# Patient Record
Sex: MALE | Race: WHITE | NOT HISPANIC OR LATINO | Employment: OTHER | URBAN - METROPOLITAN AREA
[De-identification: names, ages, dates, MRNs, and addresses within clinical notes are randomized per-mention and may not be internally consistent; named-entity substitution may affect disease eponyms.]

---

## 2017-12-04 ENCOUNTER — OFFICE VISIT (OUTPATIENT)
Dept: SLEEP MEDICINE | Facility: HOSPITAL | Age: 67
End: 2017-12-04

## 2017-12-04 VITALS
HEIGHT: 66 IN | WEIGHT: 179 LBS | HEART RATE: 68 BPM | SYSTOLIC BLOOD PRESSURE: 150 MMHG | OXYGEN SATURATION: 90 % | DIASTOLIC BLOOD PRESSURE: 102 MMHG | BODY MASS INDEX: 28.77 KG/M2

## 2017-12-04 DIAGNOSIS — G47.33 MODERATE OBSTRUCTIVE SLEEP APNEA: Primary | ICD-10-CM

## 2017-12-04 DIAGNOSIS — G47.10 HYPERSOMNIA: ICD-10-CM

## 2017-12-04 PROCEDURE — 99213 OFFICE O/P EST LOW 20 MIN: CPT | Performed by: INTERNAL MEDICINE

## 2017-12-04 NOTE — PROGRESS NOTES
Subjective:     Chief Complaint:   Chief Complaint   Patient presents with   • Follow-up       HPI:    Nic Brooks Sr. is a 67 y.o. male here for follow-up of obstructive sleep apnea.    He is here in yearly follow-up of his obstructive sleep apnea.  He remains on auto CPAP therapy at a range of 6-18.  He is utilizing a nasal mask.  He notes no significant problems.    Further details are as follows:    Since last visit sleep problem has: remained the same  Currently using PAP: yes Hours of usage during the night: 6    Amount of sleep per night : 6 hours  Average length of time it takes to fall asleep : 5 minutes  Number of awakenings per night : 2     He feels fatigue (tiredness, exhaustion, lethargy) in the daytime even when not sleepy? Occasionally   He feels sleepy (or struggles to stay awake) in the daytime? Occasionally     Boone Scale scored as 3/24.    Type of mask: nasal mask    He (since starting PAP or since last visit) has problems with the following:   Pressure from the mask 0 - No Problem  Skin irritation from the mask 0 - No Problem  Mask coming off face 0 - No Problem  Air leaks from the mask 0 - No Problem  Dry mouth or throat 0 - No Problem  Nasal congestion 0 - No Problem    I reviewed his PAP download:  Average pressure: 7  Average AHI:  1  Average minutes in large leak per night: 0      Current medications are:   Current Outpatient Prescriptions:   •  diclofenac (VOLTAREN) 75 MG EC tablet, , Disp: , Rfl:   •  famotidine (PEPCID) 20 MG tablet, , Disp: , Rfl:   •  levocetirizine (XYZAL) 5 MG tablet, , Disp: , Rfl: .      The patient's relevant past medical, surgical, family and social history were reviewed and updated in Epic as appropriate.     ROS:    Review of Systems   Constitutional: Negative for fatigue and unexpected weight change.   Respiratory: Positive for apnea.    Psychiatric/Behavioral: Negative for sleep disturbance.         Objective:    Physical Exam   Constitutional: He is  oriented to person, place, and time. He appears well-developed and well-nourished.   HENT:   Head: Normocephalic and atraumatic.   Mouth/Throat: Oropharynx is clear and moist.   Class 2 airway   Neck: Neck supple. No thyromegaly present.   Cardiovascular: Normal rate and regular rhythm.  Exam reveals no gallop and no friction rub.    No murmur heard.  Pulmonary/Chest: Effort normal. No respiratory distress. He has no wheezes. He has no rales.   Musculoskeletal: He exhibits no edema.   Neurological: He is alert and oriented to person, place, and time.   Skin: Skin is warm and dry.   Psychiatric: He has a normal mood and affect. His behavior is normal.   Vitals reviewed.      Data:    Patient's PAP download was personally reviewed including raw data and results.    Assessment:    Problem List Items Addressed This Visit        Pulmonary Problems    Moderate obstructive sleep apnea - Primary    Relevant Orders    PAP Therapy       Other    Hypersomnia    Overview     Resolved               Acceptable treatment of his obstructive sleep apnea with auto CPAP 6-18 utilizing a nasal pillow mask.    Plan:     1. No change in CPAP settings needed  2. I renewed supplies for the next year  3. Routine follow-up      Discussed in detail with the patient.  He will call prior to his follow up visit for any new problems.    Signed by  Juanjo Adam MD

## 2018-01-17 DIAGNOSIS — R06.09 DOE (DYSPNEA ON EXERTION): Primary | ICD-10-CM

## 2018-01-18 ENCOUNTER — APPOINTMENT (OUTPATIENT)
Dept: PREADMISSION TESTING | Facility: HOSPITAL | Age: 68
End: 2018-01-18

## 2018-01-18 ENCOUNTER — HOSPITAL ENCOUNTER (OUTPATIENT)
Dept: GENERAL RADIOLOGY | Facility: HOSPITAL | Age: 68
Discharge: HOME | End: 2018-01-18
Attending: INTERNAL MEDICINE | Admitting: INTERNAL MEDICINE

## 2018-01-18 ENCOUNTER — OFFICE VISIT (OUTPATIENT)
Dept: CARDIOLOGY | Facility: CLINIC | Age: 68
End: 2018-01-18

## 2018-01-18 VITALS
DIASTOLIC BLOOD PRESSURE: 89 MMHG | SYSTOLIC BLOOD PRESSURE: 141 MMHG | HEART RATE: 55 BPM | WEIGHT: 180 LBS | HEIGHT: 68 IN | BODY MASS INDEX: 27.28 KG/M2

## 2018-01-18 DIAGNOSIS — R03.0 ELEVATED BLOOD PRESSURE READING: ICD-10-CM

## 2018-01-18 DIAGNOSIS — E78.5 DYSLIPIDEMIA: ICD-10-CM

## 2018-01-18 DIAGNOSIS — R94.31 ABNORMAL EKG: ICD-10-CM

## 2018-01-18 DIAGNOSIS — R07.89 OTHER CHEST PAIN: ICD-10-CM

## 2018-01-18 DIAGNOSIS — R07.89 OTHER CHEST PAIN: Primary | ICD-10-CM

## 2018-01-18 DIAGNOSIS — R06.09 DOE (DYSPNEA ON EXERTION): ICD-10-CM

## 2018-01-18 LAB
ALBUMIN SERPL-MCNC: 4.2 G/DL (ref 3.2–4.8)
ALBUMIN/GLOB SERPL: 1.6 G/DL (ref 1.5–2.5)
ALP SERPL-CCNC: 75 U/L (ref 25–100)
ALT SERPL W P-5'-P-CCNC: 25 U/L (ref 7–40)
ANION GAP SERPL CALCULATED.3IONS-SCNC: 2 MMOL/L (ref 3–11)
ARTICHOKE IGE QN: 141 MG/DL (ref 0–130)
AST SERPL-CCNC: 24 U/L (ref 0–33)
BILIRUB SERPL-MCNC: 0.6 MG/DL (ref 0.3–1.2)
BUN BLD-MCNC: 17 MG/DL (ref 9–23)
BUN/CREAT SERPL: 15.5 (ref 7–25)
CALCIUM SPEC-SCNC: 9.7 MG/DL (ref 8.7–10.4)
CHLORIDE SERPL-SCNC: 107 MMOL/L (ref 99–109)
CHOLEST SERPL-MCNC: 200 MG/DL (ref 0–200)
CO2 SERPL-SCNC: 30 MMOL/L (ref 20–31)
CREAT BLD-MCNC: 1.1 MG/DL (ref 0.6–1.3)
DEPRECATED RDW RBC AUTO: 50.5 FL (ref 37–54)
ERYTHROCYTE [DISTWIDTH] IN BLOOD BY AUTOMATED COUNT: 14.4 % (ref 11.3–14.5)
GFR SERPL CREATININE-BSD FRML MDRD: 67 ML/MIN/1.73
GLOBULIN UR ELPH-MCNC: 2.6 GM/DL
GLUCOSE BLD-MCNC: 100 MG/DL (ref 70–100)
HBA1C MFR BLD: 5.9 % (ref 4.8–5.6)
HCT VFR BLD AUTO: 46 % (ref 38.9–50.9)
HDLC SERPL-MCNC: 48 MG/DL (ref 40–60)
HGB BLD-MCNC: 15.4 G/DL (ref 13.1–17.5)
MCH RBC QN AUTO: 31.8 PG (ref 27–31)
MCHC RBC AUTO-ENTMCNC: 33.5 G/DL (ref 32–36)
MCV RBC AUTO: 94.8 FL (ref 80–99)
PLATELET # BLD AUTO: 252 10*3/MM3 (ref 150–450)
PMV BLD AUTO: 10.2 FL (ref 6–12)
POTASSIUM BLD-SCNC: 4.9 MMOL/L (ref 3.5–5.5)
PROT SERPL-MCNC: 6.8 G/DL (ref 5.7–8.2)
RBC # BLD AUTO: 4.85 10*6/MM3 (ref 4.2–5.76)
SODIUM BLD-SCNC: 139 MMOL/L (ref 132–146)
TRIGL SERPL-MCNC: 109 MG/DL (ref 0–150)
WBC NRBC COR # BLD: 6.79 10*3/MM3 (ref 3.5–10.8)

## 2018-01-18 PROCEDURE — 80053 COMPREHEN METABOLIC PANEL: CPT | Performed by: PHYSICIAN ASSISTANT

## 2018-01-18 PROCEDURE — 85027 COMPLETE CBC AUTOMATED: CPT | Performed by: PHYSICIAN ASSISTANT

## 2018-01-18 PROCEDURE — 83036 HEMOGLOBIN GLYCOSYLATED A1C: CPT | Performed by: PHYSICIAN ASSISTANT

## 2018-01-18 PROCEDURE — 80061 LIPID PANEL: CPT | Performed by: PHYSICIAN ASSISTANT

## 2018-01-18 PROCEDURE — 36415 COLL VENOUS BLD VENIPUNCTURE: CPT

## 2018-01-18 PROCEDURE — 71046 X-RAY EXAM CHEST 2 VIEWS: CPT

## 2018-01-18 PROCEDURE — 93000 ELECTROCARDIOGRAM COMPLETE: CPT | Performed by: INTERNAL MEDICINE

## 2018-01-18 PROCEDURE — 99203 OFFICE O/P NEW LOW 30 MIN: CPT | Performed by: INTERNAL MEDICINE

## 2018-01-18 RX ORDER — ACETAMINOPHEN 325 MG/1
650 TABLET ORAL EVERY 4 HOURS PRN
Status: CANCELLED | OUTPATIENT
Start: 2018-01-18

## 2018-01-18 RX ORDER — NITROGLYCERIN 0.4 MG/1
0.4 TABLET SUBLINGUAL
Status: CANCELLED | OUTPATIENT
Start: 2018-01-18

## 2018-01-18 RX ORDER — ASPIRIN 325 MG
325 TABLET ORAL DAILY
Qty: 100 TABLET | Refills: 4
Start: 2018-01-18 | End: 2018-01-19 | Stop reason: HOSPADM

## 2018-01-18 RX ORDER — CLOPIDOGREL BISULFATE 75 MG/1
TABLET ORAL
Qty: 38 TABLET | Refills: 11 | Status: ON HOLD | OUTPATIENT
Start: 2018-01-18 | End: 2018-01-19

## 2018-01-18 NOTE — PAT
Patient states doesn't have hx of mrsa but mrsa added to infection section so pat to call ID and figure out why on chart and possibly removed before surgery tomorrow.         Pt does have open sore on right wrist- minor cut from snow blowing and skin teared- pt states dr martínez aware- minor redness and no drainage-  Pt instructed to put antibiotic ointment on tonight and keep open to air so can fully scab over.

## 2018-01-18 NOTE — PROGRESS NOTES
"[facility]  INITIAL OFFICE CONSULT    Nic Brooks Sr.  : 1950  MRN:[id number]  Home Phone:[phone]  Patient Address: [address]  [facility] [address]    Date of Encounter: 2018    PCP: Madison Hodge, SHIVA  [address] [facility] [address]  Referring MD: self-refer    IDENTIFICATION: A 67 y.o.  white male, resident of [place], [place]     Chief Complaint   Patient presents with   • Chest Pain     PROBLEM LIST:   1. Chest pain  A. Right and LHC 2005: Normal LV function, EF 75%, normal right heart pressures, minor nonobstructive coronary artery disease  B. Recurrent chest pain with abnormal EKG showing anterior YESI, \"new\" since 2005  2. History of anemia with B12 deficiency  3. Obstructive sleep apnea, on CPAP therapy  4. 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 Arthritis  7. Surgical history:   A. Tonsillectomy  B. Splenectomy  C. Lower lobectomy of right lung  D. Left knee meniscus repair  E. Lumbar discectomy    ALLERGIES: No Known Allergies    CURRENT MEDICATIONS:   •  Cyanocobalamin (B-12 COMPLIANCE INJECTION IJ), Inject as directed Every 30 Days  •  diclofenac 75 MG EC tablet, 2 Times a Day.  •  levocetirizine 5 MG tablet, As Needed    HPI: Mr. Brooks is a pleasant 66 y/o WM with history as noted above who is seen in consultation today for chest pain. He notes 2 specific incidents which are new and bothersome to him. Both episodes happened with exertion when he was outside working, first in October and again in mid December. He has mid-sternal chest pressure and associated intra-scapular pain, with sharp shooting pains that will radiate to his shoulder. He has associated shortness of breath and feels \"exhausted\" the following day after these episodes. His pain lasts a while, and notes that the first episode lasted the entire day, and the second time the pain " "lasted throughout the night as well. He took an 81mg aspirin the second time. He has not had any more episodes of chest pain since December. His EKG today is abnormal and shows some ST elevation in V2-V5, which is new compared to EKG from 2005. He denies history of hypertension, hyperlipidemia, diabetes or claudication. No history of MI, CVA, DVT/PE or rheumatic fever. No tobacco, alcohol or drug use. He is very active at home.     Cardiac Risk Factors include: advanced age (older than 55 for men, 65 for women) and male gender    ROS: All systems have been reviewed and are negative with the exception of those mentioned in the HPI and problem list above.    Surgical History:   Past Surgical History:   Procedure Laterality Date   • APPENDECTOMY     • KIDNEY STONE SURGERY     • KNEE ARTHROSCOPY W/ MENISCECTOMY     • LUMBAR DISCECTOMY     • LUNG REMOVAL, PARTIAL     • TONSILLECTOMY       Social History:   Social History     Social History   • Marital status:      Spouse name: N/A   • Number of children: N/A   • Years of education: N/A     Occupational History   • Retired      Social History Main Topics   • Smoking status: Never Smoker   • Smokeless tobacco: Never Used   • Alcohol use No   • Drug use: No   • Sexual activity: Not on file     Family History:   Family History   Problem Relation Age of Onset   • Hypertension Mother    • Cancer Mother    • Cancer Father    • Emphysema Father    • Diabetes Father    • COPD Father        Objective     Vitals:    01/18/18 1016 01/18/18 1024 01/18/18 1025 01/18/18 1026   BP: 126/82 129/87 147/92 141/89   BP Location: Left arm Left arm Right arm Right arm   Patient Position: Sitting Standing Standing Sitting   Pulse: 60 60 61 55   Weight: 81.6 kg (180 lb)      Height: 172.7 cm (68\")        Body mass index is 27.37 kg/(m^2).    PHYSICAL EXAM:  Constitutional:  Well-nourished, cooperative, in no acute distress.   Head:  Normocephalic, without obvious " "abnormality, atraumatic.   Neck: No adenopathy, supple, trachea midline, no thyromegaly, no    carotid bruit, no JVD.   Respiratory:   Clear to auscultation bilaterally; respirations regular, even and unlabored. No wheezes, rales or ronchi.    Cardiovascular:  Regular rhythm and normal rate, normal S1 and S2, no            murmur, no gallop, no rub, no click.   Pulses: Peripheral pulses are present and equal bilaterally.   GI:   Soft, non-distended. Bowel sounds heard throughout. Non-tender to palpation, no guarding.   Extremities: No edema, clubbing or cyanosis.   Skin: Skin is warm and dry. No bleeding, bruising or rash.   Neurological: Alert, oriented to time, person and place. No focal deficits.     Labs/Diagnostic Data  No recent labs     Labs 10/2016:   HgbA1C: 5.7%  TSH 1.24  CBC: WBC 6.2, RBC 4.47, Hgb 14, Hct 42, Plt 298  Lipid Panel: , HDL 38, , Trig 68  CMP: Na 143, K 5.3, Cl 107, CO2 28, Glucose 84, BUN 22, Cr 0.9, AST 23, ALT 25, Alk Phos 70      ECG 12 Lead  Date/Time: 1/18/2018 11:34 AM  Performed by: MELANI SULLIVAN  Authorized by: MELANI SULLIVAN   Comparison: compared with previous ECG from 1/20/2005  Comparison to previous ECG: Anterior YESI, \"new\" since 1/20/2005  Rhythm: sinus rhythm  Rate: normal  BPM: 62  Conduction: incomplete RBBB  QRS axis: normal  Clinical impression: abnormal ECG  Comments: Anterior YESI, \"new\" since 1/20/2005        Radiology Data:   CXR 1/18/2018:  FINDINGS:   1. There is blunting of the right costophrenic angle with elevation  right hemidiaphragm and there is considerable pleural reaction at the  right lung base. This suggests chronic pleural scarring at the right  base perhaps with a small amount of effusion, possibly organized  effusion. This appearance has increased slightly when compared to  04/06/2016 but was present to some degree previously.  2. The lungs are otherwise clear. Active parenchymal lung disease is not  identified.    " "      IMPRESSION:  1. Pleural scarring pleural reaction and increased pleural density is  seen right lung base, very slightly progressed from 2016.  2. No acute findings are otherwise noted in the chest. There is no new  active disease. Heart size is normal and the heart is compensated.      Assessment and Plan:     1. His EKG today shows 1-2 mm ST elevation in V2-V5 which is \"new\" since EKG 01/2005. His chest pain has mixed features, however when taken together with the EKG this is bothersome, and he needs a cardiac catheterization to rule out ischemic heart disease.  2. He will begin a daily aspirin 325mg as well as take 600mg of Plavix today with plan for left heart catheterization tomorrow. He understands and agrees.  3. Labs will be obtained to include CBC, CMP, A1C and lipid panel  4. Final disposition to follow pending above.     Thank you for allowing me to participate in the care of Nic Brooks SrReyna. Feel free to contact me directly with any further questions or concerns.    Scribed for Negrito Wilkes MD by Letha Scott PA-C. 1/18/2018  10:28 AM   I, Negrito Wilkes MD, [facility], [facility], personally performed the services described in this documentation as scribed by the above named individual in my presence, and it is both accurate and complete. At 6:31 PM on 01/18/2018    "

## 2018-01-18 NOTE — DISCHARGE INSTRUCTIONS
The following instructions given during Pre Admission Testing visit:    Do not eat or drink anything after MN except for sips of water with your a.m. Prescription meds unless otherwise instructed by your physician.    Glasses and jewelry may be worn, but dentures must be removed prior to cath/procedure.    Leave any items you consider valuable at home.    Family members may wait in [facility] waiting area during procedure.    Bring all medications in their original containers the day of procedure.    Bring photo ID and insurance cards on the day of procedure.    Need to make arrangements for transportation prior to discharge.    The following handouts were given:     Heart Cath pathway (if applicable)   Cardiac Cath booklet published by [facility]    OR appropriate [facility] procedure booklet    If applicable, pt instructed to bring CPAP mask and tubing the day of procedure.

## 2018-01-19 ENCOUNTER — HOSPITAL ENCOUNTER (OUTPATIENT)
Facility: HOSPITAL | Age: 68
Discharge: HOME | End: 2018-01-19
Attending: INTERNAL MEDICINE | Admitting: INTERNAL MEDICINE

## 2018-01-19 VITALS
SYSTOLIC BLOOD PRESSURE: 145 MMHG | OXYGEN SATURATION: 96 % | RESPIRATION RATE: 18 BRPM | WEIGHT: 181 LBS | DIASTOLIC BLOOD PRESSURE: 101 MMHG | BODY MASS INDEX: 28.41 KG/M2 | TEMPERATURE: 97.3 F | HEIGHT: 67 IN | HEART RATE: 63 BPM

## 2018-01-19 DIAGNOSIS — R03.0 ELEVATED BLOOD PRESSURE READING: ICD-10-CM

## 2018-01-19 DIAGNOSIS — R94.31 ABNORMAL EKG: ICD-10-CM

## 2018-01-19 DIAGNOSIS — E78.5 DYSLIPIDEMIA: ICD-10-CM

## 2018-01-19 DIAGNOSIS — R07.89 OTHER CHEST PAIN: ICD-10-CM

## 2018-01-19 LAB
ACT BLD: 241 SECONDS (ref 82–152)
ACT BLD: 279 SECONDS (ref 82–152)

## 2018-01-19 PROCEDURE — 25010000002 FENTANYL CITRATE (PF) 100 MCG/2ML SOLUTION: Performed by: INTERNAL MEDICINE

## 2018-01-19 PROCEDURE — C1769 GUIDE WIRE: HCPCS | Performed by: INTERNAL MEDICINE

## 2018-01-19 PROCEDURE — C9600 PERC DRUG-EL COR STENT SING: HCPCS | Performed by: INTERNAL MEDICINE

## 2018-01-19 PROCEDURE — C1874 STENT, COATED/COV W/DEL SYS: HCPCS | Performed by: INTERNAL MEDICINE

## 2018-01-19 PROCEDURE — C1725 CATH, TRANSLUMIN NON-LASER: HCPCS | Performed by: INTERNAL MEDICINE

## 2018-01-19 PROCEDURE — 93571 IV DOP VEL&/PRESS C FLO 1ST: CPT | Performed by: INTERNAL MEDICINE

## 2018-01-19 PROCEDURE — 85347 COAGULATION TIME ACTIVATED: CPT

## 2018-01-19 PROCEDURE — 93458 L HRT ARTERY/VENTRICLE ANGIO: CPT | Performed by: INTERNAL MEDICINE

## 2018-01-19 PROCEDURE — 92978 ENDOLUMINL IVUS OCT C 1ST: CPT | Performed by: INTERNAL MEDICINE

## 2018-01-19 PROCEDURE — 92928 PRQ TCAT PLMT NTRAC ST 1 LES: CPT | Performed by: INTERNAL MEDICINE

## 2018-01-19 PROCEDURE — 25010000002 MIDAZOLAM PER 1 MG: Performed by: INTERNAL MEDICINE

## 2018-01-19 PROCEDURE — 25010000002 HEPARIN (PORCINE) PER 1000 UNITS: Performed by: INTERNAL MEDICINE

## 2018-01-19 PROCEDURE — C1894 INTRO/SHEATH, NON-LASER: HCPCS | Performed by: INTERNAL MEDICINE

## 2018-01-19 PROCEDURE — C1887 CATHETER, GUIDING: HCPCS | Performed by: INTERNAL MEDICINE

## 2018-01-19 PROCEDURE — G0378 HOSPITAL OBSERVATION PER HR: HCPCS

## 2018-01-19 PROCEDURE — C1753 CATH, INTRAVAS ULTRASOUND: HCPCS | Performed by: INTERNAL MEDICINE

## 2018-01-19 PROCEDURE — 0 IOPAMIDOL PER 1 ML: Performed by: INTERNAL MEDICINE

## 2018-01-19 PROCEDURE — C1760 CLOSURE DEV, VASC: HCPCS | Performed by: INTERNAL MEDICINE

## 2018-01-19 DEVICE — XIENCE ALPINE EVEROLIMUS ELUTING CORONARY STENT SYSTEM 3.00 MM X 28 MM / RAPID-EXCHANGE
Type: IMPLANTABLE DEVICE | Status: FUNCTIONAL
Brand: XIENCE ALPINE

## 2018-01-19 RX ORDER — HEPARIN SODIUM 1000 [USP'U]/ML
INJECTION, SOLUTION INTRAVENOUS; SUBCUTANEOUS AS NEEDED
Status: DISCONTINUED | OUTPATIENT
Start: 2018-01-19 | End: 2018-01-19 | Stop reason: HOSPADM

## 2018-01-19 RX ORDER — NITROGLYCERIN 5 MG/ML
INJECTION, SOLUTION INTRAVENOUS AS NEEDED
Status: DISCONTINUED | OUTPATIENT
Start: 2018-01-19 | End: 2018-01-19 | Stop reason: HOSPADM

## 2018-01-19 RX ORDER — ASPIRIN 81 MG/1
81 TABLET ORAL DAILY
Status: DISCONTINUED | OUTPATIENT
Start: 2018-01-19 | End: 2018-01-19 | Stop reason: HOSPADM

## 2018-01-19 RX ORDER — MIDAZOLAM HYDROCHLORIDE 1 MG/ML
INJECTION INTRAMUSCULAR; INTRAVENOUS AS NEEDED
Status: DISCONTINUED | OUTPATIENT
Start: 2018-01-19 | End: 2018-01-19 | Stop reason: HOSPADM

## 2018-01-19 RX ORDER — CLOPIDOGREL BISULFATE 75 MG/1
75 TABLET ORAL DAILY
Status: DISCONTINUED | OUTPATIENT
Start: 2018-01-19 | End: 2018-01-19 | Stop reason: HOSPADM

## 2018-01-19 RX ORDER — ASPIRIN 81 MG/1
81 TABLET ORAL DAILY
Qty: 100 TABLET | Refills: 4 | Status: SHIPPED | OUTPATIENT
Start: 2018-01-20 | End: 2019-01-20

## 2018-01-19 RX ORDER — NITROGLYCERIN 0.4 MG/1
0.4 TABLET SUBLINGUAL
Status: DISCONTINUED | OUTPATIENT
Start: 2018-01-19 | End: 2018-01-19

## 2018-01-19 RX ORDER — FENTANYL CITRATE 50 UG/ML
INJECTION, SOLUTION INTRAMUSCULAR; INTRAVENOUS AS NEEDED
Status: DISCONTINUED | OUTPATIENT
Start: 2018-01-19 | End: 2018-01-19 | Stop reason: HOSPADM

## 2018-01-19 RX ORDER — CLOPIDOGREL BISULFATE 75 MG/1
75 TABLET ORAL DAILY
Qty: 90 TABLET | Refills: 3 | Status: SHIPPED | OUTPATIENT
Start: 2018-01-19 | End: 2018-11-08 | Stop reason: SDUPTHER

## 2018-01-19 RX ORDER — LISINOPRIL 5 MG/1
5 TABLET ORAL DAILY
Qty: 90 TABLET | Refills: 3 | Status: SHIPPED | OUTPATIENT
Start: 2018-01-19 | End: 2018-05-01 | Stop reason: SINTOL

## 2018-01-19 RX ORDER — ACETAMINOPHEN 325 MG/1
650 TABLET ORAL EVERY 4 HOURS PRN
Status: DISCONTINUED | OUTPATIENT
Start: 2018-01-19 | End: 2018-01-19 | Stop reason: HOSPADM

## 2018-01-19 RX ORDER — ATORVASTATIN CALCIUM 80 MG/1
80 TABLET, FILM COATED ORAL DAILY
Qty: 90 TABLET | Refills: 3 | Status: SHIPPED | OUTPATIENT
Start: 2018-01-19 | End: 2018-05-01 | Stop reason: SINTOL

## 2018-01-19 RX ORDER — ACETAMINOPHEN 325 MG/1
650 TABLET ORAL EVERY 4 HOURS PRN
Status: DISCONTINUED | OUTPATIENT
Start: 2018-01-19 | End: 2018-01-19

## 2018-01-19 RX ORDER — LIDOCAINE HYDROCHLORIDE 10 MG/ML
INJECTION, SOLUTION INFILTRATION; PERINEURAL AS NEEDED
Status: DISCONTINUED | OUTPATIENT
Start: 2018-01-19 | End: 2018-01-19 | Stop reason: HOSPADM

## 2018-01-19 RX ADMIN — CLOPIDOGREL BISULFATE 75 MG: 75 TABLET ORAL at 09:17

## 2018-01-19 RX ADMIN — ASPIRIN 81 MG: 81 TABLET, COATED ORAL at 09:17

## 2018-01-19 NOTE — H&P
"    Pre-cardiac Catheterization History and Physical  [facility]      Patient:  Nic Brooks Sr.  :  1950  MRN: [id number]    PCP:  SHIVA Amaya  PHONE:  [phone]    DATE: 2018  ID: Nic Brooks Sr. is a 67 y.o. male resident of [place], [place]    CC: Chest pain and abnormal EKG    PROBLEM LIST:   1. Chest pain  A. Right and LHC 2005: Normal LV function, EF 75%, normal right heart pressures, minor nonobstructive coronary artery disease  B. Recurrent chest pain with abnormal EKG showing anterior YESI, \"new\" since 2005  2. History of anemia with B12 deficiency  3. Obstructive sleep apnea, on CPAP therapy  4. 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 Arthritis  7. Surgical history:   A. Tonsillectomy  B. Splenectomy  C. Lower lobectomy of right lung  D. Left knee meniscus repair  E. Lumbar discectomy     BRIEF HPI: Patient was seen in clinic yesterday, please refer to that note. He is a pleasant 68 y/o WM with history as noted above who presented in consultation for chest pain. He had 2 specific incidents which were concerning for him happening a few months apart, with the last episode happening approximately 1 month ago. He has had midsternal chest pressure and intrascapular pain associated with exertion, but also has atypical features which are sharp shooting pains and long duration. He has not had chest pain recently, however an EKG in office yesterday demonstrated ST elevation in V2-V5 which is \"new\" compared to EKG from . He was given  and a loading dose of Plavix 600mg yesterday. No other symptoms or history of cardiovascular or cerebrovascular disease.      Cardiac Risk Factors: advanced age (older than 55 for men, 65 for women), dyslipidemia, hypertension and male gender    Allergies:      No Known Allergies    MEDICATIONS:  No current " "facility-administered medications on file prior to encounter.      Current Outpatient Prescriptions on File Prior to Encounter   Medication Sig   • aspirin 325 MG tablet Take 1 tablet by mouth Daily. (Patient taking differently: Take 81 mg by mouth Daily.)   • clopidogrel (PLAVIX) 75 MG tablet Take 8 tablets by mouth today for loading dose of 600mg, then take 75mg daily beginning tomorrow. (Patient taking differently: Take 8 tablets by mouth today for loading dose of 600mg, then take 75mg daily beginning tomorrow.  - will start tomorrow)   • Cyanocobalamin (B-12 COMPLIANCE INJECTION IJ) Inject  as directed Every 30 (Thirty) Days. LD January 5th   • diclofenac (VOLTAREN) 75 MG EC tablet Take 75 mg by mouth 2 (Two) Times a Day.   • levocetirizine (XYZAL) 5 MG tablet Take 5 mg by mouth As Needed for Allergies.     Past medical & surgical history, social and family history reviewed in the electronic medical record.    ROS: Pertinent positives listed in the HPI and problem list above. All others reviewed and negative.     Physical Exam:   BP (!) 154/107 (BP Location: Left arm, Patient Position: Lying)  Pulse 78  Temp 97.3 °F (36.3 °C) (Temporal Artery )   Resp 18  Ht 170.2 cm (67\")  Wt 82.1 kg (181 lb)  SpO2 93%  BMI 28.35 kg/m2    Constitutional:    Alert, cooperative, in no acute distress   Neck:     No Jugular venous distention, adenopathy, or thyromegaly noted.     Heart:    Regular rhythm and normal rate, normal S1 and S2, no murmurs,gallops, rubs, or clicks. No distinct PMI noted.    Lungs:     Clear to auscultation bilaterally, respirations regular, even     and unlabored    Abdomen:     Soft non-tender, non-distended, normal bowel sounds, no masses or organomegaly   Extremities:   No gross deformities, no edema, clubbing, or cyanosis.    Pulses:   Peripheral pulses palpable and equal bilaterally.     Barbaeu Test:  Left: Normal  (oxymetric Allens) Right: Not Assessed     Labs and Diagnostic " "Data:    Results from last 7 days  Lab Units 01/18/18  1150   SODIUM mmol/L 139   POTASSIUM mmol/L 4.9   CHLORIDE mmol/L 107   CO2 mmol/L 30.0   BUN mg/dL 17   CREATININE mg/dL 1.10   GLUCOSE mg/dL 100   CALCIUM mg/dL 9.7       Results from last 7 days  Lab Units 01/18/18  1150   WBC 10*3/mm3 6.79   HEMOGLOBIN g/dL 15.4   HEMATOCRIT % 46.0   PLATELETS 10*3/mm3 252     Lab Results   Component Value Date    CHOL 200 01/18/2018    TRIG 109 01/18/2018    HDL 48 01/18/2018    LDLDIRECT 141 (H) 01/18/2018    AST 24 01/18/2018    ALT 25 01/18/2018       Results from last 7 days  Lab Units 01/18/18  1150   HEMOGLOBIN A1C % 5.90*             CXR 1/18/18:  FINDINGS:   1. There is blunting of the right costophrenic angle with elevation  right hemidiaphragm and there is considerable pleural reaction at the  right lung base. This suggests chronic pleural scarring at the right  base perhaps with a small amount of effusion, possibly organized  effusion. This appearance has increased slightly when compared to  04/06/2016 but was present to some degree previously.  2. The lungs are otherwise clear. Active parenchymal lung disease is not  identified.          IMPRESSION:  1. Pleural scarring pleural reaction and increased pleural density is  seen right lung base, very slightly progressed from 2016.  2. No acute findings are otherwise noted in the chest. There is no new  active disease. Heart size is normal and the heart is compensated.      EKG 1/18/18: Anterior YESI, \"new\" since 1/20/2005    IMPRESSION:  · 68 y/o WM with recent episodes of chest pain of mixed features, and abnormal EKG in office demonstrating ST elevation in V2-V5. He presents for catheterization studies with intervention standby.     PLAN:  · Procedure to perform: LHC +/- CBI. Risks, benefits and alternatives to the procedure explained to the patient and he understands and wishes to proceed.     INegrito MD, personally performed the services described as " documented by the above named individual. I have made any necessary edits and it is both accurate and complete 1/19/2018  12:10 PM    Scribed for Negrito Wilkes MD by Letha Scott PA-C. 1/19/2018  8:42 AM

## 2018-01-23 ENCOUNTER — DOCUMENTATION (OUTPATIENT)
Dept: CARDIAC REHAB | Facility: HOSPITAL | Age: 68
End: 2018-01-23

## 2018-01-23 NOTE — PROGRESS NOTES
Pt. Referred for Phase II Cardiac Rehab. Staff attempted to contact patient regarding program information and scheduling.  Staff unable to leave message with Patient due to no voicemail.

## 2018-01-23 NOTE — PROGRESS NOTES
Pt. Referred for Phase II Cardiac Rehab. Staff discussed benefits of exercise, program protocol, and educational material provided. Teach back verified.  Permission granted from patient for staff to fax referral information to outlying program at this time.  Staff to fax referral info to Cardiac Rehab in [place].

## 2018-05-01 ENCOUNTER — OFFICE VISIT (OUTPATIENT)
Dept: CARDIOLOGY | Facility: CLINIC | Age: 68
End: 2018-05-01

## 2018-05-01 VITALS
BODY MASS INDEX: 26.36 KG/M2 | DIASTOLIC BLOOD PRESSURE: 70 MMHG | WEIGHT: 178 LBS | SYSTOLIC BLOOD PRESSURE: 101 MMHG | HEART RATE: 98 BPM | HEIGHT: 69 IN

## 2018-05-01 DIAGNOSIS — I25.10 CORONARY ARTERY DISEASE INVOLVING NATIVE CORONARY ARTERY OF NATIVE HEART WITHOUT ANGINA PECTORIS: Primary | ICD-10-CM

## 2018-05-01 DIAGNOSIS — E87.5 HYPERKALEMIA: ICD-10-CM

## 2018-05-01 DIAGNOSIS — E78.5 DYSLIPIDEMIA, GOAL LDL BELOW 70: ICD-10-CM

## 2018-05-01 PROCEDURE — 99214 OFFICE O/P EST MOD 30 MIN: CPT | Performed by: INTERNAL MEDICINE

## 2018-05-01 RX ORDER — ROSUVASTATIN CALCIUM 40 MG/1
40 TABLET, COATED ORAL DAILY
Qty: 30 TABLET | Refills: 11 | Status: SHIPPED | OUTPATIENT
Start: 2018-05-01 | End: 2018-11-08 | Stop reason: SDUPTHER

## 2018-05-01 NOTE — PROGRESS NOTES
"  OFFICE FOLLOW UP     Date of Encounter:2018     Name: Nic Brooks Sr.  : 1950  Address: [address] [address]  Phone: [phone]    PCP: SHIVA Amaya  [address] [facility] [address]    Nic Brooks Sr. is a 68 y.o. male.      Chief Complaint: Follow up of CAD    Problem List:   1. CAD  A. Right and [facility] 2005: Normal LV function, EF 75%, normal right heart pressures, minor nonobstructive coronary artery disease  B.  2018, Recurrent chest pain with abnormal EKG showing anterior YESI, \"new\" since 2005  C.  [facility], 2018:  Normal LV function, 3 vessel nonobstructive, KERRY to LAD.  2. History of anemia with B12 deficiency  3. Obstructive sleep apnea, on CPAP therapy  4. 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 Arthritis  7. Surgical history:   A. Tonsillectomy  B. Splenectomy  C. Lower lobectomy of right lung  D. Left knee meniscus repair  E. Lumbar discectomy    Allergies:  No Known Allergies    Current Medications:  •  aspirin 81 MG EC by mouth Daily.  •  atorvastatin 80 MG by mouth Daily.  •  clopidogrel 75 MG by mouth Daily  •  diclofenac 75 mg by mouth 2 (Two) Times a Day.  •  levo cetirizine 5 mg by mouth As Needed for Allergies.  •  lisinopril 5 MG by mouth Daily.    History of Present Illness:  Mr. Brooks returns for follow-up following his drug-eluting stent placed in the proximal to mid LAD in the 2018.  He has been active physically.  He has been active in his [employer] projects.   He has 2 issues today.  First, he has had leg \"cramps\" that occurred the last time that he tried to take high dose atorvastatin.  They completely abated but returned \"unchanged\" following his 2018 stent procedure.  Secondly, labs from an outside hospital reveal normal renal function but with a potassium of 4.8 in early February, 5.8 in mid April, and 5.8 in late April.  In a brief " "interview I do not think that his diet suggest a diet with high potassium content.         The following portions of the patient's history were reviewed and updated as appropriate: allergies, current medications and problem list.    ROS: Pertinent positives as listed in the HPI.  All other systems reviewed and negative.    Objective:    Vitals:    05/01/18 1454 05/01/18 1457   BP: 114/69 101/70   BP Location: Left arm Left arm   Patient Position: Sitting Standing   Pulse: 95 98   Weight: 80.7 kg (178 lb) 80.7 kg (178 lb)   Height: 175.3 cm (69\") 175.3 cm (69\")       Physical Exam:  GENERAL: Alert, cooperative, in no acute distress.   HEENT: Fundoscopic deferred, otherwise unremarkable.  NECK: No Jugular venous distention, adenopathy, or thyromegaly noted.   HEART: Regular rhythm, normal rate, and no murmurs, gallops, or rubs.   LUNGS: Clear to auscultation bilaterally. No wheezing, rales or ronchi.  ABDOMEN: Flat without evidence of organomegaly, masses, or tenderness.  NEUROLOGIC: No focal abnormalities involving strength or sensation are noted.   EXTREMITIES: No clubbing, cyanosis, or edema noted.     Diagnostic Data:    4/24/18  HGB A1c: 5.9  CMP:  GLU 94, BUN 22, CR 1.0, , K 5.8, AST 19, ALT 33  LIPID: LDL=50, HDL=48, TG=65, 4/24/18.  Procedures      Assessment and Plan:   1.  CAD: Asymptomatic post stenting (KERRY) in January. Continue ASA, plavix and statin. 0.  2.  HLP:  Myalgias noted with high dose atorvastatin, will change to rosuvastatin 40 mg daily at bedtime.  3. Hyperkalemia: I have asked the patient to avoid foods with obviously high potassium content.  I have asked him to discontinue lisinopril.  We will check potassium, blood pressure, and a lipid panel in about 1 month.  *Upon checkout patient wishes to try CoQ10 with atorvastatin. He was also taking atorvastatin in the morning. He will change this to the evening and update our office if tolerated.     I will see Nic Brooks Sr. back in 6 " months or sooner on an as needed basis.    I, Negrito Wilkes MD, personally performed the services described as documented by the above named individual. I have made any necessary edits and it is both accurate and complete 5/1/2018  6:59 PM    Scribed for Negrito Wilkes MD by Gisel Escobar RN. 05/01/2018 3:28 PM.        EMR Dragon/Transcription Disclaimer:  Much of this encounter note is an electronic transcription/translation of spoken language to printed text.  The electronic translation of spoken language may permit erroneous, or at times, nonsensical words or phrases to be inadvertently transcribed.  Although I have reviewed the note for such errors, some may still exist.

## 2018-11-08 ENCOUNTER — OFFICE VISIT (OUTPATIENT)
Dept: CARDIOLOGY | Facility: CLINIC | Age: 68
End: 2018-11-08

## 2018-11-08 VITALS
SYSTOLIC BLOOD PRESSURE: 130 MMHG | WEIGHT: 182 LBS | HEART RATE: 70 BPM | BODY MASS INDEX: 26.96 KG/M2 | HEIGHT: 69 IN | OXYGEN SATURATION: 95 % | DIASTOLIC BLOOD PRESSURE: 85 MMHG

## 2018-11-08 DIAGNOSIS — E78.5 DYSLIPIDEMIA, GOAL LDL BELOW 70: Primary | ICD-10-CM

## 2018-11-08 DIAGNOSIS — I10 ESSENTIAL HYPERTENSION: ICD-10-CM

## 2018-11-08 DIAGNOSIS — I25.10 CORONARY ARTERY DISEASE INVOLVING NATIVE CORONARY ARTERY OF NATIVE HEART WITHOUT ANGINA PECTORIS: ICD-10-CM

## 2018-11-08 DIAGNOSIS — E87.5 HYPERKALEMIA: ICD-10-CM

## 2018-11-08 PROCEDURE — 99214 OFFICE O/P EST MOD 30 MIN: CPT | Performed by: INTERNAL MEDICINE

## 2018-11-08 RX ORDER — CLOPIDOGREL BISULFATE 75 MG/1
75 TABLET ORAL DAILY
Qty: 90 TABLET | Refills: 3 | Status: SHIPPED | OUTPATIENT
Start: 2018-11-08 | End: 2019-11-08

## 2018-11-08 RX ORDER — CLOPIDOGREL BISULFATE 75 MG/1
75 TABLET ORAL DAILY
Qty: 90 TABLET | Refills: 3 | Status: SHIPPED | OUTPATIENT
Start: 2018-11-08 | End: 2018-11-08 | Stop reason: SDUPTHER

## 2018-11-08 RX ORDER — CHLORTHALIDONE 50 MG/1
50 TABLET ORAL DAILY
Qty: 30 TABLET | Refills: 11 | Status: SHIPPED | OUTPATIENT
Start: 2018-11-08 | End: 2020-01-20

## 2018-11-08 RX ORDER — ROSUVASTATIN CALCIUM 40 MG/1
40 TABLET, COATED ORAL DAILY
Qty: 30 TABLET | Refills: 11 | Status: SHIPPED | OUTPATIENT
Start: 2018-11-08 | End: 2019-10-14 | Stop reason: SDUPTHER

## 2018-11-08 RX ORDER — UBIDECARENONE 100 MG
100 CAPSULE ORAL DAILY
COMMUNITY

## 2018-11-08 NOTE — PROGRESS NOTES
"  OFFICE FOLLOW UP     Date of Encounter:2018     Name: Nic Brooks Sr.  : 1950  Address: [address] [place] [id number]  Home Phone:[phone]    PCP: Madison Hodge APRN [address] [facility] [address]    Nic Brooks Sr. is a 68 y.o. male.    Chief Complaint: Follow up of CAD, HLD    Problem List:   1. CAD  A. Right and [facility] 2005: Normal LV function, EF 75%, normal right heart pressures, minor nonobstructive coronary artery disease  B.  2018, Recurrent chest pain with abnormal EKG showing anterior YESI, \"new\" since 2005  C.  [facility], 2018:  Normal LV function, 3 vessel nonobstructive, iFR 0.89, KERRY to LAD.  2. History of anemia with B12 deficiency  3. Obstructive sleep apnea, on CPAP therapy  4. 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 Arthritis  7. Hyperlipidemia  8. Surgical history:   A. Tonsillectomy  B. Splenectomy  C. Lower lobectomy of right lung  D. Left knee meniscus repair  E. Lumbar discectomy    Allergies:  No Known Allergies    Current Medications:  •  aspirin 81 MG EC by mouth Daily.  •  clopidogrel 75 MG by mouth Daily  •  coenzyme Q10 100 mg by mouth Daily.  •  Cyanocobalamin Inject  as directed Every 30 (Thirty) Days. LD   •  diclofenac 75 MG EC by mouth Daily.  •  levocetirizine 5 mg by mouth As Needed for Allergies.  •  rosuvastatin 40 MG by mouth Daily.    History of Present Illness:           Mr. Brooks presents today for yearly follow up. Since his last visit he denies any cardiac symptoms, specifically no angina, unusual exertional dyspnea, palpitations or syncope. 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 He had labs on Monday which show LDL is at target, and K is still a little elevated at 5.4. His blood pressure log from home also shows borderline elevated BP's, and he reports his Lisinopril was discontinued last visit due to this concern. " "    The following portions of the patient's history were reviewed and updated as appropriate: allergies, current medications and problem list.    ROS: Pertinent positives as listed in the HPI.  All other systems reviewed and negative.    Objective:  Vitals:    11/08/18 1331   BP: 130/85   BP Location: Right arm   Patient Position: Sitting   Pulse: 70   SpO2: 95%   Weight: 82.6 kg (182 lb)   Height: 175.3 cm (69\")       Physical Exam:  GENERAL: Alert, cooperative, in no acute distress.   HEENT: Normocephalic, no adenopathy, no jugular venous distention  HEART: No discrete PMI is noted. Regular rhythm, normal rate, and no murmurs, gallops, or rubs.   LUNGS: Clear to auscultation bilaterally. No wheezing, rales or ronchi.  ABDOMEN: Soft, bowel sounds present, non-tender   NEUROLOGIC: No focal abnormalities involving strength or sensation are noted.   EXTREMITIES: No clubbing, cyanosis, or edema noted.     Diagnostic Data:    11/5/2018  Lipid: , HDL, 65, TRIG 85, LDL 39    K: 5.4    Procedures    Assessment and Plan:   1.  CAD: asymptomatic without angina. Continue DAPT and statin.   2.  HLD: LDL of 40 is at target and he will continue his Crestor 40mg.  3.  HTN: he has borderline elevated BP's, however with an elevated K level we will not re-challenge ACE-I or ARB. We will begin Chlorthalidone 50mg daily and see him back in 6 months with labs at that time.      Scribed for Negrito Wilkes MD by Letha Arenas PA-C. 11/08/2018 1:35 PM.  I, Negrito Wilkes MD, [facility], [facility], personally performed the services described in this documentation as scribed by the above named individual in my presence, and it is both accurate and complete. At 3:59 PM on 11/08/2018        EMR Dragon/Transcription Disclaimer:  Much of this encounter note is an electronic transcription/translation of spoken language to printed text.  The electronic translation of spoken language may permit erroneous, or at times, nonsensical words " or phrases to be inadvertently transcribed.  Although I have reviewed the note for such errors, some may still exist.

## 2018-11-26 ENCOUNTER — OFFICE VISIT (OUTPATIENT)
Dept: SLEEP MEDICINE | Facility: HOSPITAL | Age: 68
End: 2018-11-26

## 2018-11-26 VITALS
WEIGHT: 179.8 LBS | HEIGHT: 69 IN | HEART RATE: 76 BPM | OXYGEN SATURATION: 93 % | SYSTOLIC BLOOD PRESSURE: 112 MMHG | DIASTOLIC BLOOD PRESSURE: 70 MMHG | BODY MASS INDEX: 26.63 KG/M2

## 2018-11-26 DIAGNOSIS — G47.33 OSA (OBSTRUCTIVE SLEEP APNEA): Primary | ICD-10-CM

## 2018-11-26 PROCEDURE — 99213 OFFICE O/P EST LOW 20 MIN: CPT | Performed by: NURSE PRACTITIONER

## 2018-11-26 NOTE — PROGRESS NOTES
Subjective: Follow-up        Chief Complaint:   Chief Complaint   Patient presents with   • Follow-up       HPI:    Nic Brooks Sr. is a 68 y.o. male here for follow-up of pollo.  Patient was last seen 12/4/17 is here for his yearly follow-up.  He is doing very well with his CPAP therapy.  He sleeps well at night and awakens feeling very rested.  His [place] scale is 5/24.  Patient is doing well with current settings and wishes to continue CPAP therapy.        Current medications are:   Current Outpatient Medications:   •  aspirin 81 MG EC tablet, Take 1 tablet by mouth Daily., Disp: 100 tablet, Rfl: 4  •  chlorthalidone (HYGROTEN) 50 MG tablet, Take 1 tablet by mouth Daily., Disp: 30 tablet, Rfl: 11  •  clopidogrel (PLAVIX) 75 MG tablet, Take 1 tablet by mouth Daily., Disp: 90 tablet, Rfl: 3  •  coenzyme Q10 100 MG capsule, Take 100 mg by mouth Daily., Disp: , Rfl:   •  Cyanocobalamin (B-12 COMPLIANCE INJECTION IJ), Inject  as directed Every 30 (Thirty) Days. LD January 5th, Disp: , Rfl:   •  diclofenac (VOLTAREN) 75 MG EC tablet, Take 75 mg by mouth Daily., Disp: , Rfl:   •  levocetirizine (XYZAL) 5 MG tablet, Take 5 mg by mouth As Needed for Allergies., Disp: , Rfl:   •  rosuvastatin (CRESTOR) 40 MG tablet, Take 1 tablet by mouth Daily., Disp: 30 tablet, Rfl: 11.      The patient's relevant past medical, surgical, family and social history were reviewed and updated in Epic as appropriate.       Review of Systems   HENT: Positive for postnasal drip and sinus pain.    Eyes: Positive for visual disturbance.   Respiratory: Positive for apnea.    Allergic/Immunologic: Positive for environmental allergies.   Psychiatric/Behavioral: Positive for sleep disturbance.   All other systems reviewed and are negative.        Objective:    Physical Exam   Constitutional: He is oriented to person, place, and time. He appears well-developed and well-nourished.   HENT:   Head: Normocephalic and atraumatic.   Mouth/Throat:  Oropharynx is clear and moist.   Mallampati 4 anatomy   Eyes: Conjunctivae are normal.   Neck: Neck supple. No thyromegaly present.   Cardiovascular: Normal rate and regular rhythm.   Pulmonary/Chest: Effort normal and breath sounds normal.   Lymphadenopathy:     He has no cervical adenopathy.   Neurological: He is alert and oriented to person, place, and time.   Skin: Skin is warm and dry.   Psychiatric: He has a normal mood and affect. His behavior is normal. Judgment and thought content normal.   Nursing note and vitals reviewed.   download has been reviewed with patient.  Greater than 4 hour usage 77.2%.  90% pressure 8.3 with a controlled AHI 1.2.      ASSESSMENT/PLAN    Nic was seen today for follow-up.    Diagnoses and all orders for this visit:    GAUDENCIO (obstructive sleep apnea)  -     CPAP Therapy            1. Counseled patient regarding multimodal approach with healthy nutrition, healthy sleep, regular physical activity, social activities, counseling, and medications. Encouraged to practice lateral sleep position. Avoid alcohol and sedatives close to bedtime.  2. Refill supplies ×1 year.  3. Return to clinic one year or sooner as symptoms warrant.    I have reviewed the results of my evaluation and impression and discussed my recommendations in detail with the patient.      Signed by  SHIVA Edwards    November 26, 2018      CC: aMdison Hodge APRN          No ref. provider found

## 2018-11-26 NOTE — PATIENT INSTRUCTIONS

## 2019-05-16 ENCOUNTER — OFFICE VISIT (OUTPATIENT)
Dept: CARDIOLOGY | Facility: CLINIC | Age: 69
End: 2019-05-16

## 2019-05-16 VITALS
WEIGHT: 176 LBS | HEIGHT: 69 IN | SYSTOLIC BLOOD PRESSURE: 112 MMHG | HEART RATE: 81 BPM | DIASTOLIC BLOOD PRESSURE: 75 MMHG | BODY MASS INDEX: 26.07 KG/M2

## 2019-05-16 DIAGNOSIS — I10 ESSENTIAL HYPERTENSION: ICD-10-CM

## 2019-05-16 DIAGNOSIS — E78.5 DYSLIPIDEMIA, GOAL LDL BELOW 70: ICD-10-CM

## 2019-05-16 DIAGNOSIS — I25.10 CORONARY ARTERY DISEASE INVOLVING NATIVE CORONARY ARTERY OF NATIVE HEART WITHOUT ANGINA PECTORIS: Primary | ICD-10-CM

## 2019-05-16 PROCEDURE — 99214 OFFICE O/P EST MOD 30 MIN: CPT | Performed by: INTERNAL MEDICINE

## 2019-05-16 RX ORDER — ASPIRIN 81 MG/1
81 TABLET ORAL DAILY
Status: ON HOLD | COMMUNITY
End: 2019-11-16 | Stop reason: SDUPTHER

## 2019-05-16 NOTE — PROGRESS NOTES
"  OFFICE FOLLOW UP     Date of Encounter:2019     Name: Nic Brooks Sr.  : 1950  Address: [address] [address]    PCP: Madison Hodge APRN [address] [facility] [address]    Nic Brooks Sr. is a 69 y.o. male.    Chief Complaint: Follow up of CAD, HTN, HLD    Problem List:   1. Coronary artery disease   A. Right and [facility] 2005: Normal LV function, EF 75%, normal right heart pressures, minor nonobstructive coronary artery disease  B.  2018, Recurrent chest pain with abnormal EKG showing anterior YESI, \"new\" since 2005  C.  [facility], 2018:  Normal LV function, iFR 0.89, KERRY to LAD.  2. History of anemia with B12 deficiency  3. Obstructive sleep apnea, on CPAP therapy  4. 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 Arthritis  7. Hyperlipidemia  8. Surgical history:   A. Tonsillectomy  B. Splenectomy  C. Lower lobectomy of right lung  D. Left knee meniscus repair  E. Lumbar discectomy    Allergies:  No Known Allergies    Current Medications:  •  aspirin 81 MG EC tablet, Take 81 mg by mouth Daily.  •  chlorthalidone (HYGROTEN) 50 MG tablet, Take 1 tablet by mouth Daily.  •  clopidogrel (PLAVIX) 75 MG tablet, Take 1 tablet by mouth Daily  •  coenzyme Q10 100 MG capsule, Take 100 mg by mouth Daily.  •  Cyanocobalamin (B-12 COMPLIANCE INJECTION IJ), Inject as directed Every 30 (Thirty) Days.  •  diclofenac (VOLTAREN) 75 MG EC tablet, Take 75 mg by mouth Daily  •  rosuvastatin (CRESTOR) 40 MG tablet, Take 1 tablet by mouth Daily    History of Present Illness:            Mr. Brooks presents for scheduled follow up. Since his last visit he has remained active and asymptomatic. He denies angina, dyspnea on exertion, palpitations or heart failure symptoms. He reports he has had recent labwork at his PCP's office which was within normal limits. These are not available at the time of this dictation.     The " "following portions of the patient's history were reviewed and updated as appropriate: allergies, current medications and problem list.    ROS: Pertinent positives as listed in the HPI.  All other systems reviewed and negative.    Objective:  Vitals:    05/16/19 1413 05/16/19 1414   BP: 115/81 112/75   BP Location: Left arm Left arm   Patient Position: Sitting Standing   Pulse: 79 81   Weight: 79.8 kg (176 lb)    Height: 175.3 cm (69\")        Physical Exam:  GENERAL: Alert, cooperative, in no acute distress.   HEENT: Normocephalic, no adenopathy, no jugular venous distention  HEART: No discrete PMI is noted. Regular rhythm, normal rate, and no murmurs, gallops, or rubs.   LUNGS: Clear to auscultation bilaterally. No wheezing, rales or ronchi.  ABDOMEN: Soft, bowel sounds present, non-tender   NEUROLOGIC: No focal abnormalities involving strength or sensation are noted.   EXTREMITIES: No clubbing, cyanosis, or edema noted.     Diagnostic Data:    Labs 03/2019:  CBC normal  CMP: Na 142, K 4.9, Cl 103, Glucose 93, BUN 18, Cr 1.3, eGFR 58, AST 26, ALT 28  Lipid Panel: , HDL 40, LDL 48, Trig 109    Procedures    Assessment and Plan:     1. CAD: active and asymptomatic without angina. Continue DAPT and statin.  2. HTN: well controlled. Continue Chlorthalidone.   3. HLD: Continue Crestor 40mg as recent LDL of 48 is at target.   4. Elevated Creatinine on recent labs at 1.3 from previous Cr of 0.9, one year ago. This should be re-checked in a few months by his PCP and he understands. He does not take Diclofenac on a daily basis.   5. Follow up in 1 year or sooner as needed.     Scribed for Negrito Wilkes MD by Letha Arenas PA-C. 05/16/2019 2:14 PM.    I, Negrito Wilkes MD, [facility], [facility], personally performed the services described in this documentation as scribed by the above named individual in my presence, and it is both accurate and complete. At 3:58 PM on 05/16/2019      EMR Dragon/Transcription " Disclaimer:  Much of this encounter note is an electronic transcription/translation of spoken language to printed text.  The electronic translation of spoken language may permit erroneous, or at times, nonsensical words or phrases to be inadvertently transcribed.  Although I have reviewed the note for such errors, some may still exist.

## 2019-10-14 RX ORDER — ROSUVASTATIN CALCIUM 40 MG/1
TABLET, COATED ORAL
Qty: 90 TABLET | Refills: 3 | Status: SHIPPED | OUTPATIENT
Start: 2019-10-14 | End: 2020-05-19 | Stop reason: SDUPTHER

## 2019-10-16 ENCOUNTER — OFFICE VISIT (OUTPATIENT)
Dept: ORTHOPEDIC SURGERY | Facility: CLINIC | Age: 69
End: 2019-10-16

## 2019-10-16 VITALS — OXYGEN SATURATION: 98 % | BODY MASS INDEX: 25.39 KG/M2 | HEIGHT: 69 IN | HEART RATE: 65 BPM | WEIGHT: 171.4 LBS

## 2019-10-16 DIAGNOSIS — M17.12 PRIMARY OSTEOARTHRITIS OF LEFT KNEE: ICD-10-CM

## 2019-10-16 DIAGNOSIS — M17.11 PRIMARY OSTEOARTHRITIS OF RIGHT KNEE: Primary | ICD-10-CM

## 2019-10-16 PROCEDURE — 99203 OFFICE O/P NEW LOW 30 MIN: CPT | Performed by: ORTHOPAEDIC SURGERY

## 2019-10-16 RX ORDER — CETIRIZINE HYDROCHLORIDE 5 MG/1
5 TABLET ORAL DAILY
COMMUNITY
End: 2019-11-05

## 2019-10-16 RX ORDER — PREGABALIN 150 MG/1
150 CAPSULE ORAL ONCE
Status: CANCELLED | OUTPATIENT
Start: 2019-10-16 | End: 2019-10-16

## 2019-10-16 RX ORDER — ACETAMINOPHEN 325 MG/1
1000 TABLET ORAL ONCE
Status: CANCELLED | OUTPATIENT
Start: 2019-10-16 | End: 2019-10-16

## 2019-10-16 RX ORDER — MELOXICAM 7.5 MG/1
15 TABLET ORAL ONCE
Status: CANCELLED | OUTPATIENT
Start: 2019-10-16 | End: 2019-10-16

## 2019-10-16 NOTE — PROGRESS NOTES
[facility] Note    Subjective     Chief Complaint   Patient presents with   • Left Knee - Pain   • Right Knee - Pain        HPI    Nic Brooks Sr. is a 69 y.o. male.  He presents today for evaluation of bilateral knee pain, right greater than left.  He is had pain for at least 11 years, following no particular injury.  The pain has been worsening over that timeframe.  Previous injections, anti-inflammatories, and bracing without long-term benefit.  He has reached the point where he would like to proceed with knee replacement surgery, starting on the right knee.  Pain is worse with walking, standing, sitting and climbing stairs.  He has pain at night.  He has pain with work and leisure activities.  Pain is associated with swelling, grinding and stiffness.  No history of clots nor clotting disorders.  He does take Plavix, and had a stent placed in 2018, with Dr. Wilkes.      Patient Active Problem List   Diagnosis   • Moderate obstructive sleep apnea   • Fatigue   • Snoring   • GERD (gastroesophageal reflux disease)   • Hypersomnia   • Other chest pain   • Abnormal EKG   • Coronary artery disease involving native coronary artery of native heart without angina pectoris   • Dyslipidemia, goal LDL below 70   • Essential hypertension     Past Medical History:   Diagnosis Date   • Arthritis    • Aspiration into airway     april 2016- pt states was swabbed at that time but never told mrsa positive-    then started cpap august 2016 and never told anything about being positive mrsa   • Chest pain     not at this time    • Coronary artery disease    • GERD (gastroesophageal reflux disease)    • Heart disease    • History of kidney stones     just one    • Hyperlipidemia    • Osteoarthritis    • Sleep apnea with use of continuous positive airway pressure (CPAP)     complaint with machine    • Wears glasses       Past Surgical History:   Procedure Laterality Date   • APPENDECTOMY     • CARDIAC  CATHETERIZATION N/A 1/19/2018    Procedure: Left Heart Cath;  Surgeon: Negrito Wilkes MD;  Location: [facility] CATH INVASIVE LOCATION;  Service:    • COLONOSCOPY     • ENDOSCOPY     • KIDNEY STONE SURGERY     • KNEE ARTHROSCOPY W/ MENISCECTOMY      left   • LUMBAR DISCECTOMY     • LUNG REMOVAL, PARTIAL      right lower lobe    • SPLENECTOMY     • TONSILLECTOMY      removed adnoids   • WISDOM TOOTH EXTRACTION      only 2 removed       Family History   Problem Relation Age of Onset   • Hypertension Mother    • Cancer Mother    • Cancer Father    • Emphysema Father    • Diabetes Father    • COPD Father    • Osteoarthritis Father    • No Known Problems Sister    • Cancer Brother    • Diabetes Brother    • Heart disease Brother      Social History     Socioeconomic History   • Marital status:      Spouse name: Not on file   • Number of children: Not on file   • Years of education: Not on file   • Highest education level: Not on file   Tobacco Use   • Smoking status: Never Smoker   • Smokeless tobacco: Never Used   Substance and Sexual Activity   • Alcohol use: No   • Drug use: No   • Sexual activity: Defer      Current Outpatient Medications on File Prior to Visit   Medication Sig Dispense Refill   • aspirin 81 MG EC tablet Take 81 mg by mouth Daily.     • cetirizine (zyrTEC) 5 MG tablet Take 5 mg by mouth Daily.     • chlorthalidone (HYGROTEN) 50 MG tablet Take 1 tablet by mouth Daily. 30 tablet 11   • clopidogrel (PLAVIX) 75 MG tablet Take 1 tablet by mouth Daily. 90 tablet 3   • coenzyme Q10 100 MG capsule Take 100 mg by mouth Daily.     • Cyanocobalamin (B-12 COMPLIANCE INJECTION IJ) Inject  as directed Every 30 (Thirty) Days. LD January 5th     • diclofenac (VOLTAREN) 75 MG EC tablet Take 75 mg by mouth Daily.     • rosuvastatin (CRESTOR) 40 MG tablet TAKE ONE TABLET BY MOUTH DAILY 90 tablet 3     No current facility-administered medications on file prior to visit.       No Known Allergies     Review of  Systems   Constitutional: Negative for activity change, appetite change, chills, diaphoresis, fatigue, fever and unexpected weight change.   HENT: Positive for tinnitus. Negative for congestion, dental problem, drooling, ear discharge, ear pain, facial swelling, hearing loss, mouth sores, nosebleeds, postnasal drip, rhinorrhea, sinus pressure, sneezing, sore throat, trouble swallowing and voice change.    Eyes: Negative for photophobia, pain, discharge, redness, itching and visual disturbance.   Respiratory: Positive for apnea. Negative for cough, choking, chest tightness, shortness of breath, wheezing and stridor.    Cardiovascular: Negative for chest pain, palpitations and leg swelling.   Gastrointestinal: Negative for abdominal distention, abdominal pain, anal bleeding, blood in stool, constipation, diarrhea, nausea, rectal pain and vomiting.   Endocrine: Negative for cold intolerance, heat intolerance, polydipsia, polyphagia and polyuria.   Genitourinary: Negative for decreased urine volume, difficulty urinating, dysuria, enuresis, flank pain, frequency, genital sores, hematuria and urgency.   Musculoskeletal: Positive for back pain, gait problem and joint swelling. Negative for arthralgias, myalgias, neck pain and neck stiffness.   Skin: Negative for color change, pallor, rash and wound.   Allergic/Immunologic: Negative for environmental allergies, food allergies and immunocompromised state.   Neurological: Negative for dizziness, tremors, seizures, syncope, facial asymmetry, speech difficulty, weakness, light-headedness, numbness and headaches.   Hematological: Negative for adenopathy. Does not bruise/bleed easily.   Psychiatric/Behavioral: Negative for agitation, behavioral problems, confusion, decreased concentration, dysphoric mood, hallucinations, self-injury, sleep disturbance and suicidal ideas. The patient is not nervous/anxious and is not hyperactive.         Objective      Physical Exam  Pulse 65    "Ht 175.3 cm (69.02\")   Wt 77.7 kg (171 lb 6.4 oz)   SpO2 98%   BMI 25.30 kg/m²     Body mass index is 25.3 kg/m².    General:   Mental Status:  Alert   Appearance: Cooperative, in no acute distress   Build and Nutrition: Well-nourished well-developed male   Orientation: Alert and oriented to person, place and time   Posture: Normal   Gait: Antalgic on both lower extremities    Integument:   Right knee: no skin lesions, no rash, no ecchymosis   Left knee: no skin lesions, no rash, no ecchymosis    Neurologic:   Sensation:    Right foot: intact to light touch on the dorsal and plantar aspect    Left foot: intact to light touch on the dorsal and plantar aspect   Motor:  Right lower extremity: 5/5 quadriceps, hamstrings, ankle dorsiflexors, and ankle plantar flexors  Left lower extremity: 5/5 quadriceps, hamstrings, ankle dorsiflexors, and ankle plantar flexors  Vascular:   Right lower extremity: 2+ posterior tibialis pulse, prompt capillary refill   Left lower extremity: 2+ posterior tibialis pulse, prompt capillary refill    Lower Extremities:   Right Knee:    Tenderness:  Medial and lateral joint line tenderness    Effusion:  None    Swelling:  None    Crepitus:  Positive    Atrophy:  None    Range of motion:  Extension: 5°       Flexion: 110°  Instability:  No varus laxity, no valgus laxity, negative anterior drawer  Deformities:  Varus   Left Knee:    Tenderness:  None    Effusion:  None    Swelling:  None    Crepitus: Positive    Atrophy:  None    Range of motion:  Extension: 0°       Flexion: 125°  Instability:  No varus laxity, no valgus laxity, negative anterior drawer  Deformities:  Mild varus      Imaging/Studies      Imaging Results (last 24 hours)     Procedure Component Value Units Date/Time    XR Knee 4+ View Bilateral [[id number]] Resulted:  10/16/19 1210     Updated:  10/16/19 1212    Narrative:       Right Knee Radiographs  Indication: right knee pain  Views: Standing AP's and skiers of both knees, " with lateral and sunrise   views of the right knee    Comparison: no prior studies available    Findings:   Bone-on-bone contact medial compartment, tricompartmental osteophytes,   varus alignment, and advanced patellofemoral degeneration, with no acute   bony abnormalities.    Impression: End-stage right knee osteoarthritis    Left Knee Radiographs  Indication: left knee pain  Views: Standing AP's and skiers of both knees, with lateral and sunrise   views of the left knee    Comparison: no prior studies available    Findings:   Bone-on-bone contact both medially and laterally, with advanced   patellofemoral degeneration, pseudosubluxation of the knee medially, with   no acute bony abnormalities.    Impression: End-stage left knee osteoarthritis.          Assessment and Plan     Nic was seen today for pain and pain.    Diagnoses and all orders for this visit:    Primary osteoarthritis of right knee  -     XR Knee 4+ View Bilateral  -     Case Request; Standing  -     Instructions on coughing, deep breathing, and incentive spirometry.; Future  -     CBC and Differential; Future  -     Basic metabolic panel; Future  -     Protime-INR; Future  -     APTT; Future  -     Hemoglobin A1c; Future  -     Sedimentation rate; Future  -     C-reactive protein; Future  -     Urinalysis With Culture If Indicated -; Future  -     Tranexamic Acid 1,000 mg in sodium chloride 0.9 % 100 mL  -     Tranexamic Acid 1,000 mg in sodium chloride 0.9 % 100 mL  -     ceFAZolin (ANCEF) 2 g in sodium chloride 0.9 % 100 mL IVPB  -     acetaminophen (TYLENOL) tablet 975 mg  -     meloxicam (MOBIC) tablet 15 mg  -     pregabalin (LYRICA) capsule 150 mg  -     mupirocin (BACTROBAN) 2 % nasal ointment 1 application  -     Case Request    Primary osteoarthritis of left knee  -     XR Knee 4+ View Bilateral    Other orders  -     Outpatient In A Bed; Standing  -     Follow Anesthesia Guidelines / Standing Orders; Future  -     Obtain informed  consent  -     Provide instructions to patient regarding NPO status  -     Follow Anesthesia Guidelines / Standing Orders; Standing  -     Nerve Block; Standing  -     Verify NPO Status; Standing  -     SCD (sequential compression device)- to be placed on patient in Pre-op; Standing  -     POC Glucose Once; Standing  -     Clip operative site; Standing  -     Obtain informed consent (if not collected inpatient or PAT); Standing  -     Notify Physician - Standard; Standing  -     NPO After Midnight  -     mupirocin (BACTROBAN NASAL) 2 % nasal ointment; into the nostril(s) as directed by provider 2 (Two) Times a Day.  -     chlorhexidine (HIBICLENS) 4 % external liquid; Apply  topically to the appropriate area as directed Daily. Shower with hibiclens solution as directed for 5 days prior to surgery        1. Primary osteoarthritis of right knee    2. Primary osteoarthritis of left knee        I reviewed my findings with patient today.  He has advanced bilateral knee arthritis, and he would like to proceed with knee replacement surgery, starting on the right knee.  Risk, benefits, and alternatives have been discussed.  He has exhausted conservative treatment options.  Please see my counseling note for details.    Surgical Counseling     I have informed the patient of the diagnosis and the prognosis.  Exhaustive conservative treatment modalities have not resulted in long term pain relief.  The symptoms have progressed to the point of daily pain and inability to perform activities of daily living without significant pain. The patient has reached the point of desiring to proceed with total knee arthroplasty after discussing the risks, benefits and alternatives to the procedure.  The surgical procedure itself was discussed in detail. Risks of the procedure were discussed, which included but are not limited to, bleeding, infection, damage to blood vessels and nerves, incomplete pain relief, loosening of the prosthesis, deep  infection, need for further surgery, loss of limb, deep venous thrombosis, pulmonary embolus, death, heart attack, stroke, kidney failure, liver failure, and anesthetic complications.  In addition, the potential for deep infection developing in the future was discussed, which could require further surgery.  The knee would have to be re-opened, debrided, and potentially remove the prosthesis, which may or may not be replaced in the future.  Also, the possibility for loosening of the prosthesis has been mentioned.  If the prosthesis loosened, a revision arthroplasty could be performed, with results that are not as predictable compared to the original procedure.  The typical rehabilitative course has also been discussed, and full recovery may take up to a year to see the maximum benefit.  The importance of patient cooperation in the rehabilitative efforts has also been discussed.  No guarantees whatsoever were given.  The patient understands the potential risks versus the benefits and desires to proceed with total knee arthroplasty at a mutually convenient time.    Return for For surgery as planned.      Medical Decision Making  Management Options : major surgery with risk factors  Data/Risk: radiology tests and independent visualization of imaging, lab tests, or EMG/NCV      Negrito Vidal MD  10/16/19  12:26 PM

## 2019-10-17 ENCOUNTER — TELEPHONE (OUTPATIENT)
Dept: CARDIOLOGY | Facility: CLINIC | Age: 69
End: 2019-10-17

## 2019-10-17 NOTE — TELEPHONE ENCOUNTER
Wife called to request cardiac clearance for pt to undergo TKR with Dr. Negrito Vidal, scheduled on 11/15/2019. Pt has CAD with last stents in 1/2018. Pt remains asymptomatic from a cardiac standpoint. Per [facility] ok to hold Plavix 5-7 days and continue ASA 81 mg daily, uninterrupted. Wife aware, letter will be sent in Epic. KH

## 2019-11-05 ENCOUNTER — APPOINTMENT (OUTPATIENT)
Dept: PREADMISSION TESTING | Facility: HOSPITAL | Age: 69
End: 2019-11-05

## 2019-11-05 ENCOUNTER — OFFICE VISIT (OUTPATIENT)
Dept: SLEEP MEDICINE | Facility: HOSPITAL | Age: 69
End: 2019-11-05

## 2019-11-05 VITALS
HEIGHT: 69 IN | OXYGEN SATURATION: 97 % | SYSTOLIC BLOOD PRESSURE: 117 MMHG | WEIGHT: 179 LBS | BODY MASS INDEX: 26.51 KG/M2 | DIASTOLIC BLOOD PRESSURE: 79 MMHG | HEART RATE: 60 BPM

## 2019-11-05 VITALS — WEIGHT: 178 LBS | HEIGHT: 69 IN | BODY MASS INDEX: 26.36 KG/M2

## 2019-11-05 DIAGNOSIS — M17.11 PRIMARY OSTEOARTHRITIS OF RIGHT KNEE: ICD-10-CM

## 2019-11-05 DIAGNOSIS — G47.33 OSA (OBSTRUCTIVE SLEEP APNEA): Primary | ICD-10-CM

## 2019-11-05 LAB
ANION GAP SERPL CALCULATED.3IONS-SCNC: 13 MMOL/L (ref 5–15)
APTT PPP: 23.3 SECONDS (ref 24–37)
BASOPHILS # BLD AUTO: 0.03 10*3/MM3 (ref 0–0.2)
BASOPHILS NFR BLD AUTO: 0.4 % (ref 0–1.5)
BUN BLD-MCNC: 19 MG/DL (ref 8–23)
BUN/CREAT SERPL: 17.6 (ref 7–25)
CALCIUM SPEC-SCNC: 9.2 MG/DL (ref 8.6–10.5)
CHLORIDE SERPL-SCNC: 101 MMOL/L (ref 98–107)
CO2 SERPL-SCNC: 25 MMOL/L (ref 22–29)
CREAT BLD-MCNC: 1.08 MG/DL (ref 0.76–1.27)
CRP SERPL-MCNC: 0.57 MG/DL (ref 0–0.5)
DEPRECATED RDW RBC AUTO: 53.1 FL (ref 37–54)
EOSINOPHIL # BLD AUTO: 0.1 10*3/MM3 (ref 0–0.4)
EOSINOPHIL NFR BLD AUTO: 1.3 % (ref 0.3–6.2)
ERYTHROCYTE [DISTWIDTH] IN BLOOD BY AUTOMATED COUNT: 15.4 % (ref 12.3–15.4)
ERYTHROCYTE [SEDIMENTATION RATE] IN BLOOD: 12 MM/HR (ref 0–20)
GFR SERPL CREATININE-BSD FRML MDRD: 68 ML/MIN/1.73
GLUCOSE BLD-MCNC: 101 MG/DL (ref 65–99)
HBA1C MFR BLD: 5.8 % (ref 4.8–5.6)
HCT VFR BLD AUTO: 45.3 % (ref 37.5–51)
HGB BLD-MCNC: 14.7 G/DL (ref 13–17.7)
IMM GRANULOCYTES # BLD AUTO: 0.01 10*3/MM3 (ref 0–0.05)
IMM GRANULOCYTES NFR BLD AUTO: 0.1 % (ref 0–0.5)
INR PPP: 0.94 (ref 0.85–1.16)
LYMPHOCYTES # BLD AUTO: 2.78 10*3/MM3 (ref 0.7–3.1)
LYMPHOCYTES NFR BLD AUTO: 37.3 % (ref 19.6–45.3)
MCH RBC QN AUTO: 30.2 PG (ref 26.6–33)
MCHC RBC AUTO-ENTMCNC: 32.5 G/DL (ref 31.5–35.7)
MCV RBC AUTO: 93.2 FL (ref 79–97)
MONOCYTES # BLD AUTO: 0.98 10*3/MM3 (ref 0.1–0.9)
MONOCYTES NFR BLD AUTO: 13.2 % (ref 5–12)
NEUTROPHILS # BLD AUTO: 3.55 10*3/MM3 (ref 1.7–7)
NEUTROPHILS NFR BLD AUTO: 47.7 % (ref 42.7–76)
NRBC BLD AUTO-RTO: 0 /100 WBC (ref 0–0.2)
PLAT MORPH BLD: NORMAL
PLATELET # BLD AUTO: 216 10*3/MM3 (ref 140–450)
PMV BLD AUTO: 10.2 FL (ref 6–12)
POTASSIUM BLD-SCNC: 4 MMOL/L (ref 3.5–5.2)
PROTHROMBIN TIME: 12.1 SECONDS (ref 11.2–14.3)
RBC # BLD AUTO: 4.86 10*6/MM3 (ref 4.14–5.8)
RBC MORPH BLD: NORMAL
SODIUM BLD-SCNC: 139 MMOL/L (ref 136–145)
WBC MORPH BLD: NORMAL
WBC NRBC COR # BLD: 7.45 10*3/MM3 (ref 3.4–10.8)

## 2019-11-05 PROCEDURE — 36415 COLL VENOUS BLD VENIPUNCTURE: CPT

## 2019-11-05 PROCEDURE — 83036 HEMOGLOBIN GLYCOSYLATED A1C: CPT | Performed by: ORTHOPAEDIC SURGERY

## 2019-11-05 PROCEDURE — 85007 BL SMEAR W/DIFF WBC COUNT: CPT | Performed by: ORTHOPAEDIC SURGERY

## 2019-11-05 PROCEDURE — 80048 BASIC METABOLIC PNL TOTAL CA: CPT | Performed by: ORTHOPAEDIC SURGERY

## 2019-11-05 PROCEDURE — 86140 C-REACTIVE PROTEIN: CPT | Performed by: ORTHOPAEDIC SURGERY

## 2019-11-05 PROCEDURE — 85610 PROTHROMBIN TIME: CPT | Performed by: ORTHOPAEDIC SURGERY

## 2019-11-05 PROCEDURE — 93005 ELECTROCARDIOGRAM TRACING: CPT

## 2019-11-05 PROCEDURE — 85025 COMPLETE CBC W/AUTO DIFF WBC: CPT | Performed by: ORTHOPAEDIC SURGERY

## 2019-11-05 PROCEDURE — 99212 OFFICE O/P EST SF 10 MIN: CPT | Performed by: NURSE PRACTITIONER

## 2019-11-05 PROCEDURE — 85652 RBC SED RATE AUTOMATED: CPT | Performed by: ORTHOPAEDIC SURGERY

## 2019-11-05 PROCEDURE — 85730 THROMBOPLASTIN TIME PARTIAL: CPT | Performed by: ORTHOPAEDIC SURGERY

## 2019-11-05 ASSESSMENT — KOOS JR
KOOS JR SCORE: 22
KOOS JR SCORE: 31.307

## 2019-11-05 NOTE — PAT
Cardiac clearance from Dr. Wilkes on chart.    Patient to apply Chlorhexadine wipes  to surgical area (as instructed) the night before procedure and the AM of procedure. Wipes provided.    Bactroban and Chlorhexidine Prescription given during PAT visit, as well as written and verbal instructions given to patient during PAT visit.  Patient/family also instructed to complete skin prep checklist and return the checklist on the day of surgery to preoperative staff.  Patient/family verbalized understanding.    Patient instructed to drink 20 ounces (or until full) of Gatorade and it needs to be completed 1 hour before given arrival time for procedure (NO RED Gatorade)    Patient verbalized understanding.

## 2019-11-05 NOTE — PROGRESS NOTES
Chief Complaint:   Chief Complaint   Patient presents with   • Follow-up       HPI:    Nic Brooks is a 69 y.o. male here for follow-up of sleep apnea.  Patient was last seen 11/26/2018.  Patient states he is doing well with CPAP therapy.  Patient is sleeping 6 hours nightly and feels refreshed upon awakening.  Patient denies excessive daytime sleepiness.  Patient has an [place] score of 4/24.  In 1 week patient is having a total knee replacement and does understand to take his CPAP with him to the hospital.  Patient wishes to continue CPAP therapy.        Current medications are:   Current Outpatient Medications:   •  aspirin 81 MG EC tablet, Take 81 mg by mouth Daily., Disp: , Rfl:   •  cetirizine (zyrTEC) 5 MG tablet, Take 5 mg by mouth Daily., Disp: , Rfl:   •  Chlorhexidine Gluconate 4 % solution, Shower with hibiclens solution as directed for 5 days prior to surgery, Disp: 237 mL, Rfl: 0  •  chlorthalidone (HYGROTEN) 50 MG tablet, Take 1 tablet by mouth Daily., Disp: 30 tablet, Rfl: 11  •  clopidogrel (PLAVIX) 75 MG tablet, Take 1 tablet by mouth Daily., Disp: 90 tablet, Rfl: 3  •  coenzyme Q10 100 MG capsule, Take 100 mg by mouth Daily., Disp: , Rfl:   •  Cyanocobalamin (B-12 COMPLIANCE INJECTION IJ), Inject  as directed Every 30 (Thirty) Days. LD January 5th, Disp: , Rfl:   •  diclofenac (VOLTAREN) 75 MG EC tablet, Take 75 mg by mouth Daily., Disp: , Rfl:   •  mupirocin (BACTROBAN) 2 % ointment, Apply into the nostril(s) as directed by provider 2 (Two) Times a Day., Disp: 22 g, Rfl: 0  •  rosuvastatin (CRESTOR) 40 MG tablet, TAKE ONE TABLET BY MOUTH DAILY, Disp: 90 tablet, Rfl: 3.      The patient's relevant past medical, surgical, family and social history were reviewed and updated in Epic as appropriate.       Review of Systems   HENT: Positive for postnasal drip.    Eyes: Positive for visual disturbance.   Respiratory: Positive for apnea.    Musculoskeletal: Positive for arthralgias and joint  swelling.   Allergic/Immunologic: Positive for environmental allergies.   Psychiatric/Behavioral: Positive for sleep disturbance.   All other systems reviewed and are negative.        Objective:    Physical Exam   Constitutional: He is oriented to person, place, and time. He appears well-developed and well-nourished.   HENT:   Head: Normocephalic and atraumatic.   Mouth/Throat: Oropharynx is clear and moist.   Mallampati 4 anatomy   Eyes: Conjunctivae are normal.   Neck: Neck supple. No thyromegaly present.   Cardiovascular: Normal rate and regular rhythm.   Pulmonary/Chest: Effort normal and breath sounds normal.   Lymphadenopathy:     He has no cervical adenopathy.   Neurological: He is alert and oriented to person, place, and time.   Skin: Skin is warm and dry.   Psychiatric: He has a normal mood and affect. His behavior is normal. Judgment and thought content normal.   Nursing note and vitals reviewed.    154/180 days of use.  Greater than 4-hour use 82.2%.  90% pressure 8.1.  AHI of 1.2.  Download reviewed with patient.    ASSESSMENT/PLAN    Nic was seen today for follow-up.    Diagnoses and all orders for this visit:    GAUDENCIO (obstructive sleep apnea)  -     CPAP Therapy            1. Counseled patient regarding multimodal approach with healthy nutrition, healthy sleep, regular physical activity, social activities, counseling, and medications. Encouraged to practice lateral sleep position. Avoid alcohol and sedatives close to bedtime.  2. Fill supplies x1 year.  Return to clinic 1 year or sooner if symptoms warrant.    I have reviewed the results of my evaluation and impression and discussed my recommendations in detail with the patient.      Signed by  SHIVA Edwards    November 5, 2019      CC: Madison Weeks APRN          No ref. provider found

## 2019-11-06 ENCOUNTER — TELEPHONE (OUTPATIENT)
Dept: ORTHOPEDIC SURGERY | Facility: CLINIC | Age: 69
End: 2019-11-06

## 2019-11-06 NOTE — TELEPHONE ENCOUNTER
----- Message from Negrito Vidal MD sent at 11/5/2019  5:18 PM EST -----  Ok    ----- Message -----  From: Samantha Samaniego  Sent: 11/5/2019   4:31 PM  To: Negrito Vidal MD    CRP 0.57

## 2019-11-14 ENCOUNTER — ANESTHESIA EVENT (OUTPATIENT)
Dept: PERIOP | Facility: HOSPITAL | Age: 69
End: 2019-11-14

## 2019-11-14 RX ORDER — SODIUM CHLORIDE 0.9 % (FLUSH) 0.9 %
3 SYRINGE (ML) INJECTION EVERY 12 HOURS SCHEDULED
Status: CANCELLED | OUTPATIENT
Start: 2019-11-14

## 2019-11-14 RX ORDER — SODIUM CHLORIDE 0.9 % (FLUSH) 0.9 %
3-10 SYRINGE (ML) INJECTION AS NEEDED
Status: CANCELLED | OUTPATIENT
Start: 2019-11-14

## 2019-11-14 RX ORDER — FAMOTIDINE 10 MG/ML
20 INJECTION, SOLUTION INTRAVENOUS ONCE
Status: CANCELLED | OUTPATIENT
Start: 2019-11-14 | End: 2019-11-14

## 2019-11-15 ENCOUNTER — APPOINTMENT (OUTPATIENT)
Dept: GENERAL RADIOLOGY | Facility: HOSPITAL | Age: 69
End: 2019-11-15

## 2019-11-15 ENCOUNTER — HOSPITAL ENCOUNTER (OUTPATIENT)
Facility: HOSPITAL | Age: 69
Discharge: HOME | End: 2019-11-16
Attending: ORTHOPAEDIC SURGERY | Admitting: ORTHOPAEDIC SURGERY

## 2019-11-15 ENCOUNTER — ANESTHESIA (OUTPATIENT)
Dept: PERIOP | Facility: HOSPITAL | Age: 69
End: 2019-11-15

## 2019-11-15 DIAGNOSIS — Z96.651 STATUS POST TOTAL RIGHT KNEE REPLACEMENT: Primary | ICD-10-CM

## 2019-11-15 DIAGNOSIS — M17.11 PRIMARY OSTEOARTHRITIS OF RIGHT KNEE: ICD-10-CM

## 2019-11-15 PROBLEM — R73.03 PREDIABETES: Status: ACTIVE | Noted: 2019-11-15

## 2019-11-15 LAB
GLUCOSE BLDC GLUCOMTR-MCNC: 133 MG/DL (ref 70–130)
GLUCOSE BLDC GLUCOMTR-MCNC: 142 MG/DL (ref 70–130)
GLUCOSE BLDC GLUCOMTR-MCNC: 93 MG/DL (ref 70–130)
POTASSIUM BLD-SCNC: 3.7 MMOL/L (ref 3.5–5.2)

## 2019-11-15 PROCEDURE — 63710000001 ROSUVASTATIN 20 MG TABLET: Performed by: NURSE PRACTITIONER

## 2019-11-15 PROCEDURE — A9270 NON-COVERED ITEM OR SERVICE: HCPCS | Performed by: NURSE PRACTITIONER

## 2019-11-15 PROCEDURE — 25010000002 ROPIVACAINE PER 1 MG: Performed by: ORTHOPAEDIC SURGERY

## 2019-11-15 PROCEDURE — 25010000003 CEFAZOLIN IN DEXTROSE 2-4 GM/100ML-% SOLUTION: Performed by: ORTHOPAEDIC SURGERY

## 2019-11-15 PROCEDURE — 63710000001 MELOXICAM 15 MG TABLET: Performed by: ORTHOPAEDIC SURGERY

## 2019-11-15 PROCEDURE — 25010000002 PROPOFOL 10 MG/ML EMULSION: Performed by: NURSE ANESTHETIST, CERTIFIED REGISTERED

## 2019-11-15 PROCEDURE — 94660 CPAP INITIATION&MGMT: CPT

## 2019-11-15 PROCEDURE — A9270 NON-COVERED ITEM OR SERVICE: HCPCS | Performed by: ORTHOPAEDIC SURGERY

## 2019-11-15 PROCEDURE — 97162 PT EVAL MOD COMPLEX 30 MIN: CPT

## 2019-11-15 PROCEDURE — 63710000001 MUPIROCIN 2 % OINTMENT: Performed by: ORTHOPAEDIC SURGERY

## 2019-11-15 PROCEDURE — 63710000001 FAMOTIDINE 20 MG TABLET: Performed by: ANESTHESIOLOGY

## 2019-11-15 PROCEDURE — C1713 ANCHOR/SCREW BN/BN,TIS/BN: HCPCS | Performed by: ORTHOPAEDIC SURGERY

## 2019-11-15 PROCEDURE — 25010000002 ROPIVACAINE PER 1 MG: Performed by: NURSE ANESTHETIST, CERTIFIED REGISTERED

## 2019-11-15 PROCEDURE — 84132 ASSAY OF SERUM POTASSIUM: CPT | Performed by: ANESTHESIOLOGY

## 2019-11-15 PROCEDURE — 73560 X-RAY EXAM OF KNEE 1 OR 2: CPT

## 2019-11-15 PROCEDURE — 63710000001 PREGABALIN 150 MG CAPSULE: Performed by: ORTHOPAEDIC SURGERY

## 2019-11-15 PROCEDURE — 27447 TOTAL KNEE ARTHROPLASTY: CPT | Performed by: ORTHOPAEDIC SURGERY

## 2019-11-15 PROCEDURE — 94799 UNLISTED PULMONARY SVC/PX: CPT

## 2019-11-15 PROCEDURE — 82962 GLUCOSE BLOOD TEST: CPT

## 2019-11-15 PROCEDURE — 25010000002 DEXAMETHASONE PER 1 MG: Performed by: NURSE ANESTHETIST, CERTIFIED REGISTERED

## 2019-11-15 PROCEDURE — 63710000001 HYDROCODONE-ACETAMINOPHEN 5-325 MG TABLET: Performed by: ORTHOPAEDIC SURGERY

## 2019-11-15 PROCEDURE — C1776 JOINT DEVICE (IMPLANTABLE): HCPCS | Performed by: ORTHOPAEDIC SURGERY

## 2019-11-15 PROCEDURE — 63710000001 INSULIN LISPRO (HUMAN) PER 5 UNITS: Performed by: NURSE PRACTITIONER

## 2019-11-15 PROCEDURE — A9270 NON-COVERED ITEM OR SERVICE: HCPCS | Performed by: ANESTHESIOLOGY

## 2019-11-15 PROCEDURE — 25010000002 ONDANSETRON PER 1 MG: Performed by: NURSE ANESTHETIST, CERTIFIED REGISTERED

## 2019-11-15 PROCEDURE — 63710000001 ACETAMINOPHEN 500 MG TABLET: Performed by: ORTHOPAEDIC SURGERY

## 2019-11-15 DEVICE — LEGION PS HIGH FLEX XLPE SZ 5-6 10MM
Type: IMPLANTABLE DEVICE | Status: FUNCTIONAL
Brand: LEGION

## 2019-11-15 DEVICE — GENESIS II RESURFACING PATELLAR 35MM
Type: IMPLANTABLE DEVICE | Status: FUNCTIONAL
Brand: GENESIS II

## 2019-11-15 DEVICE — LEGION POSTERIOR STABILIZED                                    OXINIUM FEMORAL SIZE 7 RIGHT
Type: IMPLANTABLE DEVICE | Status: FUNCTIONAL
Brand: LEGION

## 2019-11-15 DEVICE — GENESIS II NON-POROUS TIBIAL                                    BASEPLATE SIZE 5 RIGHT
Type: IMPLANTABLE DEVICE | Status: FUNCTIONAL
Brand: GENESIS II

## 2019-11-15 DEVICE — CMT BONE SIMPLEX/P FULL DOSE 10/PK: Type: IMPLANTABLE DEVICE | Status: FUNCTIONAL

## 2019-11-15 DEVICE — IMPLANTABLE DEVICE: Type: IMPLANTABLE DEVICE | Status: FUNCTIONAL

## 2019-11-15 RX ORDER — ONDANSETRON 2 MG/ML
4 INJECTION INTRAMUSCULAR; INTRAVENOUS ONCE AS NEEDED
Status: DISCONTINUED | OUTPATIENT
Start: 2019-11-15 | End: 2019-11-15 | Stop reason: HOSPADM

## 2019-11-15 RX ORDER — NICOTINE POLACRILEX 4 MG
15 LOZENGE BUCCAL
Status: DISCONTINUED | OUTPATIENT
Start: 2019-11-15 | End: 2019-11-16 | Stop reason: HOSPADM

## 2019-11-15 RX ORDER — PROMETHAZINE HYDROCHLORIDE 25 MG/1
25 TABLET ORAL ONCE AS NEEDED
Status: DISCONTINUED | OUTPATIENT
Start: 2019-11-15 | End: 2019-11-15 | Stop reason: HOSPADM

## 2019-11-15 RX ORDER — ONDANSETRON 2 MG/ML
INJECTION INTRAMUSCULAR; INTRAVENOUS AS NEEDED
Status: DISCONTINUED | OUTPATIENT
Start: 2019-11-15 | End: 2019-11-15 | Stop reason: SURG

## 2019-11-15 RX ORDER — BISACODYL 5 MG/1
10 TABLET, DELAYED RELEASE ORAL DAILY PRN
Status: DISCONTINUED | OUTPATIENT
Start: 2019-11-15 | End: 2019-11-16 | Stop reason: HOSPADM

## 2019-11-15 RX ORDER — NALOXONE HCL 0.4 MG/ML
0.4 VIAL (ML) INJECTION
Status: DISCONTINUED | OUTPATIENT
Start: 2019-11-15 | End: 2019-11-16 | Stop reason: HOSPADM

## 2019-11-15 RX ORDER — ONDANSETRON 2 MG/ML
4 INJECTION INTRAMUSCULAR; INTRAVENOUS EVERY 6 HOURS PRN
Status: DISCONTINUED | OUTPATIENT
Start: 2019-11-15 | End: 2019-11-16 | Stop reason: HOSPADM

## 2019-11-15 RX ORDER — PROMETHAZINE HYDROCHLORIDE 25 MG/ML
6.25 INJECTION, SOLUTION INTRAMUSCULAR; INTRAVENOUS ONCE AS NEEDED
Status: DISCONTINUED | OUTPATIENT
Start: 2019-11-15 | End: 2019-11-15 | Stop reason: HOSPADM

## 2019-11-15 RX ORDER — SODIUM CHLORIDE 0.9 % (FLUSH) 0.9 %
1-10 SYRINGE (ML) INJECTION AS NEEDED
Status: DISCONTINUED | OUTPATIENT
Start: 2019-11-15 | End: 2019-11-16 | Stop reason: HOSPADM

## 2019-11-15 RX ORDER — MORPHINE SULFATE 4 MG/ML
4 INJECTION, SOLUTION INTRAMUSCULAR; INTRAVENOUS
Status: DISCONTINUED | OUTPATIENT
Start: 2019-11-15 | End: 2019-11-16 | Stop reason: HOSPADM

## 2019-11-15 RX ORDER — NALOXONE HCL 0.4 MG/ML
0.1 VIAL (ML) INJECTION
Status: DISCONTINUED | OUTPATIENT
Start: 2019-11-15 | End: 2019-11-16 | Stop reason: HOSPADM

## 2019-11-15 RX ORDER — MAGNESIUM HYDROXIDE 1200 MG/15ML
LIQUID ORAL AS NEEDED
Status: DISCONTINUED | OUTPATIENT
Start: 2019-11-15 | End: 2019-11-15 | Stop reason: HOSPADM

## 2019-11-15 RX ORDER — DOCUSATE SODIUM 100 MG/1
100 CAPSULE, LIQUID FILLED ORAL 2 TIMES DAILY PRN
Status: DISCONTINUED | OUTPATIENT
Start: 2019-11-15 | End: 2019-11-16 | Stop reason: HOSPADM

## 2019-11-15 RX ORDER — ASPIRIN 325 MG
325 TABLET, DELAYED RELEASE (ENTERIC COATED) ORAL DAILY
Status: DISCONTINUED | OUTPATIENT
Start: 2019-11-16 | End: 2019-11-16 | Stop reason: HOSPADM

## 2019-11-15 RX ORDER — DEXAMETHASONE SODIUM PHOSPHATE 4 MG/ML
INJECTION, SOLUTION INTRA-ARTICULAR; INTRALESIONAL; INTRAMUSCULAR; INTRAVENOUS; SOFT TISSUE AS NEEDED
Status: DISCONTINUED | OUTPATIENT
Start: 2019-11-15 | End: 2019-11-15 | Stop reason: SURG

## 2019-11-15 RX ORDER — ONDANSETRON 4 MG/1
4 TABLET, FILM COATED ORAL EVERY 6 HOURS PRN
Status: DISCONTINUED | OUTPATIENT
Start: 2019-11-15 | End: 2019-11-16 | Stop reason: HOSPADM

## 2019-11-15 RX ORDER — PREGABALIN 150 MG/1
150 CAPSULE ORAL ONCE
Status: COMPLETED | OUTPATIENT
Start: 2019-11-15 | End: 2019-11-15

## 2019-11-15 RX ORDER — FENTANYL CITRATE 50 UG/ML
50 INJECTION, SOLUTION INTRAMUSCULAR; INTRAVENOUS
Status: DISCONTINUED | OUTPATIENT
Start: 2019-11-15 | End: 2019-11-15 | Stop reason: HOSPADM

## 2019-11-15 RX ORDER — ROPIVACAINE HYDROCHLORIDE 5 MG/ML
INJECTION, SOLUTION EPIDURAL; INFILTRATION; PERINEURAL AS NEEDED
Status: DISCONTINUED | OUTPATIENT
Start: 2019-11-15 | End: 2019-11-15 | Stop reason: HOSPADM

## 2019-11-15 RX ORDER — PROMETHAZINE HYDROCHLORIDE 25 MG/1
25 SUPPOSITORY RECTAL ONCE AS NEEDED
Status: DISCONTINUED | OUTPATIENT
Start: 2019-11-15 | End: 2019-11-15 | Stop reason: HOSPADM

## 2019-11-15 RX ORDER — BUPIVACAINE HYDROCHLORIDE 2.5 MG/ML
INJECTION, SOLUTION EPIDURAL; INFILTRATION; INTRACAUDAL
Status: DISCONTINUED | OUTPATIENT
Start: 2019-11-15 | End: 2019-11-15 | Stop reason: SURG

## 2019-11-15 RX ORDER — MELOXICAM 15 MG/1
15 TABLET ORAL ONCE
Status: COMPLETED | OUTPATIENT
Start: 2019-11-15 | End: 2019-11-15

## 2019-11-15 RX ORDER — CEFAZOLIN SODIUM 2 G/100ML
2 INJECTION, SOLUTION INTRAVENOUS ONCE
Status: COMPLETED | OUTPATIENT
Start: 2019-11-15 | End: 2019-11-15

## 2019-11-15 RX ORDER — LABETALOL HYDROCHLORIDE 5 MG/ML
10 INJECTION, SOLUTION INTRAVENOUS EVERY 4 HOURS PRN
Status: DISCONTINUED | OUTPATIENT
Start: 2019-11-15 | End: 2019-11-16 | Stop reason: HOSPADM

## 2019-11-15 RX ORDER — FAMOTIDINE 20 MG/1
20 TABLET, FILM COATED ORAL ONCE
Status: COMPLETED | OUTPATIENT
Start: 2019-11-15 | End: 2019-11-15

## 2019-11-15 RX ORDER — ACETAMINOPHEN 325 MG/1
650 TABLET ORAL EVERY 4 HOURS PRN
Status: DISCONTINUED | OUTPATIENT
Start: 2019-11-15 | End: 2019-11-16 | Stop reason: HOSPADM

## 2019-11-15 RX ORDER — CEFAZOLIN SODIUM 2 G/100ML
2 INJECTION, SOLUTION INTRAVENOUS EVERY 8 HOURS
Status: COMPLETED | OUTPATIENT
Start: 2019-11-15 | End: 2019-11-16

## 2019-11-15 RX ORDER — BUPIVACAINE HYDROCHLORIDE 5 MG/ML
INJECTION, SOLUTION PERINEURAL
Status: COMPLETED | OUTPATIENT
Start: 2019-11-15 | End: 2019-11-15

## 2019-11-15 RX ORDER — ROSUVASTATIN CALCIUM 20 MG/1
40 TABLET, COATED ORAL DAILY
Status: DISCONTINUED | OUTPATIENT
Start: 2019-11-15 | End: 2019-11-16 | Stop reason: HOSPADM

## 2019-11-15 RX ORDER — CHLORTHALIDONE 50 MG/1
50 TABLET ORAL DAILY
COMMUNITY
End: 2020-05-19 | Stop reason: SDUPTHER

## 2019-11-15 RX ORDER — MELOXICAM 7.5 MG/1
15 TABLET ORAL DAILY
Status: DISCONTINUED | OUTPATIENT
Start: 2019-11-16 | End: 2019-11-16 | Stop reason: HOSPADM

## 2019-11-15 RX ORDER — BISACODYL 10 MG
10 SUPPOSITORY, RECTAL RECTAL DAILY PRN
Status: DISCONTINUED | OUTPATIENT
Start: 2019-11-15 | End: 2019-11-16 | Stop reason: HOSPADM

## 2019-11-15 RX ORDER — SODIUM CHLORIDE, SODIUM LACTATE, POTASSIUM CHLORIDE, CALCIUM CHLORIDE 600; 310; 30; 20 MG/100ML; MG/100ML; MG/100ML; MG/100ML
9 INJECTION, SOLUTION INTRAVENOUS CONTINUOUS
Status: DISCONTINUED | OUTPATIENT
Start: 2019-11-15 | End: 2019-11-15

## 2019-11-15 RX ORDER — SODIUM CHLORIDE 0.9 % (FLUSH) 0.9 %
3 SYRINGE (ML) INJECTION EVERY 12 HOURS SCHEDULED
Status: DISCONTINUED | OUTPATIENT
Start: 2019-11-15 | End: 2019-11-16 | Stop reason: HOSPADM

## 2019-11-15 RX ORDER — DEXTROSE MONOHYDRATE 25 G/50ML
25 INJECTION, SOLUTION INTRAVENOUS
Status: DISCONTINUED | OUTPATIENT
Start: 2019-11-15 | End: 2019-11-16 | Stop reason: HOSPADM

## 2019-11-15 RX ORDER — HYDROCODONE BITARTRATE AND ACETAMINOPHEN 5; 325 MG/1; MG/1
1 TABLET ORAL EVERY 4 HOURS PRN
Status: DISCONTINUED | OUTPATIENT
Start: 2019-11-15 | End: 2019-11-16 | Stop reason: HOSPADM

## 2019-11-15 RX ORDER — LIDOCAINE HYDROCHLORIDE 10 MG/ML
0.5 INJECTION, SOLUTION EPIDURAL; INFILTRATION; INTRACAUDAL; PERINEURAL ONCE AS NEEDED
Status: COMPLETED | OUTPATIENT
Start: 2019-11-15 | End: 2019-11-15

## 2019-11-15 RX ORDER — ACETAMINOPHEN 500 MG
1000 TABLET ORAL ONCE
Status: COMPLETED | OUTPATIENT
Start: 2019-11-15 | End: 2019-11-15

## 2019-11-15 RX ORDER — CLOPIDOGREL BISULFATE 75 MG/1
75 TABLET ORAL DAILY
COMMUNITY
End: 2019-12-23

## 2019-11-15 RX ORDER — SODIUM CHLORIDE 9 MG/ML
120 INJECTION, SOLUTION INTRAVENOUS CONTINUOUS
Status: DISCONTINUED | OUTPATIENT
Start: 2019-11-15 | End: 2019-11-16 | Stop reason: HOSPADM

## 2019-11-15 RX ADMIN — MELOXICAM 15 MG: 15 TABLET ORAL at 10:31

## 2019-11-15 RX ADMIN — ACETAMINOPHEN 1000 MG: 500 TABLET ORAL at 10:31

## 2019-11-15 RX ADMIN — SODIUM CHLORIDE, POTASSIUM CHLORIDE, SODIUM LACTATE AND CALCIUM CHLORIDE: 600; 310; 30; 20 INJECTION, SOLUTION INTRAVENOUS at 13:28

## 2019-11-15 RX ADMIN — DEXAMETHASONE SODIUM PHOSPHATE 8 MG: 4 INJECTION, SOLUTION INTRAMUSCULAR; INTRAVENOUS at 12:43

## 2019-11-15 RX ADMIN — CEFAZOLIN SODIUM 2 G: 2 INJECTION, SOLUTION INTRAVENOUS at 20:49

## 2019-11-15 RX ADMIN — SODIUM CHLORIDE 120 ML/HR: 9 INJECTION, SOLUTION INTRAVENOUS at 17:10

## 2019-11-15 RX ADMIN — TRANEXAMIC ACID 1000 MG: 100 INJECTION, SOLUTION INTRAVENOUS at 12:42

## 2019-11-15 RX ADMIN — ONDANSETRON 4 MG: 2 INJECTION INTRAMUSCULAR; INTRAVENOUS at 14:25

## 2019-11-15 RX ADMIN — MUPIROCIN 1 APPLICATION: 20 OINTMENT TOPICAL at 10:31

## 2019-11-15 RX ADMIN — PROPOFOL 120 MCG/KG/MIN: 10 INJECTION, EMULSION INTRAVENOUS at 12:43

## 2019-11-15 RX ADMIN — BUPIVACAINE HYDROCHLORIDE 20 ML: 2.5 INJECTION, SOLUTION EPIDURAL; INFILTRATION; INTRACAUDAL; PERINEURAL at 14:49

## 2019-11-15 RX ADMIN — ROSUVASTATIN CALCIUM 40 MG: 20 TABLET, COATED ORAL at 20:47

## 2019-11-15 RX ADMIN — BUPIVACAINE HYDROCHLORIDE 2 ML: 5 INJECTION, SOLUTION PERINEURAL at 12:36

## 2019-11-15 RX ADMIN — CEFAZOLIN SODIUM 2 G: 2 INJECTION, SOLUTION INTRAVENOUS at 12:22

## 2019-11-15 RX ADMIN — HYDROCODONE BITARTRATE AND ACETAMINOPHEN 1 TABLET: 5; 325 TABLET ORAL at 20:47

## 2019-11-15 RX ADMIN — FAMOTIDINE 20 MG: 20 TABLET ORAL at 10:31

## 2019-11-15 RX ADMIN — ROPIVACAINE HYDROCHLORIDE 10 ML/HR: 5 INJECTION, SOLUTION EPIDURAL; INFILTRATION; PERINEURAL at 14:49

## 2019-11-15 RX ADMIN — LIDOCAINE HYDROCHLORIDE 0.5 ML: 10 INJECTION, SOLUTION EPIDURAL; INFILTRATION; INTRACAUDAL; PERINEURAL at 10:17

## 2019-11-15 RX ADMIN — SODIUM CHLORIDE, POTASSIUM CHLORIDE, SODIUM LACTATE AND CALCIUM CHLORIDE 9 ML/HR: 600; 310; 30; 20 INJECTION, SOLUTION INTRAVENOUS at 10:17

## 2019-11-15 RX ADMIN — PREGABALIN 150 MG: 150 CAPSULE ORAL at 10:31

## 2019-11-15 NOTE — ANESTHESIA PROCEDURE NOTES
Peripheral Block    Pre-sedation assessment completed: 11/15/2019 2:38 PM    Patient reassessed immediately prior to procedure    Patient location during procedure: post-op  Stop time: 11/15/2019 2:49 PM  Reason for block: at surgeon's request and post-op pain management  Performed by  CRNA: Ruthie Connelly, CRNA  Assisted by: Sana Wyman RN  Preanesthetic Checklist  Completed: patient identified, site marked, surgical consent, pre-op evaluation, timeout performed, IV checked, risks and benefits discussed and monitors and equipment checked  Prep:  Pt Position: supine  Sterile barriers:cap, gloves, mask and sterile barriers  Prep: ChloraPrep  Patient monitoring: blood pressure monitoring, continuous pulse oximetry and EKG  Procedure  Performed under: spinal  Guidance:ultrasound guided  Images:still images obtained, printed/placed on chart    Laterality:right  Block Type:adductor canal block  Injection Technique:catheter  Needle Type:Tuohy and echogenic  Needle Gauge:18 G  Resistance on Injection: none  Catheter Size:20 G (20g)  Cath Depth at skin: 8 cm    Medications Used: bupivacaine PF (MARCAINE) 0.25 % injection, 20 mL  Med admintered at 11/15/2019 2:49 PM      Post Assessment  Injection Assessment: negative aspiration for heme, incremental injection and no paresthesia on injection  Patient Tolerance:comfortable throughout block  Complications:no  Additional Notes  Procedure:             The pt was placed in the Supine position.  The Insertion site was  prepped and Draped in sterile fashion.  The pt was anesthetized with  IV Sedation( see meds).  Skin and cutaneous tissue was infiltrated and anesthetized with 1% Lidocaine 3 mls via a 25g needle.  A BBraun 4 inch 18g echogenic needle was then  inserted approximately midline, mid-thigh and advanced In-plane with Ultrasound guidance.  Normal Saline PSF was utilized for hydrodissection of tissue.  The Vastus medialis and Sartorius muscle where visualized  and the needle tip was placed in the adductor canal,  lateral to the femoral artery.  LA injection spread was visualized, injection was incremental 1-5ml, injection pressure was normal or little, no intraneural injection, no vascular injection.  LA dose was injected thru the needle(see dose above).  A BBraun 20g wire stylet catheter was placed via the needle with ultrasound visualization and confirmation with NS fluid bolus. The labeled Catheter was then secured to skin at insertion site with skin afix and steristrips to curled catheter and CHG transparent dressing.  Thank you.

## 2019-11-15 NOTE — PLAN OF CARE
Problem: Patient Care Overview  Goal: Plan of Care Review  Outcome: Ongoing (interventions implemented as appropriate)   11/15/19 6571   Coping/Psychosocial   Plan of Care Reviewed With patient   OTHER   Outcome Summary Patient walked short distance in room with very ataxic gait. No further gait pursued for safety. Recommend using R knee immobilizer until tomorrow's PT session. PADD score 7

## 2019-11-15 NOTE — OP NOTE
DATE OF PROCEDURE: 11/15/19    PREOPERATIVE DIAGNOSIS: right knee arthritis      POSTOPERATIVE DIAGNOSIS:  right knee arthritis    PROCEDURES PERFORMED:   right total knee arthroplasty with [employer] components (#7 posterior stabilized femur, #5 tibia, 10 mm polyethylene, with 35 three peg patella).     SURGEON: Negrito Vidal MD    ASSISTANT: Galina Rojo PA-C     SPECIMENS: None    ANESTHESIA:  Spinal    STAFF:  Circulator: Nai De Santiago RN  Scrub Person: Greg Valencia; Tri Huggins  Nursing Assistant: Shirley Bansal Abigail  Assistant: Galina Rojo PA-C    TOURNIQUET TIME: 20 minutes    ESTIMATED BLOOD LOSS: 200ml     COMPLICATIONS: None    PREOPERATIVE ANTIBIOTICS: Ancef 2 g    INDICATIONS: The patient is a 69 y.o. male with debilitating right knee pain secondary to advanced osteoarthritis that failed to improve in spite of conservative treatment .  Options have been discussed at length with the patient and the patient has had an extended course of conservative treatment without long-term benefit. The patient has reached the point where the patient desires total knee arthroplasty surgery and understands the risks, benefits, and alternatives. Consent was obtained. Please see my office notes for details with regard to preoperative counseling and operative rationale.     DESCRIPTION OF PROCEDURE: The patient was positively identified in the preoperative holding area and brought to the operating suite and placed in a supine position. After adequate spinal anesthetic had been achieved, the right lower extremity was prepped and draped in the usual sterile fashion.  After application of a tourniquet to the right upper thigh, which was used during the procedure for a total 20 minutes during the cementation process only. Landmarks of the knee were identified and timeout procedure was performed to confirm the operative site, as well as other parameters. Following the  sterile prep and drape, a skin incision was made just off the medial aspect of midline for a medial parapatellar approach. Following a sharp skin incision, dissection was carried down to the level of the extensor mechanism and a medial parapatellar arthrotomy was made and the patella was tucked into the lateral gutter. Description of arthritis: Bone-on-bone contact the medial compartment, tricompartmental osteophytes, varus alignment and patellofemoral degeneration.  The knee was adequately exposed and a distal femoral cut was made with an intramedullary guide. This was subsequently sized for a #7 implant and anterior, posterior chamfer cuts made. The menisci removed both medially and laterally.  The ACL was transected and the tibia was subluxed anteriorly. Proximal tibia cut was made with the external alignment guide. The cut was noted to be perpendicular to the tibial axis, with symmetric flexion and extension gaps. Therefore, final tibial preparations to accommodate a #5 tibia were made, followed by final femoral preparations for the box region. With a trial 10 insert in place, full flexion and extension was noted with no instability. The patella was then prepared for a 35 three peg patella which had excellent tracking. All trial components were removed and final components were cemented in place with namely a #5 tibia, #7 posterior stabilized femur and a 35 three peg patella with a trial 11 insert for cement compression. All the excess cement was removed from the bone implant interface and allowed to harden. Tourniquet was deflated. Hemostasis was obtained with electrocautery. There was no brisk bleeding noted in the popliteal fossa in particular. Therefore, the knee was copiously irrigated as it was between major steps, and the final 10 insert was placed as this was deemed appropriate for the patient's anatomy with full flexion and extension and no instability and attention was then directed towards closure.  The medial parapatellar arthrotomy was closed with #1 Vicryl in an interrupted figure-of-eight fashion in 4 strategic locations followed by oversewing this from proximal to distal with a #1 StrataFix symmetric, which nicely sealed the joint, followed by closure of the deep fascial layer with #1 Vicryl in a buried interrupted fashion, followed by closure of the subcutaneous layer with 2-0 Vicryl and the skin with 3-0 Stratafix in a running subcuticular fashion. Prineo wound closure dressing was applied followed by a sterile dressing with 4 x 4's, abdominal pad, soft roll and Ace wrap. The patient tolerated the procedure well and was brought to the recovery room in good condition.     PLAN:  1.  The patient will begin early range of motion and weight-bearing per the post total knee arthroplasty protocol.   2.  I anticipate brief hospitalization for initial rehabilitation and pain control followed by continued rehabilitation in either home health or supervised setting.   3.  Postoperative medical management with Dr. Claros.  4.  Aspirin will be utilized for DVT prophylaxis unless there is a contraindication.       Negrito Vidal MD  11/15/19  2:19 PM

## 2019-11-15 NOTE — INTERVAL H&P NOTE
"Pre-Op H&P (See Recent Office Note Attached for Full H&P)    Chief complaint: Bilateral knee pain, R>L    Review of Systems:  General ROS:  no fever, chills, rashes, No change since last office visit  Cardiovascular ROS: no chest pain or dyspnea on exertion.  History of CAD s/p Prox/mid LAD KERRY 1/2018 EF NL  Respiratory ROS: no cough, shortness of breath, or wheezing    Meds:    No current facility-administered medications on file prior to encounter.      Current Outpatient Medications on File Prior to Encounter   Medication Sig Dispense Refill   • aspirin 81 MG EC tablet Take 81 mg by mouth Daily.     • Chlorhexidine Gluconate 4 % solution Shower with hibiclens solution as directed for 5 days prior to surgery 237 mL 0   • chlorthalidone (HYGROTON) 25 MG tablet Take 25 mg by mouth Daily.     • clopidogrel (PLAVIX) 75 MG tablet Take 75 mg by mouth Daily.     • mupirocin (BACTROBAN) 2 % ointment Apply into the nostril(s) as directed by provider 2 (Two) Times a Day. 22 g 0   • coenzyme Q10 100 MG capsule Take 100 mg by mouth Daily.     • Cyanocobalamin (B-12 COMPLIANCE INJECTION IJ) Inject 1 dose as directed Every 30 (Thirty) Days. LD January 5th     • rosuvastatin (CRESTOR) 40 MG tablet TAKE ONE TABLET BY MOUTH DAILY 90 tablet 3       Vital Signs:  /80 (BP Location: Right arm, Patient Position: Lying)   Pulse 66   Temp 97.5 °F (36.4 °C) (Temporal)   Resp 16   Ht 175.3 cm (69\")   Wt 80.7 kg (178 lb)   SpO2 98%   BMI 26.29 kg/m²     Physical Exam:    CV:  S1S2 regular rate and rhythm, no murmur               Resp:  Clear to auscultation; respirations regular, even and unlabored    Results Review:     Lab Results   Component Value Date    WBC 7.45 11/05/2019    HGB 14.7 11/05/2019    HCT 45.3 11/05/2019    MCV 93.2 11/05/2019     11/05/2019    NEUTROABS 3.55 11/05/2019    GLUCOSE 101 (H) 11/05/2019    BUN 19 11/05/2019    CREATININE 1.08 11/05/2019    EGFRIFNONA 68 11/05/2019     11/05/2019    K " 4.0 11/05/2019     11/05/2019    CO2 25.0 11/05/2019    CALCIUM 9.2 11/05/2019    ALBUMIN 4.20 01/18/2018    AST 24 01/18/2018    ALT 25 01/18/2018    BILITOT 0.6 01/18/2018        I reviewed the patient's new clinical results.    Cancer Staging (if applicable)  Cancer Patient: __ yes _x_no __unknown; If yes, clinical stage T:__ N:__M:__, stage group or __N/A    Assessment/Plan:    He has advanced bilateral knee arthritis, and he would like to proceed with knee replacement surgery, starting on the right knee.  Risk, benefits, and alternatives have been discussed.  He has exhausted conservative treatment options.  Please see my counseling note for details.    Surgical Counseling     I have informed the patient of the diagnosis and the prognosis.  Exhaustive conservative treatment modalities have not resulted in long term pain relief.  The symptoms have progressed to the point of daily pain and inability to perform activities of daily living without significant pain. The patient has reached the point of desiring to proceed with total knee arthroplasty after discussing the risks, benefits and alternatives to the procedure.  The surgical procedure itself was discussed in detail. Risks of the procedure were discussed, which included but are not limited to, bleeding, infection, damage to blood vessels and nerves, incomplete pain relief, loosening of the prosthesis, deep infection, need for further surgery, loss of limb, deep venous thrombosis, pulmonary embolus, death, heart attack, stroke, kidney failure, liver failure, and anesthetic complications.  In addition, the potential for deep infection developing in the future was discussed, which could require further surgery.  The knee would have to be re-opened, debrided, and potentially remove the prosthesis, which may or may not be replaced in the future.  Also, the possibility for loosening of the prosthesis has been mentioned.  If the prosthesis loosened, a  revision arthroplasty could be performed, with results that are not as predictable compared to the original procedure.  The typical rehabilitative course has also been discussed, and full recovery may take up to a year to see the maximum benefit.  The importance of patient cooperation in the rehabilitative efforts has also been discussed.  No guarantees whatsoever were given.  The patient understands the potential risks versus the benefits and desires to proceed with total knee arthroplasty    Mayela Cortés, SHIVA  11/15/2019   10:38 AM      Agree with above.  Plan for right total knee arthroplasty.    Negrito Vidal MD  11/15/19  11:29 AM

## 2019-11-15 NOTE — PLAN OF CARE
Problem: Patient Care Overview  Goal: Plan of Care Review  Outcome: Ongoing (interventions implemented as appropriate)   11/15/19 1602   Coping/Psychosocial   Plan of Care Reviewed With patient;family   Plan of Care Review   Progress improving   OTHER   Outcome Summary vss; alert and oriented; dressing cdi; arrow @ 10; tolerating PO; 2 Liters oxygen     Goal: Individualization and Mutuality  Outcome: Ongoing (interventions implemented as appropriate)    Goal: Discharge Needs Assessment  Outcome: Ongoing (interventions implemented as appropriate)    Goal: Interprofessional Rounds/Family Conf  Outcome: Ongoing (interventions implemented as appropriate)      Problem: Pain, Chronic (Adult)  Goal: Identify Related Risk Factors and Signs and Symptoms  Outcome: Ongoing (interventions implemented as appropriate)    Goal: Acceptable Pain/Comfort Level and Functional Ability  Outcome: Ongoing (interventions implemented as appropriate)      Problem: Knee Arthroplasty (Total, Partial) (Adult)  Goal: Signs and Symptoms of Listed Potential Problems Will be Absent, Minimized or Managed (Knee Arthroplasty)  Outcome: Ongoing (interventions implemented as appropriate)    Goal: Anesthesia/Sedation Recovery  Outcome: Ongoing (interventions implemented as appropriate)

## 2019-11-15 NOTE — ANESTHESIA PREPROCEDURE EVALUATION
Anesthesia Evaluation     Patient summary reviewed and Nursing notes reviewed   no history of anesthetic complications:  NPO Solid Status: > 8 hours  NPO Liquid Status: > 2 hours           Airway   Mallampati: I  TM distance: >3 FB  Neck ROM: full  No difficulty expected  Dental - normal exam     Pulmonary - normal exam    breath sounds clear to auscultation  (+) sleep apnea (compliant with CPAP) on CPAP,   (-) COPD, asthma, not a smoker  Cardiovascular - normal exam  Exercise tolerance: good (4-7 METS)    ECG reviewed  Rhythm: regular  Rate: normal    (+) hypertension well controlled, CAD (treated with KERRY to LAD in Jan of 2018. On plavix which has been stopped appropriately. Coags wnl. Has 3v nonobstructive CAD in addtion), hyperlipidemia,   (-) dysrhythmias, angina, CHF, murmur      Neuro/Psych- negative ROS  (-) seizures, CVA  GI/Hepatic/Renal/Endo    (+)  GERD well controlled,    (-) liver disease, no renal disease, diabetes    Musculoskeletal     Abdominal    Substance History      OB/GYN          Other   arthritis,                      Anesthesia Plan    ASA 3     spinal     intravenous induction     Anesthetic plan, all risks, benefits, and alternatives have been provided, discussed and informed consent has been obtained with: patient.    Plan discussed with CRNA.

## 2019-11-15 NOTE — ANESTHESIA PROCEDURE NOTES
Spinal Block    Pre-sedation assessment completed: 11/15/2019 12:30 PM    Patient reassessed immediately prior to procedure    Patient location during procedure: OR  Stop Time: 11/15/2019 12:36 PM  Indication:at surgeon's request  Performed By  Anesthesiologist: Mariano Oliver Jr., MD  Preanesthetic Checklist  Completed: patient identified, site marked, surgical consent, pre-op evaluation, timeout performed, IV checked, risks and benefits discussed and monitors and equipment checked  Spinal Block Prep:  Patient Position:sitting  Sterile Tech:cap, gloves, sterile barriers and mask  Prep:Chloraprep  Patient Monitoring:blood pressure monitoring, continuous pulse oximetry and EKG  Spinal Block Procedure  Approach:midline  Guidance:landmark technique and palpation technique  Location:L4-L5  Needle Type:Sprotte  Needle Gauge:25 G  Placement of Spinal needle event:cerebrospinal fluid aspirated  Paresthesia: no  Fluid Appearance:clear  Medications: bupivacaine (MARCAINE) 0.5 % injection, 2 mL  Med Administered at 11/15/2019 12:36 PM   Post Assessment  Patient Tolerance:patient tolerated the procedure well with no apparent complications  Complications no  Additional Notes  Procedure:  Pt assisted to sitting position, with legs in position of comfort over side of bed.  Pt. instructed in optimal spine presentation, the spine was prepped/ Draped and the skin at insertion site was anesthetized with 1% Lidocaine 2 ml.  The spinal needle was then advanced until CSF flow was obtained and LA was injected:

## 2019-11-15 NOTE — THERAPY EVALUATION
Patient Name: Nic Brooks  : 1950    MRN: [id number]                              Today's Date: 11/15/2019       Admit Date: 11/15/2019    Visit Dx:     ICD-10-CM ICD-9-CM   1. Primary osteoarthritis of right knee M17.11 715.16     Patient Active Problem List   Diagnosis   • Moderate obstructive sleep apnea   • Fatigue   • Snoring   • GERD (gastroesophageal reflux disease)   • Hypersomnia   • Other chest pain   • Abnormal EKG   • Coronary artery disease involving native coronary artery of native heart without angina pectoris   • Dyslipidemia, goal LDL below 70   • Essential hypertension   • Primary osteoarthritis of right knee   • Status post total right knee replacement   • Prediabetes     Past Medical History:   Diagnosis Date   • Arthritis    • Aspiration into airway     2016- pt states was swabbed at that time but never told mrsa positive-    then started cpap 2016 and never told anything about being positive mrsa   • Chest pain     not at this time    • Coronary artery disease    • GERD (gastroesophageal reflux disease)    • Hearing loss     kailee hearing aids   • Heart disease    • History of kidney stones     just one    • History of transfusion     [facility]   • Hyperlipidemia    • Osteoarthritis    • Sleep apnea with use of continuous positive airway pressure (CPAP)     complaint with machine    • Wears glasses      Past Surgical History:   Procedure Laterality Date   • APPENDECTOMY     • CARDIAC CATHETERIZATION N/A 2018    Procedure: Left Heart Cath;  Surgeon: Negrito Wilkes MD;  Location: [facility] INVASIVE LOCATION;  Service:    • COLONOSCOPY      every 5 years   • CORONARY STENT PLACEMENT     • ENDOSCOPY     • KIDNEY STONE SURGERY     • KNEE ARTHROSCOPY W/ MENISCECTOMY      left   • LUMBAR DISCECTOMY     • LUNG REMOVAL, PARTIAL      right lower lobe    • SPLENECTOMY     • TONSILLECTOMY      removed adnoids   • WISDOM TOOTH EXTRACTION      only 2  removed      General Information     [facility] Name 11/15/19 1724          PT Evaluation Time/Intention    Document Type  evaluation  -SC     Mode of Treatment  physical therapy  -[facility] Name 11/15/19 1724          General Information    Patient Profile Reviewed?  yes s/p R TKA- DR Vidal  -SC     Prior Level of Function  independent:;gait  -SC     Existing Precautions/Restrictions  fall;brace worn when out of bed  (Significant)   -[facility] Name 11/15/19 1724          Relationship/Environment    Lives With  alone  -[facility] Name 11/15/19 1724          Resource/Environmental Concerns    Current Living Arrangements  home/apartment/condo  -[facility] Name 11/15/19 1724          Home Main Entrance    Number of Stairs, Main Entrance  one and ramp  -[facility] Name 11/15/19 1724          Stairs Within Home, Primary    Number of Stairs, Within Home, Primary  none  -[facility] Name 11/15/19 1724          Cognitive Assessment/Intervention- PT/OT    Orientation Status (Cognition)  oriented x 4  -SC     Cognitive Assessment/Intervention Comment  alert. oriented x3 following commands  -[facility] Name 11/15/19 1724          Safety Issues, Functional Mobility    Impairments Affecting Function (Mobility)  balance;coordination;motor control  -SC       User Key  (r) = Recorded By, (t) = Taken By, (c) = Cosigned By    Initials Name Provider Type    SC Hyacinth Alva, PT Physical Therapist        Mobility     [facility] Name 11/15/19 1726          Bed Mobility Assessment/Treatment    Bed Mobility Assessment/Treatment  scooting/bridging;supine-sit  -SC     [place] Level (Bed Mobility)  conditional independence  -SC     Assistive Device (Bed Mobility)  bed rails  -SC     Comment (Bed Mobility)  up to edge of bed  -[facility] Name 11/15/19 1726          Transfer Assessment/Treatment    Comment (Transfers)  STS with VC for sequencing.  Needed more assist sitting down  -[facility] Name 11/15/19 1726          Sit-Stand Transfer    Sit-Stand  [place] (Transfers)  2 person assist;verbal cues;minimum assist (75% patient effort)  -SC     Row Name 11/15/19 1726          Gait/Stairs Assessment/Training    Gait/Stairs Assessment/Training  gait/ambulation independence  -SC     [place] Level (Gait)  verbal cues;moderate assist (50% patient effort);2 person assist  -SC     Assistive Device (Gait)  walker, front-wheeled  -SC     Pattern (Gait)  step-to  -SC     Deviations/Abnormal Patterns (Gait)  ataxic;scissoring  (Significant)   -SC     Bilateral Gait Deviations  knee buckling, right side;knee buckling, left side  -SC     Comment (Gait/Stairs)  Patient ambulated short distance with ataxic gait . At this point PT stoped further gait for safety. Rn notified to keep R knee brace on for transfers  -[facility] Name 11/15/19 1726          Mobility Assessment/Intervention    Extremity Weight-bearing Status  right lower extremity  -SC     Right Lower Extremity (Weight-bearing Status)  weight-bearing as tolerated (WBAT)  -SC       User Key  (r) = Recorded By, (t) = Taken By, (c) = Cosigned By    Initials Name Provider Type    SC Hyacinth Alva, PT Physical Therapist        Obj/Interventions     [facility] Name 11/15/19 1728          General ROM    GENERAL ROM COMMENTS  R knee limited 25%  -[facility] Name 11/15/19 1728          MMT (Manual Muscle Testing)    General MMT Comments  R tib ant 3/5, quads 3+/5., L LE : tib ant 4/5, quads 4+/5  -[facility] Name 11/15/19 1728          Therapeutic Exercise    Lower Extremity (Therapeutic Exercise)  SLR (straight leg raise), bilateral  -SC     Exercise Type (Therapeutic Exercise)  AAROM (active assistive range of motion)  -SC     Sets/Reps (Therapeutic Exercise)  10  -SC     Row Name 11/15/19 1728          Dynamic Standing Balance    Level of [place], Reaches Outside Midline (Standing, Dynamic Balance)  2 person assist;moderate assist, 50 to 74% patient effort  -SC     Time Able to Maintain Position, Reaches Outside  Midline (Standing, Dynamic Balance)  1 to 2 minutes  -SC     Assistive Device Utilized (Supported Standing, Dynamic Balance)  walker, rolling  -SC     Row Name 11/15/19 1736          Sensory Assessment/Intervention    Sensory General Assessment  -- + numbness B toes  -SC       User Key  (r) = Recorded By, (t) = Taken By, (c) = Cosigned By    Initials Name Provider Type    SC Nida, Shearon A, PT Physical Therapist        Goals/Plan     Row Name 11/15/19 1730          Bed Mobility Goal 1 (PT)    Activity/Assistive Device (Bed Mobility Goal 1, PT)  bed mobility activities, all  -SC     Pepin Level/Cues Needed (Bed Mobility Goal 1, PT)  independent  -SC     Time Frame (Bed Mobility Goal 1, PT)  long term goal (LTG);10 days  -SC     Row Name 11/15/19 1730          Transfer Goal 1 (PT)    Activity/Assistive Device (Transfer Goal 1, PT)  sit-to-stand/stand-to-sit;walker, rolling  -SC     Pepin Level/Cues Needed (Transfer Goal 1, PT)  conditional independence  -SC     Time Frame (Transfer Goal 1, PT)  long term goal (LTG);10 days  -SC     Row Name 11/15/19 1730          Gait Training Goal 1 (PT)    Activity/Assistive Device (Gait Training Goal 1, PT)  gait (walking locomotion);walker, rolling  -SC     [place] Level (Gait Training Goal 1, PT)  conditional independence  -SC     Distance (Gait Goal 1, PT)  500  -SC     Time Frame (Gait Training Goal 1, PT)  long term goal (LTG);10 days  -SC     Row Name 11/15/19 1730          ROM Goal 1 (PT)    ROM Goal 1 (PT)  0-90  -SC     Time Frame (ROM Goal 1, PT)  short term goal (STG);2 weeks;5 - 7 days  -SC     Row Name 11/15/19 1730          Stairs Goal 1 (PT)    Activity/Assistive Device (Stairs Goal 1, PT)  stairs, all skills;walker, rolling  -SC     [place]ep[place] Level/Cues Needed (Stairs Goal 1, PT)  contact guard assist  -SC     Number of Stairs (Stairs Goal 1, PT)  1  -SC     Time Frame (Stairs Goal 1, PT)  long term goal (LTG);10 days  -SC       User Key  (r)  = Recorded By, (t) = Taken By, (c) = Cosigned By    Initials Name Provider Type    SC Hyacinth Alva, PT Physical Therapist        Clinical Impression     Row Name 11/15/19 1730          Pain Assessment    Additional Documentation  Pain Scale: FACES Pre/Post-Treatment (Group)  -SC     Row Name 11/15/19 1730          Pain Scale: Numbers Pre/Post-Treatment    Pain Location - Side  Right  -SC     Pain Location  knee  -SC     Pain Intervention(s)  Repositioned;Cold applied  -[facility] 11/15/19 1730          Pain Scale: FACES Pre/Post-Treatment    Pain: FACES Scale, Pretreatment  0-->no hurt  -SC     Pain: FACES Scale, Post-Treatment  2-->hurts little bit  -SC     Row Name 11/15/19 1730          Plan of Care Review    Plan of Care Reviewed With  patient;spouse  -SC     Row Name 11/15/19 1730          Physical Therapy Clinical Impression    Criteria for Skilled Interventions Met (PT Clinical Impression)  yes;treatment indicated  -SC     Rehab Potential (PT Clinical Summary)  good, to achieve stated therapy goals  -SC     Row Name 11/15/19 1730          Positioning and Restraints    Pre-Treatment Position  in bed  -SC     Post Treatment Position  chair  -SC     In Chair  notified nsg;reclined;sitting;call light within reach;encouraged to call for assist;with family/caregiver;exit alarm on  -SC       User Key  (r) = Recorded By, (t) = Taken By, (c) = Cosigned By    Initials Name Provider Type    Hyacinth Hernandez, PT Physical Therapist        Outcome Measures     Row Name 11/15/19 1732          How much help from another person do you currently need...    Turning from your back to your side while in flat bed without using bedrails?  4  -SC     Moving from lying on back to sitting on the side of a flat bed without bedrails?  4  -SC     Moving to and from a bed to a chair (including a wheelchair)?  2  -SC     Standing up from a chair using your arms (e.g., wheelchair, bedside chair)?  3  -SC     Climbing 3-5 steps  with a railing?  1  -SC     To walk in hospital room?  2  -SC     AM-PAC 6 Clicks Score (PT)  16  -SC     Row Name 11/15/19 3573          Functional Assessment    Outcome Measure Options  AM-PAC 6 Clicks Basic Mobility (PT)  -SC       User Key  (r) = Recorded By, (t) = Taken By, (c) = Cosigned By    Initials Name Provider Type    SC Hyacnith Alva PT Physical Therapist        Physical Therapy Education     Title: PT OT SLP Therapies (Done)     Topic: Physical Therapy (Done)     Point: Mobility training (Done)     Learning Progress Summary           Patient Eager, E, VU,NR by SC at 11/15/2019  5:33 PM    Comment:  reviewed safety and brace use   Family Eager, E, VU,NR by SC at 11/15/2019  5:33 PM    Comment:  reviewed safety and brace use                   Point: Home exercise program (Done)     Learning Progress Summary           Patient Eager, E, VU,NR by SC at 11/15/2019  5:33 PM    Comment:  reviewed safety and brace use   Family Ilene E, VU,NR by SC at 11/15/2019  5:33 PM    Comment:  reviewed safety and brace use                   Point: Body mechanics (Done)     Learning Progress Summary           Patient Eager, E, VU,NR by SC at 11/15/2019  5:33 PM    Comment:  reviewed safety and brace use   Family Narcisaer, E, VU,NR by SC at 11/15/2019  5:33 PM    Comment:  reviewed safety and brace use                   Point: Precautions (Done)     Learning Progress Summary           Patient Eager, E, VU,NR by SC at 11/15/2019  5:33 PM    Comment:  reviewed safety and brace use   Family Narcisaer, E, VU,NR by SC at 11/15/2019  5:33 PM    Comment:  reviewed safety and brace use                               User Key     Initials Effective Dates Name Provider Type [facility] 06/19/15 -  Hyacinth Alva PT Physical Therapist PT              PT Recommendation and Plan     Plan of Care Reviewed With: patient  Outcome Summary: Patient walked short distance in room with very ataxic gait. No further gait perfued for safety.  Recommend using R knee immobilizer untill tomorrow's PT session. PADD score 7     Time Calculation:   PT Charges     Row Name 11/15/19 1654             Time Calculation    Start Time  1654  -SC      PT Received On  11/15/19  -SC      PT Goal Re-Cert Due Date  11/25/19  -SC        User Key  (r) = Recorded By, (t) = Taken By, (c) = Cosigned By    Initials Name Provider Type    SC Hyacinth Alva, PT Physical Therapist        Therapy Charges for Today     Code Description Service Date Service Provider Modifiers Qty    51665372002 HC PT EVAL MOD COMPLEXITY 4 11/15/2019 Hyacinth Alva, PT GP 1    11236631543 HC PT THER SUPP EA 15 MIN 11/15/2019 Hyacinth Alva, PT GP 2          PT G-Codes  Outcome Measure Options: AM-PAC 6 Clicks Basic Mobility (PT)  AM-PAC 6 Clicks Score (PT): 16    Hyacinth Alva, PT  11/15/2019

## 2019-11-15 NOTE — H&P
Patient Name: Nic Brooks  MRN: [id number]  : 1950  DOS: 11/15/2019    Attending: Negrito Vidal MD    Primary Care Provider: Madison Weeks APRN      Chief complaint:  Right knee pain    Subjective   Patient is a 69 y.o. male presented for right total knee arthroplasty by Dr. Vidal.    He underwent surgery under spinal anesthesia. He tolerated surgery well and is admitted for further medical management. His knee has been painful for many years. Conservative treatments failed to provide long term pain relief. He denies use of assistive device for ambulation or recent falls.     When seen in PACU he is doing well. He denies pain, but is still under effects of spinal block. He denies nausea, shortness of breath or chest pain. No hx of DVT or PE.    He has hx HLD and CAD. He is followed by Dr. Wilkes, cardiology, and had preop cardiac clearance.     (Above is noted, agree.  Seen in his room afterwards.  Doing very well.  No complaints of nausea, vomiting, or shortness of breath.  His postoperative pain is under control, spinal anesthesia effects are still present though starting to subside.)wy    Allergies:  No Known Allergies    Meds:  Medications Prior to Admission   Medication Sig Dispense Refill Last Dose   • aspirin 81 MG EC tablet Take 81 mg by mouth Daily.   2019 at    • Chlorhexidine Gluconate 4 % solution Shower with hibiclens solution as directed for 5 days prior to surgery 237 mL 0 2019 at 2200   • chlorthalidone (HYGROTON) 25 MG tablet Take 25 mg by mouth Daily.   2019 at 2200   • clopidogrel (PLAVIX) 75 MG tablet Take 75 mg by mouth Daily.   2019 at 1900   • mupirocin (BACTROBAN) 2 % ointment Apply into the nostril(s) as directed by provider 2 (Two) Times a Day. 22 g 0 2019 at 2200   • coenzyme Q10 100 MG capsule Take 100 mg by mouth Daily.   2019 at 2000   • Cyanocobalamin (B-12 COMPLIANCE INJECTION IJ) Inject 1 dose as directed Every 30 (Thirty)  Days. LD January 5th   10/26/2019   • rosuvastatin (CRESTOR) 40 MG tablet TAKE ONE TABLET BY MOUTH DAILY 90 tablet 3 11/13/2019       History:   Past Medical History:   Diagnosis Date   • Arthritis    • Aspiration into airway     april 2016- pt states was swabbed at that time but never told mrsa positive-    then started cpap august 2016 and never told anything about being positive mrsa   • Chest pain     not at this time    • Coronary artery disease    • GERD (gastroesophageal reflux disease)    • Hearing loss     kailee hearing aids   • Heart disease    • History of kidney stones     just one    • History of transfusion 1980    [facility]   • Hyperlipidemia    • Osteoarthritis    • Sleep apnea with use of continuous positive airway pressure (CPAP)     complaint with machine    • Wears glasses      Past Surgical History:   Procedure Laterality Date   • APPENDECTOMY     • CARDIAC CATHETERIZATION N/A 1/19/2018    Procedure: Left Heart Cath;  Surgeon: Negrito Wilkes MD;  Location: [facility] INVASIVE LOCATION;  Service:    • COLONOSCOPY      every 5 years   • CORONARY STENT PLACEMENT     • ENDOSCOPY     • KIDNEY STONE SURGERY     • KNEE ARTHROSCOPY W/ MENISCECTOMY      left   • LUMBAR DISCECTOMY     • LUNG REMOVAL, PARTIAL      right lower lobe    • SPLENECTOMY     • TONSILLECTOMY      removed adnoids   • WISDOM TOOTH EXTRACTION      only 2 removed      Family History   Problem Relation Age of Onset   • Hypertension Mother    • Cancer Mother    • Cancer Father    • Emphysema Father    • Diabetes Father    • COPD Father    • Osteoarthritis Father    • No Known Problems Sister    • Cancer Brother    • Diabetes Brother    • Heart disease Brother      Social History     Tobacco Use   • Smoking status: Never Smoker   • Smokeless tobacco: Never Used   Substance Use Topics   • Alcohol use: No   • Drug use: No   He is  with 2 children. 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 [employer]  ")wy    Review of Systems  Pertinent items are noted in HPI, all other systems reviewed and negative    Vital Signs  Visit Vitals  /85   Pulse 59   Temp 97 °F (36.1 °C)   Resp 16   Ht 175.3 cm (69\")   Wt 80.7 kg (178 lb)   SpO2 100%   BMI 26.29 kg/m²       Physical Exam:    General Appearance:    Alert, cooperative, in no acute distress   Head:    Normocephalic, without obvious abnormality, atraumatic   Eyes:            Lids and lashes normal, conjunctivae and sclerae normal, no   icterus, no pallor, corneas clear   Ears:    Ears appear intact with no abnormalities noted   Neck:   No adenopathy, supple, trachea midline, no thyromegaly   Lungs:     Clear to auscultation, respirations regular, even and                   unlabored    Heart:    Regular rhythm and normal rate, normal S1 and S2, no            murmur, no gallop   Abdomen:     Normal bowel sounds, no masses, no organomegaly, soft        non-tender, non-distended, no guarding, no rebound                 tenderness   Genitalia:    Deferred   Extremities:   Right knee ACE wrap CDI. Nerve block present   Pulses:   Pulses palpable and equal bilaterally   Skin:   No bleeding, bruising or rash   Neurologic:   Cranial nerves 2 - 12 grossly intact, lack of sensation or movement BLE d/t spinal block ( starting to regain gross movement and sensation with SA effects starting to subside)wy      I reviewed the patient's new clinical results.     Results from last 7 days   Lab Units 11/15/19  1054   POTASSIUM mmol/L 3.7     Results for JR MIRELES (MRN [id number]) as of 11/15/2019 16:56   Ref. Range 11/5/2019 10:01   Glucose Latest Ref Range: 65 - 99 mg/dL 101 (H)   Sodium Latest Ref Range: 136 - 145 mmol/L 139   Potassium Latest Ref Range: 3.5 - 5.2 mmol/L 4.0   CO2 Latest Ref Range: 22.0 - 29.0 mmol/L 25.0   Chloride Latest Ref Range: 98 - 107 mmol/L 101   Anion Gap Latest Ref Range: 5.0 - 15.0 mmol/L 13.0   Creatinine Latest Ref Range: 0.76 - 1.27 mg/dL " 1.08   BUN Latest Ref Range: 8 - 23 mg/dL 19   BUN/Creatinine Ratio Latest Ref Range: 7.0 - 25.0  17.6   Calcium Latest Ref Range: 8.6 - 10.5 mg/dL 9.2   eGFR Non  Am Latest Ref Range: >60 mL/min/1.73 68   Hemoglobin A1C Latest Ref Range: 4.80 - 5.60 % 5.80 (H)   C-Reactive Protein Latest Ref Range: 0.00 - 0.50 mg/dL 0.57 (H)   Protime Latest Ref Range: 11.2 - 14.3 Seconds 12.1   INR Latest Ref Range: 0.85 - 1.16  0.94   PTT Latest Ref Range: 24.0 - 37.0 seconds 23.3 (L)   WBC Latest Ref Range: 3.40 - 10.80 10*3/mm3 7.45   RBC Latest Ref Range: 4.14 - 5.80 10*6/mm3 4.86   Hemoglobin Latest Ref Range: 13.0 - 17.7 g/dL 14.7   Hematocrit Latest Ref Range: 37.5 - 51.0 % 45.3   RDW Latest Ref Range: 12.3 - 15.4 % 15.4   MCV Latest Ref Range: 79.0 - 97.0 fL 93.2   MCH Latest Ref Range: 26.6 - 33.0 pg 30.2   MCHC Latest Ref Range: 31.5 - 35.7 g/dL 32.5   MPV Latest Ref Range: 6.0 - 12.0 fL 10.2   Platelets Latest Ref Range: 140 - 450 10*3/mm3 216     Assessment and Plan:     Status post total right knee replacement    Primary osteoarthritis of right knee    Moderate obstructive sleep apnea    Coronary artery disease involving native coronary artery of native heart without angina pectoris    Dyslipidemia, goal LDL below 70    Prediabetes      Plan  1. PT/OT- early ambulation postop  2. Pain control-prns, AC nerve block   3. IS-encourage  4. DVT proph- mechs/ASA  5. Bowel regimen  6. Resume home medications as appropriate  7. Monitor post-op labs  8. Discharge planning for home    CAD, hyperlipidemia  - continue home statin  - resume plavix when ok with Dr. Vidal( probably POD2, if H/H stable. )wy    PreDM  - hgb A1c on 11/5/19 5.8  - Accu-Chek AC and HS with low dose SSI    GAUDENCIO  - CPAP at night      Ashely Gómez APRN  11/15/19  4:55 PM     I have personally performed the evaluation on this patient. My history is consistent  with HPI obtained. My exam findings are listed above. I have personally reviewed and  discussed the above formulated treatment plan with pt and [email or web address]

## 2019-11-16 VITALS
HEART RATE: 76 BPM | BODY MASS INDEX: 26.36 KG/M2 | HEIGHT: 69 IN | WEIGHT: 178 LBS | DIASTOLIC BLOOD PRESSURE: 76 MMHG | SYSTOLIC BLOOD PRESSURE: 104 MMHG | OXYGEN SATURATION: 95 % | RESPIRATION RATE: 16 BRPM | TEMPERATURE: 98.3 F

## 2019-11-16 LAB
ANION GAP SERPL CALCULATED.3IONS-SCNC: 12 MMOL/L (ref 5–15)
BUN BLD-MCNC: 20 MG/DL (ref 8–23)
BUN/CREAT SERPL: 16.7 (ref 7–25)
CALCIUM SPEC-SCNC: 8.4 MG/DL (ref 8.6–10.5)
CHLORIDE SERPL-SCNC: 99 MMOL/L (ref 98–107)
CO2 SERPL-SCNC: 26 MMOL/L (ref 22–29)
CREAT BLD-MCNC: 1.2 MG/DL (ref 0.76–1.27)
DEPRECATED RDW RBC AUTO: 51.1 FL (ref 37–54)
ERYTHROCYTE [DISTWIDTH] IN BLOOD BY AUTOMATED COUNT: 15.1 % (ref 12.3–15.4)
GFR SERPL CREATININE-BSD FRML MDRD: 60 ML/MIN/1.73
GLUCOSE BLD-MCNC: 116 MG/DL (ref 65–99)
GLUCOSE BLDC GLUCOMTR-MCNC: 115 MG/DL (ref 70–130)
GLUCOSE BLDC GLUCOMTR-MCNC: 125 MG/DL (ref 70–130)
HCT VFR BLD AUTO: 36.1 % (ref 37.5–51)
HGB BLD-MCNC: 12.1 G/DL (ref 13–17.7)
MCH RBC QN AUTO: 30.7 PG (ref 26.6–33)
MCHC RBC AUTO-ENTMCNC: 33.5 G/DL (ref 31.5–35.7)
MCV RBC AUTO: 91.6 FL (ref 79–97)
PLATELET # BLD AUTO: 244 10*3/MM3 (ref 140–450)
PMV BLD AUTO: 10.5 FL (ref 6–12)
POTASSIUM BLD-SCNC: 3.8 MMOL/L (ref 3.5–5.2)
RBC # BLD AUTO: 3.94 10*6/MM3 (ref 4.14–5.8)
SODIUM BLD-SCNC: 137 MMOL/L (ref 136–145)
WBC NRBC COR # BLD: 18.25 10*3/MM3 (ref 3.4–10.8)

## 2019-11-16 PROCEDURE — 99024 POSTOP FOLLOW-UP VISIT: CPT | Performed by: ORTHOPAEDIC SURGERY

## 2019-11-16 PROCEDURE — 97116 GAIT TRAINING THERAPY: CPT

## 2019-11-16 PROCEDURE — 80048 BASIC METABOLIC PNL TOTAL CA: CPT | Performed by: NURSE PRACTITIONER

## 2019-11-16 PROCEDURE — 63710000001 MELOXICAM 7.5 MG TABLET: Performed by: ORTHOPAEDIC SURGERY

## 2019-11-16 PROCEDURE — 25010000003 CEFAZOLIN IN DEXTROSE 2-4 GM/100ML-% SOLUTION: Performed by: ORTHOPAEDIC SURGERY

## 2019-11-16 PROCEDURE — 94799 UNLISTED PULMONARY SVC/PX: CPT

## 2019-11-16 PROCEDURE — 63710000001 DOCUSATE SODIUM 100 MG CAPSULE: Performed by: ORTHOPAEDIC SURGERY

## 2019-11-16 PROCEDURE — 97110 THERAPEUTIC EXERCISES: CPT

## 2019-11-16 PROCEDURE — 63710000001 ASPIRIN EC 325 MG TABLET DELAYED-RELEASE: Performed by: ORTHOPAEDIC SURGERY

## 2019-11-16 PROCEDURE — 85027 COMPLETE CBC AUTOMATED: CPT | Performed by: ORTHOPAEDIC SURGERY

## 2019-11-16 PROCEDURE — 97166 OT EVAL MOD COMPLEX 45 MIN: CPT

## 2019-11-16 PROCEDURE — A9270 NON-COVERED ITEM OR SERVICE: HCPCS | Performed by: ORTHOPAEDIC SURGERY

## 2019-11-16 PROCEDURE — 94660 CPAP INITIATION&MGMT: CPT

## 2019-11-16 PROCEDURE — 25010000002 MORPHINE PER 10 MG: Performed by: ORTHOPAEDIC SURGERY

## 2019-11-16 PROCEDURE — 82962 GLUCOSE BLOOD TEST: CPT

## 2019-11-16 PROCEDURE — 63710000001 HYDROCODONE-ACETAMINOPHEN 5-325 MG TABLET: Performed by: ORTHOPAEDIC SURGERY

## 2019-11-16 RX ORDER — DOCUSATE SODIUM 100 MG/1
100 CAPSULE, LIQUID FILLED ORAL 2 TIMES DAILY PRN
Qty: 60 CAPSULE | Refills: 0 | Status: SHIPPED | OUTPATIENT
Start: 2019-11-16

## 2019-11-16 RX ORDER — ASPIRIN 81 MG/1
81 TABLET ORAL DAILY
Status: ON HOLD
Start: 2019-11-16 | End: 2020-10-20 | Stop reason: SDUPTHER

## 2019-11-16 RX ORDER — HYDROCODONE BITARTRATE AND ACETAMINOPHEN 5; 325 MG/1; MG/1
1 TABLET ORAL EVERY 4 HOURS PRN
Qty: 40 TABLET | Refills: 0 | Status: SHIPPED | OUTPATIENT
Start: 2019-11-16 | End: 2019-11-25

## 2019-11-16 RX ADMIN — MELOXICAM 15 MG: 7.5 TABLET ORAL at 07:50

## 2019-11-16 RX ADMIN — HYDROCODONE BITARTRATE AND ACETAMINOPHEN 1 TABLET: 5; 325 TABLET ORAL at 00:39

## 2019-11-16 RX ADMIN — SODIUM CHLORIDE, PRESERVATIVE FREE 3 ML: 5 INJECTION INTRAVENOUS at 07:51

## 2019-11-16 RX ADMIN — HYDROCODONE BITARTRATE AND ACETAMINOPHEN 1 TABLET: 5; 325 TABLET ORAL at 12:05

## 2019-11-16 RX ADMIN — ASPIRIN 325 MG: 325 TABLET, COATED ORAL at 07:50

## 2019-11-16 RX ADMIN — CEFAZOLIN SODIUM 2 G: 2 INJECTION, SOLUTION INTRAVENOUS at 04:50

## 2019-11-16 RX ADMIN — MORPHINE SULFATE 4 MG: 4 INJECTION, SOLUTION INTRAMUSCULAR; INTRAVENOUS at 05:09

## 2019-11-16 RX ADMIN — DOCUSATE SODIUM 100 MG: 100 CAPSULE, LIQUID FILLED ORAL at 07:50

## 2019-11-16 RX ADMIN — HYDROCODONE BITARTRATE AND ACETAMINOPHEN 1 TABLET: 5; 325 TABLET ORAL at 07:50

## 2019-11-16 NOTE — DISCHARGE PLACEMENT REQUEST
"Nic Brooks (69 y.o. Male)     Pt is in room 358 [facility]        Date of Birth Social Security Number Address Home Phone MRN    1950  PO   [facility] [address] [phone] [id number]    Zoroastrian Marital Status          Non-Taoism        Admission Date Admission Type Admitting Provider Attending Provider Department, Room/Bed    11/15/19 Elective Negrito Vidal MD Kirk, Michael E, MD [facility] 3G, S358/1    Discharge Date Discharge Disposition Discharge Destination                       Attending Provider:  Negrito Vidal MD    Allergies:  No Known Allergies    Isolation:  None   Infection:  MRSA/History Only (01/18/18)   Code Status:  CPR    Ht:  175.3 cm (69\")   Wt:  80.7 kg (178 lb)    Admission Cmt:  None   Principal Problem:  Status post total right knee replacement [Z96.651]                 Active Insurance as of 11/15/2019     Primary Coverage     Payor Plan Insurance Group Employer/Plan Group    MEDICARE MEDICARE A & B      Payor Plan Address Payor Plan Phone Number Payor Plan Fax Number Effective Dates    [address] [phone]  2/1/2014 - None Entered    [facility] [address]       Subscriber Name Subscriber Birth Date Member ID       NIC BROOKS 1950 [id number]           Secondary Coverage     Payor Plan Insurance Group Employer/Plan Group    AARP MED SUPP AARP HEALTH CARE OPTIONS      Payor Plan Address Payor Plan Phone Number Payor Plan Fax Number Effective Dates    [facility] [phone]  1/1/2016 - None Entered    [address]       [facility] [address]       Subscriber Name Subscriber Birth Date Member ID       NIC BROOKS 1950 [id number]                 Emergency Contacts      (Rel.) Home Phone Work Phone Mobile Phone    Katie Brooks (Spouse) [phone] -- [phone]        [address]  [address] [facility] [address]  Dept. Phone:  [phone]  Dept. Fax:   " "Date Ordered: 2019         Patient:  Nic Brooks MRN:  [id number]   PO   [place] [address] :  1950  SSN:    Phone: [phone] Sex:  M     Weight: 80.7 kg (178 lb)         Ht Readings from Last 1 Encounters:   11/15/19 175.3 cm (69\")         Walker               (Order ID: [id number])    Diagnosis:  Primary osteoarthritis of right knee (M17.11 [ICD-10-CM] 715.16 [ICD-9-CM])  Status post total right knee replacement (Z96.651 [ICD-10-CM] V43.65 [ICD-9-CM])   Quantity:  1     Equipment:  Walker Folding with Wheels  Length of Need (99 Months = Lifetime): 6 Months        Authorizing Provider's Phone: [phone]   Verbal Order Mode: Telephone with readback   Authorizing Provider: Ryan Claros MD  Authorizing Provider's NPI: [id number]     Order Entered By: Valentine Davila RN 2019 10:05 AM            Insurance Information                MEDICARE/MEDICARE A & B Phone: [phone]    Subscriber: Nic Brooks Subscriber#: [id number]    Group#:  Precert#:         AARP MED SUPP/AAR HEALTH CARE OPTIONS Phone: [phone]    Subscriber: Nic Brooks Subscriber#: [id number]    Group#:  Precert#:              History & Physical      Ryan Claros MD at 11/15/19 1439          Patient Name: Nic Brooks  MRN: [id number]  : 1950  DOS: 11/15/2019    Attending: Negrito Vidal MD    Primary Care Provider: Madison Weeks APRN      Chief complaint:  Right knee pain    Subjective   Patient is a 69 y.o. male presented for right total knee arthroplasty by Dr. Vidal.    He underwent surgery under spinal anesthesia. He tolerated surgery well and is admitted for further medical management. His knee has been painful for many years. Conservative treatments failed to provide long term pain relief. He denies use of assistive device for ambulation or recent falls.     When seen in PACU he is doing well. He denies pain, but is still under effects of " spinal block. He denies nausea, shortness of breath or chest pain. No hx of DVT or PE.    He has hx HLD and CAD. He is followed by Dr. Wilkes, cardiology, and had preop cardiac clearance.     (Above is noted, agree.  Seen in his room afterwards.  Doing very well.  No complaints of nausea, vomiting, or shortness of breath.  His postoperative pain is under control, spinal anesthesia effects are still present though starting to subside.)wy    Allergies:  No Known Allergies    Meds:  Medications Prior to Admission   Medication Sig Dispense Refill Last Dose   • aspirin 81 MG EC tablet Take 81 mg by mouth Daily.   11/14/2019 at 2000   • Chlorhexidine Gluconate 4 % solution Shower with hibiclens solution as directed for 5 days prior to surgery 237 mL 0 11/14/2019 at 2200   • chlorthalidone (HYGROTON) 25 MG tablet Take 25 mg by mouth Daily.   11/14/2019 at 2200   • clopidogrel (PLAVIX) 75 MG tablet Take 75 mg by mouth Daily.   11/7/2019 at 1900   • mupirocin (BACTROBAN) 2 % ointment Apply into the nostril(s) as directed by provider 2 (Two) Times a Day. 22 g 0 11/14/2019 at 2200   • coenzyme Q10 100 MG capsule Take 100 mg by mouth Daily.   11/13/2019 at 2000   • Cyanocobalamin (B-12 COMPLIANCE INJECTION IJ) Inject 1 dose as directed Every 30 (Thirty) Days. LD January 5th   10/26/2019   • rosuvastatin (CRESTOR) 40 MG tablet TAKE ONE TABLET BY MOUTH DAILY 90 tablet 3 11/13/2019       History:   Past Medical History:   Diagnosis Date   • Arthritis    • Aspiration into airway     april 2016- pt states was swabbed at that time but never told mrsa positive-    then started cpap august 2016 and never told anything about being positive mrsa   • Chest pain     not at this time    • Coronary artery disease    • GERD (gastroesophageal reflux disease)    • Hearing loss     kailee hearing aids   • Heart disease    • History of kidney stones     just one    • History of transfusion [facility]    [facility]   • Hyperlipidemia    •  "Osteoarthritis    • Sleep apnea with use of continuous positive airway pressure (CPAP)     complaint with machine    • Wears glasses      Past Surgical History:   Procedure Laterality Date   • APPENDECTOMY     • CARDIAC CATHETERIZATION N/A 1/19/2018    Procedure: Left Heart Cath;  Surgeon: Negrito Wilkes MD;  Location: [facility] CATH INVASIVE LOCATION;  Service:    • COLONOSCOPY      every 5 years   • CORONARY STENT PLACEMENT     • ENDOSCOPY     • KIDNEY STONE SURGERY     • KNEE ARTHROSCOPY W/ MENISCECTOMY      left   • LUMBAR DISCECTOMY     • LUNG REMOVAL, PARTIAL      right lower lobe    • SPLENECTOMY     • TONSILLECTOMY      removed adnoids   • WISDOM TOOTH EXTRACTION      only 2 removed      Family History   Problem Relation Age of Onset   • Hypertension Mother    • Cancer Mother    • Cancer Father    • Emphysema Father    • Diabetes Father    • COPD Father    • Osteoarthritis Father    • No Known Problems Sister    • Cancer Brother    • Diabetes Brother    • Heart disease Brother      Social History     Tobacco Use   • Smoking status: Never Smoker   • Smokeless tobacco: Never Used   Substance Use Topics   • Alcohol use: No   • Drug use: No   He is  with 2 children. 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 [employer] )wy    Review of Systems  Pertinent items are noted in HPI, all other systems reviewed and negative    Vital Signs  Visit Vitals  /85   Pulse 59   Temp 97 °F (36.1 °C)   Resp 16   Ht 175.3 cm (69\")   Wt 80.7 kg (178 lb)   SpO2 100%   BMI 26.29 kg/m²       Physical Exam:    General Appearance:    Alert, cooperative, in no acute distress   Head:    Normocephalic, without obvious abnormality, atraumatic   Eyes:            Lids and lashes normal, conjunctivae and sclerae normal, no   icterus, no pallor, corneas clear   Ears:    Ears appear intact with no abnormalities noted   Neck:   No adenopathy, supple, trachea midline, no thyromegaly   Lungs:     Clear to auscultation, " respirations regular, even and                   unlabored    Heart:    Regular rhythm and normal rate, normal S1 and S2, no            murmur, no gallop   Abdomen:     Normal bowel sounds, no masses, no organomegaly, soft        non-tender, non-distended, no guarding, no rebound                 tenderness   Genitalia:    Deferred   Extremities:   Right knee ACE wrap CDI. Nerve block present   Pulses:   Pulses palpable and equal bilaterally   Skin:   No bleeding, bruising or rash   Neurologic:   Cranial nerves 2 - 12 grossly intact, lack of sensation or movement BLE d/t spinal block ( starting to regain gross movement and sensation with SA effects starting to subside)wy      I reviewed the patient's new clinical results.     Results from last 7 days   Lab Units 11/15/19  1054   POTASSIUM mmol/L 3.7     Results for JR MIRELES (MRN [id number]) as of 11/15/2019 16:56   Ref. Range 11/5/2019 10:01   Glucose Latest Ref Range: 65 - 99 mg/dL 101 (H)   Sodium Latest Ref Range: 136 - 145 mmol/L 139   Potassium Latest Ref Range: 3.5 - 5.2 mmol/L 4.0   CO2 Latest Ref Range: 22.0 - 29.0 mmol/L 25.0   Chloride Latest Ref Range: 98 - 107 mmol/L 101   Anion Gap Latest Ref Range: 5.0 - 15.0 mmol/L 13.0   Creatinine Latest Ref Range: 0.76 - 1.27 mg/dL 1.08   BUN Latest Ref Range: 8 - 23 mg/dL 19   BUN/Creatinine Ratio Latest Ref Range: 7.0 - 25.0  17.6   Calcium Latest Ref Range: 8.6 - 10.5 mg/dL 9.2   eGFR Non  Am Latest Ref Range: >60 mL/min/1.73 68   Hemoglobin A1C Latest Ref Range: 4.80 - 5.60 % 5.80 (H)   C-Reactive Protein Latest Ref Range: 0.00 - 0.50 mg/dL 0.57 (H)   Protime Latest Ref Range: 11.2 - 14.3 Seconds 12.1   INR Latest Ref Range: 0.85 - 1.16  0.94   PTT Latest Ref Range: 24.0 - 37.0 seconds 23.3 (L)   WBC Latest Ref Range: 3.40 - 10.80 10*3/mm3 7.45   RBC Latest Ref Range: 4.14 - 5.80 10*6/mm3 4.86   Hemoglobin Latest Ref Range: 13.0 - 17.7 g/dL 14.7   Hematocrit Latest Ref Range: 37.5 - 51.0 % 45.3    RDW Latest Ref Range: 12.3 - 15.4 % 15.4   MCV Latest Ref Range: 79.0 - 97.0 fL 93.2   MCH Latest Ref Range: 26.6 - 33.0 pg 30.2   MCHC Latest Ref Range: 31.5 - 35.7 g/dL 32.5   MPV Latest Ref Range: 6.0 - 12.0 fL 10.2   Platelets Latest Ref Range: 140 - 450 10*3/mm3 216     Assessment and Plan:     Status post total right knee replacement    Primary osteoarthritis of right knee    Moderate obstructive sleep apnea    Coronary artery disease involving native coronary artery of native heart without angina pectoris    Dyslipidemia, goal LDL below 70    Prediabetes      Plan  1. PT/OT- early ambulation postop  2. Pain control-prns, AC nerve block   3. IS-encourage  4. DVT proph- mechs/ASA  5. Bowel regimen  6. Resume home medications as appropriate  7. Monitor post-op labs  8. Discharge planning for home    CAD, hyperlipidemia  - continue home statin  - resume plavix when ok with Dr. Vidal( probably POD2, if H/H stable. )wy    PreDM  - hgb A1c on 11/5/19 5.8  - Accu-Chek AC and HS with low dose SSI    GAUDENCIO  - CPAP at night      SHIVA Lombardo  11/15/19  4:55 PM     I have personally performed the evaluation on this patient. My history is consistent  with HPI obtained. My exam findings are listed above. I have personally reviewed and discussed the above formulated treatment plan with pt and [email or web address]      Electronically signed by Ryan Claros MD at 11/15/19 1801     Negrito Vidal MD at 11/15/19 1038          Pre-Op H&P (See Recent Office Note Attached for Full H&P)    Chief complaint: Bilateral knee pain, R>L    Review of Systems:  General ROS:  no fever, chills, rashes, No change since last office visit  Cardiovascular ROS: no chest pain or dyspnea on exertion.  History of CAD s/p Prox/mid LAD KERRY 1/2018 EF NL  Respiratory ROS: no cough, shortness of breath, or wheezing    Meds:    No current facility-administered medications on file prior to encounter.      Current Outpatient Medications on File  "Prior to Encounter   Medication Sig Dispense Refill   • aspirin 81 MG EC tablet Take 81 mg by mouth Daily.     • Chlorhexidine Gluconate 4 % solution Shower with hibiclens solution as directed for 5 days prior to surgery 237 mL 0   • chlorthalidone (HYGROTON) 25 MG tablet Take 25 mg by mouth Daily.     • clopidogrel (PLAVIX) 75 MG tablet Take 75 mg by mouth Daily.     • mupirocin (BACTROBAN) 2 % ointment Apply into the nostril(s) as directed by provider 2 (Two) Times a Day. 22 g 0   • coenzyme Q10 100 MG capsule Take 100 mg by mouth Daily.     • Cyanocobalamin (B-12 COMPLIANCE INJECTION IJ) Inject 1 dose as directed Every 30 (Thirty) Days. LD January 5th     • rosuvastatin (CRESTOR) 40 MG tablet TAKE ONE TABLET BY MOUTH DAILY 90 tablet 3       Vital Signs:  /80 (BP Location: Right arm, Patient Position: Lying)   Pulse 66   Temp 97.5 °F (36.4 °C) (Temporal)   Resp 16   Ht 175.3 cm (69\")   Wt 80.7 kg (178 lb)   SpO2 98%   BMI 26.29 kg/m²      Physical Exam:    CV:  S1S2 regular rate and rhythm, no murmur               Resp:  Clear to auscultation; respirations regular, even and unlabored    Results Review:     Lab Results   Component Value Date    WBC 7.45 11/05/2019    HGB 14.7 11/05/2019    HCT 45.3 11/05/2019    MCV 93.2 11/05/2019     11/05/2019    NEUTROABS 3.55 11/05/2019    GLUCOSE 101 (H) 11/05/2019    BUN 19 11/05/2019    CREATININE 1.08 11/05/2019    EGFRIFNONA 68 11/05/2019     11/05/2019    K 4.0 11/05/2019     11/05/2019    CO2 25.0 11/05/2019    CALCIUM 9.2 11/05/2019    ALBUMIN 4.20 01/18/2018    AST 24 01/18/2018    ALT 25 01/18/2018    BILITOT 0.6 01/18/2018        I reviewed the patient's new clinical results.    Cancer Staging (if applicable)  Cancer Patient: __ yes _x_no __unknown; If yes, clinical stage T:__ N:__M:__, stage group or __N/A    Assessment/Plan:    He has advanced bilateral knee arthritis, and he would like to proceed with knee replacement surgery, " starting on the right knee.  Risk, benefits, and alternatives have been discussed.  He has exhausted conservative treatment options.  Please see my counseling note for details.    Surgical Counseling     I have informed the patient of the diagnosis and the prognosis.  Exhaustive conservative treatment modalities have not resulted in long term pain relief.  The symptoms have progressed to the point of daily pain and inability to perform activities of daily living without significant pain. The patient has reached the point of desiring to proceed with total knee arthroplasty after discussing the risks, benefits and alternatives to the procedure.  The surgical procedure itself was discussed in detail. Risks of the procedure were discussed, which included but are not limited to, bleeding, infection, damage to blood vessels and nerves, incomplete pain relief, loosening of the prosthesis, deep infection, need for further surgery, loss of limb, deep venous thrombosis, pulmonary embolus, death, heart attack, stroke, kidney failure, liver failure, and anesthetic complications.  In addition, the potential for deep infection developing in the future was discussed, which could require further surgery.  The knee would have to be re-opened, debrided, and potentially remove the prosthesis, which may or may not be replaced in the future.  Also, the possibility for loosening of the prosthesis has been mentioned.  If the prosthesis loosened, a revision arthroplasty could be performed, with results that are not as predictable compared to the original procedure.  The typical rehabilitative course has also been discussed, and full recovery may take up to a year to see the maximum benefit.  The importance of patient cooperation in the rehabilitative efforts has also been discussed.  No guarantees whatsoever were given.  The patient understands the potential risks versus the benefits and desires to proceed with total knee  arthroplasty    Mayela Cortés, APRN  11/15/2019   10:38 AM      Agree with above.  Plan for right total knee arthroplasty.    Negrito Vidal MD  11/15/19  11:29 AM      Electronically signed by Negrito Vidal MD at 11/15/19 0974   Source Note                 [facility] Note    Subjective     Chief Complaint   Patient presents with   • Left Knee - Pain   • Right Knee - Pain        HPI    Nic Brooks Sr. is a 69 y.o. male.  He presents today for evaluation of bilateral knee pain, right greater than left.  He is had pain for at least 11 years, following no particular injury.  The pain has been worsening over that timeframe.  Previous injections, anti-inflammatories, and bracing without long-term benefit.  He has reached the point where he would like to proceed with knee replacement surgery, starting on the right knee.  Pain is worse with walking, standing, sitting and climbing stairs.  He has pain at night.  He has pain with work and leisure activities.  Pain is associated with swelling, grinding and stiffness.  No history of clots nor clotting disorders.  He does take Plavix, and had a stent placed in 2018, with Dr. Wilkes.      Patient Active Problem List   Diagnosis   • Moderate obstructive sleep apnea   • Fatigue   • Snoring   • GERD (gastroesophageal reflux disease)   • Hypersomnia   • Other chest pain   • Abnormal EKG   • Coronary artery disease involving native coronary artery of native heart without angina pectoris   • Dyslipidemia, goal LDL below 70   • Essential hypertension     Past Medical History:   Diagnosis Date   • Arthritis    • Aspiration into airway     april 2016- pt states was swabbed at that time but never told mrsa positive-    then started cpap august 2016 and never told anything about being positive mrsa   • Chest pain     not at this time    • Coronary artery disease    • GERD (gastroesophageal reflux disease)    • Heart disease    • History of kidney stones      just one    • Hyperlipidemia    • Osteoarthritis    • Sleep apnea with use of continuous positive airway pressure (CPAP)     complaint with machine    • Wears glasses       Past Surgical History:   Procedure Laterality Date   • APPENDECTOMY     • CARDIAC CATHETERIZATION N/A 1/19/2018    Procedure: Left Heart Cath;  Surgeon: Negrito Wilkes MD;  Location: [facility] INVASIVE LOCATION;  Service:    • COLONOSCOPY     • ENDOSCOPY     • KIDNEY STONE SURGERY     • KNEE ARTHROSCOPY W/ MENISCECTOMY      left   • LUMBAR DISCECTOMY     • LUNG REMOVAL, PARTIAL      right lower lobe    • SPLENECTOMY     • TONSILLECTOMY      removed adnoids   • WISDOM TOOTH EXTRACTION      only 2 removed       Family History   Problem Relation Age of Onset   • Hypertension Mother    • Cancer Mother    • Cancer Father    • Emphysema Father    • Diabetes Father    • COPD Father    • Osteoarthritis Father    • No Known Problems Sister    • Cancer Brother    • Diabetes Brother    • Heart disease Brother      Social History     Socioeconomic History   • Marital status:      Spouse name: Not on file   • Number of children: Not on file   • Years of education: Not on file   • Highest education level: Not on file   Tobacco Use   • Smoking status: Never Smoker   • Smokeless tobacco: Never Used   Substance and Sexual Activity   • Alcohol use: No   • Drug use: No   • Sexual activity: Defer      Current Outpatient Medications on File Prior to Visit   Medication Sig Dispense Refill   • aspirin 81 MG EC tablet Take 81 mg by mouth Daily.     • cetirizine (zyrTEC) 5 MG tablet Take 5 mg by mouth Daily.     • chlorthalidone (HYGROTEN) 50 MG tablet Take 1 tablet by mouth Daily. 30 tablet 11   • clopidogrel (PLAVIX) 75 MG tablet Take 1 tablet by mouth Daily. 90 tablet 3   • coenzyme Q10 100 MG capsule Take 100 mg by mouth Daily.     • Cyanocobalamin (B-12 COMPLIANCE INJECTION IJ) Inject  as directed Every 30 (Thirty) Days. LD January 5th     • diclofenac  (VOLTAREN) 75 MG EC tablet Take 75 mg by mouth Daily.     • rosuvastatin (CRESTOR) 40 MG tablet TAKE ONE TABLET BY MOUTH DAILY 90 tablet 3     No current facility-administered medications on file prior to visit.       No Known Allergies     Review of Systems   Constitutional: Negative for activity change, appetite change, chills, diaphoresis, fatigue, fever and unexpected weight change.   HENT: Positive for tinnitus. Negative for congestion, dental problem, drooling, ear discharge, ear pain, facial swelling, hearing loss, mouth sores, nosebleeds, postnasal drip, rhinorrhea, sinus pressure, sneezing, sore throat, trouble swallowing and voice change.    Eyes: Negative for photophobia, pain, discharge, redness, itching and visual disturbance.   Respiratory: Positive for apnea. Negative for cough, choking, chest tightness, shortness of breath, wheezing and stridor.    Cardiovascular: Negative for chest pain, palpitations and leg swelling.   Gastrointestinal: Negative for abdominal distention, abdominal pain, anal bleeding, blood in stool, constipation, diarrhea, nausea, rectal pain and vomiting.   Endocrine: Negative for cold intolerance, heat intolerance, polydipsia, polyphagia and polyuria.   Genitourinary: Negative for decreased urine volume, difficulty urinating, dysuria, enuresis, flank pain, frequency, genital sores, hematuria and urgency.   Musculoskeletal: Positive for back pain, gait problem and joint swelling. Negative for arthralgias, myalgias, neck pain and neck stiffness.   Skin: Negative for color change, pallor, rash and wound.   Allergic/Immunologic: Negative for environmental allergies, food allergies and immunocompromised state.   Neurological: Negative for dizziness, tremors, seizures, syncope, facial asymmetry, speech difficulty, weakness, light-headedness, numbness and headaches.   Hematological: Negative for adenopathy. Does not bruise/bleed easily.   Psychiatric/Behavioral: Negative for agitation,  "behavioral problems, confusion, decreased concentration, dysphoric mood, hallucinations, self-injury, sleep disturbance and suicidal ideas. The patient is not nervous/anxious and is not hyperactive.         Objective      Physical Exam  Pulse 65   Ht 175.3 cm (69.02\")   Wt 77.7 kg (171 lb 6.4 oz)   SpO2 98%   BMI 25.30 kg/m²      Body mass index is 25.3 kg/m².    General:   Mental Status:  Alert   Appearance: Cooperative, in no acute distress   Build and Nutrition: Well-nourished well-developed male   Orientation: Alert and oriented to person, place and time   Posture: Normal   Gait: Antalgic on both lower extremities    Integument:   Right knee: no skin lesions, no rash, no ecchymosis   Left knee: no skin lesions, no rash, no ecchymosis    Neurologic:   Sensation:    Right foot: intact to light touch on the dorsal and plantar aspect    Left foot: intact to light touch on the dorsal and plantar aspect   Motor:  Right lower extremity: 5/5 quadriceps, hamstrings, ankle dorsiflexors, and ankle plantar flexors  Left lower extremity: 5/5 quadriceps, hamstrings, ankle dorsiflexors, and ankle plantar flexors  Vascular:   Right lower extremity: 2+ posterior tibialis pulse, prompt capillary refill   Left lower extremity: 2+ posterior tibialis pulse, prompt capillary refill    Lower Extremities:   Right Knee:    Tenderness:  Medial and lateral joint line tenderness    Effusion:  None    Swelling:  None    Crepitus:  Positive    Atrophy:  None    Range of motion:  Extension: 5°       Flexion: 110°  Instability:  No varus laxity, no valgus laxity, negative anterior drawer  Deformities:  Varus   Left Knee:    Tenderness:  None    Effusion:  None    Swelling:  None    Crepitus: Positive    Atrophy:  None    Range of motion:  Extension: 0°       Flexion: 125°  Instability:  No varus laxity, no valgus laxity, negative anterior drawer  Deformities:  Mild varus      Imaging/Studies      Imaging Results (last 24 hours)     " Procedure Component Value Units Date/Time    XR Knee 4+ View Bilateral [[id number]] Resulted:  10/16/19 1210     Updated:  10/16/19 1212    Narrative:       Right Knee Radiographs  Indication: right knee pain  Views: Standing AP's and skiers of both knees, with lateral and sunrise   views of the right knee    Comparison: no prior studies available    Findings:   Bone-on-bone contact medial compartment, tricompartmental osteophytes,   varus alignment, and advanced patellofemoral degeneration, with no acute   bony abnormalities.    Impression: End-stage right knee osteoarthritis    Left Knee Radiographs  Indication: left knee pain  Views: Standing AP's and skiers of both knees, with lateral and sunrise   views of the left knee    Comparison: no prior studies available    Findings:   Bone-on-bone contact both medially and laterally, with advanced   patellofemoral degeneration, pseudosubluxation of the knee medially, with   no acute bony abnormalities.    Impression: End-stage left knee osteoarthritis.          Assessment and Plan     Nic was seen today for pain and pain.    Diagnoses and all orders for this visit:    Primary osteoarthritis of right knee  -     XR Knee 4+ View Bilateral  -     Case Request; Standing  -     Instructions on coughing, deep breathing, and incentive spirometry.; Future  -     CBC and Differential; Future  -     Basic metabolic panel; Future  -     Protime-INR; Future  -     APTT; Future  -     Hemoglobin A1c; Future  -     Sedimentation rate; Future  -     C-reactive protein; Future  -     Urinalysis With Culture If Indicated -; Future  -     Tranexamic Acid 1,000 mg in sodium chloride 0.9 % 100 mL  -     Tranexamic Acid 1,000 mg in sodium chloride 0.9 % 100 mL  -     ceFAZolin (ANCEF) 2 g in sodium chloride 0.9 % 100 mL IVPB  -     acetaminophen (TYLENOL) tablet 975 mg  -     meloxicam (MOBIC) tablet 15 mg  -     pregabalin (LYRICA) capsule 150 mg  -     mupirocin (BACTROBAN) 2 % nasal  ointment 1 application  -     Case Request    Primary osteoarthritis of left knee  -     XR Knee 4+ View Bilateral    Other orders  -     Outpatient In A Bed; Standing  -     Follow Anesthesia Guidelines / Standing Orders; Future  -     Obtain informed consent  -     Provide instructions to patient regarding NPO status  -     Follow Anesthesia Guidelines / Standing Orders; Standing  -     Nerve Block; Standing  -     Verify NPO Status; Standing  -     SCD (sequential compression device)- to be placed on patient in Pre-op; Standing  -     POC Glucose Once; Standing  -     Clip operative site; Standing  -     Obtain informed consent (if not collected inpatient or PAT); Standing  -     Notify Physician - Standard; Standing  -     NPO After Midnight  -     mupirocin (BACTROBAN NASAL) 2 % nasal ointment; into the nostril(s) as directed by provider 2 (Two) Times a Day.  -     chlorhexidine (HIBICLENS) 4 % external liquid; Apply  topically to the appropriate area as directed Daily. Shower with hibiclens solution as directed for 5 days prior to surgery        1. Primary osteoarthritis of right knee    2. Primary osteoarthritis of left knee        I reviewed my findings with patient today.  He has advanced bilateral knee arthritis, and he would like to proceed with knee replacement surgery, starting on the right knee.  Risk, benefits, and alternatives have been discussed.  He has exhausted conservative treatment options.  Please see my counseling note for details.    Surgical Counseling     I have informed the patient of the diagnosis and the prognosis.  Exhaustive conservative treatment modalities have not resulted in long term pain relief.  The symptoms have progressed to the point of daily pain and inability to perform activities of daily living without significant pain. The patient has reached the point of desiring to proceed with total knee arthroplasty after discussing the risks, benefits and alternatives to the  procedure.  The surgical procedure itself was discussed in detail. Risks of the procedure were discussed, which included but are not limited to, bleeding, infection, damage to blood vessels and nerves, incomplete pain relief, loosening of the prosthesis, deep infection, need for further surgery, loss of limb, deep venous thrombosis, pulmonary embolus, death, heart attack, stroke, kidney failure, liver failure, and anesthetic complications.  In addition, the potential for deep infection developing in the future was discussed, which could require further surgery.  The knee would have to be re-opened, debrided, and potentially remove the prosthesis, which may or may not be replaced in the future.  Also, the possibility for loosening of the prosthesis has been mentioned.  If the prosthesis loosened, a revision arthroplasty could be performed, with results that are not as predictable compared to the original procedure.  The typical rehabilitative course has also been discussed, and full recovery may take up to a year to see the maximum benefit.  The importance of patient cooperation in the rehabilitative efforts has also been discussed.  No guarantees whatsoever were given.  The patient understands the potential risks versus the benefits and desires to proceed with total knee arthroplasty at a mutually convenient time.    Return for For surgery as planned.      Medical Decision Making  Management Options : major surgery with risk factors  Data/Risk: radiology tests and independent visualization of imaging, lab tests, or EMG/NCV      Negrito Vidal MD  10/16/19  12:26 PM               Electronically signed by Negrito Vidal MD at 10/16/19 9667             Negrito Vidal MD at 10/16/19 0813              [facility] Note    Subjective     Chief Complaint   Patient presents with   • Left Knee - Pain   • Right Knee - Pain        HPI    Nic Brooks Sr. is a 69 y.o. male.  He presents today for  evaluation of bilateral knee pain, right greater than left.  He is had pain for at least 11 years, following no particular injury.  The pain has been worsening over that timeframe.  Previous injections, anti-inflammatories, and bracing without long-term benefit.  He has reached the point where he would like to proceed with knee replacement surgery, starting on the right knee.  Pain is worse with walking, standing, sitting and climbing stairs.  He has pain at night.  He has pain with work and leisure activities.  Pain is associated with swelling, grinding and stiffness.  No history of clots nor clotting disorders.  He does take Plavix, and had a stent placed in 2018, with Dr. Wilkes.      Patient Active Problem List   Diagnosis   • Moderate obstructive sleep apnea   • Fatigue   • Snoring   • GERD (gastroesophageal reflux disease)   • Hypersomnia   • Other chest pain   • Abnormal EKG   • Coronary artery disease involving native coronary artery of native heart without angina pectoris   • Dyslipidemia, goal LDL below 70   • Essential hypertension     Past Medical History:   Diagnosis Date   • Arthritis    • Aspiration into airway     april 2016- pt states was swabbed at that time but never told mrsa positive-    then started cpap august 2016 and never told anything about being positive mrsa   • Chest pain     not at this time    • Coronary artery disease    • GERD (gastroesophageal reflux disease)    • Heart disease    • History of kidney stones     just one    • Hyperlipidemia    • Osteoarthritis    • Sleep apnea with use of continuous positive airway pressure (CPAP)     complaint with machine    • Wears glasses       Past Surgical History:   Procedure Laterality Date   • APPENDECTOMY     • CARDIAC CATHETERIZATION N/A 1/19/2018    Procedure: Left Heart Cath;  Surgeon: Negrito Wilkes MD;  Location: [facility] INVASIVE LOCATION;  Service:    • COLONOSCOPY     • ENDOSCOPY     • KIDNEY STONE SURGERY     • KNEE  ARTHROSCOPY W/ MENISCECTOMY      left   • LUMBAR DISCECTOMY     • LUNG REMOVAL, PARTIAL      right lower lobe    • SPLENECTOMY     • TONSILLECTOMY      removed adnoids   • WISDOM TOOTH EXTRACTION      only 2 removed       Family History   Problem Relation Age of Onset   • Hypertension Mother    • Cancer Mother    • Cancer Father    • Emphysema Father    • Diabetes Father    • COPD Father    • Osteoarthritis Father    • No Known Problems Sister    • Cancer Brother    • Diabetes Brother    • Heart disease Brother      Social History     Socioeconomic History   • Marital status:      Spouse name: Not on file   • Number of children: Not on file   • Years of education: Not on file   • Highest education level: Not on file   Tobacco Use   • Smoking status: Never Smoker   • Smokeless tobacco: Never Used   Substance and Sexual Activity   • Alcohol use: No   • Drug use: No   • Sexual activity: Defer      Current Outpatient Medications on File Prior to Visit   Medication Sig Dispense Refill   • aspirin 81 MG EC tablet Take 81 mg by mouth Daily.     • cetirizine (zyrTEC) 5 MG tablet Take 5 mg by mouth Daily.     • chlorthalidone (HYGROTEN) 50 MG tablet Take 1 tablet by mouth Daily. 30 tablet 11   • clopidogrel (PLAVIX) 75 MG tablet Take 1 tablet by mouth Daily. 90 tablet 3   • coenzyme Q10 100 MG capsule Take 100 mg by mouth Daily.     • Cyanocobalamin (B-12 COMPLIANCE INJECTION IJ) Inject  as directed Every 30 (Thirty) Days. LD January 5th     • diclofenac (VOLTAREN) 75 MG EC tablet Take 75 mg by mouth Daily.     • rosuvastatin (CRESTOR) 40 MG tablet TAKE ONE TABLET BY MOUTH DAILY 90 tablet 3     No current facility-administered medications on file prior to visit.       No Known Allergies     Review of Systems   Constitutional: Negative for activity change, appetite change, chills, diaphoresis, fatigue, fever and unexpected weight change.   HENT: Positive for tinnitus. Negative for congestion, dental problem, drooling,  "ear discharge, ear pain, facial swelling, hearing loss, mouth sores, nosebleeds, postnasal drip, rhinorrhea, sinus pressure, sneezing, sore throat, trouble swallowing and voice change.    Eyes: Negative for photophobia, pain, discharge, redness, itching and visual disturbance.   Respiratory: Positive for apnea. Negative for cough, choking, chest tightness, shortness of breath, wheezing and stridor.    Cardiovascular: Negative for chest pain, palpitations and leg swelling.   Gastrointestinal: Negative for abdominal distention, abdominal pain, anal bleeding, blood in stool, constipation, diarrhea, nausea, rectal pain and vomiting.   Endocrine: Negative for cold intolerance, heat intolerance, polydipsia, polyphagia and polyuria.   Genitourinary: Negative for decreased urine volume, difficulty urinating, dysuria, enuresis, flank pain, frequency, genital sores, hematuria and urgency.   Musculoskeletal: Positive for back pain, gait problem and joint swelling. Negative for arthralgias, myalgias, neck pain and neck stiffness.   Skin: Negative for color change, pallor, rash and wound.   Allergic/Immunologic: Negative for environmental allergies, food allergies and immunocompromised state.   Neurological: Negative for dizziness, tremors, seizures, syncope, facial asymmetry, speech difficulty, weakness, light-headedness, numbness and headaches.   Hematological: Negative for adenopathy. Does not bruise/bleed easily.   Psychiatric/Behavioral: Negative for agitation, behavioral problems, confusion, decreased concentration, dysphoric mood, hallucinations, self-injury, sleep disturbance and suicidal ideas. The patient is not nervous/anxious and is not hyperactive.         Objective      Physical Exam  Pulse 65   Ht 175.3 cm (69.02\")   Wt 77.7 kg (171 lb 6.4 oz)   SpO2 98%   BMI 25.30 kg/m²      Body mass index is 25.3 kg/m².    General:   Mental Status:  Alert   Appearance: Cooperative, in no acute distress   Build and " Nutrition: Well-nourished well-developed male   Orientation: Alert and oriented to person, place and time   Posture: Normal   Gait: Antalgic on both lower extremities    Integument:   Right knee: no skin lesions, no rash, no ecchymosis   Left knee: no skin lesions, no rash, no ecchymosis    Neurologic:   Sensation:    Right foot: intact to light touch on the dorsal and plantar aspect    Left foot: intact to light touch on the dorsal and plantar aspect   Motor:  Right lower extremity: 5/5 quadriceps, hamstrings, ankle dorsiflexors, and ankle plantar flexors  Left lower extremity: 5/5 quadriceps, hamstrings, ankle dorsiflexors, and ankle plantar flexors  Vascular:   Right lower extremity: 2+ posterior tibialis pulse, prompt capillary refill   Left lower extremity: 2+ posterior tibialis pulse, prompt capillary refill    Lower Extremities:   Right Knee:    Tenderness:  Medial and lateral joint line tenderness    Effusion:  None    Swelling:  None    Crepitus:  Positive    Atrophy:  None    Range of motion:  Extension: 5°       Flexion: 110°  Instability:  No varus laxity, no valgus laxity, negative anterior drawer  Deformities:  Varus   Left Knee:    Tenderness:  None    Effusion:  None    Swelling:  None    Crepitus: Positive    Atrophy:  None    Range of motion:  Extension: 0°       Flexion: 125°  Instability:  No varus laxity, no valgus laxity, negative anterior drawer  Deformities:  Mild varus      Imaging/Studies      Imaging Results (last 24 hours)     Procedure Component Value Units Date/Time    XR Knee 4+ View Bilateral [[id number]] Resulted:  10/16/19 1210     Updated:  10/16/19 1212    Narrative:       Right Knee Radiographs  Indication: right knee pain  Views: Standing AP's and skiers of both knees, with lateral and sunrise   views of the right knee    Comparison: no prior studies available    Findings:   Bone-on-bone contact medial compartment, tricompartmental osteophytes,   varus alignment, and advanced  patellofemoral degeneration, with no acute   bony abnormalities.    Impression: End-stage right knee osteoarthritis    Left Knee Radiographs  Indication: left knee pain  Views: Standing AP's and skiers of both knees, with lateral and sunrise   views of the left knee    Comparison: no prior studies available    Findings:   Bone-on-bone contact both medially and laterally, with advanced   patellofemoral degeneration, pseudosubluxation of the knee medially, with   no acute bony abnormalities.    Impression: End-stage left knee osteoarthritis.          Assessment and Plan     Nic was seen today for pain and pain.    Diagnoses and all orders for this visit:    Primary osteoarthritis of right knee  -     XR Knee 4+ View Bilateral  -     Case Request; Standing  -     Instructions on coughing, deep breathing, and incentive spirometry.; Future  -     CBC and Differential; Future  -     Basic metabolic panel; Future  -     Protime-INR; Future  -     APTT; Future  -     Hemoglobin A1c; Future  -     Sedimentation rate; Future  -     C-reactive protein; Future  -     Urinalysis With Culture If Indicated -; Future  -     Tranexamic Acid 1,000 mg in sodium chloride 0.9 % 100 mL  -     Tranexamic Acid 1,000 mg in sodium chloride 0.9 % 100 mL  -     ceFAZolin (ANCEF) 2 g in sodium chloride 0.9 % 100 mL IVPB  -     acetaminophen (TYLENOL) tablet 975 mg  -     meloxicam (MOBIC) tablet 15 mg  -     pregabalin (LYRICA) capsule 150 mg  -     mupirocin (BACTROBAN) 2 % nasal ointment 1 application  -     Case Request    Primary osteoarthritis of left knee  -     XR Knee 4+ View Bilateral    Other orders  -     Outpatient In A Bed; Standing  -     Follow Anesthesia Guidelines / Standing Orders; Future  -     Obtain informed consent  -     Provide instructions to patient regarding NPO status  -     Follow Anesthesia Guidelines / Standing Orders; Standing  -     Nerve Block; Standing  -     Verify NPO Status; Standing  -     SCD  (sequential compression device)- to be placed on patient in Pre-op; Standing  -     POC Glucose Once; Standing  -     Clip operative site; Standing  -     Obtain informed consent (if not collected inpatient or PAT); Standing  -     Notify Physician - Standard; Standing  -     NPO After Midnight  -     mupirocin (BACTROBAN NASAL) 2 % nasal ointment; into the nostril(s) as directed by provider 2 (Two) Times a Day.  -     chlorhexidine (HIBICLENS) 4 % external liquid; Apply  topically to the appropriate area as directed Daily. Shower with hibiclens solution as directed for 5 days prior to surgery        1. Primary osteoarthritis of right knee    2. Primary osteoarthritis of left knee        I reviewed my findings with patient today.  He has advanced bilateral knee arthritis, and he would like to proceed with knee replacement surgery, starting on the right knee.  Risk, benefits, and alternatives have been discussed.  He has exhausted conservative treatment options.  Please see my counseling note for details.    Surgical Counseling     I have informed the patient of the diagnosis and the prognosis.  Exhaustive conservative treatment modalities have not resulted in long term pain relief.  The symptoms have progressed to the point of daily pain and inability to perform activities of daily living without significant pain. The patient has reached the point of desiring to proceed with total knee arthroplasty after discussing the risks, benefits and alternatives to the procedure.  The surgical procedure itself was discussed in detail. Risks of the procedure were discussed, which included but are not limited to, bleeding, infection, damage to blood vessels and nerves, incomplete pain relief, loosening of the prosthesis, deep infection, need for further surgery, loss of limb, deep venous thrombosis, pulmonary embolus, death, heart attack, stroke, kidney failure, liver failure, and anesthetic complications.  In addition, the  potential for deep infection developing in the future was discussed, which could require further surgery.  The knee would have to be re-opened, debrided, and potentially remove the prosthesis, which may or may not be replaced in the future.  Also, the possibility for loosening of the prosthesis has been mentioned.  If the prosthesis loosened, a revision arthroplasty could be performed, with results that are not as predictable compared to the original procedure.  The typical rehabilitative course has also been discussed, and full recovery may take up to a year to see the maximum benefit.  The importance of patient cooperation in the rehabilitative efforts has also been discussed.  No guarantees whatsoever were given.  The patient understands the potential risks versus the benefits and desires to proceed with total knee arthroplasty at a mutually convenient time.    Return for For surgery as planned.      Medical Decision Making  Management Options : major surgery with risk factors  Data/Risk: radiology tests and independent visualization of imaging, lab tests, or EMG/NCV      Negrito Vidal MD  10/16/19  12:26 PM              Electronically signed by Negrito Vidal MD at 10/16/19 1227       Physician Progress Notes (last 24 hours) (Notes from 11/15/19 1014 through 11/16/19 1014)     No notes of this type exist for this encounter.

## 2019-11-16 NOTE — THERAPY DISCHARGE NOTE
Patient Name: Nic Brooks  : 1950    MRN: [id number]                              Today's Date: 2019       Admit Date: 11/15/2019    Visit Dx:     ICD-10-CM ICD-9-CM   1. Status post total right knee replacement Z96.651 V43.65   2. Primary osteoarthritis of right knee M17.11 715.16     Patient Active Problem List   Diagnosis   • Moderate obstructive sleep apnea   • Fatigue   • Snoring   • GERD (gastroesophageal reflux disease)   • Hypersomnia   • Other chest pain   • Abnormal EKG   • Coronary artery disease involving native coronary artery of native heart without angina pectoris   • Dyslipidemia, goal LDL below 70   • Essential hypertension   • Primary osteoarthritis of right knee   • Status post total right knee replacement   • Prediabetes     Past Medical History:   Diagnosis Date   • Arthritis    • Aspiration into airway     2016- pt states was swabbed at that time but never told mrsa positive-    then started cpap 2016 and never told anything about being positive mrsa   • Chest pain     not at this time    • Coronary artery disease    • GERD (gastroesophageal reflux disease)    • Hearing loss     kailee hearing aids   • Heart disease    • History of kidney stones     just one    • History of transfusion     [facility], [place]   • Hyperlipidemia    • Osteoarthritis    • Sleep apnea with use of continuous positive airway pressure (CPAP)     complaint with machine    • Wears glasses      Past Surgical History:   Procedure Laterality Date   • APPENDECTOMY     • CARDIAC CATHETERIZATION N/A 2018    Procedure: Left Heart Cath;  Surgeon: Negrito Wilkes MD;  Location: [facility] INVASIVE LOCATION;  Service:    • COLONOSCOPY      every 5 years   • CORONARY STENT PLACEMENT     • ENDOSCOPY     • KIDNEY STONE SURGERY     • KNEE ARTHROSCOPY W/ MENISCECTOMY      left   • LUMBAR DISCECTOMY     • LUNG REMOVAL, PARTIAL      right lower lobe    • SPLENECTOMY     • TONSILLECTOMY       removed adnoids   • WISDOM TOOTH EXTRACTION      only 2 removed      General Information     Row Name 11/16/19 0938          PT Evaluation Time/Intention    Document Type  therapy note (daily note);discharge treatment  -LR     Mode of Treatment  physical therapy;individual therapy  -     Row Name 11/16/19 0938          General Information    Patient Profile Reviewed?  yes  -LR     Existing Precautions/Restrictions  fall;other (see comments) R adductor canal nerve catheter  -LR     Row Name 11/16/19 0938          Cognitive Assessment/Intervention- PT/OT    Orientation Status (Cognition)  oriented x 4  -LR     Row Name 11/16/19 0938          Safety Issues, Functional Mobility    Safety Issues Affecting Function (Mobility)  other (see comments) none  -LR       User Key  (r) = Recorded By, (t) = Taken By, (c) = Cosigned By    Initials Name Provider Type    LR Emily Briceño, PT Physical Therapist        Mobility     Row Name 11/16/19 0938          Bed Mobility Assessment/Treatment    Supine-Sit Carey (Bed Mobility)  verbal cues;independent  -LR     Comment (Bed Mobility)  Patient lying on R side with R knee slightly flexed. Educated patient on importance of keeping R knee straight at rest. Demonstrated correct sequencing for t/f OOB.   -LR     Row Name 11/16/19 0938          Transfer Assessment/Treatment    Comment (Transfers)  Verbal cues to push up from bed to stand and to reach back for chair to lower into sitting. Verbal cues to step R LE out before t/f for comfort.   -     Row Name 11/16/19 0938          Sit-Stand Transfer    Sit-Stand Carey (Transfers)  verbal cues;contact guard  -LR     Assistive Device (Sit-Stand Transfers)  walker, front-wheeled  -LR     Row Name 11/16/19 0938          Gait/Stairs Assessment/Training    94957 - Gait Training Minutes   15  -LR     [place]y Level (Gait)  verbal cues;contact guard  -LR     Assistive Device (Gait)  walker, front-wheeled  -LR      Distance in Feet (Gait)  350  -LR     Pattern (Gait)  step-through  -LR     Deviations/Abnormal Patterns (Gait)  right sided deviations;antalgic;bilateral deviations;javi decreased;gait speed decreased;stride length decreased  -LR     Bilateral Gait Deviations  forward flexed posture;heel strike decreased  -LR     Right Sided Gait Deviations  weight shift ability decreased  -LR     [place] Level (Stairs)  verbal cues;contact guard  -LR     Assistive Device (Stairs)  walker, front-wheeled  -LR     Handrail Location (Stairs)  none  -LR     Number of Steps (Stairs)  1  -LR     Ascending Technique (Stairs)  step-to-step  -LR     Descending Technique (Stairs)  step-to-step  -LR     Comment (Gait/Stairs)  Patient ambulated with step through gait pattern at slow pace. Verbal cues for correct sequencing of steps, increased R LE weight bearing/stance phase, decreased UE weight bearing, and increased R knee flexion during swing phase. Improved with cues for correction. Gait limited by pain. Verbal cues to back up to step with RW and to use RW for UE support. Verbal cues to step up backwards with strong LE first and down with weak LE first. No LOB or unsteadiness with stairs.   -LR     Row Name 11/16/19 0906          Mobility Assessment/Intervention    Extremity Weight-bearing Status  right lower extremity  -LR     Right Lower Extremity (Weight-bearing Status)  weight-bearing as tolerated (WBAT)  -LR       User Key  (r) = Recorded By, (t) = Taken By, (c) = Cosigned By    Initials Name Provider Type    LR Emily Briceño, PT Physical Therapist        Obj/Interventions     Row Name 11/16/19 0997          General ROM    GENERAL ROM COMMENTS   degrees; lacking 10 degrees extension AROM, 112 degrees flexion AAROM in sitting  -LR     Row Name 11/16/19 0982          Therapeutic Exercise    Lower Extremity (Therapeutic Exercise)  heel slides, right;LAQ (long arc quad), right;quad sets, right;SAQ (short arc quad),  right;SLR (straight leg raise), right;other (see comments) standing calf raises, heel slides in sitting and reclined  -LR     Lower Extremity Range of Motion (Therapeutic Exercise)  ankle dorsiflexion/plantar flexion, right  -LR     Exercise Type (Therapeutic Exercise)  AROM (active range of motion);isotonic contraction, concentric;isometric contraction, static  -LR     Position (Therapeutic Exercise)  seated;standing  -LR     Sets/Reps (Therapeutic Exercise)  x15 reps each  -LR     Comment (Therapeutic Exercise)  cues for technique; no assist required  -LR       User Key  (r) = Recorded By, (t) = Taken By, (c) = Cosigned By    Initials Name Provider Type    LR Emily Briceño, PT Physical Therapist        Goals/Plan     Row Name 11/16/19 0938          Bed Mobility Goal 1 (PT)    Activity/Assistive Device (Bed Mobility Goal 1, PT)  bed mobility activities, all  -LR     [place] Level/Cues Needed (Bed Mobility Goal 1, PT)  independent  -LR     Time Frame (Bed Mobility Goal 1, PT)  long term goal (LTG);10 days  -LR     Progress/Outcomes (Bed Mobility Goal 1, PT)  goal partially met;discharged from facility achieved for supine to sit  -LR     Row Name 11/16/19 0938          Transfer Goal 1 (PT)    Activity/Assistive Device (Transfer Goal 1, PT)  sit-to-stand/stand-to-sit;walker, rolling  -LR     [place] Level/Cues Needed (Transfer Goal 1, PT)  conditional independence  -LR     Time Frame (Transfer Goal 1, PT)  long term goal (LTG);10 days  -LR     Progress/Outcome (Transfer Goal 1, PT)  goal not met;discharged from facility  -LR     Row Name 11/16/19 0938          Gait Training Goal 1 (PT)    Activity/Assistive Device (Gait Training Goal 1, PT)  gait (walking locomotion);walker, rolling  -LR     [place] Level (Gait Training Goal 1, PT)  conditional independence  -LR     Distance (Gait Goal 1, PT)  500 feet  -LR     Time Frame (Gait Training Goal 1, PT)  long term goal (LTG);10 days  -LR      Progress/Outcome (Gait Training Goal 1, PT)  goal not met;discharged from facility  -LR     Row Name 11/16/19 0938          ROM Goal 1 (PT)    ROM Goal 1 (PT)  0-90 degrees R knee  -LR     Time Frame (ROM Goal 1, PT)  long term goal (LTG);1 week  -LR     Progress/Outcome (ROM Goal 1, PT)  goal partially met;discharged from facility achieved flexion goal  -LR     Row Name 11/16/19 0938          Stairs Goal 1 (PT)    Activity/Assistive Device (Stairs Goal 1, PT)  stairs, all skills;walker, rolling  -LR     [place] Level/Cues Needed (Stairs Goal 1, PT)  contact guard assist  -LR     Number of Stairs (Stairs Goal 1, PT)  1  -LR     Time Frame (Stairs Goal 1, PT)  long term goal (LTG);10 days  -LR     Progress/Outcome (Stairs Goal 1, PT)  goal met  -LR       User Key  (r) = Recorded By, (t) = Taken By, (c) = Cosigned By    Initials Name Provider Type    LR Emily Briceño, PT Physical Therapist        Clinical Impression     Row Name 11/16/19 0938          Pain Assessment    Additional Documentation  Pain Scale: Numbers Pre/Post-Treatment (Group)  -LR     [facility] Name 11/16/19 0938          Pain Scale: Numbers Pre/Post-Treatment    Pain Scale: Numbers, Pretreatment  4/10  -LR     Pain Scale: Numbers, Post-Treatment  9/10  -LR     Pain Location - Side  Right  -LR     Pain Location - Orientation  anterior  -LR     Pain Location  knee  -LR     Pain Intervention(s)  Ambulation/increased activity;Repositioned  -LR     Row Name 11/16/19 0938          Plan of Care Review    Plan of Care Reviewed With  patient;spouse  -LR     Row Name 11/16/19 0938          Physical Therapy Clinical Impression    Criteria for Skilled Interventions Met (PT Clinical Impression)  yes;treatment indicated  -LR     Row Name 11/16/19 0938          Positioning and Restraints    Pre-Treatment Position  in bed  -LR     Post Treatment Position  chair  -LR     In Chair  notified nsg;reclined;sitting;call light within reach;encouraged to call for  assist;exit alarm on;with family/caregiver;legs elevated  -LR       User Key  (r) = Recorded By, (t) = Taken By, (c) = Cosigned By    Initials Name Provider Type    Emily Vidal, PT Physical Therapist        Outcome Measures     Row Name 11/16/19 0938          How much help from another person do you currently need...    Turning from your back to your side while in flat bed without using bedrails?  4  -LR     Moving from lying on back to sitting on the side of a flat bed without bedrails?  4  -LR     Moving to and from a bed to a chair (including a wheelchair)?  3  -LR     Standing up from a chair using your arms (e.g., wheelchair, bedside chair)?  3  -LR     Climbing 3-5 steps with a railing?  3  -LR     To walk in hospital room?  3  -LR     AM-PAC 6 Clicks Score (PT)  20  -LR     Row Name 11/16/19 0938          Functional Assessment    Outcome Measure Options  AM-PAC 6 Clicks Basic Mobility (PT)  -LR       User Key  (r) = Recorded By, (t) = Taken By, (c) = Cosigned By    Initials Name Provider Type    Emily Vidal, PT Physical Therapist        Physical Therapy Education     Title: PT OT SLP Therapies (Done)     Topic: Physical Therapy (Done)     Point: Mobility training (Done)     Learning Progress Summary           Patient Acceptance, DOUGLAS MELARA H, VU, DU by  at 11/16/2019  9:38 AM    Comment:  Issued and reviewed written/illustrated HEP. Educated on precautions, weight bearing status, correct supine to sit technique, correct sit<->stand t/f technique, correct gait mechanics, correct stair training technique, and correct car t/f technique.    SARITHA Odom VU,MICHAELA by SC at 11/15/2019  5:33 PM    Comment:  reviewed safety and brace use   Family SARITHA Odom VU, NR by SC at 11/15/2019  5:33 PM    Comment:  reviewed safety and brace use   Significant Other AcceptanceSARITHA D, H, VU, DU by  at 11/16/2019  9:38 AM    Comment:  Issued and reviewed written/illustrated HEP. Educated on precautions, weight bearing  status, correct supine to sit technique, correct sit<->stand t/f technique, correct gait mechanics, correct stair training technique, and correct car t/f technique.                   Point: Home exercise program (Done)     Learning Progress Summary           Patient Acceptance, E,D,EDIE, STEPHEN,TORIBIO by LR at 11/16/2019  9:38 AM    Comment:  Issued and reviewed written/illustrated HEP. Educated on precautions, weight bearing status, correct supine to sit technique, correct sit<->stand t/f technique, correct gait mechanics, correct stair training technique, and correct car t/f technique.    SARITHA Odom VU,NR by SC at 11/15/2019  5:33 PM    Comment:  reviewed safety and brace use   Family SARITHA Odom VU, NR by SC at 11/15/2019  5:33 PM    Comment:  reviewed safety and brace use   Significant Other Acceptance, E,DOGULAS,H, STEPHEN,TORIBIO by LR at 11/16/2019  9:38 AM    Comment:  Issued and reviewed written/illustrated HEP. Educated on precautions, weight bearing status, correct supine to sit technique, correct sit<->stand t/f technique, correct gait mechanics, correct stair training technique, and correct car t/f technique.                   Point: Body mechanics (Done)     Learning Progress Summary           Patient Acceptance, E,DOUGLAS,EDIE, STEPHEN,TORIBIO by LR at 11/16/2019  9:38 AM    Comment:  Issued and reviewed written/illustrated HEP. Educated on precautions, weight bearing status, correct supine to sit technique, correct sit<->stand t/f technique, correct gait mechanics, correct stair training technique, and correct car t/f technique.    SARITHA Odom VU,NR by SC at 11/15/2019  5:33 PM    Comment:  reviewed safety and brace use   Family SARITHA Odom VU,NR by SC at 11/15/2019  5:33 PM    Comment:  reviewed safety and brace use   Significant Other Acceptance, E,D,H, VU,DU by LR at 11/16/2019  9:38 AM    Comment:  Issued and reviewed written/illustrated HEP. Educated on precautions, weight bearing status, correct supine to sit technique, correct sit<->stand t/f  technique, correct gait mechanics, correct stair training technique, and correct car t/f technique.                   Point: Precautions (Done)     Learning Progress Summary           Patient Acceptance, SARITHA,EDIE COLE, STEPHEN,TORIBIO by  at 11/16/2019  9:38 AM    Comment:  Issued and reviewed written/illustrated HEP. Educated on precautions, weight bearing status, correct supine to sit technique, correct sit<->stand t/f technique, correct gait mechanics, correct stair training technique, and correct car t/f technique.    SARITHA Odom VU,NR by SC at 11/15/2019  5:33 PM    Comment:  reviewed safety and brace use   Family SARITHA Odom VU,NR by SC at 11/15/2019  5:33 PM    Comment:  reviewed safety and brace use   Significant Other Acceptance, SARITHA,DOUGLAS,EDIE, STEPHEN,TORIBIO by  at 11/16/2019  9:38 AM    Comment:  Issued and reviewed written/illustrated HEP. Educated on precautions, weight bearing status, correct supine to sit technique, correct sit<->stand t/f technique, correct gait mechanics, correct stair training technique, and correct car t/f technique.                               User Key     Initials Effective Dates Name Provider Type Discipline    SC 06/19/15 -  Hyacinth Alva, PT Physical Therapist PT     06/19/15 -  Emily Briceño, PT Physical Therapist PT              PT Recommendation and Plan  Planned Therapy Interventions (PT Eval): balance training, bed mobility training, gait training, home exercise program, stair training, ROM (range of motion), patient/family education, strengthening, transfer training  Outcome Summary/Treatment Plan (PT)  Anticipated Discharge Disposition (PT): home with assist, home with OP services  Plan of Care Reviewed With: patient, spouse  Progress: improving  Outcome Summary: Patient ambulated 350 feet with RW and step through gait pattern, limited by pain. Patient climbed 1 step with RW with no difficulty. ROM progressing well,  degrees. Plan is d/c home with family and outpatient PT.      Time  Calculation:   PT Charges     Row Name 11/16/19 0938             Time Calculation    Start Time  0938  -LR      PT Received On  11/16/19  -LR      PT Goal Re-Cert Due Date  11/25/19  -LR         Time Calculation- PT    Total Timed Code Minutes- PT  30 minute(s)  -LR         Timed Charges    50184 - PT Therapeutic Exercise Minutes  15  -LR      32474 - Gait Training Minutes   15  -LR        User Key  (r) = Recorded By, (t) = Taken By, (c) = Cosigned By    Initials Name Provider Type    LR Emily Briceño, PT Physical Therapist        Therapy Charges for Today     Code Description Service Date Service Provider Modifiers Qty    56698419881 HC PT THER PROC EA 15 MIN 11/16/2019 Emily Briceño, PT GP 1    59716311001 HC GAIT TRAINING EA 15 MIN 11/16/2019 Emily Briceño, PT GP 1          PT G-Codes  Outcome Measure Options: AM-PAC 6 Clicks Basic Mobility (PT)  AM-PAC 6 Clicks Score (PT): 20    PT Discharge Summary  Anticipated Discharge Disposition (PT): home with assist, home with OP services  Reason for Discharge: Discharge from facility  Outcomes Achieved: Patient able to partially acheive established goals  Discharge Destination: Home with assist, Home with outpatient services    Emily Briceño, PT  11/16/2019

## 2019-11-16 NOTE — PROGRESS NOTES
Discharge Planning Assessment  [facility]     Patient Name: Nic Brooks  MRN: [id number]  Today's Date: 11/16/2019    Admit Date: 11/15/2019    Discharge Needs Assessment     Row Name 11/16/19 1108       Living Environment    Lives With  spouse    Current Living Arrangements  home/apartment/condo    Primary Care Provided by  self    Provides Primary Care For  no one    Family Caregiver if Needed  spouse    Quality of Family Relationships  involved;helpful    Able to Return to Prior Arrangements  yes       Transition Planning    Patient/Family Anticipates Transition to  home with family    Patient/Family Anticipated Services at Transition  none    Transportation Anticipated  family or friend will provide       Discharge Needs Assessment    Readmission Within the Last 30 Days  no previous admission in last 30 days    Concerns to be Addressed  discharge planning    Equipment Currently Used at Home  none    Anticipated Changes Related to Illness  none    Equipment Needed After Discharge  walker, rolling    Outpatient/Agency/Support Group Needs  outpatient therapy    Offered/Gave Vendor List  yes    Patient's Choice of Community Agency(s)  [facility]         Discharge Plan     Row Name 11/16/19 4346       Plan    Plan  home with outpt PT    Patient/Family in Agreement with Plan  yes    Plan Comments  Pt lives in [place] [place] with his wife. Per pt he was independent with all ADLs prior to admit and denies use of any DME/HH. Pt is followed by his PCP and his medications are covered by insurance. At this time pt reports he will need a rolling walker for home and he wants outpt PT at discharge. A provider list was given to pt who has requested walker be brought from [facility] and his choice for outpt PT is [facility]. A referral has been given to pt so he can schedule his appointment Monday morning.     Final Discharge Disposition Code  01 - home or self-care        Destination      No service coordination in this encounter.       Durable Medical Equipment - Selection Complete      Service Provider Request Status Selected Services Address Phone Number Fax Number    [facility] [place] Selected Durable Medical Equipment [address] [facility] [address] [phone]      Dialysis/Infusion      No service coordination in this encounter.      Home Medical Care      No service coordination in this encounter.      Therapy - Selection Complete      Service Provider Request Status Selected Services Address Phone Number Fax Number    [facility] - [place] Selected Outpatient Physical Therapy [address] [address] [phone]      [facility]      No service coordination in this encounter.          Demographic Summary     Row Name 11/16/19 1102       General Information    Admission Type  observation    Referral Source  physician    Reason for Consult  discharge planning    General Information Comments  PCP Madison Weeks        Contact Information    Permission Granted to Share Info With  ;family/designee    Contact Information Comments  Katie Brooks [phone]        Functional Status     Row Name 11/16/19 1103       Functional Status, IADL    Medications  independent    Meal Preparation  independent    Housekeeping  independent    Laundry  independent    Shopping  independent       Mental Status    General Appearance WDL  WDL       Mental Status Summary    Recent Changes in Mental Status/Cognitive Functioning  no changes        Psychosocial    No documentation.       Abuse/Neglect    No documentation.       Legal    No documentation.       Substance Abuse    No documentation.       Patient Forms    No documentation.           Valentine Davila RN

## 2019-11-16 NOTE — PLAN OF CARE
Problem: Patient Care Overview  Goal: Plan of Care Review  Outcome: Ongoing (interventions implemented as appropriate)   11/16/19 9859   Coping/Psychosocial   Plan of Care Reviewed With patient   Plan of Care Review   Progress improving   OTHER   Outcome Summary Pt is ambulating well to bathroom with knee immobilizer, walker, and assistx1, gaitbelt. Saline locked. Pain controlled with Po pain medication, one ttime IV morphine, and ice. Ropivacaine pump 10ml/hr. VSS. A&Ox4.       Problem: Pain, Chronic (Adult)  Goal: Identify Related Risk Factors and Signs and Symptoms  Outcome: Ongoing (interventions implemented as appropriate)    Goal: Acceptable Pain/Comfort Level and Functional Ability  Outcome: Ongoing (interventions implemented as appropriate)      Problem: Knee Arthroplasty (Total, Partial) (Adult)  Goal: Signs and Symptoms of Listed Potential Problems Will be Absent, Minimized or Managed (Knee Arthroplasty)  Outcome: Ongoing (interventions implemented as appropriate)

## 2019-11-16 NOTE — PLAN OF CARE
Problem: Patient Care Overview  Goal: Plan of Care Review  Outcome: Ongoing (interventions implemented as appropriate)   11/16/19 0908   Coping/Psychosocial   Plan of Care Reviewed With patient;spouse   Plan of Care Review   Progress improving   OTHER   Outcome Summary Patient ambulated 350 feet with RW and step through gait pattern, limited by pain. Patient climbed 1 step with RW with no difficulty. ROM progressing well,  degrees. Plan is d/c home with family and outpatient PT.        Problem: Knee Arthroplasty (Total, Partial) (Adult)  Goal: Signs and Symptoms of Listed Potential Problems Will be Absent, Minimized or Managed (Knee Arthroplasty)  Outcome: Ongoing (interventions implemented as appropriate)   11/16/19 3237   Goal/Outcome Evaluation   Problems Assessed (Knee Arthroplasty) functional deficit;pain;range of motion decreased   Problems Present (Knee Arthroplasty) functional deficit;pain;range of motion decreased

## 2019-11-16 NOTE — PLAN OF CARE
Problem: Patient Care Overview  Goal: Plan of Care Review  Outcome: Ongoing (interventions implemented as appropriate)   11/16/19 1200   Coping/Psychosocial   Plan of Care Reviewed With patient;spouse   Plan of Care Review   Progress improving   OTHER   Outcome Summary VSS; Pt issued AE and completed ADL retraining with good demonstration of conditional independence. No DME needs identified. Pt educated re: EC, pacing with daily occupations and verbalized good understanding. No further acute skilled OT services indicated at this time. Recommend home with asisst at discharge.

## 2019-11-16 NOTE — PROGRESS NOTES
[facility]    Acute pain service Inpatient Progress Note    Patient Name: Nic Brooks  :  1950  MRN:  [id number]        Acute Pain  Service Inpatient Progress Note:    Analgesia:Good  LOC: alert and awake  Resp Status: room air  Cardiac: VS stable  Side Effects:None  Catheter Site:clean, dressing intact and dry  Cath type: peripheral nerve cath with ON Q  Infusion rate: 12ml/hr  Catheter Plan:Catheter to remain Insitu and Continue catheter infusion rate unchanged

## 2019-11-16 NOTE — PROGRESS NOTES
"      [facility] Orthopaedic Surgery Progress Note    Subjective      LOS: 0 days   Patient Care Team:  Madison Weeks APRN as PCP - General (Family Medicine)    CC: Right knee pain    Interval History:   Patient sitting comfortably in a chair.  He would like to go home today.  No complaints.    Objective      Vital Signs  Temp (24hrs), Av.3 °F (36.3 °C), Min:97 °F (36.1 °C), Max:98.7 °F (37.1 °C)      /72 (BP Location: Right arm, Patient Position: Lying)   Pulse 88   Temp 98.7 °F (37.1 °C) (Oral)   Resp 16   Ht 175.3 cm (69\")   Wt 80.7 kg (178 lb)   SpO2 93%   BMI 26.29 kg/m²     Examination:   Examination of the right knee: The wound is clean, dry, and intact.  Ankle dorsiflexion, ankle plantar flexion, and EHL are intact.  Sensation intact in the foot to light touch.  2+ dorsalis pedis pulse.  Straight leg raise is intact.      Labs:  Results from last 7 days   Lab Units 19  0550   WBC 10*3/mm3 18.25*   HEMOGLOBIN g/dL 12.1*   HEMATOCRIT % 36.1*   MCV fL 91.6   PLATELETS 10*3/mm3 244       Radiology:  Imaging Results (Last 24 Hours)     Procedure Component Value Units Date/Time    XR Knee 1 or 2 View Right [[id number]] Collected:  11/15/19 1502     Updated:  11/15/19 1624    Narrative:          EXAMINATION: XR RIGHT KNEE, 1-2 VIEWS - 11/15/2019     INDICATION: Post-op exam.      COMPARISON: None     FINDINGS: 2 views of the right knee were submitted for review. Total  right knee arthroplasty changes are identified with expected  postsurgical appearance with joint space air and subcutaneous air  identified.           Impression:       Expected postoperative appearance of the right knee total  arthroplasty.     DICTATED:   11/15/2019  EDITED/ls :   11/15/2019      This report was finalized on 11/15/2019 4:21 PM by Dr. Dominik Dobbs MD.             PT:  Patient ambulated 350 feet with RW and step through gait pattern, limited by pain. Patient climbed 1 step with RW with no difficulty. ROM " progressing well,  degrees. Plan is d/c home with family and outpatient PT.      Results Review:     I reviewed the patient's new clinical results.    Assessment and Plan     Assessment:   Status post right total knee arthroplasty-doing well      Status post total right knee replacement    Moderate obstructive sleep apnea    Coronary artery disease involving native coronary artery of native heart without angina pectoris    Dyslipidemia, goal LDL below 70    Primary osteoarthritis of right knee    Prediabetes      Plan for disposition: Plan for discharge to home today.  Follow-up with me in 3 weeks as planned.     Provider Department Center   12/9/2019 8:30 AM Brandy Beltre PA-C [facility] ORTHOP[facility]ICINE    Appt Notes: 3 WEEK POSTOP           Negrito Vidal MD  11/16/19  11:11 AM

## 2019-11-16 NOTE — DISCHARGE SUMMARY
Patient Name: Nic Brooks  MRN: [id number]  : 1950  DOS: 2019    Attending: Negrito Vidal MD    Primary Care Provider: Madison Weeks APRN    Date of Admission:.11/15/2019  8:41 AM    Date of Discharge:  2019    Discharge Diagnosis:   Status post total right knee replacement    Primary osteoarthritis of right knee    Moderate obstructive sleep apnea    Coronary artery disease involving native coronary artery of native heart without angina pectoris    Dyslipidemia, goal LDL below 70    Prediabetes    Leukocytosis, likely reactive    ABLA, mild, asymptomatic    Acute postop pain    Hospital Course  Patient is a 69 y.o. male presented for right total knee arthroplasty by Dr. Vidal.     He underwent surgery under spinal anesthesia. He tolerated surgery well and was admitted for further medical management. His knee has been painful for many years. Conservative treatments failed to provide long term pain relief. He denies use of assistive device for ambulation or recent falls.     He has hx HLD and CAD. He is followed by Dr. Wilkes, cardiology, and had preop cardiac clearance.     Patient was provided pain medications as needed for pain control, along with adductor canal nerve block infusion of Ropivacaine.    Adjustments were made to pain medications to optimize postop pain management. Risks and benefits of opiate medications discussed with patient.    He was seen by PT and OT and has progressed well over his stay.  He used an IS for atelectasis prophylaxis and aspirin along with mechanicals for DVT prophylaxis.  Home medications were resumed as appropriate, and labs were monitored and remained fairly stable.     With the progress he has made, he is ready for DC home today.    He will have an Arrow pump ( instructed on it during this admit).  Discussed with patient regarding plan and he shows understanding and agreement.    Patient will have HHPT following discharge.        Procedures  "Performed  PREOPERATIVE DIAGNOSIS: right knee arthritis       POSTOPERATIVE DIAGNOSIS:  right knee arthritis     PROCEDURES PERFORMED:   right total knee arthroplasty with [employer] components (#7 posterior stabilized femur, #5 tibia, 10 mm polyethylene, with 35 three peg patella).      SURGEON: Negrito Vidal MD    Pertinent Test Results:    I reviewed the patient's new clinical results.   Results from last 7 days   Lab Units 19  0550   WBC 10*3/mm3 18.25*   HEMOGLOBIN g/dL 12.1*   HEMATOCRIT % 36.1*   PLATELETS 10*3/mm3 244     Results for JR MIRELES (MRN [id number]) as of 2019 08:54   Ref. Range 2019 10:01   Hemoglobin Latest Ref Range: 13.0 - 17.7 g/dL 14.7   Hematocrit Latest Ref Range: 37.5 - 51.0 % 45.3     Results from last 7 days   Lab Units 19  0550 11/15/19  1054   SODIUM mmol/L 137  --    POTASSIUM mmol/L 3.8 3.7   CHLORIDE mmol/L 99  --    CO2 mmol/L 26.0  --    BUN mg/dL 20  --    CREATININE mg/dL 1.20  --    CALCIUM mg/dL 8.4*  --    GLUCOSE mg/dL 116*  --      Results for JR MIRELES (MRN [id number]) as of 2019 08:54   Ref. Range 11/15/2019 20:51 2019 05:50 2019 07:45 2019 11:16   Glucose Latest Ref Range: 70 - 130 mg/dL 142 (H) 116 (H) 125 115     I reviewed the patient's new imaging including images and reports.      Physical therapy: Patient ambulated 350 feet with RW and step through gait pattern, limited by pain. Patient climbed 1 step with RW with no difficulty. ROM progressing well,  degrees. Plan is d/c home with family and outpatient PT.     Discharge Assessment:    Vital Signs  Visit Vitals  /84 (BP Location: Left arm, Patient Position: Sitting)   Pulse 84   Temp 98.3 °F (36.8 °C) (Oral)   Resp 16   Ht 175.3 cm (69\")   Wt 80.7 kg (178 lb)   SpO2 95%   BMI 26.29 kg/m²     Temp (24hrs), Av.4 °F (36.3 °C), Min:97 °F (36.1 °C), Max:98.7 °F (37.1 °C)      General Appearance:    Alert, cooperative, in no " acute distress   Lungs:     Clear to auscultation,respirations regular, even and                   unlabored    Heart:    Regular rhythm and normal rate, normal S1 and S2   Abdomen:     Normal bowel sounds, no masses, no organomegaly, soft        non-tender, non-distended, no guarding, no rebound                 tenderness   Extremities:   Moves all extremities well, no edema, no cyanosis, no              Redness. Right knee stockinet CDI. Nerve block present   Pulses:   Pulses palpable and equal bilaterally   Skin:   No bleeding, bruising or rash   Neurologic:   Cranial nerves 2 - 12 grossly intact, sensation intact. Flexion and dorsiflexion intact bilateral feet.       Discharge Disposition: Home    Discharge Medications     Discharge Medications      New Medications      Instructions Start Date   docusate sodium 100 MG capsule   100 mg, Oral, 2 Times Daily PRN      HYDROcodone-acetaminophen 5-325 MG per tablet  Commonly known as:  NORCO   1 tablet, Oral, Every 4 Hours PRN         Changes to Medications      Instructions Start Date   aspirin 81 MG EC tablet  What changed:  additional instructions   81 mg, Oral, Daily, Resume in 1 month      aspirin 325 MG EC tablet  What changed:  You were already taking a medication with the same name, and this prescription was added. Make sure you understand how and when to take each.   325 mg, Oral, Daily, For 1 month         Continue These Medications      Instructions Start Date   B-12 COMPLIANCE INJECTION IJ   1 dose, Injection, Every 30 Days, LD January 5th      chlorthalidone 25 MG tablet  Commonly known as:  HYGROTON   25 mg, Oral, Daily      clopidogrel 75 MG tablet  Commonly known as:  PLAVIX   75 mg, Oral, Daily      coenzyme Q10 100 MG capsule   100 mg, Oral, Daily      rosuvastatin 40 MG tablet  Commonly known as:  CRESTOR   TAKE ONE TABLET BY MOUTH DAILY             Discharge Diet: consistent carb diet    Activity at Discharge: WBAT RLE    Follow-up  Appointments  Dr. Vidal per his orders      Ashely Gómez, APRN  11/16/19  2:11 PM

## 2019-11-16 NOTE — THERAPY DISCHARGE NOTE
Acute Care - Occupational Therapy Initial Eval/Discharge  [facility]     Patient Name: Nic Brooks  : 1950  MRN: [id number]  Today's Date: 2019  Onset of Illness/Injury or Date of Surgery: 11/15/19  Date of Referral to OT: 11/15/19         Admit Date: 11/15/2019       ICD-10-CM ICD-9-CM   1. Status post total right knee replacement Z96.651 V43.65   2. Primary osteoarthritis of right knee M17.11 715.16     Patient Active Problem List   Diagnosis   • Moderate obstructive sleep apnea   • Fatigue   • Snoring   • GERD (gastroesophageal reflux disease)   • Hypersomnia   • Other chest pain   • Abnormal EKG   • Coronary artery disease involving native coronary artery of native heart without angina pectoris   • Dyslipidemia, goal LDL below 70   • Essential hypertension   • Primary osteoarthritis of right knee   • Status post total right knee replacement   • Prediabetes     Past Medical History:   Diagnosis Date   • Arthritis    • Aspiration into airway     2016- pt states was swabbed at that time but never told mrsa positive-    then started cpap 2016 and never told anything about being positive mrsa   • Chest pain     not at this time    • Coronary artery disease    • GERD (gastroesophageal reflux disease)    • Hearing loss     kailee hearing aids   • Heart disease    • History of kidney stones     just one    • History of transfusion     [facility]   • Hyperlipidemia    • Osteoarthritis    • Sleep apnea with use of continuous positive airway pressure (CPAP)     complaint with machine    • Wears glasses      Past Surgical History:   Procedure Laterality Date   • APPENDECTOMY     • CARDIAC CATHETERIZATION N/A 2018    Procedure: Left Heart Cath;  Surgeon: Negrito Wilkes MD;  Location: [facility] INVASIVE LOCATION;  Service:    • COLONOSCOPY      every 5 years   • CORONARY STENT PLACEMENT     • ENDOSCOPY     • KIDNEY STONE SURGERY     • KNEE ARTHROSCOPY W/ MENISCECTOMY       left   • LUMBAR DISCECTOMY     • LUNG REMOVAL, PARTIAL      right lower lobe    • SPLENECTOMY     • TONSILLECTOMY      removed adnoids   • WISDOM TOOTH EXTRACTION      only 2 removed           OT ASSESSMENT FLOWSHEET (last 12 hours)      Occupational Therapy Evaluation     Row Name 11/16/19 1122                   OT Evaluation Time/Intention    Subjective Information  no complaints  -TA        Document Type  discharge evaluation/summary  -TA        Mode of Treatment  occupational therapy  -TA        Patient Effort  excellent  -TA        Symptoms Noted During/After Treatment  none  -TA           General Information    Patient Profile Reviewed?  yes  -TA        Onset of Illness/Injury or Date of Surgery  11/15/19  -TA        Patient Observations  alert;cooperative;agree to therapy  -TA        Patient/Family Observations  Spouse present in room  -TA        General Observations of Patient  Pt UIC, RA  -TA        Prior Level of Function  independent:;all household mobility;gait;transfer;bed mobility;min assist:;ADL's  -TA        Equipment Currently Used at Home  shower chair;bipap/ cpap  -TA        Pertinent History of Current Functional Problem  Pt admitted for pain/dysfunction of R knee; now s/p Right TKR  -TA        Existing Precautions/Restrictions  fall;other (see comments) R adductor canal nerve catheter  -TA        Equipment Issued to Patient  bathing equipment;dressing equipment  -TA        Risks Reviewed  patient:;spouse/S.O.:;LOB;dizziness;increased discomfort;change in vital signs;lines disloged  -TA        Benefits Reviewed  patient:;spouse/S.O.:;improve function;increase independence;increase knowledge  -TA           Relationship/Environment    Primary Source of Support/Comfort  spouse  -TA        Lives With  spouse  -TA        Family Caregiver if Needed  spouse  -TA           Resource/Environmental Concerns    Current Living Arrangements  home/apartment/condo  -TA        Resource/Environmental Concerns   none  -TA           Home Main Entrance    Number of Stairs, Main Entrance  one  -TA           Cognitive Assessment/Intervention- PT/OT    Orientation Status (Cognition)  oriented x 4  -TA        Follows Commands (Cognition)  WNL  -TA        Safety Deficit (Cognitive)  mild deficit;awareness of need for assistance  -TA           Safety Issues, Functional Mobility    Impairments Affecting Function (Mobility)  endurance/activity tolerance;pain  -TA           Mobility Assessment/Treatment    Extremity Weight-bearing Status  right lower extremity  -TA        Right Lower Extremity (Weight-bearing Status)  weight-bearing as tolerated (WBAT)  -TA           Bed Mobility Assessment/Treatment    Comment (Bed Mobility)  Pt UIC  -TA           Functional Mobility    Functional Mobility- Comment  Defer to PT  -TA           Transfer Assessment/Treatment    Transfer Assessment/Treatment  sit-stand transfer;stand-sit transfer  -TA           Sit-Stand Transfer    Sit-Stand [place] (Transfers)  stand by assist  -TA        Assistive Device (Sit-Stand Transfers)  walker, front-wheeled  -TA           Stand-Sit Transfer    Stand-Sit [place] (Transfers)  stand by assist  -TA        Assistive Device (Stand-Sit Transfers)  walker, front-wheeled  -TA           ADL Assessment/Intervention    BADL Assessment/Intervention  bathing;lower body dressing  -TA           Bathing Assessment/Intervention    Bathing [place] Level  lower body;conditional independence  -TA        Assistive Devices (Bathing)  long-handled sponge  -TA        Bathing Position  supported sitting  -TA        Comment (Bathing)  Pt demonstrated conditional independence with AE  -TA           Lower Body Dressing Assessment/Training    Lower Body Dressing [place] Level  doff;don;socks;independent  -TA        Lower Body Dressing Position  supported sitting  -TA        Comment (Lower Body Dressing)  Pt able to thread pants, doff/don socks without AE however pt  issued AE for days with increased pain.  -TA           BADL Safety/Performance    Impairments, BADL Safety/Performance  endurance/activity tolerance;pain  -TA        Skilled BADL Treatment/Intervention  adaptive equipment training;BADL process/adaptation training  -TA        Progress in BADL Status  independence level  -TA           General ROM    GENERAL ROM COMMENTS  BUE AROM WFL  -TA           MMT (Manual Muscle Testing)    General MMT Comments  BUE 5/5  -TA           Motor Assessment/Interventions    Additional Documentation  Balance (Group);Balance Interventions (Group)  -TA           Balance    Balance  dynamic sitting balance;dynamic standing balance  -TA           Dynamic Sitting Balance    Level of [place], Reaches Outside Midline (Sitting, Dynamic Balance)  supervision  -TA        Sitting Position, Reaches Outside Midline (Sitting, Dynamic Balance)  sitting in chair  -TA        Comment, Reaches Outside Midline (Sitting, Dynamic Balance)  LBD  -TA           Dynamic Standing Balance    Level of [place], Reaches Outside Midline (Standing, Dynamic Balance)  standby assist  -TA        Time Able to Maintain Position, Reaches Outside Midline (Standing, Dynamic Balance)  1 to 2 minutes  -TA        Assistive Device Utilized (Supported Standing, Dynamic Balance)  walker, rolling  -TA           Sensory Assessment/Intervention    Sensory General Assessment  no sensation deficits identified;other (see comments) BUE intact  -TA           Positioning and Restraints    Pre-Treatment Position  sitting in chair/recliner  -TA        Post Treatment Position  chair  -TA        In Chair  reclined;call light within reach;encouraged to call for assist;exit alarm on;with family/caregiver;legs elevated  -TA           Pain Assessment    Additional Documentation  Pain Scale: Numbers Pre/Post-Treatment (Group);Pain Scale: Word Pre/Post-Treatment (Group)  -TA           Pain Scale: Numbers Pre/Post-Treatment    Pain Location -  Side  Right  -TA        Pain Location - Orientation  anterior  -TA        Pain Location  knee  -TA        Pre/Post Treatment Pain Comment  Tolerated, improved with rest  -TA        Pain Intervention(s)  Repositioned;Ambulation/increased activity  -TA           Pain Scale: Word Pre/Post-Treatment    Pain: Word Scale, Pretreatment  2 - mild pain  -TA        Pain: Word Scale, Post-Treatment  2 - mild pain  -TA        Pre/Post Treatment Pain Comment  tolerated  -TA           Pain Scale: FACES Pre/Post-Treatment    Pain: FACES Scale, Pretreatment  0-->no hurt  -TA        Pain: FACES Scale, Post-Treatment  0-->no hurt  -TA           Wound 11/15/19 1304 Right leg Incision    Wound - Properties Group Date first assessed: 11/15/19  -AL Time first assessed: 1304  -AL Side: Right  -AL Location: leg  -AL Primary Wound Type: Incision  -AL       Coping    Observed Emotional State  accepting;cooperative;pleasant  -TA        Verbalized Emotional State  acceptance  -TA           Plan of Care Review    Plan of Care Reviewed With  patient;spouse  -TA           Clinical Impression (OT)    Date of Referral to OT  11/15/19  -TA        Functional Level at Time of Evaluation (OT Eval)  Pt demonstrates independence/conditional independence with ADLs  -TA        Patient/Family Goals Statement (OT Eval)  Return home  -TA        Criteria for Skilled Therapeutic Interventions Met (OT Eval)  no;no problems identified which require skilled intervention  -TA        Care Plan Review (OT)  evaluation/treatment results reviewed;care plan/treatment goals reviewed;risks/benefits reviewed;patient/other agree to care plan  -TA        Care Plan Review, Other Participant (OT Eval)  spouse  -TA        Anticipated Discharge Disposition (OT)  home with assist  -TA           Vital Signs    Pre Systolic BP Rehab  -- VSS; RN cleared pt for tx  -TA        O2 Delivery Pre Treatment  room air  -TA        O2 Delivery Post Treatment  room air  -TA        Pre Patient  Position  Sitting  -TA        Intra Patient Position  Standing  -TA        Post Patient Position  Sitting  -TA           Patient Education Goal (OT)    Activity (Patient Education Goal, OT)  Pt will verbalize good understanding of EC, pacing with daily occupations  -TA        [place]/Cues/Accuracy (Memory Goal 2, OT)  verbalizes understanding  -TA        Time Frame (Patient Education Goal, OT)  1 day  -TA        Progress/Outcome (Patient Education Goal, OT)  goal met  -TA          User Key  (r) = Recorded By, (t) = Taken By, (c) = Cosigned By    Initials Name Effective Dates    Jonathan Butterfield OT 03/14/16 -     Nai Gloria RN 03/14/19 -           Occupational Therapy Education     Title: PT OT SLP Therapies (Done)     Topic: Occupational Therapy (Done)     Point: ADL training (Done)     Description: Instruct learner(s) on proper safety adaptation and remediation techniques during self care or transfers.   Instruct in proper use of assistive devices.    Learning Progress Summary           Patient Acceptance, E, DU by TA at 11/16/2019 11:59 AM    Comment:  Pt issued AE and demonstrated good understanding of use for LBD, LBB; reinforced need for call for assist with OOB activities.   Significant Other Acceptance, E, DU by TA at 11/16/2019 11:59 AM    Comment:  Pt issued AE and demonstrated good understanding of use for LBD, LBB; reinforced need for call for assist with OOB activities.                               User Key     Initials Effective Dates Name Provider Type Discipline    TA 03/14/16 -  Jonathan Phelps OT Occupational Therapist OT                OT Recommendation and Plan  Outcome Summary/Treatment Plan (OT)  Anticipated Discharge Disposition (OT): home with assist  Plan of Care Review  Plan of Care Reviewed With: patient, spouse  Plan of Care Reviewed With: patient, spouse  Outcome Summary: VSS; Pt issued AE and completed ADL retraining with good demonstration of conditional  independence. No DME needs identified. Pt educated re: EC, pacing with daily occupations and verbalized good understanding. No further acute skilled OT services indicated at this time. Recommend home with asisst at discharge.      Rehab Goal Summary     Row Name 11/16/19 1122 11/16/19 0938          Bed Mobility Goal 1 (PT)    Activity/Assistive Device (Bed Mobility Goal 1, PT)  --  bed mobility activities, all  -LR     [place] Level/Cues Needed (Bed Mobility Goal 1, PT)  --  independent  -LR     Time Frame (Bed Mobility Goal 1, PT)  --  long term goal (LTG);10 days  -LR     Progress/Outcomes (Bed Mobility Goal 1, PT)  --  goal partially met;discharged from facility achieved for supine to sit  -LR        Transfer Goal 1 (PT)    Activity/Assistive Device (Transfer Goal 1, PT)  --  sit-to-stand/stand-to-sit;walker, rolling  -LR     [place] Level/Cues Needed (Transfer Goal 1, PT)  --  conditional independence  -LR     Time Frame (Transfer Goal 1, PT)  --  long term goal (LTG);10 days  -LR     Progress/Outcome (Transfer Goal 1, PT)  --  goal not met;discharged from facility  -LR        Gait Training Goal 1 (PT)    Activity/Assistive Device (Gait Training Goal 1, PT)  --  gait (walking locomotion);walker, rolling  -LR     [place] Level (Gait Training Goal 1, PT)  --  conditional independence  -LR     Distance (Gait Goal 1, PT)  --  500 feet  -LR     Time Frame (Gait Training Goal 1, PT)  --  long term goal (LTG);10 days  -LR     Progress/Outcome (Gait Training Goal 1, PT)  --  goal not met;discharged from facility  -LR        ROM Goal 1 (PT)    ROM Goal 1 (PT)  --  0-90 degrees R knee  -LR     Time Frame (ROM Goal 1, PT)  --  long term goal (LTG);1 week  -LR     Progress/Outcome (ROM Goal 1, PT)  --  goal partially met;discharged from facility achieved flexion goal  -LR        Stairs Goal 1 (PT)    Activity/Assistive Device (Stairs Goal 1, PT)  --  stairs, all skills;walker, rolling  -LR     [place]  Level/Cues Needed (Stairs Goal 1, PT)  --  contact guard assist  -LR     Number of Stairs (Stairs Goal 1, PT)  --  1  -LR     Time Frame (Stairs Goal 1, PT)  --  long term goal (LTG);10 days  -LR     Progress/Outcome (Stairs Goal 1, PT)  --  goal met  -LR        Patient Education Goal (OT)    Activity (Patient Education Goal, OT)  Pt will verbalize good understanding of EC, pacing with daily occupations  -TA  --     [place]/Cues/Accuracy (Memory Goal 2, OT)  verbalizes understanding  -TA  --     Time Frame (Patient Education Goal, OT)  1 day  -TA  --     Progress/Outcome (Patient Education Goal, OT)  goal met  -TA  --       User Key  (r) = Recorded By, (t) = Taken By, (c) = Cosigned By    Initials Name Provider Type Discipline    Emily Vidal, PT Physical Therapist PT    Jonathan Butterfield, OT Occupational Therapist OT          Outcome Measures     Row Name 11/16/19 1122             How much help from another is currently needed...    Putting on and taking off regular lower body clothing?  4  -TA      Bathing (including washing, rinsing, and drying)  3  -TA      Toileting (which includes using toilet bed pan or urinal)  4  -TA      Putting on and taking off regular upper body clothing  4  -TA      Taking care of personal grooming (such as brushing teeth)  4  -TA      Eating meals  4  -TA      AM-PAC 6 Clicks Score (OT)  23  -TA         Functional Assessment    Outcome Measure Options  AM-PAC 6 Clicks Daily Activity (OT)  -TA        User Key  (r) = Recorded By, (t) = Taken By, (c) = Cosigned By    Initials Name Provider Type    Jonathan Butterfield OT Occupational Therapist          Time Calculation:   Time Calculation- OT     Row Name 11/16/19 1203 11/16/19 0938          Time Calculation- OT    OT Start Time  1122 ttc 0 minutes  -TA  --     OT Received On  11/16/19  -TA  --        Timed Charges    25457 - Gait Training Minutes   --  15  -LR       User Key  (r) = Recorded By, (t) = Taken By,  (c) = Cosigned By    Initials Name Provider Type    LR Emily Briceño, PT Physical Therapist    TA Jonathan Phelps, OT Occupational Therapist        Therapy Suggested Charges     Code   Minutes Charges    None           Therapy Charges for Today     Code Description Service Date Service Provider Modifiers Qty    [id number] HC OT EVAL MOD COMPLEXITY 4 11/16/2019 Jonathan Phelps, OT GO 1               OT Discharge Summary  Anticipated Discharge Disposition (OT): home with assist  Reason for Discharge: Independent, All goals achieved  Discharge Destination: Home with assist    Jonathan Phelps OT  11/16/2019

## 2019-11-17 NOTE — PROGRESS NOTES
[facility] I[facility]    Nerve Cath Post Op Call    Patient Name: Nic Brooks  :  1950  MRN:  [id number]  Date of Discharge: 2019    Nerve Cath Post Op Call:    Analgesia:Good  Side Effects:None  Catheter Site:clean  Patient Controlled ON Q pump infusion rate: 8ml/hr  Catheter Plan:Will continue with plan at home without changes and The patient was instructed to call ON CALL Anesthesia provider for any questions or problems

## 2019-11-18 ENCOUNTER — TELEPHONE (OUTPATIENT)
Dept: ORTHOPEDIC SURGERY | Facility: CLINIC | Age: 69
End: 2019-11-18

## 2019-11-18 ENCOUNTER — NURSE TRIAGE (OUTPATIENT)
Dept: CALL CENTER | Facility: HOSPITAL | Age: 69
End: 2019-11-18

## 2019-11-18 NOTE — TELEPHONE ENCOUNTER
Patient's wife called today about Swelling in the knee and warmth.  I explained that they need to keep his upper body flat with 4-5 pillows under the knee only let the knee down to exercise, eat and go to the bathroom.  The knee is not any more warm than it was in the hospital.  I told her that by getting the swelling down with help with pain and being able to move the knee.  I told her to call me back if she has any further questions.  Luz

## 2019-11-18 NOTE — TELEPHONE ENCOUNTER
"Reviewed guideline with caller, advises to call surgeon. Caller agrees to follow care advice.     Reason for Disposition  • [1] Caller has URGENT question AND [2] triager unable to answer question    Additional Information  • Negative: Sounds like a life-threatening emergency to the triager  • Negative: Chest pain  • Negative: Difficulty breathing  • Negative: Surgical incision symptoms and questions  • Negative: [1] Discomfort (pain, burning or stinging) when passing urine AND [2] male  • Negative: [1] Discomfort (pain, burning or stinging) when passing urine AND [2] female  • Negative: Constipation  • Negative: New or worsening leg (calf, thigh) pain  • Negative: New or worsening leg swelling  • Negative: Dizziness is severe, or persists > 24 hours after surgery  • Negative: Pain, redness, swelling, or pus at IV Site  • Negative: Symptoms arising from use of a urinary catheter ([place] or [place])  • Negative: Cast problems or questions  • Negative: Medication question  • Negative: [1] Widespread rash AND [2] bright red, sunburn-like  • Negative: [1] SEVERE headache AND [2] after spinal (epidural) anesthesia  • Negative: [1] Vomiting AND [2] persists > 4 hours  • Negative: [1] Vomiting AND [2] abdomen looks much more swollen than usual  • Negative: [1] Drinking very little AND [2] dehydration suspected (e.g., no urine > 12 hours, very dry mouth, very lightheaded)  • Negative: Patient sounds very sick or weak to the triager  • Negative: Sounds like a serious complication to the triager  • Negative: Fever > 100.5 F (38.1 C)  • Negative: [1] SEVERE post-op pain (e.g., excruciating, pain scale 8-10) AND [2] not controlled with pain medications    Answer Assessment - Initial Assessment Questions  1. SYMPTOM: \"What's the main symptom you're concerned about?\" (e.g., pain, fever, vomiting)      Whole leg is swollen down to ankle   2. ONSET: \"When did ________  start?\"      Started yesterday evening around 6:30pm  3. SURGERY: " "\"What surgery was performed?\"      Total knee replacement  4. DATE of SURGERY: \"When was surgery performed?\"       Friday   5. ANESTHESIA: \" What type of anesthesia did you have?\" (e.g., general, spinal, epidural, local)      general  6. PAIN: \"Is there any pain?\" If so, ask: \"How bad is it?\"  (Scale 1-10; or mild, moderate, severe)      Burnin, throbbing pain  7. FEVER: \"Do you have a fever?\" If so, ask: \"What is your temperature, how was it measured, and when did it start?\"      no  8. VOMITING: \"Is there any vomiting?\" If yes, ask: \"How many times?\"      no  9. BLEEDING: \"Is there any bleeding?\" If so, ask: \"How much?\" and \"Where?\"      no  10. OTHER SYMPTOMS: \"Do you have any other symptoms?\" (e.g., drainage from wound, painful urination, constipation)        no    Protocols used: POST-OP SYMPTOMS AND QUESTIONS-ADULT-AH      "

## 2019-11-19 NOTE — PROGRESS NOTES
[facility] Ivey    Nerve Cath Post Op Call    Patient Name: Nic Brooks  :  1950  MRN:  [id number]  Date of Discharge: 2019    Nerve Cath Post Op Call:    Catheter Plan:Patient/Family member report nerve catheter previously discontinued, tip intact

## 2019-11-27 NOTE — ANESTHESIA POSTPROCEDURE EVALUATION
Patient: Nic Brooks    Procedure Summary     Date:  11/15/19 [facility] / [facility]:   [facility]    Anesthesia Start:  1222 Anesthesia Stop:  1438    Procedure:  TOTAL KNEE ARTHROPLASTY RIGHT (Right Knee) Diagnosis:       Primary osteoarthritis of right knee      (Primary osteoarthritis of right knee [M17.11])    Surgeon:  Negrito Vidal MD Provider:  Mariano Oliver Jr., MD    Anesthesia Type:  spinal ASA Status:  3          Anesthesia Type: spinal  Last vitals  BP   104/76 (11/16/19 1450)   Temp   98.3 °F (36.8 °C) (11/16/19 1117)   Pulse   76 (11/16/19 1450)   Resp   16 (11/16/19 1117)     SpO2   95 % (11/16/19 1117)     Post Anesthesia Care and Evaluation    Patient location during evaluation: PACU  Patient participation: complete - patient participated  Level of consciousness: awake and alert  Pain score: 0  Pain management: adequate  Airway patency: patent  Anesthetic complications: No anesthetic complications  PONV Status: none  Cardiovascular status: hemodynamically stable and acceptable  Respiratory status: nonlabored ventilation, acceptable and nasal cannula  Hydration status: acceptable

## 2019-12-09 ENCOUNTER — OFFICE VISIT (OUTPATIENT)
Dept: ORTHOPEDIC SURGERY | Facility: CLINIC | Age: 69
End: 2019-12-09

## 2019-12-09 DIAGNOSIS — Z96.651 STATUS POST TOTAL RIGHT KNEE REPLACEMENT: Primary | ICD-10-CM

## 2019-12-09 PROCEDURE — 99024 POSTOP FOLLOW-UP VISIT: CPT | Performed by: PHYSICIAN ASSISTANT

## 2019-12-09 NOTE — PROGRESS NOTES
[facility] [place] [facility]opaedic Surgery Clinic Note    Subjective     Post-op (3 weeks s/p (R) TKA 11/15/2019)       HPI    Nic Brooks is a 69 y.o. male.  Patient presents today for his initial postop visit following right knee TKA on the above-stated date by Dr. Vidal.  Patient is doing well.  He has been using a walker to assist with ambulation when out but does not require any assistance when at home.  He is attending outpatient PT 3 times per week since his date of discharge from the hospital.  He is currently taking aspirin daily as directed.  This time he endorses a pain scale of 4/10.  Severity the pain moderate.  Quality the pain aching, burning, shooting.  Associated symptoms swelling and stiffness but improving with PT.    Patient With a number of how much better he is from his preop state except say he feels great.    No reported fever, chills, night sweats or other constitutional symptoms.      Objective      Physical Exam  There were no vitals taken for this visit.    There is no height or weight on file to calculate BMI.        Ortho Exam  Integument:   Right knee: Incision healing well without redness, warmth, drainage or evidence of infection    Lower Extremities:   Right Knee:    Tenderness:  Mild tenderness surrounding the knee to the muscles.  Calf and thigh compartments soft and compressible.    Effusion:  1+    Swelling: None    Crepitus:  None    Range of motion:  Extension: 0°       Flexion: 90°  Instability:  No varus laxity, no valgus laxity, negative anterior drawer  Deformities:  None      Imaging Reviewed:  Ordered right knee plain films.  Imaging read by Dr. Vidal.    Imaging Results (Last 24 Hours)     Procedure Component Value Units Date/Time    XR Knee 3+ View With Gerster Right [[id number]] Resulted:  12/09/19 0843     Updated:  12/09/19 0844    Narrative:       Right Knee Radiographs  Indication: status-post right total knee arthroplasty  Views: AP, lateral, and sunrise views of the right  knee    Comparison: no change compared to prior study, 11/15/2019    Findings:   The components are well aligned, with no signs of loosening or failure.            Assessment:  1. Status post total right knee replacement        Plan:  1. Status post right TKA, stable and doing well.  2. Continue working with outpatient PT.  Continue with aspirin as directed.  3. Transition off narcotic pain medication to over-the-counter pain medication, as able.  4. Continue use of the walker but transition to cane and then to nothing when able.  5. Follow-up in 6 weeks for repeat evaluation, sooner if issues arise or symptoms worsen/change.  X-rays are not required at that visit.  6. Questions and concerns answered.    Patient was also examined by Dr. Vidal and he agrees with the above assessment and plan.      Brandy Beltre PA-C  12/09/19  8:54 AM

## 2019-12-23 RX ORDER — CLOPIDOGREL BISULFATE 75 MG/1
TABLET ORAL
Qty: 90 TABLET | Refills: 2 | Status: SHIPPED | OUTPATIENT
Start: 2019-12-23 | End: 2020-05-19

## 2020-01-20 ENCOUNTER — OFFICE VISIT (OUTPATIENT)
Dept: ORTHOPEDIC SURGERY | Facility: CLINIC | Age: 70
End: 2020-01-20

## 2020-01-20 DIAGNOSIS — Z96.651 STATUS POST TOTAL RIGHT KNEE REPLACEMENT: Primary | ICD-10-CM

## 2020-01-20 PROCEDURE — 99024 POSTOP FOLLOW-UP VISIT: CPT | Performed by: PHYSICIAN ASSISTANT

## 2020-01-20 RX ORDER — CHLORTHALIDONE 50 MG/1
TABLET ORAL
Qty: 90 TABLET | Refills: 1 | Status: SHIPPED | OUTPATIENT
Start: 2020-01-20 | End: 2020-05-19 | Stop reason: SDUPTHER

## 2020-01-20 NOTE — PROGRESS NOTES
[facility] [place] Orthopaedic Surgery Clinic Note        Subjective     Post-op Follow-up (6 weeks s/p (R) TKA 11/15/2019)       ABEL Brooks is a 69 y.o. male.  She returns today around 9 weeks out from right TKA performed on the above date by Dr. Vidal.  He is doing great.  He has no complaints or issues.  He only uses his cane intermittently.  He still attending formal PT and feels this is helping him tremendously.  No reported numbness or tingling into the extremity.  Patient denies any fever, chills, night sweats or other constitutional symptoms.  Patient reports he is 100% better when compared to his preoperative state.        Objective      Physical Exam  There were no vitals taken for this visit.    There is no height or weight on file to calculate BMI.        Ortho Exam  Integument:              Right knee: Incision well-healed without redness, warmth, drainage or evidence of infection     Lower Extremities:              Right Knee:                          Tenderness:     No tenderness on exam today                          Effusion:           Trace                          Swelling:          None                          Crepitus:          None                          Range of motion:        Extension:       0°                                                              Flexion:           120°  Instability:        No varus laxity, no valgus laxity, negative anterior drawer  Deformities:     None        Imaging Reviewed:  No new imaging today.      Assessment:  1. Status post total right knee replacement        Plan:  1. Status post right TKA, stable and doing well.  2. Continue working with outpatient PT.    3. Patient was given a note regarding jury duty.  Defer until 3/1/2020.  4. Follow-up in 4 months for repeat evaluation, sooner if issues arise or symptoms worsen/change.    X-rays will be needed at this visit.  5. Questions and concerns answered.      Brandy Beltre PA-C  01/20/20  10:38  AM

## 2020-05-19 ENCOUNTER — OFFICE VISIT (OUTPATIENT)
Dept: CARDIOLOGY | Facility: CLINIC | Age: 70
End: 2020-05-19

## 2020-05-19 VITALS
TEMPERATURE: 97.3 F | DIASTOLIC BLOOD PRESSURE: 74 MMHG | HEIGHT: 68 IN | HEART RATE: 74 BPM | SYSTOLIC BLOOD PRESSURE: 122 MMHG | BODY MASS INDEX: 27.49 KG/M2 | WEIGHT: 181.4 LBS

## 2020-05-19 DIAGNOSIS — I25.119 CORONARY ARTERY DISEASE INVOLVING NATIVE HEART WITH ANGINA PECTORIS, UNSPECIFIED VESSEL OR LESION TYPE (HCC): Primary | ICD-10-CM

## 2020-05-19 PROCEDURE — 99214 OFFICE O/P EST MOD 30 MIN: CPT | Performed by: INTERNAL MEDICINE

## 2020-05-19 RX ORDER — CHLORTHALIDONE 50 MG/1
50 TABLET ORAL DAILY
Qty: 90 TABLET | Refills: 3 | Status: SHIPPED | OUTPATIENT
Start: 2020-05-19 | End: 2021-05-20 | Stop reason: SDUPTHER

## 2020-05-19 RX ORDER — ROSUVASTATIN CALCIUM 40 MG/1
40 TABLET, COATED ORAL DAILY
Qty: 90 TABLET | Refills: 3 | Status: SHIPPED | OUTPATIENT
Start: 2020-05-19 | End: 2021-05-20 | Stop reason: SDUPTHER

## 2020-05-19 NOTE — PROGRESS NOTES
"  OFFICE FOLLOW UP     Date of Encounter:2020     Name: Nic Brooks  : 1950  Address: [address] [address]    PCP: Madison Weeks APRN [address] [facility] [address]    Nic Brooks is a 70 y.o. male.    Chief Complaint: Follow up of CAD, HLD, HTN    Problem List:   1. Coronary artery disease   A. Right and C 2005: Normal LV function, EF 75%, normal right heart pressures, minor nonobstructive coronary artery disease  B.  2018, Recurrent chest pain with abnormal EKG showing anterior YESI, \"new\" since 2005  C.  [facility], 2018:  Normal LV function, iFR 0.89, KERRY to LAD.  2. History of anemia with B12 deficiency  3. Obstructive sleep apnea, on CPAP therapy  4. 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 Arthritis  7. Hyperlipidemia  8. Surgical history:   A. Tonsillectomy  B. Splenectomy  C. Lower lobectomy of right lung  D. Left knee meniscus repair  E. Lumbar discectomy    Allergies:  No Known Allergies    Current Medications:  •  aspirin 81 MG EC tablet, Take 1 tablet by mouth Daily.  •  chlorthalidone (HYGROTEN) 50 MG tablet, TAKE ONE TABLET BY MOUTH DAILY  •  clopidogrel (PLAVIX) 75 MG tablet, TAKE ONE TABLET BY MOUTH DAILY  •  coenzyme Q10 100 MG capsule, Take 100 mg by mouth Daily.  •  Cyanocobalamin (B-12 COMPLIANCE INJECTION IJ), Inject 1 dose as directed Every 30 (Thirty) Days.  •  docusate sodium (COLACE) 100 MG capsule, Take 1 capsule by mouth 2 (Two) Times a Day As Needed   •  rosuvastatin (CRESTOR) 40 MG tablet, TAKE ONE TABLET BY MOUTH DAILY    History of Present Illness:           Mr. Brooks presents for yearly follow up. He has remained active and asymptomatic from cardiac perspective. He specifically denies angina, exertional dyspnea, palpitations or heart failure symptoms. 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 " "    The following portions of the patient's history were reviewed and updated as appropriate: allergies, current medications and problem list.    ROS: Pertinent positives as listed in the HPI.  All other systems reviewed and negative.    Objective:  Vitals:    05/19/20 1423 05/19/20 1427   BP: 132/70 122/74   BP Location: Right arm Right arm   Patient Position: Sitting Standing   Pulse: 68 74   Temp: 97.3 °F (36.3 °C)    Weight: 82.3 kg (181 lb 6.4 oz)    Height: 172.7 cm (68\")      Physical Exam:  GENERAL: Alert, cooperative, in no acute distress.   HEENT: Normocephalic, no adenopathy, no jugular venous distention  HEART: No discrete PMI is noted. Regular rhythm, normal rate, and no murmurs, gallops, or rubs.   LUNGS: Clear to auscultation bilaterally. No wheezing, rales or ronchi.  ABDOMEN: Soft, bowel sounds present, non-tender   NEUROLOGIC: No focal abnormalities involving strength or sensation are noted.   EXTREMITIES: No clubbing, cyanosis, or edema noted.     Diagnostic Data:    Lab Results   Component Value Date    WBC 18.25 (H) 11/16/2019    HGB 12.1 (L) 11/16/2019    HCT 36.1 (L) 11/16/2019    MCV 91.6 11/16/2019     11/16/2019     Lab Results   Component Value Date    GLUCOSE 116 (H) 11/16/2019    CALCIUM 8.4 (L) 11/16/2019     11/16/2019    K 3.8 11/16/2019    CO2 26.0 11/16/2019    CL 99 11/16/2019    BUN 20 11/16/2019    CREATININE 1.20 11/16/2019    EGFRIFNONA 60 (L) 11/16/2019    BCR 16.7 11/16/2019    ANIONGAP 12.0 11/16/2019     Lab Results   Component Value Date    HGBA1C 5.80 (H) 11/05/2019     Procedures    Assessment and Plan:     1. CAD: active and asymptomatic without angina or anginal equivalent. He may stop Plavix at this time, however should continue aspirin indefinitely.  I discussed this in detail with the patient and answered questions.  2. HTN: well controlled  3. HLD: Continue Crestor 40mg, and obtain lipid panel when able due to Covid 19 pandemic. LDL should be targeted " to <70.   4. Follow up in 1 year or sooner if needed.     Scribed for Negrito Wilkes MD by Letha Arenas PA-C. 5/19/2020  15:11

## 2020-07-22 ENCOUNTER — OFFICE VISIT (OUTPATIENT)
Dept: ORTHOPEDIC SURGERY | Facility: CLINIC | Age: 70
End: 2020-07-22

## 2020-07-22 VITALS — WEIGHT: 175.2 LBS | BODY MASS INDEX: 26.55 KG/M2 | HEART RATE: 75 BPM | OXYGEN SATURATION: 99 % | HEIGHT: 68 IN

## 2020-07-22 DIAGNOSIS — Z09 POSTOPERATIVE EXAMINATION: ICD-10-CM

## 2020-07-22 DIAGNOSIS — Z96.651 STATUS POST TOTAL RIGHT KNEE REPLACEMENT: Primary | ICD-10-CM

## 2020-07-22 DIAGNOSIS — M17.12 PRIMARY OSTEOARTHRITIS OF LEFT KNEE: ICD-10-CM

## 2020-07-22 PROCEDURE — 99213 OFFICE O/P EST LOW 20 MIN: CPT | Performed by: ORTHOPAEDIC SURGERY

## 2020-07-22 RX ORDER — MELOXICAM 7.5 MG/1
15 TABLET ORAL ONCE
Status: CANCELLED | OUTPATIENT
Start: 2020-07-22 | End: 2020-07-22

## 2020-07-22 RX ORDER — PREGABALIN 150 MG/1
150 CAPSULE ORAL ONCE
Status: CANCELLED | OUTPATIENT
Start: 2020-07-22 | End: 2020-07-22

## 2020-07-22 RX ORDER — ACETAMINOPHEN 325 MG/1
1000 TABLET ORAL ONCE
Status: CANCELLED | OUTPATIENT
Start: 2020-07-22 | End: 2020-07-22

## 2020-07-22 NOTE — PROGRESS NOTES
[facility] [place] Orthopaedic Surgery Clinic Note    Subjective     Chief Complaint   Patient presents with   • Follow-up     6 months follow up; 8 months Status post total right knee replacement 11-15-19        HPI    It has been 6  month(s) since Mr. Brooks's last visit. He returns to clinic today for follow-up of right knee arthroplasty. He rates his pain a 0/10 on the pain scale. Overall, he is doing better.  100% improvement compared to his preoperative symptoms.  He is pleased with the results.    However, his left knee does bother him to the point where he would like to proceed with knee replacement surgery.  He has exhausted conservative treatment options.  Pain daily, with significant deformity.  No history of clots or clotting disorders.  He would like to proceed with surgery in October timeframe.    I have reviewed the following portions of the patient's history:History of Present Illness     Patient Active Problem List   Diagnosis   • Moderate obstructive sleep apnea   • Fatigue   • Snoring   • GERD (gastroesophageal reflux disease)   • Hypersomnia   • Other chest pain   • Abnormal EKG   • Coronary artery disease involving native coronary artery of native heart without angina pectoris   • Dyslipidemia, goal LDL below 70   • Essential hypertension   • Primary osteoarthritis of right knee   • Status post total right knee replacement   • Prediabetes     Past Medical History:   Diagnosis Date   • Arthritis    • Aspiration into airway     april 2016- pt states was swabbed at that time but never told mrsa positive-    then started cpap august 2016 and never told anything about being positive mrsa   • Chest pain     not at this time    • Coronary artery disease    • GERD (gastroesophageal reflux disease)    • Hearing loss     kailee hearing aids   • Heart disease    • History of kidney stones     just one    • History of transfusion 1980    [facility]   • Hyperlipidemia    • Osteoarthritis    • Sleep apnea  with use of continuous positive airway pressure (CPAP)     complaint with machine    • Wears glasses       Past Surgical History:   Procedure Laterality Date   • APPENDECTOMY     • CARDIAC CATHETERIZATION N/A 1/19/2018    Procedure: Left Heart Cath;  Surgeon: Negrito Wilkes MD;  Location:  [facility];  Service:    • COLONOSCOPY      every 5 years   • CORONARY STENT PLACEMENT     • ENDOSCOPY     • KIDNEY STONE SURGERY     • KNEE ARTHROSCOPY W/ MENISCECTOMY      left   • LUMBAR DISCECTOMY     • LUNG REMOVAL, PARTIAL      right lower lobe    • REPLACEMENT TOTAL KNEE  11/15/2019    Right knee   • SPLENECTOMY     • TONSILLECTOMY      removed adnoids   • TOTAL KNEE ARTHROPLASTY Right 11/15/2019    Procedure: TOTAL KNEE ARTHROPLASTY RIGHT;  Surgeon: Negrito Vidal MD;  Location:  [facility] OR;  Service: Orthopedics   • WISDOM TOOTH EXTRACTION      only 2 removed       Family History   Problem Relation Age of Onset   • Hypertension Mother    • Cancer Mother    • Cancer Father    • Emphysema Father    • Diabetes Father    • COPD Father    • Osteoarthritis Father    • No Known Problems Sister    • Cancer Brother    • Diabetes Brother    • Heart disease Brother      Social History     Socioeconomic History   • Marital status:      Spouse name: Not on file   • Number of children: Not on file   • Years of education: Not on file   • Highest education level: Not on file   Tobacco Use   • Smoking status: Never Smoker   • Smokeless tobacco: Never Used   Substance and Sexual Activity   • Alcohol use: No   • Drug use: No   • Sexual activity: Defer      Current Outpatient Medications on File Prior to Visit   Medication Sig Dispense Refill   • aspirin 81 MG EC tablet Take 1 tablet by mouth Daily. Resume in 1 month     • chlorthalidone (HYGROTEN) 50 MG tablet Take 1 tablet by mouth Daily. 90 tablet 3   • coenzyme Q10 100 MG capsule Take 100 mg by mouth Daily.     • Cyanocobalamin (B-12 COMPLIANCE INJECTION IJ)  "Inject 1 dose as directed Every 30 (Thirty) Days. LD January 5th     • docusate sodium (COLACE) 100 MG capsule Take 1 capsule by mouth 2 (Two) Times a Day As Needed for Constipation. 60 capsule 0   • rosuvastatin (CRESTOR) 40 MG tablet Take 1 tablet by mouth Daily. 90 tablet 3     No current facility-administered medications on file prior to visit.       No Known Allergies     Review of Systems   Constitutional: Negative.    HENT: Negative.    Eyes: Negative.    Respiratory: Negative.    Cardiovascular: Negative.    Gastrointestinal: Negative.    Endocrine: Negative.    Genitourinary: Negative.    Musculoskeletal: Positive for arthralgias.   Skin: Negative.    Allergic/Immunologic: Negative.    Neurological: Negative.    Hematological: Negative.    Psychiatric/Behavioral: Negative.         Objective      Physical Exam  Pulse 75   Ht 172.7 cm (67.99\")   Wt 79.5 kg (175 lb 3.2 oz)   SpO2 99%   BMI 26.65 kg/m²     Body mass index is 26.65 kg/m².    General:   Mental Status:  Alert   Appearance: Cooperative, in no acute distress   Build and Nutrition: Well-nourished well-developed male   Orientation: Alert and oriented to person, place and time   Posture: Normal   Gait: Normal    Integument:   Right knee: Wound is well-healed   Left knee: no skin lesions, no rash, no ecchymosis    Lower Extremities:   Right Knee:    Tenderness:  None    Effusion:  None    Swelling:  None    Crepitus:  None    Atrophy:  None    Range of motion:  Extension: 0°       Flexion: 130°  Instability:  No varus laxity, no valgus laxity, negative anterior drawer  Deformities:  None   Left Knee:    Tenderness:  None    Effusion:  None    Swelling:  None    Crepitus: Positive    Atrophy:  None    Range of motion:  Extension: 0°       Flexion: 120°  Instability:  No varus laxity, no valgus laxity, negative anterior drawer  Deformities:  Varus      Imaging/Studies  See x-ray report from today.      Assessment and Plan     Nic was seen today for " follow-up.    Diagnoses and all orders for this visit:    Status post total right knee replacement  -     XR Knee 3+ View With Sunrise Right; Future    Postoperative examination    Primary osteoarthritis of left knee  -     Case Request; Standing  -     Instructions on coughing, deep breathing, and incentive spirometry.; Future  -     CBC and Differential; Future  -     Basic metabolic panel; Future  -     Protime-INR; Future  -     APTT; Future  -     Sedimentation rate; Future  -     C-reactive protein; Future  -     Case Request    Other orders  -     Follow Anesthesia Guidelines / Standing Orders; Future  -     Obtain informed consent  -     Provide instructions to patient regarding NPO status  -     Chlorhexidine Skin Prep - Educate and Review With Patient; Future  -     Provide Patient With ERAS Hydration Instructions  -     Provide Patient With Enhanced Recovery Booklet(s) or Handout  -     Provide Instructions/Handout For Benzoyl Peroxide 5% Wash If Having Shoulder/Arm Surgery (If Prescribed)  -     Provide Instructions/Handout For Bactroban And Chlorhexidine Shower (If Prescribed)  -     Perform A Memory Screening On All Hip/Knee Replacement Patients >Or Equal To 65 Years Or Older  -     Complete A PROMIS And HOOS Or KOOS Survey If Having Hip Or Knee Replacement  -     Provide Patient With Carbo Loading Instructions  -     Provide Patient With ERAS Booklet(s)/Handout  -     mupirocin (Bactroban Nasal) 2 % nasal ointment; into the nostril(s) as directed by provider 2 (Two) Times a Day.  -     chlorhexidine (HIBICLENS) 4 % external liquid; Apply  topically to the appropriate area as directed Daily. Shower with hibiclens solution as directed for 5 days prior to surgery        1. Status post total right knee replacement    2. Postoperative examination    3. Primary osteoarthritis of left knee        I reviewed my findings with the patient today.  His right total knee arthroplasty is functioning well, and is  pleased with results.  Routine follow-up in 4 years for that knee.    Regarding his left knee, he would like to proceed with left total knee arthroplasty surgery in October timeframe.  He has exhausted conservative treatment options.  Consent was obtained.  Please see my counseling note for details.    Surgical Counseling     I have informed the patient of the diagnosis and the prognosis.  Exhaustive conservative treatment modalities have not resulted in long term pain relief.  The symptoms have progressed to the point of daily pain and inability to perform activities of daily living without significant pain. The patient has reached the point of desiring to proceed with total knee arthroplasty after discussing the risks, benefits and alternatives to the procedure.  The surgical procedure itself was discussed in detail. Risks of the procedure were discussed, which included but are not limited to, bleeding, infection, damage to blood vessels and nerves, incomplete pain relief, loosening of the prosthesis (early or late), deep infection (early or late), need for further surgery, loss of limb, deep venous thrombosis, pulmonary embolus, death, heart attack, stroke, kidney failure, liver failure, and anesthetic complications.  In addition, the potential for deep infection developing in the future was discussed, which could require further surgery.  The knee would have to be re-opened, debrided, and potentially remove the prosthesis, which may or may not be replaced in the future.  Also, the possibility for loosening of the prosthesis has been mentioned.  If the prosthesis loosened, a revision arthroplasty could be performed, with results that are not as predictable compared to the original procedure.  The typical rehabilitative course has also been discussed, and full recovery may take up to a year to see the maximum benefit.  The importance of patient cooperation in the rehabilitative efforts has also been discussed.  No  guarantees were given.  The patient understands the potential risks versus the benefits and desires to proceed with total knee arthroplasty at a mutually convenient time.    Return for surgery.    Medical Decision Making  Management Options : major surgery with risk factors  Data/Risk: radiology tests and independent visualization of imaging, lab tests, or EMG/NCV      Negrito Vidal MD  07/22/20  09:06    Dragon disclaimer:  Much of this encounter note is an electronic transcription/translation of spoken language to printed text. The electronic translation of spoken language may permit erroneous, or at times, nonsensical words or phrases to be inadvertently transcribed; Although I have reviewed the note for such errors, some may still exist.

## 2020-07-24 PROBLEM — M17.12 PRIMARY OSTEOARTHRITIS OF LEFT KNEE: Status: ACTIVE | Noted: 2020-07-24

## 2020-08-14 ENCOUNTER — TELEPHONE (OUTPATIENT)
Dept: ORTHOPEDIC SURGERY | Facility: CLINIC | Age: 70
End: 2020-08-14

## 2020-08-14 NOTE — TELEPHONE ENCOUNTER
I spoke with the patient's wife and she advised that his jury duty is still in full effect but they are not able to be physically in the court room until 09/01/2020. The  had told them that anyone can be excused from jury duty on this date but a doctors excuse would help them be excused.     The patient is not wanting to be exposed to COVID during jury duty with his replacement coming up on 10/20/2020.    Please advise.    [place]

## 2020-08-14 NOTE — TELEPHONE ENCOUNTER
PATIENTS WIFE MAGY CALLED REGARDING NOTE FOR JURY DUTY. SHE SAID HER  NEEDS AN EXCUSE FOR JURY DUTY DUE TO HAVING A HIP REPLACEMENT AND DUE TO AN INCREASE IN COVID CASES. HE WOULD LIKE TO WAIT UNTIL NEXT YEAR TO SERVE. MAGY CAN BE REACHED [phone].

## 2020-08-14 NOTE — TELEPHONE ENCOUNTER
Left a voicemail to advise that the letter that has been requested was placed in the mail today    Nanette

## 2020-10-08 ENCOUNTER — TELEPHONE (OUTPATIENT)
Dept: CARDIOLOGY | Facility: CLINIC | Age: 70
End: 2020-10-08

## 2020-10-08 ENCOUNTER — APPOINTMENT (OUTPATIENT)
Dept: PREADMISSION TESTING | Facility: HOSPITAL | Age: 70
End: 2020-10-08

## 2020-10-08 VITALS — HEIGHT: 69 IN | BODY MASS INDEX: 26.66 KG/M2 | WEIGHT: 180 LBS

## 2020-10-08 DIAGNOSIS — M17.12 PRIMARY OSTEOARTHRITIS OF LEFT KNEE: ICD-10-CM

## 2020-10-08 LAB
ANION GAP SERPL CALCULATED.3IONS-SCNC: 8 MMOL/L (ref 5–15)
APTT PPP: 33 SECONDS (ref 24–37)
BASOPHILS # BLD AUTO: 0.05 10*3/MM3 (ref 0–0.2)
BASOPHILS NFR BLD AUTO: 0.7 % (ref 0–1.5)
BUN SERPL-MCNC: 21 MG/DL (ref 8–23)
BUN/CREAT SERPL: 19.6 (ref 7–25)
CALCIUM SPEC-SCNC: 9.7 MG/DL (ref 8.6–10.5)
CHLORIDE SERPL-SCNC: 102 MMOL/L (ref 98–107)
CO2 SERPL-SCNC: 28 MMOL/L (ref 22–29)
CREAT SERPL-MCNC: 1.07 MG/DL (ref 0.76–1.27)
CRP SERPL-MCNC: 0.3 MG/DL (ref 0–0.5)
DEPRECATED RDW RBC AUTO: 54.3 FL (ref 37–54)
EOSINOPHIL # BLD AUTO: 0.09 10*3/MM3 (ref 0–0.4)
EOSINOPHIL NFR BLD AUTO: 1.2 % (ref 0.3–6.2)
ERYTHROCYTE [DISTWIDTH] IN BLOOD BY AUTOMATED COUNT: 16.2 % (ref 12.3–15.4)
ERYTHROCYTE [SEDIMENTATION RATE] IN BLOOD: 13 MM/HR (ref 0–20)
GFR SERPL CREATININE-BSD FRML MDRD: 68 ML/MIN/1.73
GLUCOSE SERPL-MCNC: 105 MG/DL (ref 65–99)
HBA1C MFR BLD: 6 % (ref 4.8–5.6)
HCT VFR BLD AUTO: 43.1 % (ref 37.5–51)
HGB BLD-MCNC: 14.3 G/DL (ref 13–17.7)
IMM GRANULOCYTES # BLD AUTO: 0.02 10*3/MM3 (ref 0–0.05)
IMM GRANULOCYTES NFR BLD AUTO: 0.3 % (ref 0–0.5)
INR PPP: 1.02 (ref 0.85–1.16)
LYMPHOCYTES # BLD AUTO: 2.58 10*3/MM3 (ref 0.7–3.1)
LYMPHOCYTES NFR BLD AUTO: 35 % (ref 19.6–45.3)
MCH RBC QN AUTO: 30.2 PG (ref 26.6–33)
MCHC RBC AUTO-ENTMCNC: 33.2 G/DL (ref 31.5–35.7)
MCV RBC AUTO: 90.9 FL (ref 79–97)
MONOCYTES # BLD AUTO: 1.11 10*3/MM3 (ref 0.1–0.9)
MONOCYTES NFR BLD AUTO: 15 % (ref 5–12)
NEUTROPHILS NFR BLD AUTO: 3.53 10*3/MM3 (ref 1.7–7)
NEUTROPHILS NFR BLD AUTO: 47.8 % (ref 42.7–76)
NRBC BLD AUTO-RTO: 0 /100 WBC (ref 0–0.2)
PLATELET # BLD AUTO: 289 10*3/MM3 (ref 140–450)
PMV BLD AUTO: 10 FL (ref 6–12)
POTASSIUM SERPL-SCNC: 4 MMOL/L (ref 3.5–5.2)
PROTHROMBIN TIME: 13.1 SECONDS (ref 11.5–14)
RBC # BLD AUTO: 4.74 10*6/MM3 (ref 4.14–5.8)
SODIUM SERPL-SCNC: 138 MMOL/L (ref 136–145)
WBC # BLD AUTO: 7.38 10*3/MM3 (ref 3.4–10.8)

## 2020-10-08 PROCEDURE — 83036 HEMOGLOBIN GLYCOSYLATED A1C: CPT | Performed by: ORTHOPAEDIC SURGERY

## 2020-10-08 PROCEDURE — 86140 C-REACTIVE PROTEIN: CPT | Performed by: ORTHOPAEDIC SURGERY

## 2020-10-08 PROCEDURE — 85025 COMPLETE CBC W/AUTO DIFF WBC: CPT | Performed by: ORTHOPAEDIC SURGERY

## 2020-10-08 PROCEDURE — 80048 BASIC METABOLIC PNL TOTAL CA: CPT | Performed by: ORTHOPAEDIC SURGERY

## 2020-10-08 PROCEDURE — 36415 COLL VENOUS BLD VENIPUNCTURE: CPT

## 2020-10-08 PROCEDURE — 85652 RBC SED RATE AUTOMATED: CPT | Performed by: ORTHOPAEDIC SURGERY

## 2020-10-08 PROCEDURE — 93010 ELECTROCARDIOGRAM REPORT: CPT | Performed by: INTERNAL MEDICINE

## 2020-10-08 PROCEDURE — 93005 ELECTROCARDIOGRAM TRACING: CPT

## 2020-10-08 PROCEDURE — 85730 THROMBOPLASTIN TIME PARTIAL: CPT | Performed by: ORTHOPAEDIC SURGERY

## 2020-10-08 PROCEDURE — 85610 PROTHROMBIN TIME: CPT | Performed by: ORTHOPAEDIC SURGERY

## 2020-10-08 RX ORDER — OMEGA-3S/DHA/EPA/FISH OIL/D3 300MG-1000
400 CAPSULE ORAL DAILY
COMMUNITY
End: 2021-05-20

## 2020-10-08 RX ORDER — ASCORBIC ACID 500 MG
500 TABLET ORAL DAILY
COMMUNITY
End: 2021-05-20

## 2020-10-08 ASSESSMENT — KOOS JR
KOOS JR SCORE: 42.281
KOOS JR SCORE: 18

## 2020-10-08 NOTE — PAT
Patient to apply Chlorhexadine wipes  to surgical area (as instructed) the night before procedure and the AM of procedure. Wipes provided.    Patient instructed to drink 20 ounces (or until full) of Gatorade and it needs to be completed 1 hour before given arrival time for procedure (NO RED Gatorade)    Patient verbalized understanding.    Verified with patient that they received a prescription for Bactroban and Chlorhexidine shower from the office.  Reinforced with them to fill the prescription if they haven't already.  Verbal and written instructions given regarding proper use of the Bactroban and Chlorhexidine to patient and/or famlily during PAT visit. Patient/family also instructed to complete checklist and return it to Pre-op on the day of surgery.  Patient and/or family verbalized understanding.      Patient scored 5 out of 5 on memory screen    Patient denies urinary urgency, urinary frequency, or flank pain.  Thus NO ua obtained in PAT.    Requested cardiac clearance and aspirin statement from Dr Wilkes.  Patient was last seen May 2020.    Patient was told not to stop aspirin due to stents.     Patient attended in person joint class in 2018.

## 2020-10-08 NOTE — TELEPHONE ENCOUNTER
LVM requesting return call regarding pre-operative cardiac risk assessment prior to knee surgery with Dr. Vidal. Will await.

## 2020-10-15 ENCOUNTER — TELEPHONE (OUTPATIENT)
Dept: ORTHOPEDIC SURGERY | Facility: CLINIC | Age: 70
End: 2020-10-15

## 2020-10-15 DIAGNOSIS — M25.562 LEFT KNEE PAIN, UNSPECIFIED CHRONICITY: Primary | ICD-10-CM

## 2020-10-15 NOTE — TELEPHONE ENCOUNTER
Dr. Vidal,  I  did the pre-op templating xray for Mr. Brooks this morning it will be on your read list.

## 2020-10-18 ENCOUNTER — APPOINTMENT (OUTPATIENT)
Dept: PREADMISSION TESTING | Facility: HOSPITAL | Age: 70
End: 2020-10-18

## 2020-10-18 LAB — SARS-COV-2 RNA RESP QL NAA+PROBE: NOT DETECTED

## 2020-10-18 PROCEDURE — U0004 COV-19 TEST NON-CDC HGH THRU: HCPCS

## 2020-10-18 PROCEDURE — C9803 HOPD COVID-19 SPEC COLLECT: HCPCS

## 2020-10-20 ENCOUNTER — ANESTHESIA (OUTPATIENT)
Dept: PERIOP | Facility: HOSPITAL | Age: 70
End: 2020-10-20

## 2020-10-20 ENCOUNTER — HOSPITAL ENCOUNTER (OUTPATIENT)
Facility: HOSPITAL | Age: 70
Discharge: HOME | End: 2020-10-20
Attending: ORTHOPAEDIC SURGERY | Admitting: ORTHOPAEDIC SURGERY

## 2020-10-20 ENCOUNTER — APPOINTMENT (OUTPATIENT)
Dept: GENERAL RADIOLOGY | Facility: HOSPITAL | Age: 70
End: 2020-10-20

## 2020-10-20 ENCOUNTER — ANESTHESIA EVENT (OUTPATIENT)
Dept: PERIOP | Facility: HOSPITAL | Age: 70
End: 2020-10-20

## 2020-10-20 VITALS
DIASTOLIC BLOOD PRESSURE: 90 MMHG | TEMPERATURE: 97.5 F | SYSTOLIC BLOOD PRESSURE: 128 MMHG | HEIGHT: 69 IN | WEIGHT: 180 LBS | HEART RATE: 66 BPM | RESPIRATION RATE: 18 BRPM | OXYGEN SATURATION: 97 % | BODY MASS INDEX: 26.66 KG/M2

## 2020-10-20 DIAGNOSIS — Z96.652 STATUS POST TOTAL LEFT KNEE REPLACEMENT: Primary | ICD-10-CM

## 2020-10-20 DIAGNOSIS — M17.12 PRIMARY OSTEOARTHRITIS OF LEFT KNEE: ICD-10-CM

## 2020-10-20 DIAGNOSIS — M17.11 PRIMARY OSTEOARTHRITIS OF RIGHT KNEE: ICD-10-CM

## 2020-10-20 LAB — POTASSIUM SERPL-SCNC: 3.6 MMOL/L (ref 3.5–5.2)

## 2020-10-20 PROCEDURE — 25010000002 ONDANSETRON PER 1 MG: Performed by: NURSE ANESTHETIST, CERTIFIED REGISTERED

## 2020-10-20 PROCEDURE — 63710000001 FAMOTIDINE 20 MG TABLET: Performed by: ANESTHESIOLOGY

## 2020-10-20 PROCEDURE — C1713 ANCHOR/SCREW BN/BN,TIS/BN: HCPCS | Performed by: ORTHOPAEDIC SURGERY

## 2020-10-20 PROCEDURE — 25010000002 HYDROMORPHONE PER 4 MG: Performed by: ORTHOPAEDIC SURGERY

## 2020-10-20 PROCEDURE — 25010000002 ROPIVACAINE PER 1 MG: Performed by: NURSE ANESTHETIST, CERTIFIED REGISTERED

## 2020-10-20 PROCEDURE — 25010000003 CEFAZOLIN IN DEXTROSE 2-4 GM/100ML-% SOLUTION: Performed by: ORTHOPAEDIC SURGERY

## 2020-10-20 PROCEDURE — 27447 TOTAL KNEE ARTHROPLASTY: CPT | Performed by: ORTHOPAEDIC SURGERY

## 2020-10-20 PROCEDURE — C1776 JOINT DEVICE (IMPLANTABLE): HCPCS | Performed by: ORTHOPAEDIC SURGERY

## 2020-10-20 PROCEDURE — 63710000001 MELOXICAM 15 MG TABLET: Performed by: ORTHOPAEDIC SURGERY

## 2020-10-20 PROCEDURE — 97116 GAIT TRAINING THERAPY: CPT

## 2020-10-20 PROCEDURE — 63710000001 MUPIROCIN 2 % OINTMENT 1 G TUBE: Performed by: ORTHOPAEDIC SURGERY

## 2020-10-20 PROCEDURE — 63710000001 ACETAMINOPHEN 500 MG TABLET: Performed by: ORTHOPAEDIC SURGERY

## 2020-10-20 PROCEDURE — A9270 NON-COVERED ITEM OR SERVICE: HCPCS | Performed by: ANESTHESIOLOGY

## 2020-10-20 PROCEDURE — 63710000001 PREGABALIN 150 MG CAPSULE: Performed by: ORTHOPAEDIC SURGERY

## 2020-10-20 PROCEDURE — A9270 NON-COVERED ITEM OR SERVICE: HCPCS | Performed by: ORTHOPAEDIC SURGERY

## 2020-10-20 PROCEDURE — 84132 ASSAY OF SERUM POTASSIUM: CPT | Performed by: ANESTHESIOLOGY

## 2020-10-20 PROCEDURE — S0260 H&P FOR SURGERY: HCPCS | Performed by: ORTHOPAEDIC SURGERY

## 2020-10-20 PROCEDURE — 27447 TOTAL KNEE ARTHROPLASTY: CPT | Performed by: PHYSICIAN ASSISTANT

## 2020-10-20 PROCEDURE — 25010000002 DEXAMETHASONE PER 1 MG: Performed by: NURSE ANESTHETIST, CERTIFIED REGISTERED

## 2020-10-20 PROCEDURE — 73560 X-RAY EXAM OF KNEE 1 OR 2: CPT

## 2020-10-20 PROCEDURE — 63710000001 OXYCODONE 5 MG TABLET: Performed by: ORTHOPAEDIC SURGERY

## 2020-10-20 PROCEDURE — 25010000002 PROPOFOL 10 MG/ML EMULSION: Performed by: NURSE ANESTHETIST, CERTIFIED REGISTERED

## 2020-10-20 PROCEDURE — 25010000002 ROPIVACAINE PER 1 MG: Performed by: ORTHOPAEDIC SURGERY

## 2020-10-20 PROCEDURE — 97161 PT EVAL LOW COMPLEX 20 MIN: CPT

## 2020-10-20 DEVICE — GENESIS II NON-POROUS TIBIAL                                    BASEPLATE SIZE 5 LEFT
Type: IMPLANTABLE DEVICE | Site: KNEE | Status: FUNCTIONAL
Brand: GENESIS II

## 2020-10-20 DEVICE — IMPLANTABLE DEVICE: Type: IMPLANTABLE DEVICE | Site: KNEE | Status: FUNCTIONAL

## 2020-10-20 DEVICE — GENESIS II RESURFACING PATELLAR 35MM
Type: IMPLANTABLE DEVICE | Site: PATELLA | Status: FUNCTIONAL
Brand: GENESIS II

## 2020-10-20 DEVICE — LEGION CRUCIATE RETAINING OXINIUM                                    FEMORAL SIZE 7 LEFT
Type: IMPLANTABLE DEVICE | Site: KNEE | Status: FUNCTIONAL
Brand: LEGION

## 2020-10-20 DEVICE — DEV CONTRL TISS STRATAFIX SYMM PDS PLUS VIL CT-1 45CM: Type: IMPLANTABLE DEVICE | Site: KNEE | Status: FUNCTIONAL

## 2020-10-20 DEVICE — CMT BONE SIMPLEX/P FULL DOSE 10/PK: Type: IMPLANTABLE DEVICE | Site: KNEE | Status: FUNCTIONAL

## 2020-10-20 DEVICE — DEV CONTRL TISS STRATAFIX SPIRAL MNCRYL UD 3/0 PLS 60CM: Type: IMPLANTABLE DEVICE | Site: KNEE | Status: FUNCTIONAL

## 2020-10-20 DEVICE — LEGION CRUCIATE RETAINING HIGH                                    FLEX HIGHLY CROSS LINKED                                    POLYETHYLENE SIZE 5-6 11MM
Type: IMPLANTABLE DEVICE | Site: KNEE | Status: FUNCTIONAL
Brand: LEGION

## 2020-10-20 RX ORDER — SODIUM CHLORIDE 0.9 % (FLUSH) 0.9 %
10 SYRINGE (ML) INJECTION EVERY 12 HOURS SCHEDULED
Status: CANCELLED | OUTPATIENT
Start: 2020-10-20

## 2020-10-20 RX ORDER — LABETALOL HYDROCHLORIDE 5 MG/ML
10 INJECTION, SOLUTION INTRAVENOUS EVERY 4 HOURS PRN
Status: DISCONTINUED | OUTPATIENT
Start: 2020-10-20 | End: 2020-10-20 | Stop reason: HOSPADM

## 2020-10-20 RX ORDER — ASPIRIN 81 MG/1
81 TABLET ORAL DAILY
Start: 2020-10-20 | End: 2022-06-23 | Stop reason: SDUPTHER

## 2020-10-20 RX ORDER — ACETAMINOPHEN 650 MG/1
650 SUPPOSITORY RECTAL EVERY 4 HOURS PRN
Status: DISCONTINUED | OUTPATIENT
Start: 2020-10-20 | End: 2020-10-20 | Stop reason: HOSPADM

## 2020-10-20 RX ORDER — ROSUVASTATIN CALCIUM 20 MG/1
40 TABLET, COATED ORAL NIGHTLY
Status: DISCONTINUED | OUTPATIENT
Start: 2020-10-20 | End: 2020-10-20 | Stop reason: HOSPADM

## 2020-10-20 RX ORDER — MAGNESIUM HYDROXIDE 1200 MG/15ML
LIQUID ORAL AS NEEDED
Status: DISCONTINUED | OUTPATIENT
Start: 2020-10-20 | End: 2020-10-20 | Stop reason: HOSPADM

## 2020-10-20 RX ORDER — SODIUM CHLORIDE 0.9 % (FLUSH) 0.9 %
1-10 SYRINGE (ML) INJECTION AS NEEDED
Status: DISCONTINUED | OUTPATIENT
Start: 2020-10-20 | End: 2020-10-20 | Stop reason: HOSPADM

## 2020-10-20 RX ORDER — ASPIRIN 325 MG
325 TABLET, DELAYED RELEASE (ENTERIC COATED) ORAL DAILY
Status: DISCONTINUED | OUTPATIENT
Start: 2020-10-21 | End: 2020-10-20 | Stop reason: HOSPADM

## 2020-10-20 RX ORDER — ACETAMINOPHEN 325 MG/1
650 TABLET ORAL EVERY 4 HOURS PRN
Status: DISCONTINUED | OUTPATIENT
Start: 2020-10-20 | End: 2020-10-20 | Stop reason: HOSPADM

## 2020-10-20 RX ORDER — ROPIVACAINE HYDROCHLORIDE 5 MG/ML
INJECTION, SOLUTION EPIDURAL; INFILTRATION; PERINEURAL AS NEEDED
Status: DISCONTINUED | OUTPATIENT
Start: 2020-10-20 | End: 2020-10-20 | Stop reason: HOSPADM

## 2020-10-20 RX ORDER — FAMOTIDINE 20 MG/1
20 TABLET, FILM COATED ORAL
Status: COMPLETED | OUTPATIENT
Start: 2020-10-20 | End: 2020-10-20

## 2020-10-20 RX ORDER — ONDANSETRON 2 MG/ML
4 INJECTION INTRAMUSCULAR; INTRAVENOUS EVERY 6 HOURS PRN
Status: DISCONTINUED | OUTPATIENT
Start: 2020-10-20 | End: 2020-10-20 | Stop reason: HOSPADM

## 2020-10-20 RX ORDER — SODIUM CHLORIDE 0.9 % (FLUSH) 0.9 %
3 SYRINGE (ML) INJECTION EVERY 12 HOURS SCHEDULED
Status: DISCONTINUED | OUTPATIENT
Start: 2020-10-20 | End: 2020-10-20 | Stop reason: HOSPADM

## 2020-10-20 RX ORDER — CEFAZOLIN SODIUM 2 G/100ML
2 INJECTION, SOLUTION INTRAVENOUS EVERY 8 HOURS
Status: DISCONTINUED | OUTPATIENT
Start: 2020-10-20 | End: 2020-10-20 | Stop reason: HOSPADM

## 2020-10-20 RX ORDER — FENTANYL CITRATE 50 UG/ML
50 INJECTION, SOLUTION INTRAMUSCULAR; INTRAVENOUS
Status: DISCONTINUED | OUTPATIENT
Start: 2020-10-20 | End: 2020-10-20 | Stop reason: HOSPADM

## 2020-10-20 RX ORDER — PREGABALIN 150 MG/1
150 CAPSULE ORAL ONCE
Status: COMPLETED | OUTPATIENT
Start: 2020-10-20 | End: 2020-10-20

## 2020-10-20 RX ORDER — MELOXICAM 7.5 MG/1
15 TABLET ORAL DAILY
Status: DISCONTINUED | OUTPATIENT
Start: 2020-10-20 | End: 2020-10-20 | Stop reason: HOSPADM

## 2020-10-20 RX ORDER — BUPIVACAINE HYDROCHLORIDE 2.5 MG/ML
INJECTION, SOLUTION EPIDURAL; INFILTRATION; INTRACAUDAL
Status: DISCONTINUED | OUTPATIENT
Start: 2020-10-20 | End: 2020-10-20 | Stop reason: SURG

## 2020-10-20 RX ORDER — BUPIVACAINE HYDROCHLORIDE 5 MG/ML
INJECTION, SOLUTION PERINEURAL
Status: COMPLETED | OUTPATIENT
Start: 2020-10-20 | End: 2020-10-20

## 2020-10-20 RX ORDER — DEXAMETHASONE SODIUM PHOSPHATE 4 MG/ML
INJECTION, SOLUTION INTRA-ARTICULAR; INTRALESIONAL; INTRAMUSCULAR; INTRAVENOUS; SOFT TISSUE AS NEEDED
Status: DISCONTINUED | OUTPATIENT
Start: 2020-10-20 | End: 2020-10-20 | Stop reason: SURG

## 2020-10-20 RX ORDER — OXYCODONE HYDROCHLORIDE 5 MG/1
5 TABLET ORAL EVERY 4 HOURS PRN
Status: DISCONTINUED | OUTPATIENT
Start: 2020-10-20 | End: 2020-10-20 | Stop reason: HOSPADM

## 2020-10-20 RX ORDER — SODIUM CHLORIDE 0.9 % (FLUSH) 0.9 %
10 SYRINGE (ML) INJECTION AS NEEDED
Status: CANCELLED | OUTPATIENT
Start: 2020-10-20

## 2020-10-20 RX ORDER — LIDOCAINE HYDROCHLORIDE 10 MG/ML
0.5 INJECTION, SOLUTION EPIDURAL; INFILTRATION; INTRACAUDAL; PERINEURAL ONCE AS NEEDED
Status: COMPLETED | OUTPATIENT
Start: 2020-10-20 | End: 2020-10-20

## 2020-10-20 RX ORDER — CEFAZOLIN SODIUM 2 G/100ML
2 INJECTION, SOLUTION INTRAVENOUS ONCE
Status: COMPLETED | OUTPATIENT
Start: 2020-10-20 | End: 2020-10-20

## 2020-10-20 RX ORDER — MORPHINE SULFATE 4 MG/ML
4 INJECTION, SOLUTION INTRAMUSCULAR; INTRAVENOUS
Status: DISCONTINUED | OUTPATIENT
Start: 2020-10-20 | End: 2020-10-20 | Stop reason: HOSPADM

## 2020-10-20 RX ORDER — NALOXONE HCL 0.4 MG/ML
0.4 VIAL (ML) INJECTION
Status: DISCONTINUED | OUTPATIENT
Start: 2020-10-20 | End: 2020-10-20 | Stop reason: HOSPADM

## 2020-10-20 RX ORDER — ACETAMINOPHEN 500 MG
1000 TABLET ORAL ONCE
Status: COMPLETED | OUTPATIENT
Start: 2020-10-20 | End: 2020-10-20

## 2020-10-20 RX ORDER — ONDANSETRON 4 MG/1
4 TABLET, FILM COATED ORAL EVERY 6 HOURS PRN
Status: DISCONTINUED | OUTPATIENT
Start: 2020-10-20 | End: 2020-10-20 | Stop reason: HOSPADM

## 2020-10-20 RX ORDER — OXYCODONE HYDROCHLORIDE 5 MG/1
5 TABLET ORAL EVERY 4 HOURS PRN
Qty: 40 TABLET | Refills: 0 | Status: SHIPPED | OUTPATIENT
Start: 2020-10-20 | End: 2021-05-20

## 2020-10-20 RX ORDER — HYDROMORPHONE HYDROCHLORIDE 1 MG/ML
0.5 INJECTION, SOLUTION INTRAMUSCULAR; INTRAVENOUS; SUBCUTANEOUS
Status: DISCONTINUED | OUTPATIENT
Start: 2020-10-20 | End: 2020-10-20 | Stop reason: HOSPADM

## 2020-10-20 RX ORDER — SODIUM CHLORIDE, SODIUM LACTATE, POTASSIUM CHLORIDE, CALCIUM CHLORIDE 600; 310; 30; 20 MG/100ML; MG/100ML; MG/100ML; MG/100ML
9 INJECTION, SOLUTION INTRAVENOUS CONTINUOUS PRN
Status: DISCONTINUED | OUTPATIENT
Start: 2020-10-20 | End: 2020-10-20 | Stop reason: HOSPADM

## 2020-10-20 RX ORDER — MELOXICAM 15 MG/1
15 TABLET ORAL ONCE
Status: COMPLETED | OUTPATIENT
Start: 2020-10-20 | End: 2020-10-20

## 2020-10-20 RX ORDER — SODIUM CHLORIDE 9 MG/ML
120 INJECTION, SOLUTION INTRAVENOUS CONTINUOUS
Status: DISCONTINUED | OUTPATIENT
Start: 2020-10-20 | End: 2020-10-20 | Stop reason: HOSPADM

## 2020-10-20 RX ORDER — ACETAMINOPHEN 500 MG
1000 TABLET ORAL EVERY 6 HOURS PRN
Qty: 42 TABLET | Refills: 0 | Status: SHIPPED | OUTPATIENT
Start: 2020-10-20 | End: 2021-05-20

## 2020-10-20 RX ORDER — ONDANSETRON 2 MG/ML
4 INJECTION INTRAMUSCULAR; INTRAVENOUS ONCE AS NEEDED
Status: DISCONTINUED | OUTPATIENT
Start: 2020-10-20 | End: 2020-10-20 | Stop reason: HOSPADM

## 2020-10-20 RX ORDER — MIDAZOLAM HYDROCHLORIDE 1 MG/ML
1 INJECTION INTRAMUSCULAR; INTRAVENOUS
Status: DISCONTINUED | OUTPATIENT
Start: 2020-10-20 | End: 2020-10-20 | Stop reason: HOSPADM

## 2020-10-20 RX ORDER — NALOXONE HCL 0.4 MG/ML
0.1 VIAL (ML) INJECTION
Status: DISCONTINUED | OUTPATIENT
Start: 2020-10-20 | End: 2020-10-20 | Stop reason: HOSPADM

## 2020-10-20 RX ORDER — ONDANSETRON 2 MG/ML
INJECTION INTRAMUSCULAR; INTRAVENOUS AS NEEDED
Status: DISCONTINUED | OUTPATIENT
Start: 2020-10-20 | End: 2020-10-20 | Stop reason: SURG

## 2020-10-20 RX ADMIN — DEXAMETHASONE SODIUM PHOSPHATE 8 MG: 4 INJECTION, SOLUTION INTRAMUSCULAR; INTRAVENOUS at 07:40

## 2020-10-20 RX ADMIN — CEFAZOLIN SODIUM 2 G: 2 INJECTION, SOLUTION INTRAVENOUS at 07:27

## 2020-10-20 RX ADMIN — EPHEDRINE SULFATE 15 MG: 50 INJECTION INTRAMUSCULAR; INTRAVENOUS; SUBCUTANEOUS at 07:43

## 2020-10-20 RX ADMIN — CEFAZOLIN SODIUM 2 G: 2 INJECTION, SOLUTION INTRAVENOUS at 14:22

## 2020-10-20 RX ADMIN — PREGABALIN 150 MG: 150 CAPSULE ORAL at 06:36

## 2020-10-20 RX ADMIN — ROPIVACAINE HYDROCHLORIDE 10 ML/HR: 5 INJECTION, SOLUTION EPIDURAL; INFILTRATION; PERINEURAL at 09:41

## 2020-10-20 RX ADMIN — PROPOFOL 66 MCG/KG/MIN: 10 INJECTION, EMULSION INTRAVENOUS at 07:31

## 2020-10-20 RX ADMIN — SODIUM CHLORIDE, POTASSIUM CHLORIDE, SODIUM LACTATE AND CALCIUM CHLORIDE: 600; 310; 30; 20 INJECTION, SOLUTION INTRAVENOUS at 07:24

## 2020-10-20 RX ADMIN — SODIUM CHLORIDE 120 ML/HR: 9 INJECTION, SOLUTION INTRAVENOUS at 10:25

## 2020-10-20 RX ADMIN — ACETAMINOPHEN 1000 MG: 500 TABLET ORAL at 06:36

## 2020-10-20 RX ADMIN — TRANEXAMIC ACID 1000 MG: 100 INJECTION, SOLUTION INTRAVENOUS at 07:38

## 2020-10-20 RX ADMIN — TRANEXAMIC ACID 1000 MG: 100 INJECTION, SOLUTION INTRAVENOUS at 08:29

## 2020-10-20 RX ADMIN — ONDANSETRON 4 MG: 2 INJECTION INTRAMUSCULAR; INTRAVENOUS at 07:40

## 2020-10-20 RX ADMIN — FAMOTIDINE 20 MG: 20 TABLET, FILM COATED ORAL at 06:36

## 2020-10-20 RX ADMIN — MUPIROCIN 1 APPLICATION: 20 OINTMENT TOPICAL at 06:41

## 2020-10-20 RX ADMIN — MELOXICAM 15 MG: 15 TABLET ORAL at 06:36

## 2020-10-20 RX ADMIN — OXYCODONE 5 MG: 5 TABLET ORAL at 12:38

## 2020-10-20 RX ADMIN — LIDOCAINE HYDROCHLORIDE 0.2 ML: 10 INJECTION, SOLUTION EPIDURAL; INFILTRATION; INTRACAUDAL; PERINEURAL at 06:10

## 2020-10-20 RX ADMIN — SODIUM CHLORIDE, POTASSIUM CHLORIDE, SODIUM LACTATE AND CALCIUM CHLORIDE 9 ML/HR: 600; 310; 30; 20 INJECTION, SOLUTION INTRAVENOUS at 06:10

## 2020-10-20 RX ADMIN — BUPIVACAINE HYDROCHLORIDE 2 ML: 5 INJECTION, SOLUTION PERINEURAL at 07:31

## 2020-10-20 RX ADMIN — BUPIVACAINE HYDROCHLORIDE 30 ML: 2.5 INJECTION, SOLUTION EPIDURAL; INFILTRATION; INTRACAUDAL; PERINEURAL at 09:41

## 2020-10-20 RX ADMIN — HYDROMORPHONE HYDROCHLORIDE 0.5 MG: 1 INJECTION, SOLUTION INTRAMUSCULAR; INTRAVENOUS; SUBCUTANEOUS at 10:49

## 2020-10-20 RX ADMIN — SODIUM CHLORIDE, POTASSIUM CHLORIDE, SODIUM LACTATE AND CALCIUM CHLORIDE: 600; 310; 30; 20 INJECTION, SOLUTION INTRAVENOUS at 08:27

## 2020-10-20 NOTE — ANESTHESIA PREPROCEDURE EVALUATION
Anesthesia Evaluation     Patient summary reviewed and Nursing notes reviewed   no history of anesthetic complications:  NPO Solid Status: > 8 hours  NPO Liquid Status: > 2 hours           Airway   Mallampati: II  TM distance: >3 FB  Neck ROM: full  No difficulty expected  Dental    (+) poor dentition    Pulmonary    (+) sleep apnea on CPAP, decreased breath sounds,   Cardiovascular   Exercise tolerance: good (4-7 METS)    Rhythm: regular  Rate: normal    (+) hypertension well controlled less than 2 medications, CAD, hyperlipidemia,       Neuro/Psych  GI/Hepatic/Renal/Endo    (+)  GERD well controlled,      Musculoskeletal     Abdominal   (-) obese    Abdomen: soft.   Substance History      OB/GYN          Other   arthritis,                      Anesthesia Plan    ASA 2     spinal and regional     intravenous induction     Anesthetic plan, all risks, benefits, and alternatives have been provided, discussed and informed consent has been obtained with: patient.    Plan discussed with CRNA.

## 2020-10-20 NOTE — ANESTHESIA PROCEDURE NOTES
Adductor Catheter      Patient reassessed immediately prior to procedure    Patient location during procedure: post-op  Reason for block: at surgeon's request and post-op pain management  Performed by  CRNA: Stephanie Rose CRNA  Preanesthetic Checklist  Completed: patient identified, site marked, surgical consent, pre-op evaluation, timeout performed, IV checked, risks and benefits discussed and monitors and equipment checked  Prep:  Pt Position: supine  Sterile barriers:cap, gloves, mask and sterile barriers  Prep: ChloraPrep  Patient monitoring: blood pressure monitoring, continuous pulse oximetry and EKG  Procedure  Sedation:no  Performed under: spinal  Guidance:ultrasound guided  Images:still images obtained, printed/placed on chart    Laterality:left  Block Type:adductor canal block  Injection Technique:catheter  Needle Type:Tuohy and echogenic  Needle Gauge:18 G  Resistance on Injection: none  Catheter Size:20 G (20g)  Cath Depth at skin: 8 cm    Medications Used: bupivacaine PF (MARCAINE) 0.25 % injection, 30 mL  Med admintered at 10/20/2020 9:41 AM      Post Assessment  Injection Assessment: negative aspiration for heme, incremental injection and no paresthesia on injection  Patient Tolerance:comfortable throughout block  Complications:no  Additional Notes  Procedure:             The pt was placed in the Supine position.  The Insertion site was  prepped and Draped in sterile fashion.  The pt was anesthetized with  IV Sedation( see meds).  Skin and cutaneous tissue was infiltrated and anesthetized with 1% Lidocaine 3 mls via a 25g needle.  A BBraun 4 inch 18g echogenic needle was then  inserted approximately midline, mid-thigh and advanced In-plane with Ultrasound guidance.  Normal Saline PSF was utilized for hydrodissection of tissue.  The Vastus medialis and Sartorius muscle where visualized and the needle tip was placed in the adductor canal,  lateral to the femoral artery.  LA injection spread was  visualized, injection was incremental 1-5ml, injection pressure was normal or little, no intraneural injection, no vascular injection.  LA dose was injected thru the needle(see dose above).  A BBraun 20g wire stylet catheter was placed via the needle with ultrasound visualization and confirmation with NS fluid bolus. The labeled Catheter was then secured to skin at insertion site with skin afix and steristrips to curled catheter and CHG transparent dressing.  Thank you.

## 2020-10-20 NOTE — H&P
Patient Name: Nic Brooks  MRN: [id number]  : 1950  DOS: 10/20/2020    Attending: Negrito Vidal MD    Primary Care Provider: Madison Weeks APRN      Chief complaint:  Left knee pain    Subjective   Patient is a pleasant 70 y.o. male presented for scheduled surgery by Dr. Vidal. He underwent left total knee arthroplasty under spinal anesthesia.  He tolerated surgery well and was admitted for further medical management.    He is known to us from previous admission 11/15/2019 for right total knee replacement; which she recovered well.  He has hx HLD and CAD. He is followed by Dr. Wilkes, cardiology, and had preop cardiac clearance.     When seen postop he is doing well.  He does complain of mild knee pain.  He has voided without difficulty.  No complaints of nausea, shortness of breath or chest pain.  No history of DVT or PE.    Allergies:  No Known Allergies    Meds:  Medications Prior to Admission   Medication Sig Dispense Refill Last Dose   • aspirin 81 MG EC tablet Take 1 tablet by mouth Daily. Resume in 1 month   10/19/2020 at 1400   • Chlorhexidine Gluconate 4 % solution Shower with solution as directed for 5 days prior to surgery 237 mL 0 10/19/2020 at Unknown time   • mupirocin (BACTROBAN) 2 % ointment Apply into the nostril(s) as directed by provider 2 (Two) Times a Day. 22 g 0 10/19/2020 at Unknown time   • chlorthalidone (HYGROTEN) 50 MG tablet Take 1 tablet by mouth Daily. 90 tablet 3 10/10/2020   • cholecalciferol (VITAMIN D3) 10 MCG (400 UNIT) tablet Take 400 Units by mouth Daily.   10/10/2020   • coenzyme Q10 100 MG capsule Take 100 mg by mouth Daily.   10/10/2020   • Cyanocobalamin (B-12 COMPLIANCE INJECTION IJ) Inject 1 dose as directed Every 30 (Thirty) Days. LD 2020   • docusate sodium (COLACE) 100 MG capsule Take 1 capsule by mouth 2 (Two) Times a Day As Needed for Constipation. 60 capsule 0 More than a month at Unknown time   • rosuvastatin (CRESTOR) 40 MG  tablet Take 1 tablet by mouth Daily. 90 tablet 3 10/10/2020   • vitamin C (ASCORBIC ACID) 500 MG tablet Take 500 mg by mouth Daily.   10/10/2020         History:   Past Medical History:   Diagnosis Date   • Arthritis    • Coronary artery disease     s/p stent 2018   • GERD (gastroesophageal reflux disease)    • Hearing loss     kailee hearing aids   • History of kidney stones 2008    just one    • History of transfusion 1980    [facility] with lung surgery    • Hyperlipidemia    • Osteoarthritis    • Sleep apnea with use of continuous positive airway pressure (CPAP)     complaint with machine    • Wears glasses      Past Surgical History:   Procedure Laterality Date   • APPENDECTOMY     • BACK SURGERY      x2   • CARDIAC CATHETERIZATION N/A 1/19/2018    Procedure: Left Heart Cath;  Surgeon: Negrito Wilkes MD;  Location:  [facility];  Service:    • COLONOSCOPY      every 5 years   • CORONARY STENT PLACEMENT  2018   • ENDOSCOPY     • KIDNEY STONE SURGERY     • KNEE ARTHROSCOPY W/ MENISCECTOMY      left   • LUMBAR DISCECTOMY     • LUNG REMOVAL, PARTIAL      right lower lobe    • SPLENECTOMY  1974   • TONSILLECTOMY      removed adnoids   • TOTAL KNEE ARTHROPLASTY Right 11/15/2019    Procedure: TOTAL KNEE ARTHROPLASTY RIGHT;  Surgeon: Negrito Vidal MD;  Location:  [facility];  Service: Orthopedics   • WISDOM TOOTH EXTRACTION      only 2 removed      Family History   Problem Relation Age of Onset   • Hypertension Mother    • Cancer Mother    • Cancer Father    • Emphysema Father    • Diabetes Father    • COPD Father    • Osteoarthritis Father    • No Known Problems Sister    • Cancer Brother    • Diabetes Brother    • Heart disease Brother      Social History     Tobacco Use   • Smoking status: Never Smoker   • Smokeless tobacco: Never Used   Substance Use Topics   • Alcohol use: No   • Drug use: No   He is  with 2 children. 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    Review of Systems  Pertinent items are noted in HPI, all other systems reviewed and negative    Vital Signs  /79   Pulse 70   Temp 97.4 °F (36.3 °C)   Resp 18   Ht 175.3 cm (69\")   Wt 81.6 kg (180 lb)   SpO2 96%   BMI 26.58 kg/m²     Physical Exam:    General Appearance:    Alert, cooperative, in no acute distress   Head:    Normocephalic, without obvious abnormality, atraumatic   Eyes:            Lids and lashes normal, conjunctivae and sclerae normal, no   icterus, no pallor, corneas clear,    Ears:    Ears appear intact with no abnormalities noted   Throat:   No oral lesions, no thrush, oral mucosa moist   Neck:   No adenopathy, supple, trachea midline, no thyromegaly    Lungs:     Clear to auscultation,respirations regular, even and unlabored    Heart:    Regular rhythm and normal rate, normal S1 and S2, no murmur, no gallop   Abdomen:     Normal bowel sounds, no masses, no organomegaly, soft non-tender, non-distended, no guarding, no rebound  tenderness   Genitalia:    Deferred   Extremities:  Left knee Ace wrap clean dry intact.  Nerve block present.   Pulses:   Pulses palpable and equal bilaterally   Skin:   No bleeding, bruising or rash   Neurologic:   Cranial nerves 2 - 12 grossly intact. Flexion and dorsiflexion intact bilateral feet.        I reviewed the patient's new clinical results.       Results from last 7 days   Lab Units 10/20/20  0619   POTASSIUM mmol/L 3.6     Lab Results   Component Value Date    HGBA1C 6.00 (H) 10/08/2020     Results for JR MIRELES (MRN [id number]) as of 10/20/2020 12:27   Ref. Range 10/8/2020 13:53   Glucose Latest Ref Range: 65 - 99 mg/dL 105 (H)   Sodium Latest Ref Range: 136 - 145 mmol/L 138   Potassium Latest Ref Range: 3.5 - 5.2 mmol/L 4.0   CO2 Latest Ref Range: 22.0 - 29.0 mmol/L 28.0   Chloride Latest Ref Range: 98 - 107 mmol/L 102   Anion Gap Latest Ref Range: 5.0 - 15.0 mmol/L 8.0   Creatinine Latest Ref Range: 0.76 - 1.27 mg/dL " 1.07   BUN Latest Ref Range: 8 - 23 mg/dL 21   BUN/Creatinine Ratio Latest Ref Range: 7.0 - 25.0  19.6   Calcium Latest Ref Range: 8.6 - 10.5 mg/dL 9.7   eGFR Non  Am Latest Ref Range: >60 mL/min/1.73 68   C-Reactive Protein Latest Ref Range: 0.00 - 0.50 mg/dL 0.30   Protime Latest Ref Range: 11.5 - 14.0 Seconds 13.1   INR Latest Ref Range: 0.85 - 1.16  1.02   PTT Latest Ref Range: 24.0 - 37.0 seconds 33.0   WBC Latest Ref Range: 3.40 - 10.80 10*3/mm3 7.38   RBC Latest Ref Range: 4.14 - 5.80 10*6/mm3 4.74   Hemoglobin Latest Ref Range: 13.0 - 17.7 g/dL 14.3   Hematocrit Latest Ref Range: 37.5 - 51.0 % 43.1   RDW Latest Ref Range: 12.3 - 15.4 % 16.2 (H)   MCV Latest Ref Range: 79.0 - 97.0 fL 90.9   MCH Latest Ref Range: 26.6 - 33.0 pg 30.2   MCHC Latest Ref Range: 31.5 - 35.7 g/dL 33.2   MPV Latest Ref Range: 6.0 - 12.0 fL 10.0   Platelets Latest Ref Range: 140 - 450 10*3/mm3 289       Assessment and Plan:     Status post total left knee replacement    Primary osteoarthritis of left knee    Coronary artery disease involving native coronary artery of native heart without angina pectoris    Dyslipidemia, goal LDL below 70    Essential hypertension    Prediabetes      Plan  1. PT/OT- WBAT LLE  2. Pain control-prns, AC nerve block   3. IS-encourage  4. DVT proph- Mechs/ASA  5. Bowel regimen  6. Resume home medications as appropriate  7. Monitor post-op labs  8. DC planning for home    HTN, Hyperlipidemia, CAD  - Continue home statin  - hold hygroton   - Monitor BP   - Labetalol PRN for SBP>170    GAUDENCIO  - CPAP at night    Elevated hemoglobin A1c: In prediabetic range  - Explained to patient implication of A1C elevation, need to modify diet and increase activity, and f/u with PCP.      Scribed for Dr. Claros by SHIVA Lombardo. 10/20/2020  12:39 EDT    SHIVA Lombardo  10/20/20  12:23 EDT     Ryan JAY MD, personally performed the services described in this documentation as scribed by  SHIVA Lombardo ,and it is both accurate and complete. wy.

## 2020-10-20 NOTE — ANESTHESIA PROCEDURE NOTES
Spinal Block      Patient reassessed immediately prior to procedure    Patient location during procedure: OR  Indication:at surgeon's request  Performed By  CRNA: Stephanie Rose CRNA  Preanesthetic Checklist  Completed: patient identified, site marked, surgical consent, pre-op evaluation, timeout performed, IV checked, risks and benefits discussed and monitors and equipment checked  Spinal Block Prep:  Patient Position:sitting  Sterile Tech:cap, gloves, sterile barriers and mask  Prep:Chloraprep  Patient Monitoring:blood pressure monitoring, continuous pulse oximetry and EKG  Spinal Block Procedure  Approach:midline  Guidance:landmark technique and palpation technique  Location:L3-L4  Needle Type:Quincke  Needle Gauge:22 G  Placement of Spinal needle event:cerebrospinal fluid aspirated  Paresthesia: no  Fluid Appearance:clear  Medications: bupivacaine (MARCAINE) 0.5 % injection, 2 mL  Med Administered at 10/20/2020 7:31 AM   Post Assessment  Patient Tolerance:patient tolerated the procedure well with no apparent complications  Complications no  Additional Notes  Procedure:  Pt assisted to sitting position, with legs in position of comfort over side of bed.  Pt. instructed in optimal spine presentation, the spine was prepped/ Draped and the skin at insertion site was anesthetized with 1% Lidocaine 2 ml.  The spinal needle was then advanced until CSF flow was obtained and LA was injected:

## 2020-10-20 NOTE — ANESTHESIA POSTPROCEDURE EVALUATION
Patient: Nic Brooks    Procedure Summary     Date: 10/20/20 Room / Location:  [facility]    Anesthesia Start: 0724 Anesthesia Stop: 0941    Procedure: TOTAL KNEE ARTHROPLASTY LEFT (Left Knee) Diagnosis:       Primary osteoarthritis of left knee      (Primary osteoarthritis of left knee [M17.12])    Surgeon: Negrito Vidal MD Provider: Shahab Daniel MD    Anesthesia Type: spinal, regional ASA Status: 2          Anesthesia Type: spinal, regional    Vitals  Vitals Value Taken Time   BP 94/67 10/20/20 0935   Temp 97 °F (36.1 °C) 10/20/20 0930   Pulse 78 10/20/20 0941   Resp 16 10/20/20 0930   SpO2 95 % 10/20/20 0941   Vitals shown include unvalidated device data.        Post Anesthesia Care and Evaluation    Patient location during evaluation: PACU  Patient participation: complete - patient participated  Level of consciousness: awake and alert  Pain score: 0  Pain management: adequate  Airway patency: patent  Anesthetic complications: No anesthetic complications  PONV Status: none  Cardiovascular status: hemodynamically stable and acceptable  Respiratory status: nonlabored ventilation, acceptable and nasal cannula  Hydration status: acceptable

## 2020-10-20 NOTE — DISCHARGE PLACEMENT REQUEST
"Nic Brooks (70 y.o. Male)   Outpatient PT orders.  Mr. Brooks requested [facility].  He is a same day OR discharge, so going home today.  PT and OT in house have not yet seen the patient, but will be seeing him before discharge today.  Thank you, Echo Pagan RN BSN, Case Management, ph [phone].    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)   Code Status: CPR    Ht: 175.3 cm (69\")   Wt: 81.6 kg (180 lb)    Admission Cmt: None   Principal Problem: Status post total left knee replacement [Z96.652]                 Active Insurance as of 10/20/2020     Primary Coverage     Payor Plan Insurance Group Employer/Plan Group    MEDICARE MEDICARE A & B      Payor Plan Address Payor Plan Phone Number Payor Plan Fax Number Effective Dates    [address] [phone]  2/1/2014 - None Entered    [facility] [address]       Subscriber Name Subscriber Birth Date Member ID       NIC BROOKS 1950 [id number]           Secondary Coverage     Payor Plan Insurance Group Employer/Plan Group    [employer] MC SUP AAR HEALTH CARE OPTIONS      Payor Plan Address Payor Plan Phone Number Payor Plan Fax Number Effective Dates    [facility] [phone]  1/1/2016 - None Entered    [address]       [facility] [address]       Subscriber Name Subscriber Birth Date Member ID       NIC BROOKS 1950 [id number]                 Emergency Contacts     "  (Rel.) Home Phone Work Phone Mobile Phone    Katie Brooks (Spouse) [phone] -- [phone]        [address]  [address] [facility] [address]  Phone:  [phone]  Fax:  [phone] Date: Oct 20, 2020      Ambulatory Referral to Physical Therapy Evaluate and treat, Ortho; Full weight bearing     Patient:  Nic Brooks MRN:  [id number]   [address]   [facility] [address] :  1950  SSN:    Phone: [phone] Sex:  M      INSURANCE PAYOR PLAN GROUP # SUBSCRIBER ID   Primary:  Secondary:    MEDICARE  [employer] MC SUP [id number]  [id number]      [id number]  [id number]      Referring Provider Information:  NEGRITO VIDAL Phone: [phone] Fax: [phone]      Referral Information:   # Visits:  1 Referral Type: Therapy [AE1]   Urgency:  Routine Referral Reason: Specialty Services Required   Start Date: Oct 20, 2020 End Date:  To be determined by Insurer   Diagnosis: Primary osteoarthritis of left knee (M17.12 [ICD-10-CM] 715.16 [ICD-9-CM])  Status post total left knee replacement (Z96.652 [ICD-10-CM] V43.65 [ICD-9-CM])  Primary osteoarthritis of right knee (M17.11 [ICD-10-CM] 715.16 [ICD-9-CM])      Refer to Dept:   Refer to Provider:   Refer to Facility:       Specialty needed: Evaluate and treat  Specialty needed: Ortho  Weight Bearing Status: Full weight bearing     This document serves as a request of services and does not constitute Insurance authorization or approval of services.  To determine eligibility, please contact the members Insurance carrier to verify and review coverage.     If you have medical questions regarding this request for services. Please contact [address] at [phone] during normal business hours.       Authorizing Provider:Negrito Vidal MD  Authorizing Provider's NPI: [id number]  Order Entered By: Echo Pagan RN 10/20/2020 12:30 PM     Electronically signed by: Negrito Vidal MD 10/20/2020 12:30  PM        [address]  [address] [facility] [address]  Phone:  [phone]  Fax:  [phone] Date: Oct 20, 2020      Ambulatory Referral to Physical Therapy Evaluate and treat, Ortho; Full weight bearing     Patient:  Nic Brooks MRN:  [id number]   [address]   [facility] [address] :  1950  SSN:    Phone: [phone] Sex:  M      INSURANCE PAYOR PLAN GROUP # SUBSCRIBER ID   Primary:  Secondary:    MEDICARE  [employer] MC SUP [id number]  [id number]      [id number]  [id number]      Referring Provider Information:  MERVIN VIDAL Phone: [phone] Fax: [phone]      Referral Information:   # Visits:  1 Referral Type: Therapy [AE1]   Urgency:  Routine Referral Reason: Specialty Services Required   Start Date: Oct 20, 2020 End Date:  To be determined by Insurer   Diagnosis: Primary osteoarthritis of left knee (M17.12 [ICD-10-CM] 715.16 [ICD-9-CM])  Status post total left knee replacement (Z96.652 [ICD-10-CM] V43.65 [ICD-9-CM])  Primary osteoarthritis of right knee (M17.11 [ICD-10-CM] 715.16 [ICD-9-CM])      Refer to Dept:   Refer to Provider:   Refer to Facility:       Specialty needed: Evaluate and treat  Specialty needed: Ortho  Weight Bearing Status: Full weight bearing     This document serves as a request of services and does not constitute Insurance authorization or approval of services.  To determine eligibility, please contact the members Insurance carrier to verify and review coverage.     If you have medical questions regarding this request for services. Please contact [address] at [phone] during normal business hours.       Authorizing Provider:Mervin Vidal MD  Authorizing Provider's NPI: [id number]  Order Entered By: Echo Pagan RN 10/20/2020 12:30 PM     Electronically signed by: Mervin Vidal MD 10/20/2020 12:30 PM                   Operative/Procedure Notes (most recent note)      Mervin Vidal MD at 10/20/20 0754         DATE OF PROCEDURE: 10/20/20    PREOPERATIVE DIAGNOSIS: left knee arthritis      POSTOPERATIVE DIAGNOSIS:  left knee arthritis    PROCEDURES PERFORMED:   left total knee arthroplasty with [employer] components (#7 cruciate retaining femur, #5 tibia, 11 mm polyethylene, with 35 three peg patella).     SURGEON: Negrito Vidal MD    ASSISTANT: Galina Rojo PA-C  (Galina Rojo PA-C was present and necessary for positioning, draping, retraction, instrumentation and closure.)    SPECIMENS: None    ANESTHESIA:  Spinal    STAFF:  Circulator: Pam Frias RN; Jani Abdlala RN  Scrub Person: Heidy Gandara; Nelda Bloom  Vendor Representative: [place] Assistant: Carmina Melgar  Assistant: Galina Rojo PA-C    TOURNIQUET TIME: 15 minutes    ESTIMATED BLOOD LOSS: 100ml     COMPLICATIONS: None    PREOPERATIVE ANTIBIOTICS: Ancef 2 g    INDICATIONS: The patient is a 70 y.o. male with debilitating left knee pain secondary to osteoarthritis that failed to improve in spite of conservative treatment .  Options have been discussed at length with the patient and the patient has had an extended course of conservative treatment without long-term benefit. The patient has reached the point where the patient desires total knee arthroplasty surgery and understands the risks, benefits, and alternatives. Consent was obtained. Please see my office notes for details with regard to preoperative counseling and operative rationale.     DESCRIPTION OF PROCEDURE: The patient was positively identified in the preoperative holding area and brought to the operating suite and placed in a supine position. After adequate spinal anesthetic had been achieved, the left lower extremity was prepped and draped in the usual sterile fashion.  After application of a tourniquet to the left upper thigh, which was used during the procedure for a total 15 minutes during the cementation process  only. Landmarks of the knee were identified and timeout procedure was performed to confirm the operative site, as well as other parameters. Following the sterile prep and drape, a skin incision was made just off the medial aspect of midline for a medial parapatellar approach. Following a sharp skin incision, dissection was carried down to the level of the extensor mechanism and a medial parapatellar arthrotomy was made and the patella was tucked into the lateral gutter. Description of arthritis: Bone-on-bone contact in the medial compartment, tricompartmental osteophytes, varus alignment.  The knee was adequately exposed and a distal femoral cut was made with an intramedullary guide. This was subsequently sized for a #7 implant and anterior, posterior chamfer cuts made. The menisci removed both medially and laterally.  The ACL was transected and the tibia was subluxed anteriorly. Proximal tibia cut was made with the external alignment guide. The cut was noted to be perpendicular to the tibial axis, with symmetric flexion and extension gaps. Therefore, final tibial preparations to accommodate a #5 tibia were made, followed by final femoral preparations. With a trial 10 insert in place, full flexion and extension was noted with no instability. The patella was then prepared for a 35 three peg patella which had excellent tracking. All trial components were removed and final components were cemented in place with namely a #5 tibia, #7 cruciate retaining femur and a 35 three peg patella with a trial 11 insert for cement compression. All the excess cement was removed from the bone implant interface and allowed to harden. Tourniquet was deflated. Hemostasis was obtained with electrocautery. There was no brisk bleeding noted in the popliteal fossa in particular. Therefore, the knee was copiously irrigated as it was between major steps, and the final 11 insert was placed as this was deemed appropriate for the patient's  anatomy with full flexion and extension and no instability and attention was then directed towards closure. The medial parapatellar arthrotomy was closed with #1 Vicryl in an interrupted figure-of-eight fashion in 4 strategic locations followed by oversewing this from proximal to distal with a #1 StrataFix symmetric, which nicely sealed the joint, followed by closure of the deep fascial layer with #1 Vicryl in a buried interrupted fashion, followed by closure of the subcutaneous layer with 2-0 Vicryl and the skin with 3-0 Stratafix in a running subcuticular fashion. Prineo wound closure dressing was applied followed by a sterile dressing with 4 x 4's, abdominal pad, soft roll and Ace wrap. The patient tolerated the procedure well and was brought to the recovery room in good condition.     PLAN:  1.  The patient will begin early range of motion and weight-bearing per the post total knee arthroplasty protocol.   2.  I anticipate brief hospitalization for initial rehabilitation and pain control followed by continued rehabilitation in a home health setting, and he may be ready for discharge later today if pain is controlled, cleared by physical therapy, and stable medically.   3.  Postoperative medical management with Dr. Claros.  4.  Aspirin will be utilized for DVT prophylaxis unless there is a contraindication.       Negrito Vidal MD  10/20/20  09:09 EDT    Dragon disclaimer:  Much of this encounter note is an electronic transcription/translation of spoken language to printed text. The electronic translation of spoken language may permit erroneous, or at times, nonsensical words or phrases to be inadvertently transcribed; Although I have reviewed the note for such errors, some may still exist.    Electronically signed by Negrito Vidal MD at 10/20/20 0909       Physician Progress Notes (most recent note)    No notes of this type exist for this encounter.         Physical Therapy Notes (most recent note)     No notes exist for this encounter.         Occupational Therapy Notes (most recent note)    No notes exist for this encounter.

## 2020-10-20 NOTE — PLAN OF CARE
Problem: Adult Inpatient Plan of Care  Goal: Plan of Care Review  Flowsheets (Taken 10/20/2020 1310)  Progress: improving  Plan of Care Reviewed With:   patient   spouse  Outcome Summary: PT eval complete. Pt ambulated 310 feet using RW and CGA x2 for safety. Pt ascended/descended 1 step using RW and CGA x2. Gait/stair training limited by fatigue. STS performed with CGA x2 for safety. No knee buckling noted with ambulation. Pt IND with SLR. Reviewed HEP and knee precautions via handout. Will assess L knee AROM POD#1. PADD score = 9. Recommend pt d/c home with assist and OPPT.

## 2020-10-20 NOTE — OP NOTE
DATE OF PROCEDURE: 10/20/20    PREOPERATIVE DIAGNOSIS: left knee arthritis      POSTOPERATIVE DIAGNOSIS:  left knee arthritis    PROCEDURES PERFORMED:   left total knee arthroplasty with [employer] components (#7 cruciate retaining femur, #5 tibia, 11 mm polyethylene, with 35 three peg patella).     SURGEON: Negrito Vidal MD    ASSISTANT: Galina Rojo PA-C  (Galina Rojo PA-C was present and necessary for positioning, draping, retraction, instrumentation and closure.)    SPECIMENS: None    ANESTHESIA:  Spinal    STAFF:  Circulator: Pam Frias RN; Jani Abdalla RN  Scrub Person: Heidy Gandara; Nelda Bloom  Vendor Representative: [place] Assistant: Carmina Melgar  Assistant: Galina Rojo PA-C    TOURNIQUET TIME: 15 minutes    ESTIMATED BLOOD LOSS: 100ml     COMPLICATIONS: None    PREOPERATIVE ANTIBIOTICS: Ancef 2 g    INDICATIONS: The patient is a 70 y.o. male with debilitating left knee pain secondary to osteoarthritis that failed to improve in spite of conservative treatment .  Options have been discussed at length with the patient and the patient has had an extended course of conservative treatment without long-term benefit. The patient has reached the point where the patient desires total knee arthroplasty surgery and understands the risks, benefits, and alternatives. Consent was obtained. Please see my office notes for details with regard to preoperative counseling and operative rationale.     DESCRIPTION OF PROCEDURE: The patient was positively identified in the preoperative holding area and brought to the operating suite and placed in a supine position. After adequate spinal anesthetic had been achieved, the left lower extremity was prepped and draped in the usual sterile fashion.  After application of a tourniquet to the left upper thigh, which was used during the procedure for a total 15 minutes during the cementation process only.  Landmarks of the knee were identified and timeout procedure was performed to confirm the operative site, as well as other parameters. Following the sterile prep and drape, a skin incision was made just off the medial aspect of midline for a medial parapatellar approach. Following a sharp skin incision, dissection was carried down to the level of the extensor mechanism and a medial parapatellar arthrotomy was made and the patella was tucked into the lateral gutter. Description of arthritis: Bone-on-bone contact in the medial compartment, tricompartmental osteophytes, varus alignment.  The knee was adequately exposed and a distal femoral cut was made with an intramedullary guide. This was subsequently sized for a #7 implant and anterior, posterior chamfer cuts made. The menisci removed both medially and laterally.  The ACL was transected and the tibia was subluxed anteriorly. Proximal tibia cut was made with the external alignment guide. The cut was noted to be perpendicular to the tibial axis, with symmetric flexion and extension gaps. Therefore, final tibial preparations to accommodate a #5 tibia were made, followed by final femoral preparations. With a trial 10 insert in place, full flexion and extension was noted with no instability. The patella was then prepared for a 35 three peg patella which had excellent tracking. All trial components were removed and final components were cemented in place with namely a #5 tibia, #7 cruciate retaining femur and a 35 three peg patella with a trial 11 insert for cement compression. All the excess cement was removed from the bone implant interface and allowed to harden. Tourniquet was deflated. Hemostasis was obtained with electrocautery. There was no brisk bleeding noted in the popliteal fossa in particular. Therefore, the knee was copiously irrigated as it was between major steps, and the final 11 insert was placed as this was deemed appropriate for the patient's anatomy with  full flexion and extension and no instability and attention was then directed towards closure. The medial parapatellar arthrotomy was closed with #1 Vicryl in an interrupted figure-of-eight fashion in 4 strategic locations followed by oversewing this from proximal to distal with a #1 StrataFix symmetric, which nicely sealed the joint, followed by closure of the deep fascial layer with #1 Vicryl in a buried interrupted fashion, followed by closure of the subcutaneous layer with 2-0 Vicryl and the skin with 3-0 Stratafix in a running subcuticular fashion. Prineo wound closure dressing was applied followed by a sterile dressing with 4 x 4's, abdominal pad, soft roll and Ace wrap. The patient tolerated the procedure well and was brought to the recovery room in good condition.     PLAN:  1.  The patient will begin early range of motion and weight-bearing per the post total knee arthroplasty protocol.   2.  I anticipate brief hospitalization for initial rehabilitation and pain control followed by continued rehabilitation in a home health setting, and he may be ready for discharge later today if pain is controlled, cleared by physical therapy, and stable medically.   3.  Postoperative medical management with Dr. Claros.  4.  Aspirin will be utilized for DVT prophylaxis unless there is a contraindication.       Negrito Vidal MD  10/20/20  09:09 JOHNT    Dragon disclaimer:  Much of this encounter note is an electronic transcription/translation of spoken language to printed text. The electronic translation of spoken language may permit erroneous, or at times, nonsensical words or phrases to be inadvertently transcribed; Although I have reviewed the note for such errors, some may still exist.

## 2020-10-20 NOTE — THERAPY EVALUATION
Patient Name: Nic Brooks  : 1950    MRN: [id number]                              Today's Date: 10/20/2020       Admit Date: 10/20/2020    Visit Dx:     ICD-10-CM ICD-9-CM   1. Status post total left knee replacement  Z96.652 V43.65   2. Primary osteoarthritis of left knee  M17.12 715.16   3. Primary osteoarthritis of right knee  M17.11 715.16     Patient Active Problem List   Diagnosis   • Moderate obstructive sleep apnea   • Fatigue   • Snoring   • GERD (gastroesophageal reflux disease)   • Hypersomnia   • Other chest pain   • Abnormal EKG   • Coronary artery disease involving native coronary artery of native heart without angina pectoris   • Dyslipidemia, goal LDL below 70   • Essential hypertension   • Primary osteoarthritis of right knee   • Status post total left knee replacement   • Prediabetes   • Primary osteoarthritis of left knee     Past Medical History:   Diagnosis Date   • Arthritis    • Coronary artery disease     s/p stent    • GERD (gastroesophageal reflux disease)    • Hearing loss     kailee hearing aids   • History of kidney stones     just one    • History of transfusion     [facility] with lung surgery    • Hyperlipidemia    • Osteoarthritis    • Sleep apnea with use of continuous positive airway pressure (CPAP)     complaint with machine    • Wears glasses      Past Surgical History:   Procedure Laterality Date   • APPENDECTOMY     • BACK SURGERY      x2   • CARDIAC CATHETERIZATION N/A 2018    Procedure: Left Heart Cath;  Surgeon: Negrito Wilkes MD;  Location: Located within [facility] INVASIVE LOCATION;  Service:    • COLONOSCOPY      every 5 years   • CORONARY STENT PLACEMENT     • ENDOSCOPY     • KIDNEY STONE SURGERY     • KNEE ARTHROSCOPY W/ MENISCECTOMY      left   • LUMBAR DISCECTOMY     • LUNG REMOVAL, PARTIAL      right lower lobe    • SPLENECTOMY     • TONSILLECTOMY      removed adnoids   • TOTAL KNEE ARTHROPLASTY Right 11/15/2019    Procedure:  TOTAL KNEE ARTHROPLASTY RIGHT;  Surgeon: Negrito Vidal MD;  Location: [facility];  Service: Orthopedics   • WISDOM TOOTH EXTRACTION      only 2 removed      General Information     Row Name 10/20/20 1310          Physical Therapy Time and Intention    Document Type  evaluation  -LUX     Mode of Treatment  individual therapy;physical therapy  -LUX     Row Name 10/20/20 1310          General Information    Patient Profile Reviewed  yes  -LUX     Prior Level of Function  min assist:;ADL's;all household mobility;transfer  -LUX     Existing Precautions/Restrictions  fall;other (see comments) L adductor nerve catheter  -LUX     Barriers to Rehab  none identified  -LUX     Row Name 10/20/20 1310          Living Environment    Lives With  spouse  -LUX     Row Name 10/20/20 1310          Home Main Entrance    Number of Stairs, Main Entrance  none  -LUX     Stair Railings, Main Entrance  none  -LUX     Row Name 10/20/20 1310          Stairs Within Home, Primary    Stairs, Within Home, Primary  1  -LUX     Number of Stairs, Within Home, Primary  none  -LUX     Row Name 10/20/20 1310          Cognition    Orientation Status (Cognition)  oriented x 4  -LUX     Row Name 10/20/20 1310          Safety Issues, Functional Mobility    Safety Issues Affecting Function (Mobility)  safety precautions follow-through/compliance;safety precaution awareness  -LUX     Impairments Affecting Function (Mobility)  endurance/activity tolerance;range of motion (ROM);strength  -LUX       User Key  (r) = Recorded By, (t) = Taken By, (c) = Cosigned By    Initials Name Provider Type    Pelon Casas PT Physical Therapist        Mobility     Row Name 10/20/20 1310          Bed Mobility    Comment (Bed Mobility)  Pt received UIC  -LUX     Row Name 10/20/20 1310          Transfers    Comment (Transfers)  Verbal cues for safe hand placement during standing/sitting and moving L LE out for comfort prior to sitting  -LUX     Row Name 10/20/20 1310          Sit-Stand  Transfer    Sit-Stand [place] (Transfers)  verbal cues;contact guard;2 person assist  -LUX     Assistive Device (Sit-Stand Transfers)  walker, front-wheeled  -LUX     Row Name 10/20/20 1310          Gait/Stairs (Locomotion)    [place] Level (Gait)  verbal cues;contact guard;2 person assist  -LUX     Assistive Device (Gait)  walker, front-wheeled  -LUX     Distance in Feet (Gait)  310 feet  -LUX     Deviations/Abnormal Patterns (Gait)  bilateral deviations;gait speed decreased;javi decreased;stride length decreased  -LUX     Bilateral Gait Deviations  forward flexed posture  -LUX     Left Sided Gait Deviations  heel strike decreased;weight shift ability decreased  -LUX     [place] Level (Stairs)  verbal cues;contact guard;2 person assist  -LUX     Assistive Device (Stairs)  walker, front-wheeled  -LUX     Handrail Location (Stairs)  none  -LUX     Number of Steps (Stairs)  1  -LUX     Ascending Technique (Stairs)  step-to-step  -LUX     Descending Technique (Stairs)  step-to-step  -LUX     Comment (Gait/Stairs)  Pt ambulated with step through pattern and decreased speed. Verbal cues for maintaining upright posture, body within walker, increase step length, and WB on L LE. Pt ascended/descended 1 step using RW, backwards technique, and CGA x2 for safety. Verbal cues for LE sequencing and AD management. Gait/stair training limited by fatigue. No knee buckling noted.  -     Row Name 10/20/20 1310          Mobility    Extremity Weight-bearing Status  left lower extremity  -LUX     Left Lower Extremity (Weight-bearing Status)  weight-bearing as tolerated (WBAT)  -       User Key  (r) = Recorded By, (t) = Taken By, (c) = Cosigned By    Initials Name Provider Type    Pelon Casas, PT Physical Therapist        Obj/Interventions     Row Name 10/20/20 1310          Range of Motion Comprehensive    General Range of Motion  lower extremity range of motion deficits identified  -LUX     Comment, General Range of Motion  R  LE AROM WFL; L LE AROM impaired 25%; able to actively DF/PF  -LUX     Row Name 10/20/20 1310          Strength Comprehensive (MMT)    General Manual Muscle Testing (MMT) Assessment  lower extremity strength deficits identified  -LUX     Comment, General Manual Muscle Testing (MMT) Assessment  R LE functionally 4+/5; L LE functionally 4-/5; IND with SLR  -LUX     Row Name 10/20/20 1310          Motor Skills    Therapeutic Exercise  hip;knee;ankle  -LUX     Row Name 10/20/20 1310          Hip (Therapeutic Exercise)    Hip (Therapeutic Exercise)  isometric exercises  -LUX     Hip Isometrics (Therapeutic Exercise)  gluteal sets;10 repetitions  -LUX     Row Name 10/20/20 1310          Knee (Therapeutic Exercise)    Knee (Therapeutic Exercise)  isometric exercises  -LUX     Knee Isometrics (Therapeutic Exercise)  quad sets;10 repetitions  -LUX     Row Name 10/20/20 1310          Ankle (Therapeutic Exercise)    Ankle (Therapeutic Exercise)  AROM (active range of motion)  -LUX     Ankle AROM (Therapeutic Exercise)  bilateral;dorsiflexion;plantarflexion;10 repetitions  -     Row Name 10/20/20 1310          Sensory Assessment (Somatosensory)    Sensory Assessment (Somatosensory)  LE sensation intact  -       User Key  (r) = Recorded By, (t) = Taken By, (c) = Cosigned By    Initials Name Provider Type    Pelon Casas, PT Physical Therapist        Goals/Plan     Row Name 10/20/20 1310          Bed Mobility Goal 1 (PT)    Activity/Assistive Device (Bed Mobility Goal 1, PT)  sit to supine/supine to sit  -LUX     [place] Level/Cues Needed (Bed Mobility Goal 1, PT)  modified independence  -LUX     Time Frame (Bed Mobility Goal 1, PT)  long term goal (LTG);3 days  -     Row Name 10/20/20 1310          Transfer Goal 1 (PT)    Activity/Assistive Device (Transfer Goal 1, PT)  sit-to-stand/stand-to-sit;walker, rolling  -LUX     [place] Level/Cues Needed (Transfer Goal 1, PT)  modified independence  -ULX     Time Frame (Transfer Goal  1, PT)  long term goal (LTG);3 days  -LUX     Row Name 10/20/20 1310          Gait Training Goal 1 (PT)    Activity/Assistive Device (Gait Training Goal 1, PT)  gait (walking locomotion);walker, rolling  -LUX     [place] Level (Gait Training Goal 1, PT)  modified independence  -LUX     Distance (Gait Training Goal 1, PT)  500 feet  -LUX     Time Frame (Gait Training Goal 1, PT)  long term goal (LTG);3 days  -LUX     Row Name 10/20/20 1310          ROM Goal 1 (PT)    ROM Goal 1 (PT)  L knee AROM 0-90 degrees  -LUX     Time Frame (ROM Goal 1, PT)  long-term goal (LTG);3 days  -LUX     Row Name 10/20/20 1310          Stairs Goal 1 (PT)    Activity/Assistive Device (Stairs Goal 1, PT)  stairs, all skills;walker, rolling  -LUX     [place] Level/Cues Needed (Stairs Goal 1, PT)  modified independence  -LUX     Number of Stairs (Stairs Goal 1, PT)  1 backwards technique  -LUX     Time Frame (Stairs Goal 1, PT)  long term goal (LTG);3 days  -LUX       User Key  (r) = Recorded By, (t) = Taken By, (c) = Cosigned By    Initials Name Provider Type    LUX Pelon Alcala, PT Physical Therapist        Clinical Impression     Row Name 10/20/20 1310          Pain    Additional Documentation  Pain Scale: Numbers Pre/Post-Treatment (Group)  -     Row Name 10/20/20 1310          Pain Scale: Numbers Pre/Post-Treatment    Pretreatment Pain Rating  4/10  -LUX     Posttreatment Pain Rating  5/10  -LUX     Pain Location - Side  Left  -LUX     Pain Location - Orientation  generalized  -LUX     Pain Location  knee  -LUX     Pain Intervention(s)  Cold applied;Repositioned;Ambulation/increased activity  -     Row Name 10/20/20 1310          Therapy Assessment/Plan (PT)    Patient/Family Therapy Goals Statement (PT)  To return home  -LUX     Rehab Potential (PT)  good, to achieve stated therapy goals  -     Criteria for Skilled Interventions Met (PT)  yes;meets criteria;skilled treatment is necessary  -     Row Name 10/20/20 1310          Positioning  and Restraints    Pre-Treatment Position  sitting in chair/recliner  -LUX     Post Treatment Position  chair  -LUX     In Chair  notified nsg;reclined;call light within reach;encouraged to call for assist;exit alarm on;legs elevated;compression device;with family/caregiver  -LUX       User Key  (r) = Recorded By, (t) = Taken By, (c) = Cosigned By    Initials Name Provider Type    Pelon Casas, PT Physical Therapist        Outcome Measures     Row Name 10/20/20 1310          How much help from another person do you currently need...    Turning from your back to your side while in flat bed without using bedrails?  4  -LUX     Moving from lying on back to sitting on the side of a flat bed without bedrails?  4  -LUX     Moving to and from a bed to a chair (including a wheelchair)?  3  -LUX     Standing up from a chair using your arms (e.g., wheelchair, bedside chair)?  3  -LUX     Climbing 3-5 steps with a railing?  3  -LUX     To walk in hospital room?  3  -LUX     AM-PAC 6 Clicks Score (PT)  20  -     Row Name 10/20/20 1310          PADD    Diagnosis  1  -LUX     Gender  2  -LUX     Age Group  1  -LUX     Gait Distance  1  -LUX     Assist Level  1  -LUX     Home Support  3  -LUX     PADD Score  9  -LUX     Patient Preference  home with outpatient rehab  -     Prediction by PADD Score  directly home (with home health or out-patient rehab)  -     Row Name 10/20/20 1310          Functional Assessment    Outcome Measure Options  AM-PAC 6 Clicks Basic Mobility (PT);PADD  -       User Key  (r) = Recorded By, (t) = Taken By, (c) = Cosigned By    Initials Name Provider Type    Pelon Casas, PT Physical Therapist        Physical Therapy Education                 Title: PT OT SLP Therapies (In Progress)     Topic: Physical Therapy (Done)     Point: Mobility training (Done)     Learning Progress Summary           Patient Acceptance, E,D,H, VU by LUX at 10/20/2020 1310    Comment: Educated on safe sequencing with bed mobility,  ambulatory transfers, gait, and stair training. Reviewed HEP and knee precautions via handout.   Significant Other Acceptance, E,D,H, VU by LUX at 10/20/2020 1310    Comment: Educated on safe sequencing with bed mobility, ambulatory transfers, gait, and stair training. Reviewed HEP and knee precautions via handout.                   Point: Home exercise program (Done)     Learning Progress Summary           Patient Acceptance, E,D,H, VU by LUX at 10/20/2020 1310    Comment: Educated on safe sequencing with bed mobility, ambulatory transfers, gait, and stair training. Reviewed HEP and knee precautions via handout.   Significant Other Acceptance, E,D,H, VU by LUX at 10/20/2020 1310    Comment: Educated on safe sequencing with bed mobility, ambulatory transfers, gait, and stair training. Reviewed HEP and knee precautions via handout.                   Point: Body mechanics (Done)     Learning Progress Summary           Patient Acceptance, E,D,H, VU by LUX at 10/20/2020 1310    Comment: Educated on safe sequencing with bed mobility, ambulatory transfers, gait, and stair training. Reviewed HEP and knee precautions via handout.   Significant Other Acceptance, E,D,H, VU by LUX at 10/20/2020 1310    Comment: Educated on safe sequencing with bed mobility, ambulatory transfers, gait, and stair training. Reviewed HEP and knee precautions via handout.                   Point: Precautions (Done)     Learning Progress Summary           Patient Acceptance, E,D,H, VU by LUX at 10/20/2020 1310    Comment: Educated on safe sequencing with bed mobility, ambulatory transfers, gait, and stair training. Reviewed HEP and knee precautions via handout.   Significant Other Acceptance, E,D,H, VU by LUX at 10/20/2020 1310    Comment: Educated on safe sequencing with bed mobility, ambulatory transfers, gait, and stair training. Reviewed HEP and knee precautions via handout.                               User Key     Initials Effective Dates Name  Provider Type Discipline     09/10/19 -  Pelon Alcala, PT Physical Therapist PT              PT Recommendation and Plan  Planned Therapy Interventions (PT): balance training, bed mobility training, home exercise program, gait training, patient/family education, strengthening, stair training, ROM (range of motion), transfer training  Plan of Care Reviewed With: patient, spouse  Progress: improving  Outcome Summary: PT eval complete. Pt ambulated 310 feet using RW and CGA x2 for safety. Pt ascended/descended 1 step using RW and CGA x2. Gait/stair training limited by fatigue. STS performed with CGA x2 for safety. No knee buckling noted with ambulation. Pt IND with SLR. Reviewed HEP and knee precautions via handout. Will assess L knee AROM POD#1. PADD score = 9. Recommend pt d/c home with assist and OPPT.     Time Calculation:   PT Charges     Row Name 10/20/20 1310             Time Calculation    Start Time  1310  -LUX      PT Received On  10/20/20  -      PT Goal Re-Cert Due Date  10/30/20  -         Time Calculation- PT    Total Timed Code Minutes- PT  10 minute(s)  -         Timed Charges    92896 - PT Therapeutic Exercise Minutes  2  -LUX      20251 - Gait Training Minutes   8  -LUX        User Key  (r) = Recorded By, (t) = Taken By, (c) = Cosigned By    Initials Name Provider Type     Pelon Alcala, PT Physical Therapist        Therapy Charges for Today     Code Description Service Date Service Provider Modifiers Qty    11535672465 HC GAIT TRAINING EA 15 MIN 10/20/2020 Pelon Alcala, PT GP 1    06055589818 HC PT EVAL LOW COMPLEXITY 3 10/20/2020 Pelon Alcala, PT GP 1    41662465403 HC PT THER SUPP EA 15 MIN 10/20/2020 Pelon Alcala, PT GP 2          PT G-Codes  Outcome Measure Options: AM-PAC 6 Clicks Basic Mobility (PT), PADD  AM-PAC 6 Clicks Score (PT): 20    Pelon Alcala PT  10/20/2020

## 2020-10-20 NOTE — H&P
Pre-Op H&P  Nic Brooks  [id number]  1950    Chief complaint: left knee pain    HPI:    Patient is a 70 y.o.male who presents with osteoarthritis left knee     Review of Systems:  General ROS: negative for chills, fever or skin lesions;  No changes since last office visit.  Neg for recent sick exposure  Cardiovascular ROS: no chest pain or dyspnea on exertion  Respiratory ROS: no cough, shortness of breath, or wheezing    Allergies: No Known Allergies    Home Meds:    No current facility-administered medications on file prior to encounter.      Current Outpatient Medications on File Prior to Encounter   Medication Sig Dispense Refill   • aspirin 81 MG EC tablet Take 1 tablet by mouth Daily. Resume in 1 month     • Chlorhexidine Gluconate 4 % solution Shower with solution as directed for 5 days prior to surgery 237 mL 0   • mupirocin (BACTROBAN) 2 % ointment Apply into the nostril(s) as directed by provider 2 (Two) Times a Day. 22 g 0   • chlorthalidone (HYGROTEN) 50 MG tablet Take 1 tablet by mouth Daily. 90 tablet 3   • coenzyme Q10 100 MG capsule Take 100 mg by mouth Daily.     • Cyanocobalamin (B-12 COMPLIANCE INJECTION IJ) Inject 1 dose as directed Every 30 (Thirty) Days. LD January 5th     • docusate sodium (COLACE) 100 MG capsule Take 1 capsule by mouth 2 (Two) Times a Day As Needed for Constipation. 60 capsule 0   • rosuvastatin (CRESTOR) 40 MG tablet Take 1 tablet by mouth Daily. 90 tablet 3       PMH:   Past Medical History:   Diagnosis Date   • Arthritis    • Coronary artery disease     s/p stent 2018   • GERD (gastroesophageal reflux disease)    • Hearing loss     kailee hearing aids   • History of kidney stones 2008    just one    • History of transfusion 1980    [facility] with lung surgery    • Hyperlipidemia    • Osteoarthritis    • Sleep apnea with use of continuous positive airway pressure (CPAP)     complaint with machine    • Wears glasses      PSH:    Past Surgical History:  "  Procedure Laterality Date   • APPENDECTOMY     • BACK SURGERY      x2   • CARDIAC CATHETERIZATION N/A 1/19/2018    Procedure: Left Heart Cath;  Surgeon: Negrito Wilkes MD;  Location:  [facility];  Service:    • COLONOSCOPY      every 5 years   • CORONARY STENT PLACEMENT  2018   • ENDOSCOPY     • KIDNEY STONE SURGERY     • KNEE ARTHROSCOPY W/ MENISCECTOMY      left   • LUMBAR DISCECTOMY     • LUNG REMOVAL, PARTIAL      right lower lobe    • SPLENECTOMY  1974   • TONSILLECTOMY      removed adnoids   • TOTAL KNEE ARTHROPLASTY Right 11/15/2019    Procedure: TOTAL KNEE ARTHROPLASTY RIGHT;  Surgeon: Negrito Vidal MD;  Location:  [facility];  Service: Orthopedics   • WISDOM TOOTH EXTRACTION      only 2 removed        Immunization History:  Influenza: yes  Pneumococcal: yes  Tetanus: yes    Social History:   Tobacco:   Social History     Tobacco Use   Smoking Status Never Smoker   Smokeless Tobacco Never Used      Alcohol:     Social History     Substance and Sexual Activity   Alcohol Use No       Vitals:           BP (!) 141/108 (BP Location: Right arm, Patient Position: Sitting)   Pulse 69   Temp 97.5 °F (36.4 °C) (Temporal)   Resp 18   Ht 175.3 cm (69\")   Wt 81.6 kg (180 lb)   SpO2 96%   BMI 26.58 kg/m²     Physical Exam:  General Appearance:    Alert, cooperative, no distress, appears stated age   Head:    Normocephalic, without obvious abnormality, atraumatic   Lungs:     Clear to auscultation bilaterally, respirations unlabored    Heart:   Regular rate and rhythm, S1 and S2 normal, no murmur, rub    or gallop    Abdomen:    Soft, nontender.  +bowel sounds   Breast Exam:    deferred   Genitalia:    deferred   Extremities:   Extremities normal, atraumatic, no cyanosis or edema   Skin:   Skin color, texture, turgor normal, no rashes or lesions   Neurologic:   Grossly intact   Results Review  LABS:  Lab Results   Component Value Date    WBC 7.38 10/08/2020    HGB 14.3 10/08/2020    HCT 43.1 " 10/08/2020    MCV 90.9 10/08/2020     10/08/2020    NEUTROABS 3.53 10/08/2020    GLUCOSE 105 (H) 10/08/2020    BUN 21 10/08/2020    CREATININE 1.07 10/08/2020    EGFRIFNONA 68 10/08/2020     10/08/2020    K 4.0 10/08/2020     10/08/2020    CO2 28.0 10/08/2020    CALCIUM 9.7 10/08/2020    ALBUMIN 4.20 01/18/2018    AST 24 01/18/2018    ALT 25 01/18/2018    BILITOT 0.6 01/18/2018    PTT 33.0 10/08/2020    INR 1.02 10/08/2020       RADIOLOGY:  Imaging Results (Last 72 Hours)     ** No results found for the last 72 hours. **          I reviewed the patient's new clinical results. Covid test negative 10/18/2020    Cancer Staging (if applicable)  Cancer Patient: __ yes __no __unknown; If yes, clinical stage T:__ N:__M:__, stage group or __N/A    Impression: 70 year old male presenting with painful left knee, primary osteoarthritis    Plan: left total knee arthroplasty    WEI Banks   10/20/2020   06:49 EDT     Agree with above - plan for left TKA    Negrito Vidal MD  10/20/20  07:24 EDT

## 2020-10-20 NOTE — PROGRESS NOTES
Discharge Planning Assessment  [facility]     Patient Name: Nic Brooks  MRN: [id number]  Today's Date: 10/20/2020    Admit Date: 10/20/2020    Discharge Needs Assessment     Row Name 10/20/20 1235       Living Environment    Lives With  spouse    Name(s) of Who Lives With Patient  Katie Brooks    Current Living Arrangements  home/apartment/condo    Family Caregiver if Needed  spouse    Quality of Family Relationships  helpful;involved;supportive    Able to Return to Prior Arrangements  yes       Resource/Environmental Concerns    Resource/Environmental Concerns  none       Transition Planning    Patient/Family Anticipates Transition to  home with family    Patient/Family Anticipated Services at Transition  rehabilitation services    Transportation Anticipated  family or friend will provide       Discharge Needs Assessment    Readmission Within the Last 30 Days  no previous admission in last 30 days    Equipment Currently Used at Home  walker, rolling;commode;cpap;shower chair;grab bar    Equipment Needed After Discharge  other (see comments) Game ready ice machine    Outpatient/Agency/Support Group Needs  outpatient therapy    Discharge Facility/Level of Care Needs  outpatient therapy    Provided Post Acute Provider List?  Yes    Post Acute Provider List  Outpatient Therapy    Provided Post Acute Provider Quality & Resource List?  Yes    Delivered To  Patient;Support Person    Method of Delivery  In person    Patient's Choice of Community Agency(s)  [facility]        Discharge Plan     Row Name 10/20/20 5793       Plan    Plan  Home with wife's assistance, [facility] and a Game Ready Ice Machine rented from  [employer]    Patient/Family in Agreement with Plan  yes    Plan Comments  Met with Mr. Brooks and his wife, Katie, at the bedside for discharge planning.  Mr. Brooks lives with his wife in a one story home in [facility].  He has not yet been evaluated by PT and OT.  He requested a  Game Ready Ice Machine for home use.  Contacted Nabeel at  [employer] and he will contact Katie regarding specifics of ice machine rental.  Nabeel stated that the patient's wife will need to  the machine at their office as they did not have enough notice to plan for delivery.  Mr. Brooks was agreeable to the ice machine and phone number for Katie given to  [employer].    Discussed physical therapy and Mr. Brooks is planning on going to [facility] in [place] and requested [facility] at the facility.  Attempted to call [facility] and no answer.  Faxed orders to the facility.  Requested that Mr. Brooks call to schedule his appointment.  Mr Brooks states that his wife will be available to assist him at home as needed and will be transporting him home when discharged.    CM will continue to follow.    Final Discharge Disposition Code  01 - home or self-care        Continued Care and Services - Admitted Since 10/20/2020     Therapy     Service Provider Request Status Selected Services Address Phone Fax    [facility]  Pending - Request Sent N/A [address] [address] [phone]                Demographic Summary     Row Name 10/20/20 1234       General Information    Admission Type  observation    Arrived From  home    Reason for Consult  discharge planning    General Information Comments  PCP:  Madison Weeks       Contact Information    Permission Granted to Share Info With      Contact Information Comments  Wife:  Katie,  [phone]        Functional Status     Row Name 10/20/20 1235       Functional Status    Usual Activity Tolerance  moderate    Current Activity Tolerance  -- Following for PT/OT notes.       Functional Status, IADL    Medications  independent    Meal Preparation  independent    Housekeeping  independent    Laundry  independent    Shopping  independent       Mental Status    General Appearance WDL  WDL        Psychosocial     No documentation.       Abuse/Neglect    No documentation.       Legal    No documentation.       Substance Abuse    No documentation.       Patient Forms    No documentation.           Echo Pagan RN

## 2020-10-21 NOTE — PROGRESS NOTES
SHAINA Ivey    Nerve Cath Post Op Call    Patient Name: Nic Brooks  :  1950  MRN:  [id number]  Date of Discharge: 10/20/2020    Nerve Cath Post Op Call:    Analgesia:Excellent  Pain Score:2/10  Side Effects:None  Catheter Site:clean  Patient Controlled ON Q pump infusion rate: 10ml/hr  Catheter Plan:Will continue with plan at home without changes and The patient was instructed to call ON CALL Anesthesia provider for any questions or problems  Patient/Family instructed to call ON CALL anesthesia provider for any questions or problems.  Patient Follow Up:

## 2020-10-21 NOTE — PROGRESS NOTES
SHAINA Ivey    Nerve Cath Post Op Call    Patient Name: Nic Brooks  :  1950  MRN:  [id number]  Date of Discharge: 10/20/2020    Nerve Cath Post Op Call:    Catheter Plan:Patient called, No answer. Message left to call [facility] pain service for any questions or complaints and Patient/Family member instructed to remove the catheter during telephone contact  Patient/Family instructed to call ON CALL anesthesia provider for any questions or problems.  Patient Follow Up:

## 2020-10-22 NOTE — PROGRESS NOTES
SHAINA Ivey    Nerve Cath Post Op Call    Patient Name: Nic Brooks  :  1950  MRN:  [id number]  Date of Discharge: 10/20/2020    Nerve Cath Post Op Call:    Analgesia:Excellent  Pain Score:0/10  Side Effects:None  Catheter Site:clean  Patient Controlled ON Q pump infusion rate: 8ml/hr  Catheter Plan:The patient was instructed to call ON CALL Anesthesia provider for any questions or problems and Patient/Family member report nerve catheter previously discontinued, tip intact  Patient/Family instructed to call ON CALL anesthesia provider for any questions or problems.  Patient Follow Up:

## 2020-11-11 ENCOUNTER — OFFICE VISIT (OUTPATIENT)
Dept: ORTHOPEDIC SURGERY | Facility: CLINIC | Age: 70
End: 2020-11-11

## 2020-11-11 DIAGNOSIS — Z96.652 S/P TOTAL KNEE ARTHROPLASTY, LEFT: Primary | ICD-10-CM

## 2020-11-11 PROCEDURE — 99024 POSTOP FOLLOW-UP VISIT: CPT | Performed by: PHYSICIAN ASSISTANT

## 2020-11-11 NOTE — PROGRESS NOTES
[facility]    Subjective     Patient: Nic Brooks  : 1950    Primary Care Provider: Madison Weeks APRN    Requesting Provider: As above    Post-op (3 week S/P TOTAL KNEE ARTHROPLASTY LEFT 10/20/20)      History    History of Present Illness: Patient presents today 3 weeks status post left total knee arthroplasty with Dr. Vidal on 10/20/2020.  Patient reports that he is doing very well.  Is he is having pain 3/10 and using a cane for ambulation.  He is doing outpatient physical therapy.  He reports his side has been a little more painful than the right which was done approximately a year ago.  He is taking Tylenol for pain.  He has been happy with his outcome.    Current Outpatient Medications on File Prior to Visit   Medication Sig Dispense Refill   • acetaminophen (TYLENOL) 500 MG tablet Take 2 tablets by mouth Every 6 (Six) Hours As Needed for Mild Pain . For 1 week, then as needed 42 tablet 0   • aspirin 81 MG EC tablet Take 1 tablet by mouth Daily. Resume in 1 month     • aspirin  MG EC tablet Take 1 tablet by mouth Daily. For 1 month 30 tablet 0   • chlorthalidone (HYGROTEN) 50 MG tablet Take 1 tablet by mouth Daily. 90 tablet 3   • cholecalciferol (VITAMIN D3) 10 MCG (400 UNIT) tablet Take 400 Units by mouth Daily.     • coenzyme Q10 100 MG capsule Take 100 mg by mouth Daily.     • Cyanocobalamin (B-12 COMPLIANCE INJECTION IJ) Inject 1 dose as directed Every 30 (Thirty) Days. LD      • docusate sodium (COLACE) 100 MG capsule Take 1 capsule by mouth 2 (Two) Times a Day As Needed for Constipation. 60 capsule 0   • Ropivacine HCl-NaCl (NAROPIN) 20 mg/hr by Peripheral Nerve route Continuous. Indications: Acute Pain     • rosuvastatin (CRESTOR) 40 MG tablet Take 1 tablet by mouth Daily. 90 tablet 3   • vitamin C (ASCORBIC ACID) 500 MG tablet Take 500 mg by mouth Daily.     • oxyCODONE (Roxicodone) 5 MG immediate release tablet Take 1 tablet by mouth  Every 4 (Four) Hours As Needed for Severe Pain . 40 tablet 0     No current facility-administered medications on file prior to visit.       No Known Allergies   Past Medical History:   Diagnosis Date   • Arthritis    • Coronary artery disease     s/p stent 2018   • GERD (gastroesophageal reflux disease)    • Hearing loss     kailee hearing aids   • History of kidney stones 2008    just one    • History of transfusion 1980    [facility] with lung surgery    • Hyperlipidemia    • Osteoarthritis    • Sleep apnea with use of continuous positive airway pressure (CPAP)     complaint with machine    • Wears glasses      Past Surgical History:   Procedure Laterality Date   • APPENDECTOMY     • BACK SURGERY      x2   • CARDIAC CATHETERIZATION N/A 1/19/2018    Procedure: Left Heart Cath;  Surgeon: Negrito Wilkes MD;  Location:  [facility];  Service:    • COLONOSCOPY      every 5 years   • CORONARY STENT PLACEMENT  2018   • ENDOSCOPY     • KIDNEY STONE SURGERY     • KNEE ARTHROSCOPY W/ MENISCECTOMY      left   • LUMBAR DISCECTOMY     • LUNG REMOVAL, PARTIAL      right lower lobe    • SPLENECTOMY  1974   • TONSILLECTOMY      removed adnoids   • TOTAL KNEE ARTHROPLASTY Right 11/15/2019    Procedure: TOTAL KNEE ARTHROPLASTY RIGHT;  Surgeon: Negrito Vidal MD;  Location:  [facility];  Service: Orthopedics   • TOTAL KNEE ARTHROPLASTY Left 10/20/2020    Procedure: TOTAL KNEE ARTHROPLASTY LEFT;  Surgeon: Negrito Vidal MD;  Location:  [facility];  Service: Orthopedics;  Laterality: Left;   • WISDOM TOOTH EXTRACTION      only 2 removed      Family History   Problem Relation Age of Onset   • Hypertension Mother    • Cancer Mother    • Cancer Father    • Emphysema Father    • Diabetes Father    • COPD Father    • Osteoarthritis Father    • No Known Problems Sister    • Cancer Brother    • Diabetes Brother    • Heart disease Brother       Social History     Socioeconomic History   • Marital status:       Spouse name: Not on file   • Number of children: Not on file   • Years of education: Not on file   • Highest education level: Not on file   Tobacco Use   • Smoking status: Never Smoker   • Smokeless tobacco: Never Used   Substance and Sexual Activity   • Alcohol use: No   • Drug use: No   • Sexual activity: Defer        Review of Systems   Constitutional: Negative.    HENT: Negative.    Eyes: Negative.    Respiratory: Negative.    Cardiovascular: Negative.    Gastrointestinal: Negative.    Endocrine: Negative.    Genitourinary: Negative.    Musculoskeletal: Positive for arthralgias.   Skin: Negative.    Allergic/Immunologic: Negative.    Neurological: Negative.    Hematological: Negative.    Psychiatric/Behavioral: Negative.        The following portions of the patient's history were reviewed and updated as appropriate: allergies, current medications, past family history, past medical history, past social history, past surgical history and problem list.      Objective      Physical Exam  There were no vitals taken for this visit.    There is no height or weight on file to calculate BMI.    Ortho Exam  Left knee exam: Anterior knee incision is healing well.  Range of motion 0-1 10 ligaments stable to valgus and varus stress.  Neurovascular intact distally.  Ambulating with a cane.    Medical Decision Making    Data Review:   ordered and reviewed x-rays today    Assessment:  1. S/P total knee arthroplasty, left        Plan:  Doing well status post left total knee arthroplasty with Dr. Vidal on 10/20/2020.  X-rays today show well-positioned, cemented total knee arthroplasty with no evidence of osteolysis, subsidence or fracture.  Patient continues to have improving pain is been very happy with his outcome.  I told him he may begin driving.  He will return to see Dr. Vidal in 4 to 6 weeks or sooner if needed.      Madison Mckee PA-C  11/11/20  11:50 EST

## 2020-12-03 ENCOUNTER — OFFICE VISIT (OUTPATIENT)
Dept: SLEEP MEDICINE | Facility: HOSPITAL | Age: 70
End: 2020-12-03

## 2020-12-03 VITALS
HEIGHT: 69 IN | WEIGHT: 180 LBS | HEART RATE: 71 BPM | DIASTOLIC BLOOD PRESSURE: 86 MMHG | RESPIRATION RATE: 15 BRPM | OXYGEN SATURATION: 95 % | SYSTOLIC BLOOD PRESSURE: 126 MMHG | BODY MASS INDEX: 26.66 KG/M2

## 2020-12-03 DIAGNOSIS — G47.10 HYPERSOMNIA: ICD-10-CM

## 2020-12-03 DIAGNOSIS — G47.33 OSA (OBSTRUCTIVE SLEEP APNEA): Primary | ICD-10-CM

## 2020-12-03 PROCEDURE — 99213 OFFICE O/P EST LOW 20 MIN: CPT | Performed by: NURSE PRACTITIONER

## 2020-12-03 NOTE — PROGRESS NOTES
Chief Complaint:   Chief Complaint   Patient presents with   • Follow-up       HPI:    Nic Brooks is a 70 y.o. male here for follow-up of sleep apnea.  Patient was last seen 11/5/2019.  Patient has a history of CAD, dyslipidemia, hypertension, GERD, osteoarthritis, resolved hypersomnia, and sleep apnea.  Patient states he is doing well with CPAP therapy.  Patient has had a sinus infection for the past 2 weeks and has been on able to wear his CPAP.  When he does wear his CPAP he feels more rested.  Patient will sleep 7 to 8 hours nightly and does feel refreshed upon awakening.  In the past month he has been getting up and down due to pain in his left knee status post replacement.  Otherwise patient states he is doing well and has an Sutton score of    3/24.  Patient has no concerns or complaints regarding CPAP and wishes to continue.  Patient's hypersomnia is now resolved.    Current medications are:   Current Outpatient Medications:   •  acetaminophen (TYLENOL) 500 MG tablet, Take 2 tablets by mouth Every 6 (Six) Hours As Needed for Mild Pain . For 1 week, then as needed, Disp: 42 tablet, Rfl: 0  •  aspirin 81 MG EC tablet, Take 1 tablet by mouth Daily. Resume in 1 month, Disp:  , Rfl:   •  aspirin  MG EC tablet, Take 1 tablet by mouth Daily. For 1 month, Disp: 30 tablet, Rfl: 0  •  chlorthalidone (HYGROTEN) 50 MG tablet, Take 1 tablet by mouth Daily., Disp: 90 tablet, Rfl: 3  •  cholecalciferol (VITAMIN D3) 10 MCG (400 UNIT) tablet, Take 400 Units by mouth Daily., Disp: , Rfl:   •  coenzyme Q10 100 MG capsule, Take 100 mg by mouth Daily., Disp: , Rfl:   •  Cyanocobalamin (B-12 COMPLIANCE INJECTION IJ), Inject 1 dose as directed Every 30 (Thirty) Days. LD January 5th, Disp: , Rfl:   •  docusate sodium (COLACE) 100 MG capsule, Take 1 capsule by mouth 2 (Two) Times a Day As Needed for Constipation., Disp: 60 capsule, Rfl: 0  •  rosuvastatin (CRESTOR) 40 MG tablet, Take 1 tablet by mouth Daily.,  Disp: 90 tablet, Rfl: 3  •  vitamin C (ASCORBIC ACID) 500 MG tablet, Take 500 mg by mouth Daily., Disp: , Rfl:   •  oxyCODONE (Roxicodone) 5 MG immediate release tablet, Take 1 tablet by mouth Every 4 (Four) Hours As Needed for Severe Pain ., Disp: 40 tablet, Rfl: 0  •  Ropivacine HCl-NaCl (NAROPIN), 20 mg/hr by Peripheral Nerve route Continuous. Indications: Acute Pain, Disp:  , Rfl: .      The patient's relevant past medical, surgical, family and social history were reviewed and updated in Epic as appropriate.       Review of Systems   HENT: Positive for sinus pressure and sinus pain.         Sinus infection x 2 weeks   Eyes: Positive for visual disturbance.   Respiratory: Positive for apnea.    Gastrointestinal: Positive for constipation.   Musculoskeletal: Positive for arthralgias, gait problem, joint swelling and myalgias.   Allergic/Immunologic: Positive for environmental allergies.   Psychiatric/Behavioral: Positive for sleep disturbance.   All other systems reviewed and are negative.        Objective:    Physical Exam  Vitals signs reviewed.   Constitutional:       Appearance: Normal appearance. He is normal weight.   HENT:      Head: Normocephalic and atraumatic.      Mouth/Throat:      Pharynx: Oropharynx is clear.      Comments: Class 4 airway  Eyes:      Conjunctiva/sclera: Conjunctivae normal.   Pulmonary:      Effort: Pulmonary effort is normal. No respiratory distress.      Breath sounds: Normal breath sounds.   Musculoskeletal:         General: Swelling present.      Comments: Left knee swollen s/p LTK   Skin:     General: Skin is dry.      Coloration: Skin is not jaundiced or pale.      Findings: No erythema.   Neurological:      Mental Status: He is alert and oriented to person, place, and time.   Psychiatric:         Mood and Affect: Mood normal.         Behavior: Behavior normal.         Thought Content: Thought content normal.         Judgment: Judgment normal.       19/35 days of use  Greater  than 4-hour use 42.9  90% pressure 9.1  AHI 4.4  Settings 6-18.    ASSESSMENT/PLAN    Diagnoses and all orders for this visit:    1. GAUDENCIO (obstructive sleep apnea) (Primary)  -     CPAP Therapy    2. Hypersomnia            1. Counseled patient regarding multimodal approach with healthy nutrition, healthy sleep, regular physical activity, social activities, counseling, and medications. Encouraged to practice lateral  sleep position. Avoid alcohol and sedatives close to bedtime.  2.   Download has been reviewed with patient.  We have also discussed his Bowdle score in detail.  I have advised patient to please increase his use.  Patient's hypersomnia is now resolved with his CPAP therapy.  I have refilled his supplies and patient will return to clinic in 1 year.  I have reviewed the results of my evaluation and impression and discussed my recommendations in detail with the patient.      Signed by  SHIVA Edwards    December 3, 2020      CC: Madison Weeks APRN          No ref. provider found

## 2020-12-16 ENCOUNTER — OFFICE VISIT (OUTPATIENT)
Dept: ORTHOPEDIC SURGERY | Facility: CLINIC | Age: 70
End: 2020-12-16

## 2020-12-16 DIAGNOSIS — Z96.652 S/P TOTAL KNEE ARTHROPLASTY, LEFT: Primary | ICD-10-CM

## 2020-12-16 DIAGNOSIS — Z47.89 ORTHOPEDIC AFTERCARE: ICD-10-CM

## 2020-12-16 PROCEDURE — 99024 POSTOP FOLLOW-UP VISIT: CPT | Performed by: ORTHOPAEDIC SURGERY

## 2020-12-16 NOTE — PROGRESS NOTES
[facility] Note    Subjective     Chief Complaint   Patient presents with   • Post-op     5 weeks follow up; 8 weeks S/P TOTAL KNEE ARTHROPLASTY LEFT 10/20/20)        HPI    It has been 5  week(s) since Mr. Brooks's last visit. He returns to clinic today for postoperative follow-up of left knee arthroplasty. He rates his pain a 0/10 on the pain scale. Previous/current treatments: physical therapy. Current symptoms: swelling and stiffness. The pain is worse with standing; ice improve the pain. Overall, he is doing better.  100% improvement compared to his preoperative symptoms.  Fully ambulatory without external aids.    I have reviewed the following portions of the patient's history and agree with: History of Present Illness and Review of Systems    Patient Active Problem List   Diagnosis   • Moderate obstructive sleep apnea   • Fatigue   • Snoring   • GERD (gastroesophageal reflux disease)   • Hypersomnia   • Other chest pain   • Abnormal EKG   • Coronary artery disease involving native coronary artery of native heart without angina pectoris   • Dyslipidemia, goal LDL below 70   • Essential hypertension   • Primary osteoarthritis of right knee   • Status post total left knee replacement   • Prediabetes   • Primary osteoarthritis of left knee     Past Medical History:   Diagnosis Date   • Arthritis    • Coronary artery disease     s/p stent 2018   • GERD (gastroesophageal reflux disease)    • Hearing loss     kailee hearing aids   • History of kidney stones 2008    just one    • History of transfusion 1980    [facility] with lung surgery    • Hyperlipidemia    • Osteoarthritis    • Sleep apnea with use of continuous positive airway pressure (CPAP)     complaint with machine    • Wears glasses       Past Surgical History:   Procedure Laterality Date   • APPENDECTOMY     • BACK SURGERY      x2   • CARDIAC CATHETERIZATION N/A 1/19/2018    Procedure: Left Heart Cath;  Surgeon: Negrito  CARITO Wilkes MD;  Location:  [facility];  Service:    • COLONOSCOPY      every 5 years   • CORONARY STENT PLACEMENT  2018   • ENDOSCOPY     • KIDNEY STONE SURGERY     • KNEE ARTHROSCOPY W/ MENISCECTOMY      left   • LUMBAR DISCECTOMY     • LUNG REMOVAL, PARTIAL      right lower lobe    • SPLENECTOMY  1974   • TONSILLECTOMY      removed adnoids   • TOTAL KNEE ARTHROPLASTY Right 11/15/2019    Procedure: TOTAL KNEE ARTHROPLASTY RIGHT;  Surgeon: Negrito Vidal MD;  Location:  [facility];  Service: Orthopedics   • TOTAL KNEE ARTHROPLASTY Left 10/20/2020    Procedure: TOTAL KNEE ARTHROPLASTY LEFT;  Surgeon: Negrito Vidal MD;  Location:  [facility];  Service: Orthopedics;  Laterality: Left;   • WISDOM TOOTH EXTRACTION      only 2 removed       Family History   Problem Relation Age of Onset   • Hypertension Mother    • Cancer Mother    • Cancer Father    • Emphysema Father    • Diabetes Father    • COPD Father    • Osteoarthritis Father    • No Known Problems Sister    • Cancer Brother    • Diabetes Brother    • Heart disease Brother      Social History     Socioeconomic History   • Marital status:      Spouse name: Not on file   • Number of children: Not on file   • Years of education: Not on file   • Highest education level: Not on file   Tobacco Use   • Smoking status: Never Smoker   • Smokeless tobacco: Never Used   Substance and Sexual Activity   • Alcohol use: No   • Drug use: No   • Sexual activity: Defer      Current Outpatient Medications on File Prior to Visit   Medication Sig Dispense Refill   • acetaminophen (TYLENOL) 500 MG tablet Take 2 tablets by mouth Every 6 (Six) Hours As Needed for Mild Pain . For 1 week, then as needed 42 tablet 0   • aspirin 81 MG EC tablet Take 1 tablet by mouth Daily. Resume in 1 month     • aspirin  MG EC tablet Take 1 tablet by mouth Daily. For 1 month 30 tablet 0   • chlorthalidone (HYGROTEN) 50 MG tablet Take 1 tablet by mouth Daily. 90 tablet 3   •  cholecalciferol (VITAMIN D3) 10 MCG (400 UNIT) tablet Take 400 Units by mouth Daily.     • coenzyme Q10 100 MG capsule Take 100 mg by mouth Daily.     • Cyanocobalamin (B-12 COMPLIANCE INJECTION IJ) Inject 1 dose as directed Every 30 (Thirty) Days. LD January 5th     • docusate sodium (COLACE) 100 MG capsule Take 1 capsule by mouth 2 (Two) Times a Day As Needed for Constipation. 60 capsule 0   • oxyCODONE (Roxicodone) 5 MG immediate release tablet Take 1 tablet by mouth Every 4 (Four) Hours As Needed for Severe Pain . 40 tablet 0   • Ropivacine HCl-NaCl (NAROPIN) 20 mg/hr by Peripheral Nerve route Continuous. Indications: Acute Pain     • rosuvastatin (CRESTOR) 40 MG tablet Take 1 tablet by mouth Daily. 90 tablet 3   • vitamin C (ASCORBIC ACID) 500 MG tablet Take 500 mg by mouth Daily.       No current facility-administered medications on file prior to visit.       No Known Allergies     Review of Systems   Constitutional: Negative.    HENT: Negative.    Eyes: Negative.    Respiratory: Negative.    Cardiovascular: Negative.    Gastrointestinal: Negative.    Endocrine: Negative.    Genitourinary: Negative.    Musculoskeletal: Positive for arthralgias.   Skin: Negative.    Allergic/Immunologic: Negative.    Neurological: Negative.    Hematological: Negative.    Psychiatric/Behavioral: Negative.         Objective      Physical Exam  There were no vitals taken for this visit.    There is no height or weight on file to calculate BMI.    General:   Mental Status:  Alert   Appearance: Cooperative, in no acute distress   Build and Nutrition: Well-nourished well-developed male   Orientation: Alert and oriented to person, place and time   Posture: Normal   Gait: Normal    Integument:   Left knee: Wound is well-healed with no signs of infection    Lower Extremities:   Left Knee:    Tenderness:  None    Effusion:  1+    Swelling: None    Crepitus:  None    Range of motion:  Extension: 0°       Flexion: 130°  Instability:  No  varus laxity, no valgus laxity, negative anterior drawer  Deformities:  None        Assessment and Plan     Diagnoses and all orders for this visit:    1. S/P total knee arthroplasty, left (Primary)    2. Orthopedic aftercare        1. S/P total knee arthroplasty, left    2. Orthopedic aftercare        I reviewed my findings with the patient today.  His left total knee arthroplasty is functioning well and he is pleased with results.  I will see him back in 10 months, which will be a 1 year checkup with x-rays.  I will see him back sooner for any problems.    Return in about 10 months (around 10/16/2021) for Recheck with X-Rays.      Negrito Vidal MD  12/16/20  09:25 ERI Snyder disclaimer:  Much of this encounter note is an electronic transcription/translation of spoken language to printed text. The electronic translation of spoken language may permit erroneous, or at times, nonsensical words or phrases to be inadvertently transcribed; Although I have reviewed the note for such errors, some may still exist.

## 2021-01-19 ENCOUNTER — TELEPHONE (OUTPATIENT)
Dept: CARDIOLOGY | Facility: CLINIC | Age: 71
End: 2021-01-19

## 2021-01-19 NOTE — TELEPHONE ENCOUNTER
Pt wife called and patient is having colonoscopy next Wednesday and wife was wanting to know if he should stop his daily ASA an CoQ10 prior to the procedure. Please advise

## 2021-03-16 ENCOUNTER — OFFICE VISIT (OUTPATIENT)
Dept: ORTHOPEDIC SURGERY | Facility: CLINIC | Age: 71
End: 2021-03-16

## 2021-03-16 VITALS
DIASTOLIC BLOOD PRESSURE: 99 MMHG | SYSTOLIC BLOOD PRESSURE: 152 MMHG | WEIGHT: 180 LBS | BODY MASS INDEX: 26.66 KG/M2 | HEART RATE: 81 BPM | HEIGHT: 69 IN

## 2021-03-16 DIAGNOSIS — M19.031 PRIMARY OSTEOARTHRITIS OF BOTH WRISTS: ICD-10-CM

## 2021-03-16 DIAGNOSIS — M18.0 PRIMARY OSTEOARTHRITIS OF BOTH FIRST CARPOMETACARPAL JOINTS: ICD-10-CM

## 2021-03-16 DIAGNOSIS — M79.642 BILATERAL HAND PAIN: Primary | ICD-10-CM

## 2021-03-16 DIAGNOSIS — M79.641 BILATERAL HAND PAIN: Primary | ICD-10-CM

## 2021-03-16 DIAGNOSIS — M19.032 PRIMARY OSTEOARTHRITIS OF BOTH WRISTS: ICD-10-CM

## 2021-03-16 DIAGNOSIS — M19.042 ARTHRITIS OF BOTH HANDS: ICD-10-CM

## 2021-03-16 DIAGNOSIS — M19.041 ARTHRITIS OF BOTH HANDS: ICD-10-CM

## 2021-03-16 PROCEDURE — 99213 OFFICE O/P EST LOW 20 MIN: CPT | Performed by: PHYSICIAN ASSISTANT

## 2021-03-16 NOTE — PROGRESS NOTES
[facility] [place] [facility] Note        Subjective     Pain of the Right Hand and Pain of the Left Hand      HPI    Nic Brooks is a 71 y.o. male.  Established patient presents with new problem--bilateral wrist and hand pain.  He reports symptoms of been ongoing since July 2020.  No history of injury or trauma.  He notes difficulty with pushing himself out of a chair and other weightbearing related activities.  He has swelling especially along the right first CMC joint.    Patient endorses a pain scale of 3/10.  Severity the pain mild to moderate.  Quality pain aching, stabbing.  Associated symptoms swelling and stiffness.  No reported numbness or tingling.    Patient denies any fever, chills, night sweats or other constitutional symptoms.    Past Medical History:   Diagnosis Date   • Arthritis    • Coronary artery disease     s/p stent 2018   • GERD (gastroesophageal reflux disease)    • Hearing loss     kailee hearing aids   • History of kidney stones 2008    just one    • History of transfusion 1980    [facility] with lung surgery    • Hyperlipidemia    • Osteoarthritis    • Sleep apnea with use of continuous positive airway pressure (CPAP)     complaint with machine    • Wears glasses       Past Surgical History:   Procedure Laterality Date   • APPENDECTOMY     • BACK SURGERY      x2   • CARDIAC CATHETERIZATION N/A 1/19/2018    Procedure: Left Heart Cath;  Surgeon: Negrito Wilkes MD;  Location:  [facility] CATH INVASIVE LOCATION;  Service:    • COLONOSCOPY      every 5 years   • CORONARY STENT PLACEMENT  2018   • ENDOSCOPY     • KIDNEY STONE SURGERY     • KNEE ARTHROSCOPY W/ MENISCECTOMY      left   • LUMBAR DISCECTOMY     • LUNG REMOVAL, PARTIAL      right lower lobe    • SPLENECTOMY  1974   • TONSILLECTOMY      removed adnoids   • TOTAL KNEE ARTHROPLASTY Right 11/15/2019    Procedure: TOTAL KNEE ARTHROPLASTY RIGHT;  Surgeon: Negrito Vidal MD;  Location:  [facility];  Service:  Orthopedics   • TOTAL KNEE ARTHROPLASTY Left 10/20/2020    Procedure: TOTAL KNEE ARTHROPLASTY LEFT;  Surgeon: Negrito Vidal MD;  Location: [facility];  Service: Orthopedics;  Laterality: Left;   • WISDOM TOOTH EXTRACTION      only 2 removed       Family History   Problem Relation Age of Onset   • Hypertension Mother    • Cancer Mother    • Cancer Father    • Emphysema Father    • Diabetes Father    • COPD Father    • Osteoarthritis Father    • No Known Problems Sister    • Cancer Brother    • Diabetes Brother    • Heart disease Brother      Social History     Socioeconomic History   • Marital status:      Spouse name: Not on file   • Number of children: Not on file   • Years of education: Not on file   • Highest education level: Not on file   Tobacco Use   • Smoking status: Never Smoker   • Smokeless tobacco: Never Used   Vaping Use   • Vaping Use: Never used   Substance and Sexual Activity   • Alcohol use: No   • Drug use: No   • Sexual activity: Defer      Current Outpatient Medications on File Prior to Visit   Medication Sig Dispense Refill   • acetaminophen (TYLENOL) 500 MG tablet Take 2 tablets by mouth Every 6 (Six) Hours As Needed for Mild Pain . For 1 week, then as needed 42 tablet 0   • aspirin 81 MG EC tablet Take 1 tablet by mouth Daily. Resume in 1 month     • aspirin  MG EC tablet Take 1 tablet by mouth Daily. For 1 month 30 tablet 0   • chlorthalidone (HYGROTEN) 50 MG tablet Take 1 tablet by mouth Daily. 90 tablet 3   • cholecalciferol (VITAMIN D3) 10 MCG (400 UNIT) tablet Take 400 Units by mouth Daily.     • coenzyme Q10 100 MG capsule Take 100 mg by mouth Daily.     • Cyanocobalamin (B-12 COMPLIANCE INJECTION IJ) Inject 1 dose as directed Every 30 (Thirty) Days. LD January 5th     • docusate sodium (COLACE) 100 MG capsule Take 1 capsule by mouth 2 (Two) Times a Day As Needed for Constipation. 60 capsule 0   • oxyCODONE (Roxicodone) 5 MG immediate release tablet Take 1 tablet by mouth  "Every 4 (Four) Hours As Needed for Severe Pain . 40 tablet 0   • Ropivacine HCl-NaCl (NAROPIN) 20 mg/hr by Peripheral Nerve route Continuous. Indications: Acute Pain     • rosuvastatin (CRESTOR) 40 MG tablet Take 1 tablet by mouth Daily. 90 tablet 3   • vitamin C (ASCORBIC ACID) 500 MG tablet Take 500 mg by mouth Daily.       No current facility-administered medications on file prior to visit.      No Known Allergies       Review of Systems   Constitutional: Negative.    HENT: Negative.    Eyes: Negative.    Respiratory: Negative.    Cardiovascular: Negative.    Gastrointestinal: Negative.    Endocrine: Negative.    Genitourinary: Negative.    Musculoskeletal: Positive for arthralgias and joint swelling.   Skin: Negative.    Allergic/Immunologic: Negative.    Neurological: Negative.    Hematological: Negative.    Psychiatric/Behavioral: Negative.         I reviewed the patient's chief complaint, history of present illness, review of systems, past medical history, surgical history, family history, social history, medications and allergy list.        Objective      Physical Exam  /99   Pulse 81   Ht 175.3 cm (69.02\")   Wt 81.6 kg (180 lb)   BMI 26.57 kg/m²     Body mass index is 26.57 kg/m².    General  Mental Status - alert  General Appearance - cooperative, well groomed, not in acute distress  Orientation - Oriented X3  Build & Nutrition - well developed and well nourished  Posture - normal posture  Gait - normal gait       Ortho Exam  Peripheral Vascular   Bilateral Upper Extremity    No cyanotic nail beds    Pink nail beds and rapid capillary refill   Palpation    Radial Pulse - Bilaterally normal    Neurologic   Sensory: Light touch intact- Right and left hand    Left Upper Extremity    Left wrist extensors: 5/5    Left wrist flexors: 5/5    Left intrinsics: 5/5   Right Upper Extremity    Right wrist extensors: 5/5    Right wrist flexors: 5/5    Right intrinsics: 5/5    Musculoskeletal   Left " Elbow    Forearm supination: AROM - 90 degrees    Forearm pronation: AROM - 90 degrees   Right Elbow    Forearm supination: AROM - 90 degrees    Forearm pronation: AROM - 90 degrees     Inspection and Palpation   Bilateral wrist      Tenderness -diffuse tenderness wrist hands and fingers.    Swelling -mild swelling noted right first CMC joint otherwise no significant swelling throughout either one of the hands or wrists.    Crepitus - none    Muscle tone - no atrophy     ROJM:   Left Wrist    Flexion: AROM - 75 degrees    Extension: AROM - 75 degrees   Right Wrist    Flexion: AROM - 75 degrees    Extension: AROM - 75 degrees     Deformities, Malalignments, Discrepancies    None     Functional Testing   Right Wrist    Tinel's Sign negative    Phalen's Sign negative    Carpal Compression Test negative   Left Wrist    Tinel's Sign negative    Phalen's Sign negative    Carpal Compression Test negative       Strength and Tone    Right  strength: good    Left  strength: good     Hand Exam: Degenerative changes noted throughout wrist, hand and digits.  Stiffness noted to all digits.  First CMC positive grind, tenderness, shuck.        Imaging/Studies  Ordered bilateral hands plain films.  Imaging read by Dr. Jimenes.    Left Hand X-Ray     Indication: Pain     Views:  AP, Lateral, and Oblique      Comparison: None     Findings:  No fracture.  There does appear to be a metallic foreign body within or just adjacent to the index metacarpal shaft.  No bony lesion  Normal soft tissues  There are degenerative changes throughout the carpus.  There are degenerative changes noted at the CMC joint of the thumb with severe degeneration.  There is a partial amputation at the long digit distal phalanx.  Diffuse degenerative changes are noted throughout the IP joints of the left hand as well as the thumb, index and long digit MCP joints.     Impression:   Degenerative changes left hand  No acute bony abnormality  Metallic  foreign body adjacent to or within the index metacarpal        Right Hand X-Ray     Indication: Pain     Views:  AP, Lateral, and Oblique      Comparison: None     Findings:  No fracture  No bony lesion  Normal soft tissues  Degenerative changes are noted throughout the IP joints of the hand.  There are severe degenerative changes at the thumb CMC joint.  There are severe radial scaphoid degenerative changes as well.  Severe degenerative changes are noted at the index and thumb MCP joints.     Impression:   Diffuse degenerative changes right hand  No acute bony abnormality      Assessment:  1. Bilateral hand pain    2. Primary osteoarthritis of both first carpometacarpal joints    3. Arthritis of both hands    4. Primary osteoarthritis of both wrists        Plan:  1. Bilateral hand pain due to osteoarthritis first CMC joints, bilateral wrists as well as bilateral hands.  2. Offered to send patient to hand surgeon for further evaluation and treatment of his CMC joints.  He politely declined at this time.  3. Offered to send him to PT/OT for treatments to his hands.  Patient declined.  4. We discussed use of compression gloves.  5. Prescribed Voltaren gel for his hands.  6. Patient would like to follow-up as needed at this time.  7. Questions and concerns answered.        Brandy Beltre PA-C  03/19/21  08:16 JOHNT    Dragon disclaimer:  Much of this encounter note is an electronic transcription/translation of spoken language to printed text. The electronic translation of spoken language may permit erroneous, or at times, nonsensical words or phrases to be inadvertently transcribed; Although I have reviewed the note for such errors, some may still exist.

## 2021-05-10 DIAGNOSIS — E78.5 DYSLIPIDEMIA, GOAL LDL BELOW 70: Primary | ICD-10-CM

## 2021-05-10 DIAGNOSIS — R73.03 PREDIABETES: ICD-10-CM

## 2021-05-10 DIAGNOSIS — I10 ESSENTIAL HYPERTENSION: ICD-10-CM

## 2021-05-20 ENCOUNTER — OFFICE VISIT (OUTPATIENT)
Dept: CARDIOLOGY | Facility: CLINIC | Age: 71
End: 2021-05-20

## 2021-05-20 VITALS
SYSTOLIC BLOOD PRESSURE: 134 MMHG | BODY MASS INDEX: 27.25 KG/M2 | DIASTOLIC BLOOD PRESSURE: 74 MMHG | OXYGEN SATURATION: 97 % | HEIGHT: 69 IN | WEIGHT: 184 LBS | HEART RATE: 89 BPM

## 2021-05-20 DIAGNOSIS — I10 ESSENTIAL HYPERTENSION: ICD-10-CM

## 2021-05-20 DIAGNOSIS — I25.10 CORONARY ARTERY DISEASE INVOLVING NATIVE CORONARY ARTERY OF NATIVE HEART WITHOUT ANGINA PECTORIS: Primary | ICD-10-CM

## 2021-05-20 DIAGNOSIS — E78.5 DYSLIPIDEMIA, GOAL LDL BELOW 70: ICD-10-CM

## 2021-05-20 PROCEDURE — 99214 OFFICE O/P EST MOD 30 MIN: CPT | Performed by: INTERNAL MEDICINE

## 2021-05-20 RX ORDER — ROSUVASTATIN CALCIUM 40 MG/1
40 TABLET, COATED ORAL DAILY
Qty: 90 TABLET | Refills: 3 | Status: SHIPPED | OUTPATIENT
Start: 2021-05-20 | End: 2022-06-23 | Stop reason: SDUPTHER

## 2021-05-20 RX ORDER — CETIRIZINE HYDROCHLORIDE 10 MG/1
10 TABLET ORAL AS NEEDED
COMMUNITY
End: 2023-01-19

## 2021-05-20 RX ORDER — CHLORTHALIDONE 50 MG/1
50 TABLET ORAL DAILY
Qty: 90 TABLET | Refills: 3 | Status: SHIPPED | OUTPATIENT
Start: 2021-05-20 | End: 2022-06-23 | Stop reason: SDUPTHER

## 2021-05-20 NOTE — PROGRESS NOTES
"  OFFICE FOLLOW UP     Date of Encounter:2021     Name: Nic Brooks  : 1950  Address: [address] [address]    PCP: Madison Weeks APRN [address] [facility] [address]    Nic Brooks is a 71 y.o. male.      Chief Complaint: Follow up of CAD, HLD    Problem List:   1. Coronary artery disease   A. Right and [facility] 2005: Normal LV function, EF 75%, normal right heart pressures, minor nonobstructive coronary artery disease  B.  2018, Recurrent chest pain with abnormal EKG showing anterior YESI, \"new\" since 2005  C.  [facility], 2018:  Normal LV function, iFR 0.89, KERRY to LAD.  2. History of anemia with B12 deficiency  3. Obstructive sleep apnea, on CPAP therapy  4. 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 Arthritis  7. Hyperlipidemia  8. Hypertension  9. Surgical history:   A. Tonsillectomy  B. Splenectomy  C. Lower lobectomy of right lung  D. Bilateral knee replacements  E. Lumbar discectomy    Allergies:  No Known Allergies    Current Medications:  Current Outpatient Medications   Medication Instructions   • aspirin 81 mg, Oral, Daily, Resume in 1 month   • cetirizine (ZYRTEC) 10 mg, Oral, Daily   • chlorthalidone (HYGROTEN) 50 mg, Oral, Daily   • coenzyme Q10 100 mg, Oral, Daily   • Cyanocobalamin (B-12 COMPLIANCE INJECTION IJ) 1 dose, Injection, Every 30 Days, LD    • Diclofenac Sodium (VOLTAREN) 4 g, Topical, 2 Times Daily   • rosuvastatin (CRESTOR) 40 mg, Oral, Daily   • Stool Softener 100 mg, Oral, 2 Times Daily PRN        History of Present Illness:      Nic Brooks returns for annual follow up today.  He is societal meetings and research have been impacted by Covid but things are starting to \"\".  The patient is acutely aware of CDC recommendations in terms of vaccination (he is vaccinated) and the continued use of masks and social distancing.     " "    The following portions of the patient's history were reviewed and updated as appropriate: allergies, current medications and problem list.    ROS: Pertinent positives as listed in the HPI.  All other systems reviewed and negative.    Objective:    Vitals:    05/20/21 1440 05/20/21 1442   BP: 127/75 134/74   BP Location: Right arm Right arm   Patient Position: Sitting Standing   Pulse: 81 89   SpO2: 97%    Weight: 83.5 kg (184 lb)    Height: 175.3 cm (69\")      Body mass index is 27.17 kg/m².    Physical Exam:  GENERAL: Alert, cooperative, in no acute distress.   HEENT: Normocephalic, no adenopathy, no jugular venous distention  HEART: No discrete PMI is noted. Regular rhythm, normal rate, and no murmurs, gallops, or rubs.   LUNGS: Clear to auscultation bilaterally. No wheezing, rales or ronchi.  ABDOMEN: Soft, bowel sounds present, non-tender   NEUROLOGIC: No focal abnormalities involving strength or sensation are noted.   EXTREMITIES: No clubbing, cyanosis, or edema noted.       Diagnostic Data:    Lab Results   Component Value Date    HGBA1C 6.00 (H) 10/08/2020      Lab Results   Component Value Date    GLUCOSE 105 (H) 10/08/2020    CALCIUM 9.7 10/08/2020     10/08/2020    K 3.6 10/20/2020    CO2 28.0 10/08/2020     10/08/2020    BUN 21 10/08/2020    CREATININE 1.07 10/08/2020    EGFRIFNONA 68 10/08/2020    BCR 19.6 10/08/2020    ANIONGAP 8.0 10/08/2020     5/13/2021  LIPID: , HDL 45, TRIG 98, LDL 61  CMP: , BUN 19, CR 1.26, , K 4.2, GFR 57, AST 18, ALT 14  A1C: 6.0    Procedures    Advance Care Planning   ACP discussion was held with the patient during this visit. Patient has an advance directive in EMR which is still valid.     Assessment and Plan:   1.  CAD: NYHA I following percutaneous revascularization on best medical therapy.   2.  HTN: His blood pressure is the AHA target.  Will not make changes today.  3.  HLD: His LDL cholesterol of 61 is at target this afternoon.  Will " not make changes in this regard.  4.  Elevated creatinine: We will recheck BMP in 6 months.  His current hemoglobin A1c of 6.0 is consistent with prediabetes and will be followed in this regard.  Should he have any further increase in creatinine and our hemoglobin A1c, I will look at his urine for microalbuminuria and consider the addition of Metformin to his regimen.    I will see Nic Brooks back in 6 months or sooner on an as needed basis.    Scribed for Negrito Wilkes MD by Gisel Escobar RN. 05/20/2021 16:00 EDT.       EMR Dragon/Transcription Disclaimer:  Much of this encounter note is an electronic transcription/translation of spoken language to printed text.  The electronic translation of spoken language may permit erroneous, or at times, nonsensical words or phrases to be inadvertently transcribed.  Although I have reviewed the note for such errors, some may still exist.

## 2021-08-09 ENCOUNTER — TELEPHONE (OUTPATIENT)
Dept: ORTHOPEDIC SURGERY | Facility: CLINIC | Age: 71
End: 2021-08-09

## 2021-08-09 NOTE — TELEPHONE ENCOUNTER
Caller: MAGY MIRELES    Relationship: WIFE    Best call back number: [phone]    What form or medical record are you requesting: OFFICE NOTES FROM 1/20/2020 & 12/9/2019    Who is requesting this form or medical record from you:  FOR SECOND OPINION    How would you like to receive the form or medical records (pick-up, mail, fax): FAX  If fax, what is the fax number: [phone]    Timeframe paperwork needed: ASAP

## 2021-11-08 ENCOUNTER — OFFICE VISIT (OUTPATIENT)
Dept: ORTHOPEDIC SURGERY | Facility: CLINIC | Age: 71
End: 2021-11-08

## 2021-11-08 VITALS
BODY MASS INDEX: 24.97 KG/M2 | HEIGHT: 69 IN | SYSTOLIC BLOOD PRESSURE: 135 MMHG | WEIGHT: 168.6 LBS | HEART RATE: 63 BPM | DIASTOLIC BLOOD PRESSURE: 95 MMHG

## 2021-11-08 DIAGNOSIS — Z09 POSTOPERATIVE EXAMINATION: ICD-10-CM

## 2021-11-08 DIAGNOSIS — Z96.652 S/P TOTAL KNEE ARTHROPLASTY, LEFT: Primary | ICD-10-CM

## 2021-11-08 PROCEDURE — 99212 OFFICE O/P EST SF 10 MIN: CPT | Performed by: ORTHOPAEDIC SURGERY

## 2021-11-08 ASSESSMENT — KOOS JR
KOOS JR SCORE: 100
KOOS JR SCORE: 0

## 2021-11-08 NOTE — PROGRESS NOTES
[facility] [place] [facility] Note    Subjective     Chief Complaint   Patient presents with   • Follow-up     11 month f/u--13 months  S/P TOTAL KNEE ARTHROPLASTY LEFT 10/20/20)        HPI    It has been 11  month(s) since Mr. Brooks's last visit. He returns to clinic today for follow-up of left knee arthroplasty. The issue has been ongoing for 1 year(s). He rates his pain a 0/10 on the pain scale. Previous/current treatments: physical therapy. Current symptoms: none. Overall, he is doing better.  100% improvement compared to his preoperative symptoms.  Fully ambulatory without external aids.  He walks at least 3 miles a day, and is pleased with results.    I have reviewed the following portions of the patient's history and agree with: History of Present Illness and Review of Systems    Patient Active Problem List   Diagnosis   • Moderate obstructive sleep apnea   • Fatigue   • Snoring   • GERD (gastroesophageal reflux disease)   • Hypersomnia   • Other chest pain   • Abnormal EKG   • Coronary artery disease involving native coronary artery of native heart without angina pectoris   • Dyslipidemia, goal LDL below 70   • Essential hypertension   • Primary osteoarthritis of right knee   • Status post total left knee replacement   • Prediabetes   • Primary osteoarthritis of left knee     Past Medical History:   Diagnosis Date   • Arthritis    • Coronary artery disease     s/p stent 2018   • GERD (gastroesophageal reflux disease)    • Hearing loss     kailee hearing aids   • History of kidney stones 2008    just one    • History of transfusion 1980    [facility] with lung surgery    • Hyperlipidemia    • Osteoarthritis    • Sleep apnea with use of continuous positive airway pressure (CPAP)     complaint with machine    • Wears glasses       Past Surgical History:   Procedure Laterality Date   • APPENDECTOMY     • BACK SURGERY      x2   • CARDIAC CATHETERIZATION N/A 1/19/2018    Procedure: Left Heart  Cath;  Surgeon: Negrito Wilkes MD;  Location:  [facility] CATH INVASIVE LOCATION;  Service:    • COLONOSCOPY      every 5 years   • CORONARY STENT PLACEMENT  2018   • ENDOSCOPY     • KIDNEY STONE SURGERY     • KNEE ARTHROSCOPY W/ MENISCECTOMY      left   • LUMBAR DISCECTOMY     • LUNG REMOVAL, PARTIAL      right lower lobe    • SPLENECTOMY  1974   • TONSILLECTOMY      removed adnoids   • TOTAL KNEE ARTHROPLASTY Right 11/15/2019    Procedure: TOTAL KNEE ARTHROPLASTY RIGHT;  Surgeon: Negrito Vidal MD;  Location:  [facility];  Service: Orthopedics   • TOTAL KNEE ARTHROPLASTY Left 10/20/2020    Procedure: TOTAL KNEE ARTHROPLASTY LEFT;  Surgeon: Negrito Vidal MD;  Location:  [facility];  Service: Orthopedics;  Laterality: Left;   • WISDOM TOOTH EXTRACTION      only 2 removed       Family History   Problem Relation Age of Onset   • Hypertension Mother    • Cancer Mother    • Cancer Father    • Emphysema Father    • Diabetes Father    • COPD Father    • Osteoarthritis Father    • No Known Problems Sister    • Cancer Brother    • Diabetes Brother    • Heart disease Brother      Social History     Socioeconomic History   • Marital status:    Tobacco Use   • Smoking status: Never Smoker   • Smokeless tobacco: Never Used   Vaping Use   • Vaping Use: Never used   Substance and Sexual Activity   • Alcohol use: No   • Drug use: No   • Sexual activity: Defer      Current Outpatient Medications on File Prior to Visit   Medication Sig Dispense Refill   • aspirin 81 MG EC tablet Take 1 tablet by mouth Daily. Resume in 1 month     • cetirizine (zyrTEC) 10 MG tablet Take 10 mg by mouth Daily.     • chlorthalidone (HYGROTEN) 50 MG tablet Take 1 tablet by mouth Daily. 90 tablet 3   • coenzyme Q10 100 MG capsule Take 100 mg by mouth Daily.     • Cyanocobalamin (B-12 COMPLIANCE INJECTION IJ) Inject 1 dose as directed Every 30 (Thirty) Days. LD January 5th     • Diclofenac Sodium (VOLTAREN) 1 % gel gel Apply 4 g topically to the  appropriate area as directed 2 (Two) Times a Day. 100 g 2   • docusate sodium (COLACE) 100 MG capsule Take 1 capsule by mouth 2 (Two) Times a Day As Needed for Constipation. 60 capsule 0   • rosuvastatin (CRESTOR) 40 MG tablet Take 1 tablet by mouth Daily. 90 tablet 3     No current facility-administered medications on file prior to visit.      No Known Allergies     Review of Systems   Constitutional: Negative for activity change, appetite change, chills, diaphoresis, fatigue, fever and unexpected weight change.   HENT: Negative for congestion, dental problem, drooling, ear discharge, ear pain, facial swelling, hearing loss, mouth sores, nosebleeds, postnasal drip, rhinorrhea, sinus pressure, sneezing, sore throat, tinnitus, trouble swallowing and voice change.    Eyes: Negative for photophobia, pain, discharge, redness, itching and visual disturbance.   Respiratory: Negative for apnea, cough, choking, chest tightness, shortness of breath, wheezing and stridor.    Cardiovascular: Negative for chest pain, palpitations and leg swelling.   Gastrointestinal: Negative for abdominal distention, abdominal pain, anal bleeding, blood in stool, constipation, diarrhea, nausea, rectal pain and vomiting.   Endocrine: Negative for cold intolerance, heat intolerance, polydipsia, polyphagia and polyuria.   Genitourinary: Negative for decreased urine volume, difficulty urinating, dysuria, enuresis, flank pain, frequency, genital sores, hematuria and urgency.   Musculoskeletal: Negative for arthralgias, back pain, gait problem, joint swelling, myalgias, neck pain and neck stiffness.        No pain     Skin: Negative for color change, pallor, rash and wound.   Allergic/Immunologic: Negative for environmental allergies, food allergies and immunocompromised state.   Neurological: Negative for dizziness, tremors, seizures, syncope, facial asymmetry, speech difficulty, weakness, light-headedness, numbness and headaches.   Hematological:  "Negative for adenopathy. Does not bruise/bleed easily.   Psychiatric/Behavioral: Negative for agitation, behavioral problems, confusion, decreased concentration, dysphoric mood, hallucinations, self-injury, sleep disturbance and suicidal ideas. The patient is not nervous/anxious and is not hyperactive.         Objective      Physical Exam  /95   Pulse 63   Ht 175.3 cm (69.02\")   Wt 76.5 kg (168 lb 9.6 oz)   BMI 24.89 kg/m²     Body mass index is 24.89 kg/m².    General:   Mental Status:  Alert   Appearance: Cooperative, in no acute distress   Build and Nutrition: Well-nourished well-developed male   Orientation: Alert and oriented to person, place and time   Posture: Normal   Gait: Normal    Integument:   Left knee: Wound is well-healed with no signs of infection    Lower Extremities:   Left Knee:    Tenderness:  None    Effusion:  None    Swelling: None    Crepitus:  None    Range of motion:  Extension: 0°       Flexion: 135°  Instability:  No varus laxity, no valgus laxity, negative anterior drawer  Deformities:  None      Imaging/Studies  Imaging Results (Last 24 Hours)     Procedure Component Value Units Date/Time    XR Knee 3+ View With [place] Left [[id number]] Resulted: 11/08/21 0836     Updated: 11/08/21 0836    Narrative:      Left Knee Radiographs  Indication: status-post left total knee arthroplasty  Views: AP, lateral, and sunrise views of the left knee    Comparison: no change compared to prior study, 11/11/2020    Findings:   The components are well aligned, with no signs of loosening or failure.            Assessment and Plan     Diagnoses and all orders for this visit:    1. S/P total knee arthroplasty, left (Primary)  -     XR Knee 3+ View With [place] Left    2. Postoperative examination        1. S/P total knee arthroplasty, left    2. Postoperative examination        I reviewed my findings with the patient.  His left total knee arthroplasty is functioning well, and is pleased with " results.  I will see him back in 4 years, which will be a 5-year checkup, but sooner for any problems.    Return in about 4 years (around 11/8/2025) for Recheck with X-Rays.      Negrito Vidal MD  11/08/21  09:15 EST

## 2021-11-23 ENCOUNTER — OFFICE VISIT (OUTPATIENT)
Dept: CARDIOLOGY | Facility: CLINIC | Age: 71
End: 2021-11-23

## 2021-11-23 VITALS
DIASTOLIC BLOOD PRESSURE: 79 MMHG | HEART RATE: 74 BPM | OXYGEN SATURATION: 96 % | WEIGHT: 180.4 LBS | SYSTOLIC BLOOD PRESSURE: 112 MMHG | HEIGHT: 69 IN | BODY MASS INDEX: 26.72 KG/M2

## 2021-11-23 DIAGNOSIS — I10 ESSENTIAL HYPERTENSION: ICD-10-CM

## 2021-11-23 DIAGNOSIS — I25.10 CORONARY ARTERY DISEASE INVOLVING NATIVE CORONARY ARTERY OF NATIVE HEART WITHOUT ANGINA PECTORIS: Primary | ICD-10-CM

## 2021-11-23 DIAGNOSIS — E78.5 DYSLIPIDEMIA, GOAL LDL BELOW 70: ICD-10-CM

## 2021-11-23 PROCEDURE — 99213 OFFICE O/P EST LOW 20 MIN: CPT | Performed by: PHYSICIAN ASSISTANT

## 2021-11-23 RX ORDER — CYANOCOBALAMIN 1000 UG/ML
INJECTION, SOLUTION INTRAMUSCULAR; SUBCUTANEOUS
COMMUNITY
Start: 2021-11-17 | End: 2021-11-23

## 2021-11-23 NOTE — PROGRESS NOTES
"  OFFICE FOLLOW UP     Date of Encounter:2021     Name: Nic Brooks  : 1950  Address: [address] [address]    PCP: Madison Weeks APRN [address] [facility] [id number]    Nic Brooks is a 71 y.o. male.    Chief Complaint: Follow up of CAD, HTN,  HLD    Problem List:   1. Coronary artery disease   A. Right and [facility] 2005: Normal LV function, EF 75%, normal right heart pressures, minor nonobstructive coronary artery disease  B.  2018, Recurrent chest pain with abnormal EKG showing anterior YESI, \"new\" since 2005  C.  [facility], 2018:  Normal LV function, iFR 0.89, KERRY to LAD.  2. History of anemia with B12 deficiency  3. Obstructive sleep apnea, on CPAP therapy  4. 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 Arthritis  7. Hyperlipidemia  8. Hypertension  9. Surgical history:   A. Tonsillectomy  B. Splenectomy  C. Lower lobectomy of right lung  D. Bilateral knee replacements  E. Lumbar discectomy    Allergies:  No Known Allergies    Current Medications:  Current Outpatient Medications   Medication Instructions   • aspirin 81 mg, Oral, Daily, Resume in 1 month   • cetirizine (ZYRTEC) 10 mg, Oral, As Needed   • chlorthalidone (HYGROTEN) 50 mg, Oral, Daily   • coenzyme Q10 100 mg, Oral, Daily   • Cyanocobalamin (B-12 COMPLIANCE INJECTION IJ) 1 dose, Injection, Every 30 Days, LD    • Diclofenac Sodium (VOLTAREN) 4 g, Topical, 2 Times Daily   • rosuvastatin (CRESTOR) 40 mg, Oral, Daily   • Stool Softener 100 mg, Oral, 2 Times Daily PRN        History of Present Illness:           Mr. Brooks returns for schedule follow up today. He denies any cardiac complaints since his last visit. He specifically has not had angina, exertional dyspnea or heart failure symptoms. He remains active as a  and [place]      The following portions of the patient's " "history were reviewed and updated as appropriate: allergies, current medications and problem list.    ROS: Pertinent positives as listed in the HPI.  All other systems reviewed and negative.    Objective:  Vitals:    11/23/21 1009 11/23/21 1013   BP: 112/75 112/79   BP Location: Right arm Right arm   Patient Position: Sitting Standing   Pulse: 67 74   SpO2: 95% 96%   Weight: 81.8 kg (180 lb 6.4 oz)    Height: 175.3 cm (69\")      Body mass index is 26.64 kg/m².    Physical Exam:  GENERAL: Alert, cooperative, in no acute distress.   HEENT: Normocephalic, no adenopathy, no jugular venous distention  HEART: No discrete PMI is noted. Regular rhythm, normal rate, and no murmurs, gallops, or rubs.   LUNGS: Clear to auscultation bilaterally. No wheezing, rales or ronchi.  ABDOMEN: Soft, bowel sounds present, non-tender   NEUROLOGIC: No focal abnormalities involving strength or sensation are noted.   EXTREMITIES: No clubbing, cyanosis, or edema noted.     Diagnostic Data:    Labs 11.2021:  Lipid Panel: , HDL 50, LDL 54, trig 91  CMP: Glucose 95, BUN 19, Cr 1.2, eGFR 60, Na 138, K 3.7, Cl 101, CO2 31, Alk Phos 61, AST 18, ALT 14  CBC WNL     Procedures    Assessment and Plan:     1. CAD: active and asymptomatic without angina or anginal equivalent.   2. Hypertension: well controlled, continue Chlorthalidone.   3. Hyperlipidemia: LDL at target, continue Crestor.   4. Follow up in 6 months, sooner if needed.       Electronically signed by Letha Arenas PA-C, 11/23/21, 10:48 AM EST.                 "

## 2021-12-13 ENCOUNTER — OFFICE VISIT (OUTPATIENT)
Dept: SLEEP MEDICINE | Facility: HOSPITAL | Age: 71
End: 2021-12-13

## 2021-12-13 VITALS
WEIGHT: 179.2 LBS | HEIGHT: 69 IN | HEART RATE: 65 BPM | BODY MASS INDEX: 26.54 KG/M2 | SYSTOLIC BLOOD PRESSURE: 114 MMHG | OXYGEN SATURATION: 96 % | DIASTOLIC BLOOD PRESSURE: 82 MMHG

## 2021-12-13 DIAGNOSIS — G47.33 OSA (OBSTRUCTIVE SLEEP APNEA): Primary | ICD-10-CM

## 2021-12-13 PROCEDURE — 99213 OFFICE O/P EST LOW 20 MIN: CPT | Performed by: NURSE PRACTITIONER

## 2021-12-13 NOTE — PROGRESS NOTES
Chief Complaint:   Chief Complaint   Patient presents with   • Follow-up       HPI:    Nic Brooks is a 71 y.o. male here for follow-up of sleep apnea.  Patient was last seen 12/3/2020.  Patient did stop using CPAP therapy 7/17/2021.  Patient states this is due to the recall.  We did discuss at that time consequences of untreated sleep apnea and patient did not wish to proceed at that time.  He has registered his [email or web address] and will continue use once this is received.  He is now sleeping 7 to 8 hours having frequent awakenings and sometimes able to go back to sleep this is resolved with CPAP therapy.  He has an [place] score of 7/24.  I will go ahead and refill supplies at this time so he may resume CPAP as soon as it arrives and I will see him back in follow-up.        Current medications are:   Current Outpatient Medications:   •  aspirin 81 MG EC tablet, Take 1 tablet by mouth Daily. Resume in 1 month, Disp:  , Rfl:   •  cetirizine (zyrTEC) 10 MG tablet, Take 10 mg by mouth As Needed., Disp: , Rfl:   •  chlorthalidone (HYGROTEN) 50 MG tablet, Take 1 tablet by mouth Daily., Disp: 90 tablet, Rfl: 3  •  coenzyme Q10 100 MG capsule, Take 100 mg by mouth Daily., Disp: , Rfl:   •  Cyanocobalamin (B-12 COMPLIANCE INJECTION IJ), Inject 1 dose as directed Every 30 (Thirty) Days. LD January 5th, Disp: , Rfl:   •  Diclofenac Sodium (VOLTAREN) 1 % gel gel, Apply 4 g topically to the appropriate area as directed 2 (Two) Times a Day. (Patient taking differently: Apply 4 g topically to the appropriate area as directed As Needed.), Disp: 100 g, Rfl: 2  •  docusate sodium (COLACE) 100 MG capsule, Take 1 capsule by mouth 2 (Two) Times a Day As Needed for Constipation., Disp: 60 capsule, Rfl: 0  •  rosuvastatin (CRESTOR) 40 MG tablet, Take 1 tablet by mouth Daily., Disp: 90 tablet, Rfl: 3.      The patient's relevant past medical, surgical, family and social history were reviewed and updated in Epic as  appropriate.       Review of Systems   Eyes: Positive for visual disturbance.   Respiratory: Positive for apnea.    Gastrointestinal: Positive for constipation.   Musculoskeletal: Positive for arthralgias, gait problem, joint swelling and myalgias.   Allergic/Immunologic: Positive for environmental allergies.   Psychiatric/Behavioral: Positive for sleep disturbance.   All other systems reviewed and are negative.        Objective:    Physical Exam  Constitutional:       Appearance: Normal appearance.   HENT:      Head: Normocephalic and atraumatic.      Mouth/Throat:      Comments: Mallampati 4 anatomy  Eyes:      Conjunctiva/sclera: Conjunctivae normal.   Cardiovascular:      Rate and Rhythm: Normal rate and regular rhythm.   Pulmonary:      Effort: Pulmonary effort is normal.      Breath sounds: Normal breath sounds.   Skin:     General: Skin is warm and dry.   Neurological:      Mental Status: He is alert and oriented to person, place, and time.   Psychiatric:         Mood and Affect: Mood normal.         Behavior: Behavior normal.         Thought Content: Thought content normal.         Judgment: Judgment normal.           ASSESSMENT/PLAN    Diagnoses and all orders for this visit:    1. GAUDENCIO (obstructive sleep apnea) (Primary)  -     CPAP Therapy            1. Counseled patient regarding multimodal approach with healthy nutrition, healthy sleep, regular physical activity, social activities, counseling, and medications. Encouraged to practice lateral sleep position. Avoid alcohol and sedatives close to bedtime.  2. Patient will restart use once the new machine arrives and I will see him back in approximately 6 months.    I have reviewed the results of my evaluation and impression and discussed my recommendations in detail with the patient.      Signed by  SHIVA Edwards    December 14, 2021      CC: Madison Weeks APRN          No ref. provider found

## 2022-05-19 DIAGNOSIS — E78.5 DYSLIPIDEMIA, GOAL LDL BELOW 70: ICD-10-CM

## 2022-05-19 DIAGNOSIS — I25.10 CORONARY ARTERY DISEASE INVOLVING NATIVE CORONARY ARTERY OF NATIVE HEART WITHOUT ANGINA PECTORIS: Primary | ICD-10-CM

## 2022-05-19 DIAGNOSIS — R73.03 PREDIABETES: ICD-10-CM

## 2022-06-21 NOTE — PROGRESS NOTES
OFFICE VISIT  NOTE  [facility] CARDIOLOGY      Name: Nic Brooks    Date: 2022  MRN:  [id number]  :  1950      REFERRING/PRIMARY PROVIDER:  Madison Weeks APRN     Chief Complaint   Patient presents with   • Coronary artery disease involving native coronary artery of       HPI: Nic Brooks is a 72 y.o. male who presents today for follow up of CAD, previously followed by Dr. Wilkes. Associated history of hypertension, hyperlipidemia, GAUDENCIO on CPAP, history of right lower lobectomy secondary to granulomatosis, history of splenectomy for splenomegaly and anemia with B12 deficiency. He remains active as a  and walks about 3 [place].   Doing well denies chest pain or shortness of breath.  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    ROS:Pertinent positives as listed in the HPI.  All other systems reviewed and negative.    Past Medical History:   Diagnosis Date   • Arthritis    • Coronary artery disease     s/p stent    • GERD (gastroesophageal reflux disease)    • Hearing loss     kailee hearing aids   • History of kidney stones     just one    • History of transfusion     [facility] with lung surgery    • Hyperlipidemia    • Osteoarthritis    • Sleep apnea with use of continuous positive airway pressure (CPAP)     complaint with machine    • Wears glasses        Past Surgical History:   Procedure Laterality Date   • APPENDECTOMY     • BACK SURGERY      x2   • CARDIAC CATHETERIZATION N/A 2018    Procedure: Left Heart Cath;  Surgeon: Negrito Wilkes MD;  Location: [facility] INVASIVE LOCATION;  Service:    • COLONOSCOPY      every 5 years   • CORONARY STENT PLACEMENT  2018   • ENDOSCOPY     • KIDNEY STONE SURGERY     • KNEE ARTHROSCOPY W/ MENISCECTOMY      left   • LUMBAR DISCECTOMY     • LUNG REMOVAL, PARTIAL      right lower lobe   "  • SPLENECTOMY  1974   • TONSILLECTOMY      removed adnoids   • TOTAL KNEE ARTHROPLASTY Right 11/15/2019    Procedure: TOTAL KNEE ARTHROPLASTY RIGHT;  Surgeon: Negrito Vidal MD;  Location: [facility] OR;  Service: Orthopedics   • TOTAL KNEE ARTHROPLASTY Left 10/20/2020    Procedure: TOTAL KNEE ARTHROPLASTY LEFT;  Surgeon: Negrito Vidal MD;  Location:  [facility] OR;  Service: Orthopedics;  Laterality: Left;   • WISDOM TOOTH EXTRACTION      only 2 removed        Social History     Socioeconomic History   • Marital status:    Tobacco Use   • Smoking status: Never Smoker   • Smokeless tobacco: Never Used   Vaping Use   • Vaping Use: Never used   Substance and Sexual Activity   • Alcohol use: No   • Drug use: No   • Sexual activity: Defer       Family History   Problem Relation Age of Onset   • Hypertension Mother    • Cancer Mother    • Cancer Father    • Emphysema Father    • Diabetes Father    • COPD Father    • Osteoarthritis Father    • No Known Problems Sister    • Cancer Brother    • Diabetes Brother    • Heart disease Brother         No Known Allergies    Current Outpatient Medications   Medication Instructions   • aspirin 81 mg, Oral, Daily, Resume in 1 month   • cetirizine (ZYRTEC) 10 mg, Oral, As Needed   • chlorthalidone (HYGROTEN) 50 mg, Oral, Daily   • coenzyme Q10 100 mg, Oral, Daily   • Cyanocobalamin (B-12 COMPLIANCE INJECTION IJ) 1 dose, Injection, Every 30 Days, LD January 5th   • rosuvastatin (CRESTOR) 40 mg, Oral, Daily   • Stool Softener 100 mg, Oral, 2 Times Daily PRN       Vitals:    06/23/22 0905   BP: 116/68   BP Location: Left arm   Patient Position: Sitting   Cuff Size: Adult   Pulse: 57   SpO2: 98%   Weight: 78.9 kg (174 lb)   Height: 175.3 cm (69\")     Body mass index is 25.7 kg/m².    PHYSICAL EXAM:    General Appearance:   · well developed  · well nourished  Neck:  · thyroid not enlarged  · supple  Respiratory:  · no respiratory distress  · normal breath sounds  · no " "rales  Cardiovascular:  · no jugular venous distention  · regular rhythm  · apical impulse normal  · S1 normal, S2 normal  · no S3, no S4   · no murmur  · no rub, no thrill  · carotid pulses normal; no bruit  · pedal pulses normal  · lower extremity edema: none    Skin:   warm, dry    RESULTS:   Procedures        Labs 11.2021:  Lipid Panel: , HDL 50, LDL 64, trig 91  CMP: Glucose 95, BUN 19, Cr 1.2, eGFR 60, Na 138, K 3.7, Cl 101, CO2 31, Alk Phos 61, AST 18, ALT 14      Lab Results   Component Value Date    CHOL 200 01/18/2018    TRIG 109 01/18/2018    HDL 48 01/18/2018     (H) 01/18/2018    AST 24 01/18/2018    ALT 25 01/18/2018     Lab Results   Component Value Date    HGBA1C 6.00 (H) 10/08/2020     Creatinine   Date Value Ref Range Status   10/08/2020 1.07 0.76 - 1.27 mg/dL Final   11/16/2019 1.20 0.76 - 1.27 mg/dL Final   11/05/2019 1.08 0.76 - 1.27 mg/dL Final     eGFR Non  Amer   Date Value Ref Range Status   10/08/2020 68 >60 mL/min/1.73 Final   11/16/2019 60 (L) >60 mL/min/1.73 Final   11/05/2019 68 >60 mL/min/1.73 Final         ASSESSMENT:  Problem List Items Addressed This Visit        Cardiac and Vasculature    Coronary artery disease involving native coronary artery of native heart without angina pectoris - Primary    Overview     A. Right and [facility] 1/21/2005: Normal LV function, EF 75%, normal right heart pressures, minor nonobstructive coronary artery disease  B.  1/2018, Recurrent chest pain with abnormal EKG showing anterior YESI, \"new\" since 01/2005  C.  [facility], 1/19/2018:  Normal LV function, iFR 0.89, KERRY to LAD.           Dyslipidemia, goal LDL below 70    Essential hypertension       Sleep    Moderate obstructive sleep apnea          PLAN:    1. Coronary artery disease  S/p LAD stent 2018  Continue ASA, statin   Asymptomatic, doing well continue exercise.    2.   Hypertension   Goal <130/80mmHg  Continue Chlorthalidone     3.  Hyperlipidemia   Goal LDL <70  Continue Crestor 40mg " nightly   Recent lipids reviewed, excellent results.    4.  CKD stage III:  GFR 57 5/2022, continue avoidance of NSAIDs and maintain adequate hydration    Advance Care Planning   ACP discussion was held with the patient during this visit. Patient has an advance directive in EMR which is still valid.           Follow-up   Return in about 1 year (around 6/23/2023).    Darryl Hernandez MD, [facility]  Interventional Cardiology

## 2022-06-23 ENCOUNTER — OFFICE VISIT (OUTPATIENT)
Dept: CARDIOLOGY | Facility: CLINIC | Age: 72
End: 2022-06-23

## 2022-06-23 VITALS
HEART RATE: 57 BPM | HEIGHT: 69 IN | WEIGHT: 174 LBS | OXYGEN SATURATION: 98 % | SYSTOLIC BLOOD PRESSURE: 116 MMHG | BODY MASS INDEX: 25.77 KG/M2 | DIASTOLIC BLOOD PRESSURE: 68 MMHG

## 2022-06-23 DIAGNOSIS — I10 ESSENTIAL HYPERTENSION: ICD-10-CM

## 2022-06-23 DIAGNOSIS — I25.10 CORONARY ARTERY DISEASE INVOLVING NATIVE CORONARY ARTERY OF NATIVE HEART WITHOUT ANGINA PECTORIS: Primary | ICD-10-CM

## 2022-06-23 DIAGNOSIS — E78.5 DYSLIPIDEMIA, GOAL LDL BELOW 70: ICD-10-CM

## 2022-06-23 DIAGNOSIS — G47.33 MODERATE OBSTRUCTIVE SLEEP APNEA: ICD-10-CM

## 2022-06-23 PROCEDURE — 99214 OFFICE O/P EST MOD 30 MIN: CPT | Performed by: INTERNAL MEDICINE

## 2022-06-23 RX ORDER — ASPIRIN 81 MG/1
81 TABLET ORAL DAILY
Qty: 90 TABLET | Refills: 3 | Status: SHIPPED | OUTPATIENT
Start: 2022-06-23

## 2022-06-23 RX ORDER — CHLORTHALIDONE 50 MG/1
50 TABLET ORAL DAILY
Qty: 90 TABLET | Refills: 3 | Status: SHIPPED | OUTPATIENT
Start: 2022-06-23

## 2022-06-23 RX ORDER — ROSUVASTATIN CALCIUM 40 MG/1
40 TABLET, COATED ORAL DAILY
Qty: 90 TABLET | Refills: 3 | Status: SHIPPED | OUTPATIENT
Start: 2022-06-23

## 2022-10-27 ENCOUNTER — OFFICE VISIT (OUTPATIENT)
Dept: SLEEP MEDICINE | Facility: HOSPITAL | Age: 72
End: 2022-10-27

## 2022-10-27 VITALS
HEIGHT: 69 IN | DIASTOLIC BLOOD PRESSURE: 72 MMHG | OXYGEN SATURATION: 98 % | HEART RATE: 56 BPM | BODY MASS INDEX: 25.62 KG/M2 | SYSTOLIC BLOOD PRESSURE: 108 MMHG | WEIGHT: 173 LBS

## 2022-10-27 DIAGNOSIS — G47.33 OSA (OBSTRUCTIVE SLEEP APNEA): Primary | ICD-10-CM

## 2022-10-27 PROCEDURE — 99213 OFFICE O/P EST LOW 20 MIN: CPT | Performed by: NURSE PRACTITIONER

## 2022-10-27 NOTE — PROGRESS NOTES
Chief Complaint:   Chief Complaint   Patient presents with   • Follow-up       HPI:    Nic Brooks is a 72 y.o. male here for follow-up of sleep apnea.  Patient was last seen 12/13/2021.  Patient continues to do well with CPAP therapy.  Patient will sleep anywhere from 6 to 8 hours nightly.  He goes to sleep easily does not get up during the night.  Patient has an [place] score of 6/24.  He has no concerns or complaints and will continue CPAP therapy.        Current medications are:   Current Outpatient Medications:   •  aspirin 81 MG EC tablet, Take 1 tablet by mouth Daily., Disp: 90 tablet, Rfl: 3  •  cetirizine (zyrTEC) 10 MG tablet, Take 10 mg by mouth As Needed., Disp: , Rfl:   •  chlorthalidone (HYGROTEN) 50 MG tablet, Take 1 tablet by mouth Daily., Disp: 90 tablet, Rfl: 3  •  coenzyme Q10 100 MG capsule, Take 100 mg by mouth Daily., Disp: , Rfl:   •  Cyanocobalamin (B-12 COMPLIANCE INJECTION IJ), Inject 1 dose as directed Every 30 (Thirty) Days. LD January 5th, Disp: , Rfl:   •  docusate sodium (COLACE) 100 MG capsule, Take 1 capsule by mouth 2 (Two) Times a Day As Needed for Constipation., Disp: 60 capsule, Rfl: 0  •  rosuvastatin (CRESTOR) 40 MG tablet, Take 1 tablet by mouth Daily., Disp: 90 tablet, Rfl: 3.      The patient's relevant past medical, surgical, family and social history were reviewed and updated in Epic as appropriate.       Review of Systems   Eyes: Positive for visual disturbance.   Respiratory: Positive for apnea.    Gastrointestinal: Positive for constipation.   Musculoskeletal: Positive for arthralgias, gait problem, joint swelling and myalgias.   Allergic/Immunologic: Positive for environmental allergies.   All other systems reviewed and are negative.        Objective:    Physical Exam  Constitutional:       Appearance: Normal appearance.   HENT:      Head: Normocephalic and atraumatic.      Mouth/Throat:      Comments: Class 4 airway  Cardiovascular:      Rate and Rhythm:  "Regular rhythm. Bradycardia present.   Pulmonary:      Effort: Pulmonary effort is normal.      Breath sounds: Normal breath sounds.   Skin:     General: Skin is warm and dry.   Neurological:      Mental Status: He is alert.   Psychiatric:         Mood and Affect: Mood normal.         Behavior: Behavior normal.         Thought Content: Thought content normal.         Judgment: Judgment normal.     /72   Pulse 56   Ht 175.3 cm (69\")   Wt 78.5 kg (173 lb)   SpO2 98%   BMI 25.55 kg/m²       CPAP Report  82/90 days of use  Greater than 4-hour use 86.7  90% pressure 7.8  AHI 1.9    The patient continues to use and benefit from CPAP therapy.    ASSESSMENT/PLAN    Diagnoses and all orders for this visit:    1. GAUDENCIO (obstructive sleep apnea) (Primary)  -     PAP Therapy        1. Counseled patient regarding multimodal approach with healthy nutrition, healthy sleep, regular physical activity, social activities, counseling, and medications. Encouraged to practice lateral sleep position. Avoid alcohol and sedatives close to bedtime.  2.   Refill supplies x1 year.  Return to clinic 1 year or sooner symptoms warrant.    I have reviewed the results of my evaluation and impression and discussed my recommendations in detail with the patient.      Signed by  SHIVA Edwards    October 27, 2022      CC: Madison Weeks APRN         No ref. provider found      "

## 2023-01-18 NOTE — PROGRESS NOTES
Patient: Nic Brooks    YOB: 1950    Date: 01/19/2023    Primary Care Provider: Francisco Vincent MD    Chief Complaint   Patient presents with   • Hernia     Inguinal Hernia- right side bulging.       SUBJECTIVE:    History of present illness: Patient states that he was lifting something heavy approximately 5 months ago.  He developed a hernia which has increased in size.  Some discomfort but otherwise no complaints.    The following portions of the patient's history were reviewed and updated as appropriate: allergies, current medications, past family history, past medical history, past social history, past surgical history and problem list.      Review of Systems   Constitutional: Negative for activity change, chills, fever and unexpected weight change.   HENT: Negative for hearing loss, trouble swallowing and voice change.    Eyes: Negative for visual disturbance.   Respiratory: Negative for apnea, cough, chest tightness, shortness of breath and wheezing.    Cardiovascular: Negative for chest pain, palpitations and leg swelling.   Gastrointestinal: Negative for abdominal distention, abdominal pain, anal bleeding, blood in stool, constipation, diarrhea, nausea, rectal pain and vomiting.   Endocrine: Negative for cold intolerance and heat intolerance.   Genitourinary: Negative for difficulty urinating, dysuria and flank pain.   Musculoskeletal: Negative for back pain and gait problem.   Skin: Negative for color change, rash and wound.   Neurological: Negative for dizziness, syncope, speech difficulty, weakness, light-headedness, numbness and headaches.   Hematological: Negative for adenopathy. Does not bruise/bleed easily.   Psychiatric/Behavioral: Negative for confusion. The patient is not nervous/anxious.        Allergies:  No Known Allergies    Medications:    Current Outpatient Medications:   •  amoxicillin (AMOXIL) 500 MG capsule, As Needed. Used prior to dentist appointments, Disp: ,  Rfl:   •  aspirin 81 MG EC tablet, Take 1 tablet by mouth Daily., Disp: 90 tablet, Rfl: 3  •  chlorthalidone (HYGROTEN) 50 MG tablet, Take 1 tablet by mouth Daily., Disp: 90 tablet, Rfl: 3  •  coenzyme Q10 100 MG capsule, Take 100 mg by mouth Daily., Disp: , Rfl:   •  Cyanocobalamin (B-12 COMPLIANCE INJECTION IJ), Inject 1 dose as directed Every 30 (Thirty) Days. LD January 5th, Disp: , Rfl:   •  rosuvastatin (CRESTOR) 40 MG tablet, Take 1 tablet by mouth Daily., Disp: 90 tablet, Rfl: 3  •  docusate sodium (COLACE) 100 MG capsule, Take 1 capsule by mouth 2 (Two) Times a Day As Needed for Constipation., Disp: 60 capsule, Rfl: 0    History:  Past Medical History:   Diagnosis Date   • Arthritis    • Coronary artery disease     s/p stent 2018   • GERD (gastroesophageal reflux disease)    • Hearing loss     kailee hearing aids   • History of kidney stones 2008    just one    • History of transfusion 1980    [facility] with lung surgery    • Hyperlipidemia    • Osteoarthritis    • Sleep apnea with use of continuous positive airway pressure (CPAP)     complaint with machine    • Wears glasses        Past Surgical History:   Procedure Laterality Date   • APPENDECTOMY     • BACK SURGERY      x2   • CARDIAC CATHETERIZATION N/A 1/19/2018    Procedure: Left Heart Cath;  Surgeon: Negrito Wilkes MD;  Location:  [facility] INVASIVE LOCATION;  Service:    • COLONOSCOPY      every 5 years   • CORONARY STENT PLACEMENT  2018   • ENDOSCOPY     • KIDNEY STONE SURGERY     • KNEE ARTHROSCOPY W/ MENISCECTOMY      left   • LUMBAR DISCECTOMY     • LUNG REMOVAL, PARTIAL      right lower lobe    • SPLENECTOMY  1974   • TONSILLECTOMY      removed adnoids   • TOTAL KNEE ARTHROPLASTY Right 11/15/2019    Procedure: TOTAL KNEE ARTHROPLASTY RIGHT;  Surgeon: Negrito Vidal MD;  Location:  [facility] OR;  Service: Orthopedics   • TOTAL KNEE ARTHROPLASTY Left 10/20/2020    Procedure: TOTAL KNEE ARTHROPLASTY LEFT;  Surgeon: Negrito Vidal  "MD;  Location: [facility];  Service: Orthopedics;  Laterality: Left;   • WISDOM TOOTH EXTRACTION      only 2 removed        Family History   Problem Relation Age of Onset   • Hypertension Mother    • Cancer Mother    • Cancer Father    • Emphysema Father    • Diabetes Father    • COPD Father    • Osteoarthritis Father    • No Known Problems Sister    • Diabetes Brother    • Heart disease Brother        Social History     Tobacco Use   • Smoking status: Never   • Smokeless tobacco: Never   Vaping Use   • Vaping Use: Never used   Substance Use Topics   • Alcohol use: No   • Drug use: No        OBJECTIVE:    Vital Signs:   Vitals:    01/19/23 1357   BP: 102/76   BP Location: Left arm   Patient Position: Sitting   Cuff Size: Adult   Pulse: 78   Temp: 98.4 °F (36.9 °C)   TempSrc: Temporal   SpO2: 98%   Weight: 80.7 kg (178 lb)   Height: 175.3 cm (69\")       Physical Exam:   General Appearance:    Alert, cooperative, in no acute distress   Head:    Normocephalic, without obvious abnormality, atraumatic   Eyes:            Normal.  No scleral icterus.  PERRLA    Lungs:     Clear to auscultation,respirations regular, even and                  unlabored    Heart:    Regular rhythm and normal rate, normal S1 and S2, no            murmur   Abdomen:     Normal bowel sounds, no masses, no organomegaly, soft        non-tender, non-distended, no guarding, reducible right inguinal hernia that is moderate in size.   Extremities:   Moves all extremities well, no edema, no cyanosis, no             redness   Skin:   No bleeding, bruising or rash   Neurologic:   Normal without gross deficits.   Psychiatric: No evidence of depression or anxiety        Results Review:   None    Review of Systems was reviewed and confirmed as accurate as documented by the MA.    ASSESSMENT/PLAN:    1. Non-recurrent unilateral inguinal hernia without obstruction or gangrene        I explained the diagnosis to him.  I offered him both observation versus repair " however given its size I do recommend repair due to the risk of intestinal strangulation/obstruction.  He understands this.  He understands the procedure as well as the use of mesh with the procedure.  With repair he understands the risks of bleeding, infection, recurrence, pain including chronic pain, mesh issues including pain and infection etc.  He understands all of the above and he wishes to proceed.        Electronically signed by Dale Curtis MD  02/01/23

## 2023-01-19 ENCOUNTER — OFFICE VISIT (OUTPATIENT)
Dept: SURGERY | Facility: CLINIC | Age: 73
End: 2023-01-19
Payer: MEDICARE

## 2023-01-19 VITALS
BODY MASS INDEX: 26.36 KG/M2 | SYSTOLIC BLOOD PRESSURE: 102 MMHG | HEART RATE: 78 BPM | OXYGEN SATURATION: 98 % | HEIGHT: 69 IN | WEIGHT: 178 LBS | TEMPERATURE: 98.4 F | DIASTOLIC BLOOD PRESSURE: 76 MMHG

## 2023-01-19 DIAGNOSIS — K40.90 NON-RECURRENT UNILATERAL INGUINAL HERNIA WITHOUT OBSTRUCTION OR GANGRENE: Primary | ICD-10-CM

## 2023-01-19 PROCEDURE — 99204 OFFICE O/P NEW MOD 45 MIN: CPT | Performed by: SURGERY

## 2023-01-19 RX ORDER — AMOXICILLIN 500 MG/1
CAPSULE ORAL AS NEEDED
COMMUNITY
Start: 2022-12-14

## 2023-01-19 NOTE — LETTER
January 19, 2023     Francisco Vincent MD  [address]  [facility] [address]    Patient: Nic Brooks   YOB: 1950   Date of Visit: 1/19/2023       Dear Dr. Melisa MD:    Thank you for referring Nic Brooks to me for evaluation. Below are the relevant portions of my assessment and plan of care.    If you have questions, please do not hesitate to call me. I look forward to following Nic along with you.         Sincerely,        Dale Curtis MD        CC: No Recipients  Dale Curtis MD  01/19/23 1443  Signed  Patient: Nic Brooks    YOB: 1950    Date: 01/19/2023    Primary Care Provider: Francisco Vincent MD    Chief Complaint   Patient presents with   • Hernia     Inguinal Hernia- right side bulging.       SUBJECTIVE:    History of present illness: Patient states that he was lifting something heavy approximately 5 months ago.  He developed a hernia which has increased in size.  Some discomfort but otherwise no complaints.    The following portions of the patient's history were reviewed and updated as appropriate: allergies, current medications, past family history, past medical history, past social history, past surgical history and problem list.      Review of Systems   Constitutional: Negative for activity change, chills, fever and unexpected weight change.   HENT: Negative for hearing loss, trouble swallowing and voice change.    Eyes: Negative for visual disturbance.   Respiratory: Negative for apnea, cough, chest tightness, shortness of breath and wheezing.    Cardiovascular: Negative for chest pain, palpitations and leg swelling.   Gastrointestinal: Negative for abdominal distention, abdominal pain, anal bleeding, blood in stool, constipation, diarrhea, nausea, rectal pain and vomiting.   Endocrine: Negative for cold intolerance and heat intolerance.   Genitourinary: Negative for difficulty urinating, dysuria and flank pain.   Musculoskeletal:  Negative for back pain and gait problem.   Skin: Negative for color change, rash and wound.   Neurological: Negative for dizziness, syncope, speech difficulty, weakness, light-headedness, numbness and headaches.   Hematological: Negative for adenopathy. Does not bruise/bleed easily.   Psychiatric/Behavioral: Negative for confusion. The patient is not nervous/anxious.        Allergies:  No Known Allergies    Medications:    Current Outpatient Medications:   •  amoxicillin (AMOXIL) 500 MG capsule, As Needed. Used prior to dentist appointments, Disp: , Rfl:   •  aspirin 81 MG EC tablet, Take 1 tablet by mouth Daily., Disp: 90 tablet, Rfl: 3  •  chlorthalidone (HYGROTEN) 50 MG tablet, Take 1 tablet by mouth Daily., Disp: 90 tablet, Rfl: 3  •  coenzyme Q10 100 MG capsule, Take 100 mg by mouth Daily., Disp: , Rfl:   •  Cyanocobalamin (B-12 COMPLIANCE INJECTION IJ), Inject 1 dose as directed Every 30 (Thirty) Days. LD January 5th, Disp: , Rfl:   •  rosuvastatin (CRESTOR) 40 MG tablet, Take 1 tablet by mouth Daily., Disp: 90 tablet, Rfl: 3  •  docusate sodium (COLACE) 100 MG capsule, Take 1 capsule by mouth 2 (Two) Times a Day As Needed for Constipation., Disp: 60 capsule, Rfl: 0    History:  Past Medical History:   Diagnosis Date   • Arthritis    • Coronary artery disease     s/p stent 2018   • GERD (gastroesophageal reflux disease)    • Hearing loss     kailee hearing aids   • History of kidney stones 2008    just one    • History of transfusion 1980    [facility] with lung surgery    • Hyperlipidemia    • Osteoarthritis    • Sleep apnea with use of continuous positive airway pressure (CPAP)     complaint with machine    • Wears glasses        Past Surgical History:   Procedure Laterality Date   • APPENDECTOMY     • BACK SURGERY      x2   • CARDIAC CATHETERIZATION N/A 1/19/2018    Procedure: Left Heart Cath;  Surgeon: Negrito Wilkes MD;  Location: [facility] CATH INVASIVE LOCATION;  Service:    • COLONOSCOPY       "every 5 years   • CORONARY STENT PLACEMENT  2018   • ENDOSCOPY     • KIDNEY STONE SURGERY     • KNEE ARTHROSCOPY W/ MENISCECTOMY      left   • LUMBAR DISCECTOMY     • LUNG REMOVAL, PARTIAL      right lower lobe    • SPLENECTOMY  1974   • TONSILLECTOMY      removed adnoids   • TOTAL KNEE ARTHROPLASTY Right 11/15/2019    Procedure: TOTAL KNEE ARTHROPLASTY RIGHT;  Surgeon: Negrito Vidal MD;  Location:  [facility];  Service: Orthopedics   • TOTAL KNEE ARTHROPLASTY Left 10/20/2020    Procedure: TOTAL KNEE ARTHROPLASTY LEFT;  Surgeon: Negrito Vidal MD;  Location:  [facility];  Service: Orthopedics;  Laterality: Left;   • WISDOM TOOTH EXTRACTION      only 2 removed        Family History   Problem Relation Age of Onset   • Hypertension Mother    • Cancer Mother    • Cancer Father    • Emphysema Father    • Diabetes Father    • COPD Father    • Osteoarthritis Father    • No Known Problems Sister    • Diabetes Brother    • Heart disease Brother        Social History     Tobacco Use   • Smoking status: Never   • Smokeless tobacco: Never   Vaping Use   • Vaping Use: Never used   Substance Use Topics   • Alcohol use: No   • Drug use: No        OBJECTIVE:    Vital Signs:   Vitals:    01/19/23 1357   BP: 102/76   BP Location: Left arm   Patient Position: Sitting   Cuff Size: Adult   Pulse: 78   Temp: 98.4 °F (36.9 °C)   TempSrc: Temporal   SpO2: 98%   Weight: 80.7 kg (178 lb)   Height: 175.3 cm (69\")       Physical Exam:   General Appearance:    Alert, cooperative, in no acute distress   Head:    Normocephalic, without obvious abnormality, atraumatic   Eyes:            Normal.  No scleral icterus.  PERRLA    Lungs:     Clear to auscultation,respirations regular, even and                  unlabored    Heart:    Regular rhythm and normal rate, normal S1 and S2, no            murmur   Abdomen:     Normal bowel sounds, no masses, no organomegaly, soft        non-tender, non-distended, no guarding, reducible right inguinal hernia " that is moderate in size.   Extremities:   Moves all extremities well, no edema, no cyanosis, no             redness   Skin:   No bleeding, bruising or rash   Neurologic:   Normal without gross deficits.   Psychiatric: No evidence of depression or anxiety        Results Review:   None    Review of Systems was reviewed and confirmed as accurate as documented by the MA.    ASSESSMENT/PLAN:    1. Non-recurrent bilateral inguinal hernia without obstruction or gangrene        I explained the diagnosis to him.  I offered him both observation versus repair however given its size I do recommend repair due to the risk of intestinal strangulation/obstruction.  He understands this.  He understands the procedure as well as the use of mesh with the procedure.  With repair he understands the risks of bleeding, infection, recurrence, pain including chronic pain, mesh issues including pain and infection etc.  He understands all of the above and he wishes to proceed.        Electronically signed by Dale Curtis MD  01/19/23

## 2023-01-27 ENCOUNTER — TELEPHONE (OUTPATIENT)
Dept: SURGERY | Facility: CLINIC | Age: 73
End: 2023-01-27
Payer: MEDICARE

## 2023-02-01 ENCOUNTER — OUTSIDE FACILITY SERVICE (OUTPATIENT)
Dept: SURGERY | Facility: CLINIC | Age: 73
End: 2023-02-01
Payer: MEDICARE

## 2023-02-01 ENCOUNTER — TELEPHONE (OUTPATIENT)
Dept: SURGERY | Facility: CLINIC | Age: 73
End: 2023-02-01

## 2023-02-01 DIAGNOSIS — G89.18 POSTOPERATIVE PAIN: Primary | ICD-10-CM

## 2023-02-01 DIAGNOSIS — G89.18 POSTOPERATIVE PAIN: ICD-10-CM

## 2023-02-01 PROCEDURE — 49505 PRP I/HERN INIT REDUC >5 YR: CPT | Performed by: SURGERY

## 2023-02-01 RX ORDER — IBUPROFEN 600 MG/1
600 TABLET ORAL EVERY 6 HOURS PRN
Qty: 14 TABLET | Refills: 0 | Status: SHIPPED | OUTPATIENT
Start: 2023-02-01 | End: 2023-02-06

## 2023-02-01 RX ORDER — HYDROCODONE BITARTRATE AND ACETAMINOPHEN 5; 325 MG/1; MG/1
1-2 TABLET ORAL EVERY 4 HOURS PRN
Qty: 10 TABLET | Refills: 0 | Status: SHIPPED | OUTPATIENT
Start: 2023-02-01 | End: 2023-02-03

## 2023-02-01 RX ORDER — HYDROCODONE BITARTRATE AND ACETAMINOPHEN 5; 325 MG/1; MG/1
1-2 TABLET ORAL EVERY 4 HOURS PRN
Qty: 10 TABLET | Refills: 0 | Status: SHIPPED | OUTPATIENT
Start: 2023-02-01 | End: 2023-02-01 | Stop reason: SDUPTHER

## 2023-02-07 NOTE — PROGRESS NOTES
"Patient: Nic Brooks    YOB: 1950    Date: 02/09/2023    Primary Care Provider: Francisco Vincent MD    Chief Complaint   Patient presents with   • Post-op Follow-up     Hernia repair       History of present illness:  I saw the patient in the office today as a followup from their recent hernia repair.  They state that they have done well and are having no complaints.    Vital Signs:   Vitals:    02/09/23 1241   BP: 98/64   BP Location: Left arm   Patient Position: Sitting   Cuff Size: Adult   Pulse: 59   Temp: 97.1 °F (36.2 °C)   TempSrc: Infrared   SpO2: 99%   Weight: 80.3 kg (177 lb)   Height: 175.3 cm (69\")       Physical Exam:   General Appearance:    Alert, cooperative, in no acute distress   Abdomen:     no masses, no organomegaly, soft non-tender, non-distended, no guarding, wounds are well healed, no evidence of recurrent hernia     Assessment / Plan:    1. Postoperative visit        Patient doing well.  Having no issues.  Activity and wound care instructions were given.  Follow-up as needed.    Electronically signed by Dale Curtis MD  02/09/23                "

## 2023-02-09 ENCOUNTER — OFFICE VISIT (OUTPATIENT)
Dept: SURGERY | Facility: CLINIC | Age: 73
End: 2023-02-09
Payer: MEDICARE

## 2023-02-09 VITALS
TEMPERATURE: 97.1 F | OXYGEN SATURATION: 99 % | HEIGHT: 69 IN | DIASTOLIC BLOOD PRESSURE: 64 MMHG | WEIGHT: 177 LBS | HEART RATE: 59 BPM | BODY MASS INDEX: 26.22 KG/M2 | SYSTOLIC BLOOD PRESSURE: 98 MMHG

## 2023-02-09 DIAGNOSIS — Z48.89 POSTOPERATIVE VISIT: Primary | ICD-10-CM

## 2023-02-09 PROCEDURE — 99024 POSTOP FOLLOW-UP VISIT: CPT | Performed by: SURGERY

## 2023-02-09 RX ORDER — CYANOCOBALAMIN 1000 UG/ML
INJECTION, SOLUTION INTRAMUSCULAR; SUBCUTANEOUS
COMMUNITY
Start: 2023-01-31 | End: 2023-02-09 | Stop reason: SDUPTHER

## 2023-10-26 ENCOUNTER — OFFICE VISIT (OUTPATIENT)
Dept: SLEEP MEDICINE | Facility: CLINIC | Age: 73
End: 2023-10-26
Payer: MEDICARE

## 2023-10-26 VITALS
HEIGHT: 69 IN | WEIGHT: 172.5 LBS | HEART RATE: 62 BPM | TEMPERATURE: 98.2 F | BODY MASS INDEX: 25.55 KG/M2 | OXYGEN SATURATION: 96 %

## 2023-10-26 DIAGNOSIS — G47.33 OSA (OBSTRUCTIVE SLEEP APNEA): Primary | ICD-10-CM

## 2023-10-26 PROCEDURE — 99213 OFFICE O/P EST LOW 20 MIN: CPT | Performed by: NURSE PRACTITIONER

## 2023-10-26 NOTE — PROGRESS NOTES
Chief Complaint:   Chief Complaint   Patient presents with    Follow-up       HPI:    Nic Brooks is a 73 y.o. male here for follow-up of sleep apnea.  Patient was last seen 10/27/2022.  Patient continues to do well with CPAP therapy.  Patient is sleeping 7 hours nightly and does feel rested upon awakening.  Patient goes to sleep easily and does not get up during the night.  Patient has an Talihina score of 5/24.  We did speak about 18 out of the last 30 days of use.  Patient states the ragweed and pollen have been very bad for him this year and cannot wear the mask when he has severe congestion.  He does feel much better on CPAP, has no concerns or complaints, and will continue therapy.        Current medications are:   Current Outpatient Medications:     amoxicillin (AMOXIL) 500 MG capsule, As Needed. Used prior to dentist appointments, Disp: , Rfl:     aspirin 81 MG EC tablet, Take 1 tablet by mouth Daily., Disp: 90 tablet, Rfl: 3    chlorthalidone (HYGROTEN) 50 MG tablet, Take 1 tablet by mouth Daily., Disp: 90 tablet, Rfl: 3    coenzyme Q10 100 MG capsule, Take 1 capsule by mouth Daily., Disp: , Rfl:     Cyanocobalamin (B-12 COMPLIANCE INJECTION IJ), Inject 1 dose as directed Every 30 (Thirty) Days. LD January 5th, Disp: , Rfl:     rosuvastatin (CRESTOR) 40 MG tablet, Take 1 tablet by mouth Daily., Disp: 90 tablet, Rfl: 3    vitamin B-6 (PYRIDOXINE) 50 MG tablet, Take 1 tablet by mouth Daily., Disp: , Rfl: .      The patient's relevant past medical, surgical, family and social history were reviewed and updated in Epic as appropriate.       Review of Systems   HENT:  Positive for congestion and hearing loss.    Eyes:  Positive for visual disturbance.   Respiratory:  Positive for apnea.    Musculoskeletal:  Positive for arthralgias, joint swelling and myalgias.   Allergic/Immunologic: Positive for environmental allergies.   Psychiatric/Behavioral:  Positive for sleep disturbance.    All other systems  reviewed and are negative.        Objective:    Physical Exam  Constitutional:       Appearance: Normal appearance.   HENT:      Head: Normocephalic and atraumatic.      Mouth/Throat:      Comments: Class 4 airway  Cardiovascular:      Rate and Rhythm: Normal rate and regular rhythm.   Pulmonary:      Effort: Pulmonary effort is normal.      Breath sounds: Normal breath sounds.   Skin:     General: Skin is warm and dry.   Neurological:      Mental Status: He is alert and oriented to person, place, and time.   Psychiatric:         Mood and Affect: Mood normal.         Behavior: Behavior normal.         Thought Content: Thought content normal.         Judgment: Judgment normal.         CPAP Report    18/30 days of use  Greater than 4-hour use 53.3  9% pressure 8.0  AHI 1.4  Settings 6-18  The patient continues to use and benefit from CPAP therapy.    ASSESSMENT/PLAN    Diagnoses and all orders for this visit:    1. GAUDENCIO (obstructive sleep apnea) (Primary)  -     PAP Therapy        Counseled patient regarding multimodal approach with healthy nutrition, healthy sleep, regular physical activity, social activities, counseling, and medications. Encouraged to practice lateral sleep position. Avoid alcohol and sedatives close to bedtime.    Refill supplies x1 year.  Return to clinic 1 year sooner symptoms warrant.    I have reviewed the results of my evaluation and impression and discussed my recommendations in detail with the patient.      Signed by  Maureen Steele, APRN    October 26, 2023      CC: Francisco Vincent MD         No ref. provider found

## 2023-12-06 NOTE — PROGRESS NOTES
Patient: Nic Brooks    YOB: 1950    Date: 12/08/2023    Primary Care Provider: Francisco Vincent MD    Chief Complaint   Patient presents with    Hernia     left       SUBJECTIVE:    History of present illness:  I saw the patient in the office today as a consultation for evaluation and treatment of left inguinal hernia.  Patient states that 2 weeks ago he noticed the hernia.  He has some discomfort with that.  Had his other side repaired earlier in the year.    The following portions of the patient's history were reviewed and updated as appropriate: allergies, current medications, past family history, past medical history, past social history, past surgical history and problem list.    Review of Systems   Constitutional:  Negative for chills, fever and unexpected weight change.   HENT:  Negative for trouble swallowing and voice change.    Eyes:  Negative for visual disturbance.   Respiratory:  Negative for apnea, cough, chest tightness, shortness of breath and wheezing.    Cardiovascular:  Negative for chest pain, palpitations and leg swelling.   Gastrointestinal:  Negative for abdominal distention, abdominal pain, anal bleeding, blood in stool, constipation, diarrhea, nausea, rectal pain and vomiting.   Endocrine: Negative for cold intolerance and heat intolerance.   Genitourinary:  Positive for testicular pain. Negative for difficulty urinating, dysuria, flank pain and scrotal swelling.   Musculoskeletal:  Negative for back pain, gait problem and joint swelling.   Skin:  Negative for color change, rash and wound.   Neurological:  Negative for dizziness, syncope, speech difficulty, weakness, numbness and headaches.   Hematological:  Negative for adenopathy. Does not bruise/bleed easily.   Psychiatric/Behavioral:  Negative for confusion. The patient is not nervous/anxious.        History:  Past Medical History:   Diagnosis Date    Arthritis     Coronary artery disease     s/p stent 2018     GERD (gastroesophageal reflux disease)     Hearing loss     kailee hearing aids    History of kidney stones 2008    just one     History of transfusion 1980    [facility] with lung surgery     Hyperlipidemia     Osteoarthritis     Sleep apnea with use of continuous positive airway pressure (CPAP)     complaint with machine     Wears glasses        Past Surgical History:   Procedure Laterality Date    APPENDECTOMY      BACK SURGERY      x2    CARDIAC CATHETERIZATION N/A 1/19/2018    Procedure: Left Heart Cath;  Surgeon: Negrito Wilkes MD;  Location:  [facility] CATH INVASIVE LOCATION;  Service:     COLONOSCOPY      every 5 years    CORONARY STENT PLACEMENT  2018    ENDOSCOPY      KIDNEY STONE SURGERY      KNEE ARTHROSCOPY W/ MENISCECTOMY      left    LUMBAR DISCECTOMY      LUNG REMOVAL, PARTIAL      right lower lobe     SPLENECTOMY  1974    TONSILLECTOMY      removed adnoids    TOTAL KNEE ARTHROPLASTY Right 11/15/2019    Procedure: TOTAL KNEE ARTHROPLASTY RIGHT;  Surgeon: Negrito Vidal MD;  Location:  [facility];  Service: Orthopedics    TOTAL KNEE ARTHROPLASTY Left 10/20/2020    Procedure: TOTAL KNEE ARTHROPLASTY LEFT;  Surgeon: Negrito Vidal MD;  Location:  [facility];  Service: Orthopedics;  Laterality: Left;    WISDOM TOOTH EXTRACTION      only 2 removed        Family History   Problem Relation Age of Onset    Hypertension Mother     Cancer Mother     Cancer Father     Emphysema Father     Diabetes Father     COPD Father     Osteoarthritis Father     No Known Problems Sister     Diabetes Brother     Heart disease Brother        Social History     Tobacco Use    Smoking status: Never     Passive exposure: Never    Smokeless tobacco: Never   Vaping Use    Vaping Use: Never used   Substance Use Topics    Alcohol use: No    Drug use: No       Allergies:  No Known Allergies    Medications:    Current Outpatient Medications:     aspirin 81 MG EC tablet, Take 1 tablet by mouth Daily., Disp: 90 tablet, Rfl: 3    " chlorthalidone (HYGROTEN) 50 MG tablet, Take 1 tablet by mouth Daily., Disp: 90 tablet, Rfl: 3    coenzyme Q10 100 MG capsule, Take 1 capsule by mouth Daily., Disp: , Rfl:     Cyanocobalamin (B-12 COMPLIANCE INJECTION IJ), Inject 1 dose as directed Every 30 (Thirty) Days. LD January 5th, Disp: , Rfl:     rosuvastatin (CRESTOR) 40 MG tablet, Take 1 tablet by mouth Daily., Disp: 90 tablet, Rfl: 3    vitamin B-6 (PYRIDOXINE) 50 MG tablet, Take 1 tablet by mouth Daily., Disp: , Rfl:     OBJECTIVE:    Vital Signs:   Vitals:    12/08/23 0929   BP: 130/84   Pulse: 63   Temp: 98.6 °F (37 °C)   TempSrc: Temporal   SpO2: 97%   Weight: 78 kg (172 lb)   Height: 175.3 cm (69\")       Physical Exam:   General Appearance:    Alert, cooperative, in no acute distress   Head:    Normocephalic, without obvious abnormality, atraumatic   Eyes:            Normal.  No scleral icterus.  PERRLA    Lungs:     Clear to auscultation,respirations regular, even and                  unlabored    Heart:    Regular rhythm and normal rate, normal S1 and S2, no            murmur   Abdomen:   Soft and nontender and not distended.  Reducible left inguinal hernia.   Extremities:   Moves all extremities well, no edema, no cyanosis, no             redness   Skin:   No bleeding, bruising or rash   Neurologic:   Normal without gross deficits.   Psychiatric: No evidence of depression or anxiety          Results Review:   None    Review of Systems was reviewed and confirmed as accurate as documented by the MA.    ASSESSMENT/PLAN:    1. Non-recurrent unilateral inguinal hernia without obstruction or gangrene         recommend repair of this hernia.  He understands procedure as well as the risk of bleeding, infection, recurrence, pain, mesh issues including pain and infection etc.  At this time he wishes to proceed.    Electronically signed by Dale Curtis MD  12/08/23            "

## 2023-12-08 ENCOUNTER — PATIENT ROUNDING (BHMG ONLY) (OUTPATIENT)
Dept: SURGERY | Facility: CLINIC | Age: 73
End: 2023-12-08
Payer: MEDICARE

## 2023-12-08 ENCOUNTER — OFFICE VISIT (OUTPATIENT)
Dept: SURGERY | Facility: CLINIC | Age: 73
End: 2023-12-08
Payer: MEDICARE

## 2023-12-08 VITALS
SYSTOLIC BLOOD PRESSURE: 130 MMHG | HEART RATE: 63 BPM | DIASTOLIC BLOOD PRESSURE: 84 MMHG | HEIGHT: 69 IN | BODY MASS INDEX: 25.48 KG/M2 | WEIGHT: 172 LBS | TEMPERATURE: 98.6 F | OXYGEN SATURATION: 97 %

## 2023-12-08 DIAGNOSIS — K40.90 NON-RECURRENT UNILATERAL INGUINAL HERNIA WITHOUT OBSTRUCTION OR GANGRENE: Primary | ICD-10-CM

## 2023-12-08 PROCEDURE — 1160F RVW MEDS BY RX/DR IN RCRD: CPT | Performed by: SURGERY

## 2023-12-08 PROCEDURE — 99214 OFFICE O/P EST MOD 30 MIN: CPT | Performed by: SURGERY

## 2023-12-08 PROCEDURE — 3079F DIAST BP 80-89 MM HG: CPT | Performed by: SURGERY

## 2023-12-08 PROCEDURE — 1159F MED LIST DOCD IN RCRD: CPT | Performed by: SURGERY

## 2023-12-08 PROCEDURE — 3075F SYST BP GE 130 - 139MM HG: CPT | Performed by: SURGERY

## 2023-12-08 NOTE — PROGRESS NOTES
December 8, 2023    Hello, may I speak with Nic Brooks?    My name is Destiny     I am  with [facility]  [address]  [place] [place] [address] [phone].    Before we get started may I verify your date of birth? 1950    I am calling to officially welcome you to our practice and ask about your recent visit. Is this a good time to talk? yes    Tell me about your visit with us. What things went well?  Great Visit         We're always looking for ways to make our patients' experiences even better. Do you have recommendations on ways we may improve?  no    Overall were you satisfied with your first visit to our practice? yes       I appreciate you taking the time to speak with me today. Is there anything else I can do for you? no      Thank you, and have a great day.

## 2023-12-14 ENCOUNTER — TELEPHONE (OUTPATIENT)
Dept: SURGERY | Facility: CLINIC | Age: 73
End: 2023-12-14
Payer: MEDICARE

## 2023-12-14 NOTE — TELEPHONE ENCOUNTER
MEGANM giles Lucero to call and confirm the procedure on 12/20/2023 @ [facility].

## 2023-12-20 ENCOUNTER — TELEPHONE (OUTPATIENT)
Dept: CARDIOLOGY | Facility: CLINIC | Age: 73
End: 2023-12-20
Payer: MEDICARE

## 2023-12-20 ENCOUNTER — OUTSIDE FACILITY SERVICE (OUTPATIENT)
Dept: SURGERY | Facility: CLINIC | Age: 73
End: 2023-12-20
Payer: MEDICARE

## 2023-12-20 NOTE — TELEPHONE ENCOUNTER
Caller: Katie Brooks    Relationship to patient: Emergency Contact    Best call back number: [phone]    Chief complaint: PT HAD A ABNORMAL EKG BEFORE A SURGERY SO IT WAS NOT ABLE TO BE PREFORMED.     Type of visit: FOLLOW UP    Requested date: ASAP       Additional notes:PT HAS COPY OF EKG RESULTS TO GIVE TO PROVIDER.

## 2023-12-21 ENCOUNTER — APPOINTMENT (OUTPATIENT)
Dept: GENERAL RADIOLOGY | Facility: HOSPITAL | Age: 73
DRG: 287 | End: 2023-12-21
Payer: MEDICARE

## 2023-12-21 ENCOUNTER — HOSPITAL ENCOUNTER (INPATIENT)
Facility: HOSPITAL | Age: 73
LOS: 1 days | Discharge: HOME | DRG: 287 | End: 2023-12-22
Attending: EMERGENCY MEDICINE | Admitting: INTERNAL MEDICINE
Payer: MEDICARE

## 2023-12-21 DIAGNOSIS — I20.0 UNSTABLE ANGINA: ICD-10-CM

## 2023-12-21 DIAGNOSIS — R07.9 CHEST PAIN, UNSPECIFIED TYPE: Primary | ICD-10-CM

## 2023-12-21 LAB
ALBUMIN SERPL-MCNC: 4.1 G/DL (ref 3.5–5.2)
ALBUMIN/GLOB SERPL: 1.6 G/DL
ALP SERPL-CCNC: 63 U/L (ref 39–117)
ALT SERPL W P-5'-P-CCNC: 22 U/L (ref 1–41)
ANION GAP SERPL CALCULATED.3IONS-SCNC: 13 MMOL/L (ref 5–15)
ANION GAP SERPL CALCULATED.3IONS-SCNC: 8 MMOL/L (ref 5–15)
AST SERPL-CCNC: 24 U/L (ref 1–40)
BASOPHILS # BLD AUTO: 0.04 10*3/MM3 (ref 0–0.2)
BASOPHILS # BLD AUTO: 0.04 10*3/MM3 (ref 0–0.2)
BASOPHILS NFR BLD AUTO: 0.4 % (ref 0–1.5)
BASOPHILS NFR BLD AUTO: 0.4 % (ref 0–1.5)
BILIRUB SERPL-MCNC: 0.5 MG/DL (ref 0–1.2)
BUN SERPL-MCNC: 21 MG/DL (ref 8–23)
BUN SERPL-MCNC: 22 MG/DL (ref 8–23)
BUN/CREAT SERPL: 19.1 (ref 7–25)
BUN/CREAT SERPL: 19.6 (ref 7–25)
CALCIUM SPEC-SCNC: 9 MG/DL (ref 8.6–10.5)
CALCIUM SPEC-SCNC: 9.6 MG/DL (ref 8.6–10.5)
CHLORIDE SERPL-SCNC: 100 MMOL/L (ref 98–107)
CHLORIDE SERPL-SCNC: 102 MMOL/L (ref 98–107)
CHOLEST SERPL-MCNC: 124 MG/DL (ref 0–200)
CO2 SERPL-SCNC: 23 MMOL/L (ref 22–29)
CO2 SERPL-SCNC: 30 MMOL/L (ref 22–29)
CREAT SERPL-MCNC: 1.07 MG/DL (ref 0.76–1.27)
CREAT SERPL-MCNC: 1.15 MG/DL (ref 0.76–1.27)
DEPRECATED RDW RBC AUTO: 51.3 FL (ref 37–54)
DEPRECATED RDW RBC AUTO: 53.2 FL (ref 37–54)
EGFRCR SERPLBLD CKD-EPI 2021: 66.8 ML/MIN/1.73
EGFRCR SERPLBLD CKD-EPI 2021: 72.8 ML/MIN/1.73
EOSINOPHIL # BLD AUTO: 0.06 10*3/MM3 (ref 0–0.4)
EOSINOPHIL # BLD AUTO: 0.07 10*3/MM3 (ref 0–0.4)
EOSINOPHIL NFR BLD AUTO: 0.6 % (ref 0.3–6.2)
EOSINOPHIL NFR BLD AUTO: 0.8 % (ref 0.3–6.2)
ERYTHROCYTE [DISTWIDTH] IN BLOOD BY AUTOMATED COUNT: 15.3 % (ref 12.3–15.4)
ERYTHROCYTE [DISTWIDTH] IN BLOOD BY AUTOMATED COUNT: 15.4 % (ref 12.3–15.4)
GEN 5 2HR TROPONIN T REFLEX: 14 NG/L
GLOBULIN UR ELPH-MCNC: 2.6 GM/DL
GLUCOSE SERPL-MCNC: 104 MG/DL (ref 65–99)
GLUCOSE SERPL-MCNC: 96 MG/DL (ref 65–99)
HCT VFR BLD AUTO: 47 % (ref 37.5–51)
HCT VFR BLD AUTO: 49.1 % (ref 37.5–51)
HDLC SERPL-MCNC: 52 MG/DL (ref 40–60)
HGB BLD-MCNC: 15.9 G/DL (ref 13–17.7)
HGB BLD-MCNC: 16.5 G/DL (ref 13–17.7)
HOLD SPECIMEN: NORMAL
IMM GRANULOCYTES # BLD AUTO: 0.02 10*3/MM3 (ref 0–0.05)
IMM GRANULOCYTES # BLD AUTO: 0.02 10*3/MM3 (ref 0–0.05)
IMM GRANULOCYTES NFR BLD AUTO: 0.2 % (ref 0–0.5)
IMM GRANULOCYTES NFR BLD AUTO: 0.2 % (ref 0–0.5)
LDLC SERPL CALC-MCNC: 58 MG/DL (ref 0–100)
LDLC/HDLC SERPL: 1.12 {RATIO}
LIPASE SERPL-CCNC: 20 U/L (ref 13–60)
LYMPHOCYTES # BLD AUTO: 2.69 10*3/MM3 (ref 0.7–3.1)
LYMPHOCYTES # BLD AUTO: 3.04 10*3/MM3 (ref 0.7–3.1)
LYMPHOCYTES NFR BLD AUTO: 28.3 % (ref 19.6–45.3)
LYMPHOCYTES NFR BLD AUTO: 32.9 % (ref 19.6–45.3)
MCH RBC QN AUTO: 31 PG (ref 26.6–33)
MCH RBC QN AUTO: 31.3 PG (ref 26.6–33)
MCHC RBC AUTO-ENTMCNC: 33.6 G/DL (ref 31.5–35.7)
MCHC RBC AUTO-ENTMCNC: 33.8 G/DL (ref 31.5–35.7)
MCV RBC AUTO: 91.6 FL (ref 79–97)
MCV RBC AUTO: 93.2 FL (ref 79–97)
MONOCYTES # BLD AUTO: 0.94 10*3/MM3 (ref 0.1–0.9)
MONOCYTES # BLD AUTO: 1 10*3/MM3 (ref 0.1–0.9)
MONOCYTES NFR BLD AUTO: 10.8 % (ref 5–12)
MONOCYTES NFR BLD AUTO: 9.9 % (ref 5–12)
NEUTROPHILS NFR BLD AUTO: 5.07 10*3/MM3 (ref 1.7–7)
NEUTROPHILS NFR BLD AUTO: 5.77 10*3/MM3 (ref 1.7–7)
NEUTROPHILS NFR BLD AUTO: 54.9 % (ref 42.7–76)
NEUTROPHILS NFR BLD AUTO: 60.6 % (ref 42.7–76)
NRBC BLD AUTO-RTO: 0 /100 WBC (ref 0–0.2)
NRBC BLD AUTO-RTO: 0 /100 WBC (ref 0–0.2)
NT-PROBNP SERPL-MCNC: 42.8 PG/ML (ref 0–900)
NT-PROBNP SERPL-MCNC: 46.3 PG/ML (ref 0–900)
PHOSPHATE SERPL-MCNC: 2.5 MG/DL (ref 2.5–4.5)
PLATELET # BLD AUTO: 206 10*3/MM3 (ref 140–450)
PLATELET # BLD AUTO: 237 10*3/MM3 (ref 140–450)
PMV BLD AUTO: 10.4 FL (ref 6–12)
PMV BLD AUTO: 10.5 FL (ref 6–12)
POTASSIUM SERPL-SCNC: 3.5 MMOL/L (ref 3.5–5.2)
POTASSIUM SERPL-SCNC: 3.9 MMOL/L (ref 3.5–5.2)
PROT SERPL-MCNC: 6.7 G/DL (ref 6–8.5)
QT INTERVAL: 414 MS
QT INTERVAL: 434 MS
QTC INTERVAL: 422 MS
QTC INTERVAL: 430 MS
RBC # BLD AUTO: 5.13 10*6/MM3 (ref 4.14–5.8)
RBC # BLD AUTO: 5.27 10*6/MM3 (ref 4.14–5.8)
SODIUM SERPL-SCNC: 138 MMOL/L (ref 136–145)
SODIUM SERPL-SCNC: 138 MMOL/L (ref 136–145)
TRIGL SERPL-MCNC: 69 MG/DL (ref 0–150)
TROPONIN T DELTA: -1 NG/L
TROPONIN T SERPL HS-MCNC: 15 NG/L
VLDLC SERPL-MCNC: 14 MG/DL (ref 5–40)
WBC NRBC COR # BLD AUTO: 9.24 10*3/MM3 (ref 3.4–10.8)
WBC NRBC COR # BLD AUTO: 9.52 10*3/MM3 (ref 3.4–10.8)
WHOLE BLOOD HOLD COAG: NORMAL
WHOLE BLOOD HOLD SPECIMEN: NORMAL

## 2023-12-21 PROCEDURE — 71045 X-RAY EXAM CHEST 1 VIEW: CPT

## 2023-12-21 PROCEDURE — 83880 ASSAY OF NATRIURETIC PEPTIDE: CPT | Performed by: EMERGENCY MEDICINE

## 2023-12-21 PROCEDURE — 25810000003 SODIUM CHLORIDE 0.9 % SOLUTION: Performed by: INTERNAL MEDICINE

## 2023-12-21 PROCEDURE — 83880 ASSAY OF NATRIURETIC PEPTIDE: CPT | Performed by: INTERNAL MEDICINE

## 2023-12-21 PROCEDURE — 84484 ASSAY OF TROPONIN QUANT: CPT | Performed by: EMERGENCY MEDICINE

## 2023-12-21 PROCEDURE — 85025 COMPLETE CBC W/AUTO DIFF WBC: CPT | Performed by: INTERNAL MEDICINE

## 2023-12-21 PROCEDURE — 80061 LIPID PANEL: CPT | Performed by: INTERNAL MEDICINE

## 2023-12-21 PROCEDURE — 93005 ELECTROCARDIOGRAM TRACING: CPT

## 2023-12-21 PROCEDURE — 36415 COLL VENOUS BLD VENIPUNCTURE: CPT

## 2023-12-21 PROCEDURE — 84100 ASSAY OF PHOSPHORUS: CPT | Performed by: INTERNAL MEDICINE

## 2023-12-21 PROCEDURE — 99222 1ST HOSP IP/OBS MODERATE 55: CPT | Performed by: INTERNAL MEDICINE

## 2023-12-21 PROCEDURE — 80053 COMPREHEN METABOLIC PANEL: CPT | Performed by: EMERGENCY MEDICINE

## 2023-12-21 PROCEDURE — 99285 EMERGENCY DEPT VISIT HI MDM: CPT

## 2023-12-21 PROCEDURE — 83690 ASSAY OF LIPASE: CPT | Performed by: EMERGENCY MEDICINE

## 2023-12-21 PROCEDURE — 93005 ELECTROCARDIOGRAM TRACING: CPT | Performed by: EMERGENCY MEDICINE

## 2023-12-21 PROCEDURE — 85025 COMPLETE CBC W/AUTO DIFF WBC: CPT

## 2023-12-21 RX ORDER — SODIUM CHLORIDE 0.9 % (FLUSH) 0.9 %
10 SYRINGE (ML) INJECTION EVERY 12 HOURS SCHEDULED
Status: DISCONTINUED | OUTPATIENT
Start: 2023-12-21 | End: 2023-12-22 | Stop reason: HOSPADM

## 2023-12-21 RX ORDER — CHLORTHALIDONE 25 MG/1
50 TABLET ORAL DAILY
Status: DISCONTINUED | OUTPATIENT
Start: 2023-12-21 | End: 2023-12-22 | Stop reason: HOSPADM

## 2023-12-21 RX ORDER — ROSUVASTATIN CALCIUM 20 MG/1
40 TABLET, COATED ORAL NIGHTLY
Status: DISCONTINUED | OUTPATIENT
Start: 2023-12-21 | End: 2023-12-22 | Stop reason: HOSPADM

## 2023-12-21 RX ORDER — POLYETHYLENE GLYCOL 3350 17 G/17G
17 POWDER, FOR SOLUTION ORAL DAILY PRN
Status: DISCONTINUED | OUTPATIENT
Start: 2023-12-21 | End: 2023-12-22 | Stop reason: HOSPADM

## 2023-12-21 RX ORDER — ASPIRIN 81 MG/1
81 TABLET, CHEWABLE ORAL DAILY
Status: DISCONTINUED | OUTPATIENT
Start: 2023-12-22 | End: 2023-12-22 | Stop reason: HOSPADM

## 2023-12-21 RX ORDER — BISACODYL 5 MG/1
5 TABLET, DELAYED RELEASE ORAL DAILY PRN
Status: DISCONTINUED | OUTPATIENT
Start: 2023-12-21 | End: 2023-12-22 | Stop reason: HOSPADM

## 2023-12-21 RX ORDER — AMOXICILLIN 250 MG
2 CAPSULE ORAL 2 TIMES DAILY
Status: DISCONTINUED | OUTPATIENT
Start: 2023-12-21 | End: 2023-12-22 | Stop reason: HOSPADM

## 2023-12-21 RX ORDER — BISACODYL 10 MG
10 SUPPOSITORY, RECTAL RECTAL DAILY PRN
Status: DISCONTINUED | OUTPATIENT
Start: 2023-12-21 | End: 2023-12-22 | Stop reason: HOSPADM

## 2023-12-21 RX ORDER — ASPIRIN 81 MG/1
324 TABLET, CHEWABLE ORAL ONCE
Status: COMPLETED | OUTPATIENT
Start: 2023-12-21 | End: 2023-12-21

## 2023-12-21 RX ORDER — SODIUM CHLORIDE 9 MG/ML
75 INJECTION, SOLUTION INTRAVENOUS CONTINUOUS
Status: DISCONTINUED | OUTPATIENT
Start: 2023-12-21 | End: 2023-12-22 | Stop reason: HOSPADM

## 2023-12-21 RX ORDER — SODIUM CHLORIDE 9 MG/ML
40 INJECTION, SOLUTION INTRAVENOUS AS NEEDED
Status: DISCONTINUED | OUTPATIENT
Start: 2023-12-21 | End: 2023-12-22 | Stop reason: HOSPADM

## 2023-12-21 RX ORDER — NITROGLYCERIN 0.4 MG/1
0.4 TABLET SUBLINGUAL
Status: DISCONTINUED | OUTPATIENT
Start: 2023-12-21 | End: 2023-12-22 | Stop reason: SDUPTHER

## 2023-12-21 RX ORDER — SODIUM CHLORIDE 0.9 % (FLUSH) 0.9 %
10 SYRINGE (ML) INJECTION AS NEEDED
Status: DISCONTINUED | OUTPATIENT
Start: 2023-12-21 | End: 2023-12-22 | Stop reason: HOSPADM

## 2023-12-21 RX ORDER — SODIUM CHLORIDE 0.9 % (FLUSH) 0.9 %
10 SYRINGE (ML) INJECTION AS NEEDED
Status: DISCONTINUED | OUTPATIENT
Start: 2023-12-21 | End: 2023-12-22 | Stop reason: SDUPTHER

## 2023-12-21 RX ADMIN — ROSUVASTATIN 40 MG: 20 TABLET, FILM COATED ORAL at 20:57

## 2023-12-21 RX ADMIN — ASPIRIN 81 MG CHEWABLE TABLET 324 MG: 81 TABLET CHEWABLE at 12:04

## 2023-12-21 RX ADMIN — Medication 10 ML: at 20:57

## 2023-12-21 RX ADMIN — SODIUM CHLORIDE 75 ML/HR: 9 INJECTION, SOLUTION INTRAVENOUS at 17:26

## 2023-12-21 RX ADMIN — SENNOSIDES AND DOCUSATE SODIUM 2 TABLET: 8.6; 5 TABLET ORAL at 20:57

## 2023-12-21 RX ADMIN — CHLORTHALIDONE 50 MG: 25 TABLET ORAL at 17:25

## 2023-12-21 NOTE — ED NOTES
Nic Brooks    Nursing Report ED to Floor:  Mental status: A&O x 4  Ambulatory status: independent  Oxygen Therapy:  room air  Cardiac Rhythm: normal sinus  Admitted from: ED  Safety Concerns:  none  Social Issues: none  ED Room #:  28    ED Nurse Phone Extension - [phone] or may call [phone].      HPI:   Chief Complaint   Patient presents with    Chest Pain       Past Medical History:  Past Medical History:   Diagnosis Date    Arthritis     Coronary artery disease     s/p stent 2018    GERD (gastroesophageal reflux disease)     Hearing loss     kailee hearing aids    History of kidney stones 2008    just one     History of transfusion 1980    [facility] with lung surgery     Hyperlipidemia     Osteoarthritis     Sleep apnea with use of continuous positive airway pressure (CPAP)     complaint with machine     Wears glasses         Past Surgical History:  Past Surgical History:   Procedure Laterality Date    APPENDECTOMY      BACK SURGERY      x2    CARDIAC CATHETERIZATION N/A 1/19/2018    Procedure: Left Heart Cath;  Surgeon: Negrito Wilkes MD;  Location:  [facility];  Service:     COLONOSCOPY      every 5 years    CORONARY STENT PLACEMENT  2018    ENDOSCOPY      KIDNEY STONE SURGERY      KNEE ARTHROSCOPY W/ MENISCECTOMY      left    LUMBAR DISCECTOMY      LUNG REMOVAL, PARTIAL      right lower lobe     SPLENECTOMY  1974    TONSILLECTOMY      removed adnoids    TOTAL KNEE ARTHROPLASTY Right 11/15/2019    Procedure: TOTAL KNEE ARTHROPLASTY RIGHT;  Surgeon: Negrito Vidal MD;  [facility];  Service: Orthopedics    TOTAL KNEE ARTHROPLASTY Left 10/20/2020    Procedure: TOTAL KNEE ARTHROPLASTY LEFT;  Surgeon: Negrito Vidal MD;  Location:  [facility];  Service: Orthopedics;  Laterality: Left;    WISDOM TOOTH EXTRACTION      only 2 removed         Admitting Doctor:   Raul Escudero MD    Consulting Provider(s):  Consults       No orders found from 11/22/2023 to  "12/22/2023.             Admitting Diagnosis:   The encounter diagnosis was Chest pain, unspecified type.    Most Recent Vitals:   Vitals:    12/21/23 1153 12/21/23 1233   BP: 131/95 132/89   BP Location: Left arm Right arm   Patient Position: Sitting Lying   Pulse: (!) 49 58   Resp: 18 13   Temp: 98 °F (36.7 °C)    TempSrc: Oral    SpO2: 96% 95%   Weight: 77.1 kg (170 lb)    Height: 175.3 cm (69\")        Active LDAs/IV Access:   Lines, Drains & Airways       Active LDAs       Name Placement date Placement time Site Days    Peripheral IV 12/21/23 1207 Anterior;Distal;Left;Upper Arm 12/21/23  1207  Arm  less than 1    Nerve Block Catheter 10/20/20 0744 left 10/20/20  0744  created via procedure documentation  -- 1157                    Labs (abnormal labs have a star):   Labs Reviewed   COMPREHENSIVE METABOLIC PANEL - Abnormal; Notable for the following components:       Result Value    Glucose 104 (*)     CO2 30.0 (*)     All other components within normal limits    Narrative:     GFR Normal >60  Chronic Kidney Disease <60  Kidney Failure <15    The GFR formula is only valid for adults with stable renal function between ages 18 and 70.   CBC WITH AUTO DIFFERENTIAL - Abnormal; Notable for the following components:    Monocytes, Absolute 1.00 (*)     All other components within normal limits   TROPONIN - Normal    Narrative:     High Sensitive Troponin T Reference Range:  <14.0 ng/L- Negative Female for AMI  <22.0 ng/L- Negative Male for AMI  >=14 - Abnormal Female indicating possible myocardial injury.  >=22 - Abnormal Male indicating possible myocardial injury.   Clinicians would have to utilize clinical acumen, EKG, Troponin, and serial changes to determine if it is an Acute Myocardial Infarction or myocardial injury due to an underlying chronic condition.        LIPASE - Normal   BNP (IN-HOUSE) - Normal    Narrative:     This assay is used as an aid in the diagnosis of individuals suspected of having heart failure. " It can be used as an aid in the diagnosis of acute decompensated heart failure (ADHF) in patients presenting with signs and symptoms of ADHF to the emergency department (ED). In addition, NT-proBNP of <300 pg/mL indicates ADHF is not likely.    Age Range Result Interpretation  NT-proBNP Concentration (pg/mL:      <50             Positive            >450                   Gray                 300-450                    Negative             <300    50-75           Positive            >900                  Gray                300-900                  Negative            <300      >75             Positive            >1800                  Gray                300-1800                  Negative            <300   HIGH SENSITIVITIY TROPONIN T 2HR - Normal    Narrative:     High Sensitive Troponin T Reference Range:  <14.0 ng/L- Negative Female for AMI  <22.0 ng/L- Negative Male for AMI  >=14 - Abnormal Female indicating possible myocardial injury.  >=22 - Abnormal Male indicating possible myocardial injury.   Clinicians would have to utilize clinical acumen, EKG, Troponin, and serial changes to determine if it is an Acute Myocardial Infarction or myocardial injury due to an underlying chronic condition.        RAINBOW DRAW    Narrative:     The following orders were created for panel order [place] Draw.  Procedure                               Abnormality         Status                     ---------                               -----------         ------                     Green Top (Gel)[[id number]]                                  Final result               Lavender Top[[id number]]                                     Final result               Gold Top - SST[[id number]]                                   Final result               Gray Top[[id number]]                                         In process                 Light Blue Top[[id number]]                                   Final result                 Please view results  for these tests on the individual orders.   BASIC METABOLIC PANEL   PHOSPHORUS   BNP (IN-HOUSE)   LIPID PANEL   CBC WITH AUTO DIFFERENTIAL   CBC AND DIFFERENTIAL    Narrative:     The following orders were created for panel order CBC & Differential.  Procedure                               Abnormality         Status                     ---------                               -----------         ------                     CBC Auto Differential[[id number]]        Abnormal            Final result                 Please view results for these tests on the individual orders.   GREEN TOP   LAVENDER TOP   GOLD TOP - SST   LIGHT BLUE TOP   GRAY TOP   CBC AND DIFFERENTIAL    Narrative:     The following orders were created for panel order CBC & Differential.  Procedure                               Abnormality         Status                     ---------                               -----------         ------                     CBC Auto Differential[[id number]]                                                         Please view results for these tests on the individual orders.       Meds Given in ED:   Medications   sodium chloride 0.9 % flush 10 mL (has no administration in time range)   sodium chloride 0.9 % flush 10 mL (has no administration in time range)   sodium chloride 0.9 % flush 10 mL (has no administration in time range)   sodium chloride 0.9 % infusion 40 mL (has no administration in time range)   sennosides-docusate (PERICOLACE) 8.6-50 MG per tablet 2 tablet (has no administration in time range)     And   polyethylene glycol (MIRALAX) packet 17 g (has no administration in time range)     And   bisacodyl (DULCOLAX) EC tablet 5 mg (has no administration in time range)     And   bisacodyl (DULCOLAX) suppository 10 mg (has no administration in time range)   sodium chloride 0.9 % infusion (has no administration in time range)   chlorthalidone (HYGROTON) tablet 50 mg (has no administration in time range)   aspirin  chewable tablet 81 mg (has no administration in time range)   rosuvastatin (CRESTOR) tablet 40 mg (has no administration in time range)   nitroglycerin (NITROSTAT) SL tablet 0.4 mg (has no administration in time range)   aspirin chewable tablet 324 mg (324 mg Oral Given 12/21/23 1204)     sodium chloride, 75 mL/hr

## 2023-12-21 NOTE — CONSULTS
" [facility]   [address], [address]  [place], [place], [address] [phone]  WWW.[facility], [place], operated by [facility]           EMERGENCY DEPARTMENT CONSULTATION NOTE    Patient Care Team:  Patient Care Team:  Francisco Vincent MD as PCP - General (Family Medicine)  Darryl Hernandez MD as Consulting Physician (Cardiology)  Letha Arenas PA-C as Physician Assistant (Cardiology)  Maureen Steele APRN as Nurse Practitioner (Nurse Practitioner)  Dale Curtis MD as Consulting Physician (General Surgery)    Requesting Provider and Reason for consultation: The patient is being seen today at the request of Dr. Champion for chest pain.     Chief complaint:   Chief Complaint   Patient presents with    Chest Pain            Subjective:     Cardiac focused problem list:  Coronary artery disease  Right and left heart cath, 1/21/2005:  Normal LV function, EF 75%. Normal right heart pressures. Minor nonobstructive CAD  Recurrent chest pain 1/2018, with abnormal EKG showing anterior ST elevation, \"new\" since 1/2005  [facility] 1/19/2018:  Normal LV function, iFR 0.89, KERRY to LAD.   2.  Frequent PVCs  3. Hypertension   4. Hyperlipidemia   5. CKD stage III  eGFR 57, 5/2022  6. History of anemia with B12 deficiency   7. GAUDENCIO, on CPAP therapy  8. 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 Arthritis     HPI:      Mr. Brooks was at outpatient surgery and EKG noted to have frequent PVCs.  Anaesthesiologist didn't feel comfortable.  Went to PCP and sent here for further eval.  No shortness of breath.  Has been fatigued lately and been getting chest pressure.  He wants to figure out what is going on.  Trop negative.  No EKG chagnes except for frequent pvcs.  ROS negative except for the above.      Review of Systems:  As noted in the HPI    PFSH:  Patient Active Problem List   Diagnosis    " Moderate obstructive sleep apnea    Fatigue    Snoring    GERD (gastroesophageal reflux disease)    Hypersomnia    Other chest pain    Abnormal EKG    Coronary artery disease involving native coronary artery of native heart without angina pectoris    Dyslipidemia, goal LDL below 70    Essential hypertension    Primary osteoarthritis of right knee    Status post total left knee replacement    Prediabetes    Primary osteoarthritis of left knee       No current facility-administered medications on file prior to encounter.     Current Outpatient Medications on File Prior to Encounter   Medication Sig Dispense Refill    aspirin 81 MG EC tablet Take 1 tablet by mouth Daily. 90 tablet 3    chlorthalidone (HYGROTEN) 50 MG tablet Take 1 tablet by mouth Daily. 90 tablet 3    coenzyme Q10 100 MG capsule Take 1 capsule by mouth Daily.      Cyanocobalamin (B-12 COMPLIANCE INJECTION IJ) Inject 1 dose as directed Every 30 (Thirty) Days. LD January 5th      rosuvastatin (CRESTOR) 40 MG tablet Take 1 tablet by mouth Daily. 90 tablet 3    vitamin B-6 (PYRIDOXINE) 50 MG tablet Take 1 tablet by mouth Daily.         Social History     Socioeconomic History    Marital status:    Tobacco Use    Smoking status: Never     Passive exposure: Never    Smokeless tobacco: Never   Vaping Use    Vaping Use: Never used   Substance and Sexual Activity    Alcohol use: No    Drug use: No    Sexual activity: Defer            Objective:     Vital Sign Min/Max for last 24 hours  Temp  Min: 98 °F (36.7 °C)  Max: 98 °F (36.7 °C)   BP  Min: 131/95  Max: 132/89   Pulse  Min: 49  Max: 58   Resp  Min: 13  Max: 18   SpO2  Min: 95 %  Max: 96 %   No data recorded    No intake or output data in the 24 hours ending 12/21/23 1454        Vitals:    12/21/23 1233   BP: 132/89   Pulse: 58   Resp: 13   Temp:    SpO2: 95%     CONSTITUTIONAL: No acute distress  RESPIRATORY: Normal effort. Clear to auscultation bilaterally without wheezing or rales  CARDIOVASCULAR:  "Regular rate and rhythm with normal S1 and S2. Without murmur.  PERIPHERAL VASCULAR: No carotid bruit bilaterally.   There is no lower extremity edema bilaterally.  Neuro: non focal exam  Psychiatric: cooperative  MK: normal range of motion    Labs and radiologic results:  Today's results were reviewed by myself.    Cardiac Data:             Assessment and Plan:         ASSESSMENT:  Coronary artery disease  Right and left heart cath, 1/21/2005:  Normal LV function, EF 75%. Normal right heart pressures. Minor nonobstructive CAD  Recurrent chest pain 1/2018, with abnormal EKG showing anterior ST elevation, \"new\" since 1/2005  [facility] 1/19/2018:  Normal LV function, iFR 0.89, KERRY to LAD.   2.  Frequent PVCs  3. Hypertension   4. Hyperlipidemia   5. CKD stage III  eGFR 57, 5/2022  6. History of anemia with B12 deficiency   7. GAUDENCIO, on CPAP therapy  8. 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 Arthritis     PLAN:  Cont. Aspirin, statin and HTN meds  Unclear why all of sudden frequent pvcs and not been feeling well.  Will get stress echo tomorrow and if positive [facility]  Admit to cardiology under my care for further reny Escudero MD              "

## 2023-12-21 NOTE — PLAN OF CARE
Goal Outcome Evaluation:  Plan of Care Reviewed With: spouse, patient        Progress: no change  Outcome Evaluation: Patient admitted to room 601 at 1615 from the ED.  Patient A+Ox4, denies SOA on room air.  NSR with frequent PVCs noted.  Patient educated on NPO status at [facility] r/t stress test 12-22-23 with possible heart cath.  Will continue with current POC.

## 2023-12-21 NOTE — Clinical Note
Hemostasis started on the right radial artery. R-Band was used in achieving hemostasis. Radial compression device applied to vessel. Hemostasis achieved successfully. Closure device additional comment: 8ML

## 2023-12-22 ENCOUNTER — APPOINTMENT (OUTPATIENT)
Dept: CARDIOLOGY | Facility: HOSPITAL | Age: 73
DRG: 287 | End: 2023-12-22
Payer: MEDICARE

## 2023-12-22 VITALS
HEART RATE: 67 BPM | WEIGHT: 170 LBS | BODY MASS INDEX: 25.18 KG/M2 | HEIGHT: 69 IN | DIASTOLIC BLOOD PRESSURE: 79 MMHG | TEMPERATURE: 97.6 F | RESPIRATION RATE: 16 BRPM | OXYGEN SATURATION: 95 % | SYSTOLIC BLOOD PRESSURE: 106 MMHG

## 2023-12-22 PROBLEM — R07.9 CHEST PAIN: Status: ACTIVE | Noted: 2023-12-21

## 2023-12-22 LAB
ANION GAP SERPL CALCULATED.3IONS-SCNC: 9 MMOL/L (ref 5–15)
BASOPHILS # BLD AUTO: 0.03 10*3/MM3 (ref 0–0.2)
BASOPHILS NFR BLD AUTO: 0.5 % (ref 0–1.5)
BH CV ECHO MEAS - AO ROOT DIAM: 2.8 CM
BH CV ECHO MEAS - EDV(CUBED): 24.4 ML
BH CV ECHO MEAS - ESV(CUBED): 12.2 ML
BH CV ECHO MEAS - FS: 20.7 %
BH CV ECHO MEAS - IVS/LVPW: 1.18 CM
BH CV ECHO MEAS - IVSD: 1.3 CM
BH CV ECHO MEAS - LA DIMENSION: 3.6 CM
BH CV ECHO MEAS - LV MASS(C)D: 104.2 GRAMS
BH CV ECHO MEAS - LVIDD: 2.9 CM
BH CV ECHO MEAS - LVIDS: 2.3 CM
BH CV ECHO MEAS - LVOT AREA: 3.1 CM2
BH CV ECHO MEAS - LVOT DIAM: 2 CM
BH CV ECHO MEAS - LVPWD: 1.1 CM
BH CV STRESS DURATION MIN STAGE 1: 2
BH CV STRESS DURATION SEC STAGE 1: 42
BH CV STRESS GRADE STAGE 1: 10
BH CV STRESS HR STAGE 1: 125
BH CV STRESS METS STAGE 1: 5
BH CV STRESS O2 STAGE 1: 96
BH CV STRESS PROTOCOL 1: NORMAL
BH CV STRESS RECOVERY BP: NORMAL MMHG
BH CV STRESS RECOVERY HR: 88 BPM
BH CV STRESS RECOVERY O2: 94 %
BH CV STRESS SPEED STAGE 1: 1.7
BH CV STRESS STAGE 1: 1
BUN SERPL-MCNC: 17 MG/DL (ref 8–23)
BUN/CREAT SERPL: 13.9 (ref 7–25)
CALCIUM SPEC-SCNC: 9 MG/DL (ref 8.6–10.5)
CHLORIDE SERPL-SCNC: 101 MMOL/L (ref 98–107)
CO2 SERPL-SCNC: 29 MMOL/L (ref 22–29)
CREAT SERPL-MCNC: 1.22 MG/DL (ref 0.76–1.27)
DEPRECATED RDW RBC AUTO: 50.8 FL (ref 37–54)
EGFRCR SERPLBLD CKD-EPI 2021: 62.2 ML/MIN/1.73
EOSINOPHIL # BLD AUTO: 0.11 10*3/MM3 (ref 0–0.4)
EOSINOPHIL NFR BLD AUTO: 1.7 % (ref 0.3–6.2)
ERYTHROCYTE [DISTWIDTH] IN BLOOD BY AUTOMATED COUNT: 15.2 % (ref 12.3–15.4)
GLUCOSE SERPL-MCNC: 91 MG/DL (ref 65–99)
HCT VFR BLD AUTO: 45.1 % (ref 37.5–51)
HGB BLD-MCNC: 15.2 G/DL (ref 13–17.7)
IMM GRANULOCYTES # BLD AUTO: 0.01 10*3/MM3 (ref 0–0.05)
IMM GRANULOCYTES NFR BLD AUTO: 0.2 % (ref 0–0.5)
LYMPHOCYTES # BLD AUTO: 2.22 10*3/MM3 (ref 0.7–3.1)
LYMPHOCYTES NFR BLD AUTO: 34.5 % (ref 19.6–45.3)
MAXIMAL PREDICTED HEART RATE: 146 BPM
MCH RBC QN AUTO: 30.8 PG (ref 26.6–33)
MCHC RBC AUTO-ENTMCNC: 33.7 G/DL (ref 31.5–35.7)
MCV RBC AUTO: 91.3 FL (ref 79–97)
MONOCYTES # BLD AUTO: 0.92 10*3/MM3 (ref 0.1–0.9)
MONOCYTES NFR BLD AUTO: 14.3 % (ref 5–12)
NEUTROPHILS NFR BLD AUTO: 3.14 10*3/MM3 (ref 1.7–7)
NEUTROPHILS NFR BLD AUTO: 48.8 % (ref 42.7–76)
NRBC BLD AUTO-RTO: 0 /100 WBC (ref 0–0.2)
PERCENT MAX PREDICTED HR: 86.99 %
PHOSPHATE SERPL-MCNC: 2.8 MG/DL (ref 2.5–4.5)
PLATELET # BLD AUTO: 234 10*3/MM3 (ref 140–450)
PMV BLD AUTO: 10.6 FL (ref 6–12)
POTASSIUM SERPL-SCNC: 3.4 MMOL/L (ref 3.5–5.2)
RBC # BLD AUTO: 4.94 10*6/MM3 (ref 4.14–5.8)
SODIUM SERPL-SCNC: 139 MMOL/L (ref 136–145)
STRESS BASELINE BP: NORMAL MMHG
STRESS BASELINE HR: 80 BPM
STRESS O2 SAT REST: 96 %
STRESS PERCENT HR: 102 %
STRESS POST ESTIMATED WORKLOAD: 4.6 METS
STRESS POST EXERCISE DUR MIN: 2 MIN
STRESS POST EXERCISE DUR SEC: 42 SEC
STRESS POST O2 SAT PEAK: 96 %
STRESS POST PEAK BP: NORMAL MMHG
STRESS POST PEAK HR: 127 BPM
STRESS TARGET HR: 124 BPM
WBC NRBC COR # BLD AUTO: 6.43 10*3/MM3 (ref 3.4–10.8)

## 2023-12-22 PROCEDURE — 93458 L HRT ARTERY/VENTRICLE ANGIO: CPT | Performed by: INTERNAL MEDICINE

## 2023-12-22 PROCEDURE — 99238 HOSP IP/OBS DSCHRG MGMT 30/<: CPT | Performed by: INTERNAL MEDICINE

## 2023-12-22 PROCEDURE — 25010000002 SULFUR HEXAFLUORIDE MICROSPH 60.7-25 MG RECONSTITUTED SUSPENSION: Performed by: INTERNAL MEDICINE

## 2023-12-22 PROCEDURE — B2111ZZ FLUOROSCOPY OF MULTIPLE CORONARY ARTERIES USING LOW OSMOLAR CONTRAST: ICD-10-PCS | Performed by: INTERNAL MEDICINE

## 2023-12-22 PROCEDURE — 93018 CV STRESS TEST I&R ONLY: CPT | Performed by: INTERNAL MEDICINE

## 2023-12-22 PROCEDURE — 25010000002 MIDAZOLAM PER 1 MG: Performed by: INTERNAL MEDICINE

## 2023-12-22 PROCEDURE — C1894 INTRO/SHEATH, NON-LASER: HCPCS | Performed by: INTERNAL MEDICINE

## 2023-12-22 PROCEDURE — 80048 BASIC METABOLIC PNL TOTAL CA: CPT | Performed by: INTERNAL MEDICINE

## 2023-12-22 PROCEDURE — 93017 CV STRESS TEST TRACING ONLY: CPT

## 2023-12-22 PROCEDURE — 93352 ADMIN ECG CONTRAST AGENT: CPT | Performed by: INTERNAL MEDICINE

## 2023-12-22 PROCEDURE — 85025 COMPLETE CBC W/AUTO DIFF WBC: CPT | Performed by: INTERNAL MEDICINE

## 2023-12-22 PROCEDURE — B2151ZZ FLUOROSCOPY OF LEFT HEART USING LOW OSMOLAR CONTRAST: ICD-10-PCS | Performed by: INTERNAL MEDICINE

## 2023-12-22 PROCEDURE — 25010000002 HEPARIN (PORCINE) PER 1000 UNITS: Performed by: INTERNAL MEDICINE

## 2023-12-22 PROCEDURE — 25510000001 IOPAMIDOL PER 1 ML: Performed by: INTERNAL MEDICINE

## 2023-12-22 PROCEDURE — 93350 STRESS TTE ONLY: CPT

## 2023-12-22 PROCEDURE — 25010000002 FENTANYL CITRATE (PF) 50 MCG/ML SOLUTION: Performed by: INTERNAL MEDICINE

## 2023-12-22 PROCEDURE — 93350 STRESS TTE ONLY: CPT | Performed by: INTERNAL MEDICINE

## 2023-12-22 PROCEDURE — C1769 GUIDE WIRE: HCPCS | Performed by: INTERNAL MEDICINE

## 2023-12-22 PROCEDURE — 4A023N7 MEASUREMENT OF CARDIAC SAMPLING AND PRESSURE, LEFT HEART, PERCUTANEOUS APPROACH: ICD-10-PCS | Performed by: INTERNAL MEDICINE

## 2023-12-22 PROCEDURE — 25810000003 SODIUM CHLORIDE 0.9 % SOLUTION: Performed by: INTERNAL MEDICINE

## 2023-12-22 PROCEDURE — 84100 ASSAY OF PHOSPHORUS: CPT | Performed by: INTERNAL MEDICINE

## 2023-12-22 RX ORDER — FENTANYL CITRATE 50 UG/ML
INJECTION, SOLUTION INTRAMUSCULAR; INTRAVENOUS
Status: DISCONTINUED | OUTPATIENT
Start: 2023-12-22 | End: 2023-12-22 | Stop reason: HOSPADM

## 2023-12-22 RX ORDER — HEPARIN SODIUM 1000 [USP'U]/ML
INJECTION, SOLUTION INTRAVENOUS; SUBCUTANEOUS
Status: DISCONTINUED | OUTPATIENT
Start: 2023-12-22 | End: 2023-12-22 | Stop reason: HOSPADM

## 2023-12-22 RX ORDER — NITROGLYCERIN 0.4 MG/1
0.4 TABLET SUBLINGUAL
Status: DISCONTINUED | OUTPATIENT
Start: 2023-12-22 | End: 2023-12-22 | Stop reason: HOSPADM

## 2023-12-22 RX ORDER — LIDOCAINE HYDROCHLORIDE 10 MG/ML
INJECTION, SOLUTION EPIDURAL; INFILTRATION; INTRACAUDAL; PERINEURAL
Status: DISCONTINUED | OUTPATIENT
Start: 2023-12-22 | End: 2023-12-22 | Stop reason: HOSPADM

## 2023-12-22 RX ORDER — MIDAZOLAM HYDROCHLORIDE 1 MG/ML
INJECTION INTRAMUSCULAR; INTRAVENOUS
Status: DISCONTINUED | OUTPATIENT
Start: 2023-12-22 | End: 2023-12-22 | Stop reason: HOSPADM

## 2023-12-22 RX ADMIN — Medication 10 ML: at 08:00

## 2023-12-22 RX ADMIN — CHLORTHALIDONE 50 MG: 25 TABLET ORAL at 08:00

## 2023-12-22 RX ADMIN — SULFUR HEXAFLUORIDE 2 ML: KIT at 10:57

## 2023-12-22 RX ADMIN — SODIUM CHLORIDE 75 ML/HR: 9 INJECTION, SOLUTION INTRAVENOUS at 06:25

## 2023-12-22 RX ADMIN — ASPIRIN 81 MG: 81 TABLET, CHEWABLE ORAL at 08:00

## 2023-12-22 NOTE — CASE MANAGEMENT/SOCIAL WORK
Discharge Planning Assessment  [facility]     Patient Name: Nic Brooks  MRN: [id number]  Today's Date: 12/22/2023    Admit Date: 12/21/2023    Plan: Home   Discharge Needs Assessment       Row Name 12/22/23 1106       Living Environment    People in Home spouse    Current Living Arrangements home    Potentially Unsafe Housing Conditions none    Primary Care Provided by self    Provides Primary Care For no one    Family Caregiver if Needed spouse    Quality of Family Relationships involved;supportive    Able to Return to Prior Arrangements yes       Resource/Environmental Concerns    Resource/Environmental Concerns none       Transition Planning    Patient/Family Anticipates Transition to home with family    Patient/Family Anticipated Services at Transition     Transportation Anticipated family or friend will provide       Discharge Needs Assessment    Readmission Within the Last 30 Days no previous admission in last 30 days    Equipment Currently Used at Home none    Concerns to be Addressed discharge planning    Anticipated Changes Related to Illness none    Equipment Needed After Discharge none                   Discharge Plan       Row Name 12/22/23 1108       Plan    Plan Home    Patient/Family in Agreement with Plan yes    Plan Comments Spoke with patient's wife in room to initiate discharge planning.  Patient lives with her in [facility].  He is independent with ADL's.  He has no DME at home and is not current home health.  His PCP is Francisco Vincent.  He does not have an advanced directive.  Verified that he has Medicare/AARP.  Mr. Brooks has RX coverage and has his scripts filled at [facility].  His plan is to return home at discharge.  No needs nted at this time.  CM will continue to follow.    Final Discharge Disposition Code 01 - home or self-care                  Continued Care and Services - Admitted Since 12/21/2023    Coordination has not been started for this encounter.        Expected Discharge Date and Time       Expected Discharge Date Expected Discharge Time    Dec 23, 2023            Demographic Summary       Row Name 12/22/23 1101       General Information    Admission Type inpatient    Arrived From emergency department    Referral Source admission list    Reason for Consult discharge planning    Preferred Language English                   Functional Status       Row Name 12/22/23 1101       Functional Status    Usual Activity Tolerance moderate    Current Activity Tolerance moderate       Functional Status, IADL    Medications independent    Meal Preparation independent    Housekeeping independent    Laundry independent    Shopping independent                   Psychosocial    No documentation.                  Abuse/Neglect    No documentation.                  Legal    No documentation.                  Substance Abuse    No documentation.                  Patient Forms    No documentation.                     Marina Hawk RN

## 2023-12-22 NOTE — PROGRESS NOTES
"    Subjective:     Encounter Date:12/21/2023      Patient ID: Nic Brooks is a 73 y.o. male.    Chief Complaint:  Chest Pain       Coronary artery disease  Right and left heart cath, 1/21/2005:  Normal LV function, EF 75%. Normal right heart pressures. Minor nonobstructive CAD  Recurrent chest pain 1/2018, with abnormal EKG showing anterior ST elevation, \"new\" since 1/2005  [facility] 1/19/2018:  Normal LV function, iFR 0.89, KERRY to LAD.   Abnormal stress echo 12/22/23  2.  Frequent PVCs  3. Hypertension   4. Hyperlipidemia   5. CKD stage III  eGFR 57, 5/2022  6. History of anemia with B12 deficiency   7. GAUDENCIO, on CPAP therapy  8. 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 Arthritis      HPI:       Mr. Brooks was at outpatient surgery and EKG noted to have frequent PVCs.  Anaesthesiologist didn't feel comfortable.  Went to PCP and sent here for further eval.  No shortness of breath.  Has been fatigued lately and been getting chest pressure.  He wants to figure out what is going on.  Trop negative.  No EKG chagnes except for frequent pvcs.  ROS negative except for the above.      Update 12/22/23  On stress test very poor exercise tolerance.  Pvc went away with exertion but was quite limited by shortness of breath.  He is very active and [place] 200 bee hives.       Review of Systems   Cardiovascular:  Positive for chest pain.       Procedures       Objective:     Constitutional:       Appearance: Healthy appearance. Not in distress.   Neck:      Vascular: No JVR. JVD normal.   Pulmonary:      Effort: Pulmonary effort is normal.      Breath sounds: Normal breath sounds. No wheezing. No rhonchi. No rales.   Chest:      Chest wall: Not tender to palpatation.   Cardiovascular:      PMI at left midclavicular line. Normal rate. Regular rhythm. Normal S1. Normal S2.       Murmurs: There is " "no murmur.      No gallop.  No click. No rub.   Pulses:     Intact distal pulses.   Edema:     Peripheral edema absent.   Abdominal:      General: Bowel sounds are normal.      Palpations: Abdomen is soft.      Tenderness: There is no abdominal tenderness.   Musculoskeletal: Normal range of motion.         General: No tenderness. Skin:     General: Skin is warm and dry.   Neurological:      General: No focal deficit present.      Mental Status: Alert and oriented to person, place and time.         Lab Review:       Assessment:       Coronary artery disease  Right and left heart cath, 1/21/2005:  Normal LV function, EF 75%. Normal right heart pressures. Minor nonobstructive CAD  Recurrent chest pain 1/2018, with abnormal EKG showing anterior ST elevation, \"new\" since 1/2005  [facility] 1/19/2018:  Normal LV function, iFR 0.89, KERRY to LAD.   Abnormal stress echo 12/22/23  2.  Frequent PVCs  3. Hypertension   4. Hyperlipidemia   5. CKD stage III  eGFR 57, 5/2022  6. History of anemia with B12 deficiency   7. GAUDENCIO, on CPAP therapy  8. 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 Arthritis          Plan:       -Given his dramatic change in last few weeks and his medium risk stress test and very frequent pvcs will proceed with [facility] to better evaluate.  Later today.  He has remained NPO.    -cont. Aspirin, statin and HTN meds  -If no pci will place on monitor, add bb (may not tolerate secondary to bradycardia), get cardiac mri and refer to EFREN Escudero MD         "

## 2023-12-22 NOTE — ED PROVIDER NOTES
Subjective   History of Present Illness    Pt presents with chest pain onset today.  He has had fatigue since waking.  He developed some midsternal chest tightness.  He called PCP who directed him to the ED.  No dyspnea, sweating, dizziness, nausea.    He has history of CAD with stent in 2018.  He is due to have hernia surgery and says he had an abnormal EKG during PAT and was told to see cards. He has appt in 1 week.    History provided by:  Patient      Review of Systems   Constitutional:  Negative for diaphoresis.   Cardiovascular:  Positive for chest pain.   Gastrointestinal:  Negative for nausea.   Neurological:  Negative for dizziness.   All other systems reviewed and are negative.      Past Medical History:   Diagnosis Date    Arthritis     Coronary artery disease     s/p stent 2018    GERD (gastroesophageal reflux disease)     Hearing loss     kailee hearing aids    History of kidney stones 2008    just one     History of transfusion 1980    [facility] with lung surgery     Hyperlipidemia     Osteoarthritis     Sleep apnea with use of continuous positive airway pressure (CPAP)     complaint with machine     Wears glasses        No Known Allergies    Past Surgical History:   Procedure Laterality Date    APPENDECTOMY      BACK SURGERY      x2    CARDIAC CATHETERIZATION N/A 1/19/2018    Procedure: Left Heart Cath;  Surgeon: Negrito Wilkes MD;  Location:  [facility];  Service:     COLONOSCOPY      every 5 years    CORONARY STENT PLACEMENT  2018    ENDOSCOPY      KIDNEY STONE SURGERY      KNEE ARTHROSCOPY W/ MENISCECTOMY      left    LUMBAR DISCECTOMY      LUNG REMOVAL, PARTIAL      right lower lobe     SPLENECTOMY  1974    TONSILLECTOMY      removed adnoids    TOTAL KNEE ARTHROPLASTY Right 11/15/2019    Procedure: TOTAL KNEE ARTHROPLASTY RIGHT;  Surgeon: Negrito Vidal MD;  Location: [facility] OR;  Service: Orthopedics    TOTAL KNEE ARTHROPLASTY Left 10/20/2020    Procedure: TOTAL  KNEE ARTHROPLASTY LEFT;  Surgeon: Negrito Vidal MD;  Location: [facility];  Service: Orthopedics;  Laterality: Left;    WISDOM TOOTH EXTRACTION      only 2 removed        Family History   Problem Relation Age of Onset    Hypertension Mother     Cancer Mother     Cancer Father     Emphysema Father     Diabetes Father     COPD Father     Osteoarthritis Father     No Known Problems Sister     Diabetes Brother     Heart disease Brother        Social History     Socioeconomic History    Marital status:    Tobacco Use    Smoking status: Never     Passive exposure: Never    Smokeless tobacco: Never   Vaping Use    Vaping Use: Never used   Substance and Sexual Activity    Alcohol use: No    Drug use: No    Sexual activity: Defer           Objective   Physical Exam  Vitals and nursing note reviewed.   Constitutional:       General: He is not in acute distress.     Appearance: Normal appearance. He is not ill-appearing.   HENT:      Head: Normocephalic and atraumatic.      Mouth/Throat:      Mouth: Mucous membranes are moist.   Eyes:      General: No scleral icterus.        Right eye: No discharge.         Left eye: No discharge.      Conjunctiva/sclera: Conjunctivae normal.   Cardiovascular:      Rate and Rhythm: Normal rate and regular rhythm.      Heart sounds: No murmur heard.  Pulmonary:      Effort: Pulmonary effort is normal. No respiratory distress.      Breath sounds: Normal breath sounds. No wheezing.   Abdominal:      General: Bowel sounds are normal. There is no distension.      Palpations: Abdomen is soft.      Tenderness: There is no abdominal tenderness. There is no guarding or rebound.   Musculoskeletal:         General: No swelling. Normal range of motion.      Cervical back: Normal range of motion and neck supple.   Skin:     General: Skin is warm and dry.      Findings: No rash.   Neurological:      General: No focal deficit present.      Mental Status: He is alert. Mental status is at baseline.    Psychiatric:         Mood and Affect: Mood normal.         Behavior: Behavior normal.         Thought Content: Thought content normal.         Procedures           ED Course    EKG SR with occ PACs.  Labs benign. Trop negative.  CXR NAD.    Pt worried about sx and needing clearance before surgery and wants cardiology consult. Cards saw pt and will admit.    Patient stable on serial rechecks.  Discussed exam findings, test results so far and concerns in detail at the bedside.  Discussed need for admission for further evaluation and treatment.  I discussed the patient's presentation, test results and need for admission with the hospitalist.              HEART Score: 5                              Medical Decision Making  Problems Addressed:  Chest pain, unspecified type: complicated acute illness or injury    Amount and/or Complexity of Data Reviewed  Labs: ordered. Decision-making details documented in ED Course.  Radiology: ordered and independent interpretation performed. Decision-making details documented in ED Course.     Details: CXR NAD, my read  ECG/medicine tests: ordered and independent interpretation performed. Decision-making details documented in ED Course.     Details: EKG NSR with PACs, my read  Discussion of management or test interpretation with external provider(s): cardiology    Risk  OTC drugs.  Prescription drug management.  Decision regarding hospitalization.        Final diagnoses:   Chest pain, unspecified type       ED Disposition  ED Disposition       ED Disposition   Decision to Admit    Condition   --    Comment   Level of Care: Telemetry [5]   Diagnosis: Unstable angina [[id number]]   Admitting Physician: JUSTIN SAGASTUME [[id number]]   Certification: I Certify That Inpatient Hospital Services Are Medically Necessary For Greater Than 2 Midnights                 No follow-up provider specified.       Medication List      No changes were made to your prescriptions during this visit.             Mundo Champion MD  12/21/23 2119

## 2023-12-22 NOTE — PLAN OF CARE
Goal Outcome Evaluation:  Plan of Care Reviewed With: patient, spouse        Progress: improving  Outcome Evaluation: Patient with discharge orders, once TR band is removed.  Patients wife will transport the pt home.

## 2023-12-22 NOTE — DISCHARGE SUMMARY
Hospital course  Came in frequent pvcs and dyspnea upon exertion along with chest pressure.  Stress echo was moderate risk.  LHC showed patent stent and NOCAD, LVEDP 7 mmHg.  Tolerated procedure well.      Consultation: none    Procedures  LHC       Your medication list        START taking these medications        Instructions Last Dose Given Next Dose Due   metoprolol tartrate 25 MG tablet  Commonly known as: LOPRESSOR      Take 1 tablet by mouth 2 (Two) Times a Day.              CONTINUE taking these medications        Instructions Last Dose Given Next Dose Due   aspirin 81 MG EC tablet      Take 1 tablet by mouth Daily.       B-12 COMPLIANCE INJECTION IJ      Inject 1 dose as directed Every 30 (Thirty) Days. LD January 5th       chlorthalidone 50 MG tablet  Commonly known as: HYGROTEN      Take 1 tablet by mouth Daily.       coenzyme Q10 100 MG capsule      Take 1 capsule by mouth Daily.       rosuvastatin 40 MG tablet  Commonly known as: CRESTOR      Take 1 tablet by mouth Daily.       vitamin B-6 50 MG tablet  Commonly known as: PYRIDOXINE      Take 1 tablet by mouth Daily.                 Where to Get Your Medications        These medications were sent to [facility] [id number] - [place]MA [place] - [address] - [phone]  - [phone]   [address] [place] [address]      Phone: [phone]   metoprolol tartrate 25 MG tablet        Nothing to carry greater than 10lbs for 7 days  14 day Holter  2 week cardiology follow up

## 2023-12-22 NOTE — H&P (VIEW-ONLY)
"    Subjective:     Encounter Date:12/21/2023      Patient ID: Nic Brooks is a 73 y.o. male.    Chief Complaint:  Chest Pain       Coronary artery disease  Right and left heart cath, 1/21/2005:  Normal LV function, EF 75%. Normal right heart pressures. Minor nonobstructive CAD  Recurrent chest pain 1/2018, with abnormal EKG showing anterior ST elevation, \"new\" since 1/2005  C 1/19/2018:  Normal LV function, iFR 0.89, KERRY to LAD.   Abnormal stress echo 12/22/23  2.  Frequent PVCs  3. Hypertension   4. Hyperlipidemia   5. CKD stage III  eGFR 57, 5/2022  6. History of anemia with B12 deficiency   7. GAUDENCIO, on CPAP therapy  8. 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 Arthritis      HPI:       Mr. Brooks was at outpatient surgery and EKG noted to have frequent PVCs.  Anaesthesiologist didn't feel comfortable.  Went to PCP and sent here for further eval.  No shortness of breath.  Has been fatigued lately and been getting chest pressure.  He wants to figure out what is going on.  Trop negative.  No EKG chagnes except for frequent pvcs.  ROS negative except for the above.      Update 12/22/23  On stress test very poor exercise tolerance.  Pvc went away with exertion but was quite limited by shortness of breath.  He is very active and goes all [place] 200 bee hives.       Review of Systems   Cardiovascular:  Positive for chest pain.       Procedures       Objective:     Constitutional:       Appearance: Healthy appearance. Not in distress.   Neck:      Vascular: No JVR. JVD normal.   Pulmonary:      Effort: Pulmonary effort is normal.      Breath sounds: Normal breath sounds. No wheezing. No rhonchi. No rales.   Chest:      Chest wall: Not tender to palpatation.   Cardiovascular:      PMI at left midclavicular line. Normal rate. Regular rhythm. Normal S1. Normal S2.       Murmurs: There is " "no murmur.      No gallop.  No click. No rub.   Pulses:     Intact distal pulses.   Edema:     Peripheral edema absent.   Abdominal:      General: Bowel sounds are normal.      Palpations: Abdomen is soft.      Tenderness: There is no abdominal tenderness.   Musculoskeletal: Normal range of motion.         General: No tenderness. Skin:     General: Skin is warm and dry.   Neurological:      General: No focal deficit present.      Mental Status: Alert and oriented to person, place and time.         Lab Review:       Assessment:       Coronary artery disease  Right and left heart cath, 1/21/2005:  Normal LV function, EF 75%. Normal right heart pressures. Minor nonobstructive CAD  Recurrent chest pain 1/2018, with abnormal EKG showing anterior ST elevation, \"new\" since 1/2005  [facility] 1/19/2018:  Normal LV function, iFR 0.89, KERRY to LAD.   Abnormal stress echo 12/22/23  2.  Frequent PVCs  3. Hypertension   4. Hyperlipidemia   5. CKD stage III  eGFR 57, 5/2022  6. History of anemia with B12 deficiency   7. GAUDENCIO, on CPAP therapy  8. 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 Arthritis          Plan:       -Given his dramatic change in last few weeks and his medium risk stress test and very frequent pvcs will proceed with [facility] to better evaluate.  Later today.  He has remained NPO.    -cont. Aspirin, statin and HTN meds  -If no pci will place on monitor, add bb (may not tolerate secondary to bradycardia), get cardiac mri and refer to EFREN Escudero MD         "

## 2024-01-05 ENCOUNTER — TELEPHONE (OUTPATIENT)
Dept: CARDIOLOGY | Facility: CLINIC | Age: 74
End: 2024-01-05
Payer: MEDICARE

## 2024-01-05 NOTE — TELEPHONE ENCOUNTER
"S/W wife, pt is having Dr. Vincent send us the EKG. Metoprolol 25 mg BID started day of cath 12/22, non-obstructive CAD in right dominant system, widely patent LAD stent. Pt is still having \"chest twinges\".  Advised we agree with PCP recommendations to stop metoprolol. He will continue chlorthalidone 50 mg daily, and ASA 81 mg. He has a f/u appt with RDS 1/18.     Do you want to try something else for chest twinge symptoms?   "

## 2024-01-05 NOTE — TELEPHONE ENCOUNTER
Caller: ToddKatie    Relationship: Emergency Contact    Best call back number: [phone]     Which medication are you concerned about: METOPROLOL 25 MG     Who prescribed you this medication: DR. SAGASTUME     When did you start taking this medication: 12/22/23    What are your concerns: SINCE PATIENT HAS BEEN TAKING THIS MEDICATION HIS BLOOD PRESSURE AND HEART RATE HAS BEEN VERY LOW IN THE LOW 30s OR 40s. PATIENT WIFE STATED THAT PATIENTS PRIMACY DR. JETT TOOK THE PATIENT OFF OF THE METOPROLOL TODAY DUE TO HIM HAVING ANOTHER ABNORMAL EKG. PATIENT WIFE WOULD LIKE TO KNOW WHAT THEY NEED TO DO NEXT, RATHER IT BE HIM SEE DR. DAVE SOONER OR SOMETHING. PATIENT WIFE STATED THEY SENT THE HEART MONITOR OFF THIS MORNING THAT THE PATIENT WAS WEARING BUT THEY ARE CONCERNED WITH HIM HAVING NO ENERGY AND BEING VERY FATIGUE.     How long have you had these concerns: SINCE STARTING THIS MEDICATION     SENDING OVER AS HIGH PRIORITY DUE TO PATIENT REQUEST

## 2024-01-08 NOTE — TELEPHONE ENCOUNTER
Attempted to reach pt wife back, no answer. LVM with RDS comments above. Advised to return my call if HR is still low today and/or having worsening symptoms.

## 2024-01-18 ENCOUNTER — TELEPHONE (OUTPATIENT)
Dept: SURGERY | Facility: CLINIC | Age: 74
End: 2024-01-18
Payer: MEDICARE

## 2024-01-18 ENCOUNTER — OFFICE VISIT (OUTPATIENT)
Dept: CARDIOLOGY | Facility: CLINIC | Age: 74
End: 2024-01-18
Payer: MEDICARE

## 2024-01-18 VITALS
OXYGEN SATURATION: 97 % | SYSTOLIC BLOOD PRESSURE: 116 MMHG | WEIGHT: 177.6 LBS | DIASTOLIC BLOOD PRESSURE: 72 MMHG | BODY MASS INDEX: 26.31 KG/M2 | HEIGHT: 69 IN | HEART RATE: 62 BPM

## 2024-01-18 DIAGNOSIS — I25.10 CORONARY ARTERY DISEASE INVOLVING NATIVE CORONARY ARTERY OF NATIVE HEART WITHOUT ANGINA PECTORIS: Primary | ICD-10-CM

## 2024-01-18 DIAGNOSIS — E78.5 DYSLIPIDEMIA, GOAL LDL BELOW 70: ICD-10-CM

## 2024-01-18 DIAGNOSIS — I10 ESSENTIAL HYPERTENSION: ICD-10-CM

## 2024-01-18 NOTE — TELEPHONE ENCOUNTER
PATIENT CALLED AND STATED HE HAS BEEN SEEN BY CARDIOLOGY AND IS READY TO SCHEDULE PROCEDURE. VERIFIED HOME 3 IS BEST TO CALL

## 2024-01-18 NOTE — PROGRESS NOTES
OFFICE VISIT  NOTE  [facility] CARDIOLOGY      Name: Nic Brooks    Date: 2024  MRN:  [id number]  :  1950      REFERRING/PRIMARY PROVIDER:  Francisco Vincent MD     Chief Complaint   Patient presents with    Coronary Artery Disease       HPI: Nic Brooks is a 73 y.o. male who presents for follow up of CAD, previously followed by Dr. Wilkes. Associated history of hypertension, hyperlipidemia, GAUDENCIO on CPAP, history of right lower lobectomy secondary to granulomatosis, history of splenectomy for splenomegaly and anemia with B12 deficiency.  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  Hospitalized 2023 due to frequent PVCs found on EKG at time of hernia surgery and at PCPs office, stress echocardiogram showed moderate risk, therefore follow-up [facility] showed nonobstructive CAD widely patent LAD stent EDP of 7 mmHg.  They were very appreciative of their care by Dr. Scott and the team at [facility]. He also Wore a Holter monitor for about 12 days after hospitalization showed 3% PVCs he was on metoprolol 25 mg twice daily after the hospitalization but cannot tolerate due to bradycardia and fatigue he stopped it several days ago and feels much better.    ROS:Pertinent positives as listed in the HPI.  All other systems reviewed and negative.    Past Medical History:   Diagnosis Date    Arthritis     Coronary artery disease     s/p stent     GERD (gastroesophageal reflux disease)     Hearing loss     kailee hearing aids    History of kidney stones     just one     History of transfusion     [facility] with lung surgery     Hyperlipidemia     Osteoarthritis     Sleep apnea with use of continuous positive airway pressure (CPAP)     complaint with machine     Wears glasses        Past Surgical History:   Procedure Laterality Date    APPENDECTOMY      BACK SURGERY      x2    CARDIAC CATHETERIZATION N/A 2018    Procedure: Left Heart Cath;  Surgeon: Negrito Wilkes MD;   Location:  [facility];  Service:     CARDIAC CATHETERIZATION N/A 12/22/2023    Procedure: Left Heart Cath;  Surgeon: Darryl Álvarez III, MD;  Location:  [facility];  Service: Cardiovascular;  Laterality: N/A;    COLONOSCOPY      every 5 years    CORONARY STENT PLACEMENT  2018    ENDOSCOPY      KIDNEY STONE SURGERY      KNEE ARTHROSCOPY W/ MENISCECTOMY      left    LUMBAR DISCECTOMY      LUNG REMOVAL, PARTIAL      right lower lobe     SPLENECTOMY  1974    TONSILLECTOMY      removed adnoids    TOTAL KNEE ARTHROPLASTY Right 11/15/2019    Procedure: TOTAL KNEE ARTHROPLASTY RIGHT;  Surgeon: Negrito Vidal MD;  Location:  [facility];  Service: Orthopedics    TOTAL KNEE ARTHROPLASTY Left 10/20/2020    Procedure: TOTAL KNEE ARTHROPLASTY LEFT;  Surgeon: Negrito Vidal MD;  Location:  [facility];  Service: Orthopedics;  Laterality: Left;    WISDOM TOOTH EXTRACTION      only 2 removed        Social History     Socioeconomic History    Marital status:    Tobacco Use    Smoking status: Never     Passive exposure: Never    Smokeless tobacco: Never   Vaping Use    Vaping Use: Never used   Substance and Sexual Activity    Alcohol use: No    Drug use: No    Sexual activity: Not Currently     Partners: Female     Birth control/protection: None, Vasectomy       Family History   Problem Relation Age of Onset    Hypertension Mother     Cancer Mother     Cancer Father     Emphysema Father     Diabetes Father     COPD Father     Osteoarthritis Father     No Known Problems Sister     Diabetes Brother     Heart disease Brother         No Known Allergies    Current Outpatient Medications   Medication Instructions    aspirin 81 mg, Oral, Daily    chlorthalidone (HYGROTEN) 50 mg, Oral, Daily    coenzyme Q10 100 mg, Oral, Daily    Cyanocobalamin (B-12 COMPLIANCE INJECTION IJ) 1 dose, Injection, Every 30 Days, LD January 5th    rosuvastatin (CRESTOR) 40 mg, Oral, Daily    vitamin B-6 (PYRIDOXINE) 50  "mg, Oral, Daily       Vitals:    01/18/24 1055   BP: 116/72   BP Location: Right arm   Patient Position: Sitting   Cuff Size: Adult   Pulse: 62   SpO2: 97%   Weight: 80.6 kg (177 lb 9.6 oz)   Height: 175.3 cm (69.02\")       Body mass index is 26.21 kg/m².    PHYSICAL EXAM:    General Appearance:   well developed  well nourished  Neck:  thyroid not enlarged  supple  Respiratory:  no respiratory distress  normal breath sounds  no rales  Cardiovascular:  no jugular venous distention  regular rhythm  apical impulse normal  S1 normal, S2 normal  no S3, no S4   no murmur  no rub, no thrill  carotid pulses normal; no bruit  pedal pulses normal  lower extremity edema: none    Skin:   warm, dry    RESULTS:   Procedures    Results for orders placed during the hospital encounter of 12/21/23    Adult Stress Echo W/ Cont or Stress Agent if Necessary Per Protocol    Interpretation Summary    Patient denied chest pain but c/o SOA (SpO2 WNL on RA) during exercise that resolved in recovery.    Expected exercise duration = 6:40. Actual = 2:42. ALOK (+59). Patient requested to stop due to SOA.    THR of 124 achievede at 2:05.    No significant ST or T wave changes noted.    SR-ST with frequent PVCs, less frequent during exercise. Bigeminy noted in pretest and recovery, couplets in recovery.    LVEF preserved at baseline.  some augmentation with exertino but HR attenuated when excercising.    ANAND treadmill score -2 secondary to shortness of breath and poor exercise tolerance.  medium risk stress test      Labs 11.2021:  Lipid Panel: , HDL 50, LDL 64, trig 91  CMP: Glucose 95, BUN 19, Cr 1.2, eGFR 60, Na 138, K 3.7, Cl 101, CO2 31, Alk Phos 61, AST 18, ALT 14      Lab Results   Component Value Date    CHOL 124 12/21/2023    TRIG 69 12/21/2023    HDL 52 12/21/2023    LDL 58 12/21/2023    AST 24 12/21/2023    ALT 22 12/21/2023     Lab Results   Component Value Date    HGBA1C 6.00 (H) 10/08/2020     Creatinine   Date Value Ref Range " "Status   12/22/2023 1.22 0.76 - 1.27 mg/dL Final   12/21/2023 1.07 0.76 - 1.27 mg/dL Final   12/21/2023 1.15 0.76 - 1.27 mg/dL Final     eGFR Non  Amer   Date Value Ref Range Status   10/08/2020 68 >60 mL/min/1.73 Final   11/16/2019 60 (L) >60 mL/min/1.73 Final   11/05/2019 68 >60 mL/min/1.73 Final         ASSESSMENT:  Problem List Items Addressed This Visit       Coronary artery disease involving native coronary artery of native heart without angina pectoris - Primary    Overview     A. Right and LHC 1/21/2005: Normal LV function, EF 75%, normal right heart pressures, minor nonobstructive coronary artery disease  B.  1/2018, Recurrent chest pain with abnormal EKG showing anterior YESI, \"new\" since 01/2005  C.  [facility], 1/19/2018:  Normal LV function, iFR 0.89, KERRY to LAD.         Dyslipidemia, goal LDL below 70    Essential hypertension       PLAN:    Coronary artery disease  S/p LAD stent 2018  Repeat cath 12/22/2023 by Dr. Álvarez showed nonobstructive CAD widely patent LAD stent.  Continue ASA, statin   Asymptomatic, doing well continue exercise.    2.   Hypertension   Goal <130/80mmHg  Continue Chlorthalidone     3.  Hyperlipidemia   Goal LDL <70  Continue Crestor 40mg nightly   Recent lipids reviewed, excellent results.    4.  CKD stage III:  GFR 57 5/2022, continue avoidance of NSAIDs and maintain adequate hydration    5.  Occasional PVCs:  14-day Holter monitor 1/2024 showed occasional PVCs 3% average heart rate 51  He cannot tolerate metoprolol due to fatigue and bradycardia with heart rates in the 40s to 50s    At this time he is asymptomatic, I am okay holding off on medical therapy for now due to asymptomatic status and normal LV function on recent echo    6.  Preop cardiovascular assessment:  Low cardiovascular risk for upcoming hernia surgery and carpal tunnel surgery.  He has PVCs but he is asymptomatic and he has a heart catheterization from 12/2023 showing widely patent stents and no other " obstructive disease.  Most recent echo shows normal LV function.  No further cardiac testing necessary prior to surgery    Advance Care Planning   ACP discussion was held with the patient during this visit. Patient has an advance directive in EMR which is still valid.           Follow-up   Return in about 6 months (around 7/18/2024).    Darryl Hernandez MD, [facility]  Interventional Cardiology

## 2024-01-19 ENCOUNTER — PREP FOR SURGERY (OUTPATIENT)
Dept: OTHER | Facility: HOSPITAL | Age: 74
End: 2024-01-19
Payer: MEDICARE

## 2024-01-19 DIAGNOSIS — K40.90 NON-RECURRENT UNILATERAL INGUINAL HERNIA WITHOUT OBSTRUCTION OR GANGRENE: Primary | ICD-10-CM

## 2024-01-19 RX ORDER — CEFAZOLIN SODIUM 2 G/50ML
2000 SOLUTION INTRAVENOUS ONCE
OUTPATIENT
Start: 2024-01-19 | End: 2024-01-19

## 2024-01-19 NOTE — TELEPHONE ENCOUNTER
Phoned the patient and spoke with wife she stated to schedule him. Patient was scheduled for 2/7/24 at [facility]

## 2024-02-02 ENCOUNTER — TELEPHONE (OUTPATIENT)
Dept: SURGERY | Facility: CLINIC | Age: 74
End: 2024-02-02
Payer: MEDICARE

## 2024-02-05 ENCOUNTER — TELEPHONE (OUTPATIENT)
Dept: SURGERY | Facility: CLINIC | Age: 74
End: 2024-02-05
Payer: MEDICARE

## 2024-02-05 NOTE — TELEPHONE ENCOUNTER
Phoned patient and spoke with wife to confirm procedure scheduled with Dr Curtis on 2/7/24 at [facility]. Wife confirmed appointment.

## 2024-02-07 ENCOUNTER — OUTSIDE FACILITY SERVICE (OUTPATIENT)
Dept: SURGERY | Facility: CLINIC | Age: 74
End: 2024-02-07
Payer: MEDICARE

## 2024-02-07 ENCOUNTER — DOCUMENTATION (OUTPATIENT)
Dept: SURGERY | Facility: CLINIC | Age: 74
End: 2024-02-07

## 2024-02-07 DIAGNOSIS — G89.18 POST-OP PAIN: Primary | ICD-10-CM

## 2024-02-07 RX ORDER — HYDROCODONE BITARTRATE AND ACETAMINOPHEN 5; 325 MG/1; MG/1
1-2 TABLET ORAL EVERY 4 HOURS PRN
Qty: 10 TABLET | Refills: 0 | Status: SHIPPED | OUTPATIENT
Start: 2024-02-07 | End: 2024-02-09

## 2024-02-07 RX ORDER — ONDANSETRON 4 MG/1
4 TABLET, FILM COATED ORAL EVERY 8 HOURS PRN
Qty: 8 TABLET | Refills: 0 | Status: SHIPPED | OUTPATIENT
Start: 2024-02-07 | End: 2024-02-12

## 2024-02-07 RX ORDER — IBUPROFEN 800 MG/1
800 TABLET ORAL EVERY 6 HOURS PRN
Qty: 16 TABLET | Refills: 0 | Status: SHIPPED | OUTPATIENT
Start: 2024-02-07 | End: 2024-02-12

## 2024-02-15 ENCOUNTER — OFFICE VISIT (OUTPATIENT)
Dept: SURGERY | Facility: CLINIC | Age: 74
End: 2024-02-15
Payer: MEDICARE

## 2024-02-15 VITALS
SYSTOLIC BLOOD PRESSURE: 128 MMHG | BODY MASS INDEX: 26.25 KG/M2 | DIASTOLIC BLOOD PRESSURE: 64 MMHG | HEART RATE: 59 BPM | RESPIRATION RATE: 18 BRPM | WEIGHT: 177.2 LBS | HEIGHT: 69 IN | OXYGEN SATURATION: 96 % | TEMPERATURE: 98 F

## 2024-02-15 DIAGNOSIS — Z48.89 POSTOPERATIVE VISIT: Primary | ICD-10-CM

## 2024-02-15 PROCEDURE — 3074F SYST BP LT 130 MM HG: CPT | Performed by: SURGERY

## 2024-02-15 PROCEDURE — 99024 POSTOP FOLLOW-UP VISIT: CPT | Performed by: SURGERY

## 2024-02-15 PROCEDURE — 3078F DIAST BP <80 MM HG: CPT | Performed by: SURGERY

## 2024-02-15 PROCEDURE — 1160F RVW MEDS BY RX/DR IN RCRD: CPT | Performed by: SURGERY

## 2024-02-15 PROCEDURE — 1159F MED LIST DOCD IN RCRD: CPT | Performed by: SURGERY

## 2024-07-11 RX ORDER — ROSUVASTATIN CALCIUM 40 MG/1
40 TABLET, COATED ORAL DAILY
Qty: 90 TABLET | Refills: 3 | Status: SHIPPED | OUTPATIENT
Start: 2024-07-11

## 2024-07-12 RX ORDER — CHLORTHALIDONE 50 MG/1
50 TABLET ORAL DAILY
Qty: 90 TABLET | Refills: 3 | Status: SHIPPED | OUTPATIENT
Start: 2024-07-12

## 2024-08-06 NOTE — PROGRESS NOTES
OFFICE VISIT  NOTE  [facility] CARDIOLOGY      Name: Nic Brooks    Date: 2024  MRN:  [id number]  :  1950      REFERRING/PRIMARY PROVIDER:  Francisco Vincent MD     Chief Complaint   Patient presents with    Coronary artery disease      HPI: Nic Brooks is a 74 y.o. male who presents today for follow up of CAD, previously followed by Dr. Wilkes. Associated history of hypertension, hyperlipidemia, GAUDENCIO on CPAP, history of right lower lobectomy secondary to granulomatosis, history of splenectomy for splenomegaly and anemia with B12 deficiency.  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  Hospitalized 2023 due to frequent PVCs found on EKG at time of hernia surgery and at PCPs office, stress echocardiogram showed moderate risk, therefore follow-up [facility] showed nonobstructive CAD widely patent LAD stent EDP of 7 mmHg. He also wore a Holter monitor for about 12 days after hospitalization showed 3% PVCs he was on metoprolol 25 mg twice daily after the hospitalization but could not tolerate due to bradycardia and fatigue.     Today he reports ongoing issues with fatigue, which is a significant change for him from a year ago. He is a  and is unable to do what he was doing a year ago in terms of working outside, now mostly works half days. He also falls asleep easily if sitting down, has no energy to get up and do things. He had labs with PCP earlier this year which were within normal limits, including TSH and CBC. He has noted lower HRs on a Fitbit type watch. He wears his CPAP religiously and gets 7-8 hours of sleep a night. He denies any angina or anginal equivalent.     ROS:Pertinent positives as listed in the HPI.  All other systems reviewed and negative.    Past Medical History:   Diagnosis Date    Arthritis     Coronary artery disease     s/p stent     GERD (gastroesophageal reflux disease)     Hearing loss     kailee hearing aids    History of kidney stones     just  one     History of transfusion 1980    [facility] with lung surgery     Hyperlipidemia     Osteoarthritis     Sleep apnea with use of continuous positive airway pressure (CPAP)     complaint with machine     Wears glasses        Past Surgical History:   Procedure Laterality Date    APPENDECTOMY      BACK SURGERY      x2    CARDIAC CATHETERIZATION N/A 1/19/2018    Procedure: Left Heart Cath;  Surgeon: Negrito Wilkes MD;  Location:  [facility];  Service:     CARDIAC CATHETERIZATION N/A 12/22/2023    Procedure: Left Heart Cath;  Surgeon: Darryl Álvarez III, MD;  Location:  [facility];  Service: Cardiovascular;  Laterality: N/A;    COLONOSCOPY      every 5 years    CORONARY STENT PLACEMENT  2018    ENDOSCOPY      KIDNEY STONE SURGERY      KNEE ARTHROSCOPY W/ MENISCECTOMY      left    LUMBAR DISCECTOMY      LUNG REMOVAL, PARTIAL      right lower lobe     SPLENECTOMY  1974    TONSILLECTOMY      removed adnoids    TOTAL KNEE ARTHROPLASTY Right 11/15/2019    Procedure: TOTAL KNEE ARTHROPLASTY RIGHT;  Surgeon: Negrito Vidal MD;  Location:  [facility] OR;  Service: Orthopedics    TOTAL KNEE ARTHROPLASTY Left 10/20/2020    Procedure: TOTAL KNEE ARTHROPLASTY LEFT;  Surgeon: Negrito Vidal MD;  Location:  [facility] OR;  Service: Orthopedics;  Laterality: Left;    WISDOM TOOTH EXTRACTION      only 2 removed        Social History     Socioeconomic History    Marital status:    Tobacco Use    Smoking status: Never     Passive exposure: Never    Smokeless tobacco: Never   Vaping Use    Vaping status: Never Used   Substance and Sexual Activity    Alcohol use: No    Drug use: No    Sexual activity: Not Currently     Partners: Female     Birth control/protection: None, Vasectomy       Family History   Problem Relation Age of Onset    Hypertension Mother     Cancer Mother     Cancer Father     Emphysema Father     Diabetes Father     COPD Father     Osteoarthritis Father     No  "Known Problems Sister     Diabetes Brother     Heart disease Brother         No Known Allergies    Current Outpatient Medications   Medication Instructions    aspirin 81 mg, Oral, Daily    chlorthalidone (HYGROTEN) 50 mg, Oral, Daily    coenzyme Q10 100 mg, Oral, Daily    rosuvastatin (CRESTOR) 40 mg, Oral, Daily    vitamin B-6 (PYRIDOXINE) 50 mg, Oral, Daily       Vitals:    08/08/24 1024   BP: 110/80   Pulse: 68   SpO2: 99%   Weight: 79.3 kg (174 lb 12.8 oz)   Height: 175.3 cm (69\")     Body mass index is 25.81 kg/m².    PHYSICAL EXAM:    General Appearance:   well developed  well nourished  Neck:  thyroid not enlarged  supple  Respiratory:  no respiratory distress  normal breath sounds  no rales  Cardiovascular:  no jugular venous distention  regular rhythm  apical impulse normal  S1 normal, S2 normal  no S3, no S4   no murmur  no rub, no thrill  lower extremity edema: none    Skin:   warm, dry    RESULTS:     ECG 12 Lead    Date/Time: 8/8/2024 11:50 AM  Performed by: Letha Arenas PA-C    Authorized by: Letha Arenas PA-C  Comparison: compared with previous ECG from 12/21/2023  Comparison to previous ECG: PVCs and slower rate on today's EKG   Rhythm: sinus rhythm and sinus bradycardia  Ectopy: infrequent PVCs  Rate: bradycardic  BPM: 52  QRS axis: normal    Clinical impression: abnormal EKG        Results for orders placed during the hospital encounter of 12/21/23    Adult Stress Echo W/ Cont or Stress Agent if Necessary Per Protocol    Interpretation Summary    Patient denied chest pain but c/o SOA (SpO2 WNL on RA) during exercise that resolved in recovery.    Expected exercise duration = 6:40. Actual = 2:42. ALOK (+59). Patient requested to stop due to SOA.    THR of 124 achievede at 2:05.    No significant ST or T wave changes noted.    SR-ST with frequent PVCs, less frequent during exercise. Bigeminy noted in pretest and recovery, couplets in recovery.    LVEF preserved at baseline.  " "some augmentation with exertino but HR attenuated when excercising.    ANAND treadmill score -2 secondary to shortness of breath and poor exercise tolerance.  medium risk stress test      Labs:  Lab Results   Component Value Date    CHOL 124 12/21/2023    TRIG 69 12/21/2023    HDL 52 12/21/2023    LDL 58 12/21/2023    AST 24 12/21/2023    ALT 22 12/21/2023     Lab Results   Component Value Date    HGBA1C 6.00 (H) 10/08/2020     Creatinine   Date Value Ref Range Status   12/22/2023 1.22 0.76 - 1.27 mg/dL Final   12/21/2023 1.07 0.76 - 1.27 mg/dL Final   12/21/2023 1.15 0.76 - 1.27 mg/dL Final     eGFR Non  Amer   Date Value Ref Range Status   10/08/2020 68 >60 mL/min/1.73 Final   11/16/2019 60 (L) >60 mL/min/1.73 Final   11/05/2019 68 >60 mL/min/1.73 Final         ASSESSMENT:  Problem List Items Addressed This Visit          Cardiac and Vasculature    Abnormal EKG    Overview     Added automatically from request for surgery [id number]         Coronary artery disease involving native coronary artery of native heart without angina pectoris - Primary    Overview     A. Right and [facility] [place] 1/21/2005: Normal LV function, EF 75%, normal right heart pressures, minor nonobstructive coronary artery disease  B.  1/2018, Recurrent chest pain with abnormal EKG showing anterior YESI, \"new\" since 01/2005  C.  [facility] [place], 1/19/2018:  Normal LV function, iFR 0.89, KERRY to LAD.         Relevant Orders    Holter Monitor - 72 Hour Up To 15 Days    Ambulatory Referral to Cardiac Electrophysiology    Dyslipidemia, goal LDL below 70    Essential hypertension       Sleep    Moderate obstructive sleep apnea     Other Visit Diagnoses       PVC's (premature ventricular contractions)        Relevant Orders    Holter Monitor - 72 Hour Up To 15 Days    Ambulatory Referral to Cardiac Electrophysiology            PLAN:  Coronary artery disease  S/p LAD stent 2018  Repeat cath 12/22/2023 by Dr. Álvarez showed nonobstructive CAD, widely patent LAD stent.  Continue " ASA, statin      2.   Hypertension   Goal <130/80mmHg  BP well controlled, on Chlorthalidone      3.  Hyperlipidemia   Goal LDL <70  Continue Crestor 40mg nightly  LDL 69 on labs 01/2024     4.  CKD stage III:  GFR 57 5/2022, continue avoidance of NSAIDs and maintain adequate hydration     5.  PVCs/fatigue:  14-day Holter monitor 1/2024 showed occasional PVCs 3% average heart rate 51  He cannot tolerate metoprolol due to fatigue and bradycardia with heart rates in the 40s to 50s  He has persistent fatigue and low energy, unable to do what he used to do.   EKG today with occasional PVCs and bradycardia, he would like referral to Dr. Dawkins for further recommendations. Will place 14 day monitor to relook PVC burden at this time, as he was on BB with the previous monitor      Most recent echo shows normal LV function.           Advance Care Planning   ACP discussion was held with the patient during this visit. Patient has an advance directive in EMR which is still valid.           Follow-up   Return in about 6 months (around 2/8/2025) for with RDS .      Electronically signed by Letha Arenas PA-C, 08/08/24, 11:54 AM EDT.

## 2024-08-08 ENCOUNTER — OFFICE VISIT (OUTPATIENT)
Dept: CARDIOLOGY | Facility: CLINIC | Age: 74
End: 2024-08-08
Payer: MEDICARE

## 2024-08-08 VITALS
SYSTOLIC BLOOD PRESSURE: 110 MMHG | HEART RATE: 68 BPM | OXYGEN SATURATION: 99 % | WEIGHT: 174.8 LBS | HEIGHT: 69 IN | DIASTOLIC BLOOD PRESSURE: 80 MMHG | BODY MASS INDEX: 25.89 KG/M2

## 2024-08-08 DIAGNOSIS — G47.33 MODERATE OBSTRUCTIVE SLEEP APNEA: ICD-10-CM

## 2024-08-08 DIAGNOSIS — I25.10 CORONARY ARTERY DISEASE INVOLVING NATIVE CORONARY ARTERY OF NATIVE HEART WITHOUT ANGINA PECTORIS: Primary | ICD-10-CM

## 2024-08-08 DIAGNOSIS — I10 ESSENTIAL HYPERTENSION: ICD-10-CM

## 2024-08-08 DIAGNOSIS — R94.31 ABNORMAL EKG: ICD-10-CM

## 2024-08-08 DIAGNOSIS — R53.83 OTHER FATIGUE: ICD-10-CM

## 2024-08-08 DIAGNOSIS — I49.3 PVC'S (PREMATURE VENTRICULAR CONTRACTIONS): ICD-10-CM

## 2024-08-08 DIAGNOSIS — E78.5 DYSLIPIDEMIA, GOAL LDL BELOW 70: ICD-10-CM

## 2024-08-08 PROCEDURE — 3079F DIAST BP 80-89 MM HG: CPT | Performed by: PHYSICIAN ASSISTANT

## 2024-08-08 PROCEDURE — 99214 OFFICE O/P EST MOD 30 MIN: CPT | Performed by: PHYSICIAN ASSISTANT

## 2024-08-08 PROCEDURE — 93000 ELECTROCARDIOGRAM COMPLETE: CPT | Performed by: PHYSICIAN ASSISTANT

## 2024-08-08 PROCEDURE — 3074F SYST BP LT 130 MM HG: CPT | Performed by: PHYSICIAN ASSISTANT

## 2024-09-04 ENCOUNTER — TELEPHONE (OUTPATIENT)
Dept: CARDIOLOGY | Facility: CLINIC | Age: 74
End: 2024-09-04
Payer: MEDICARE

## 2024-09-04 NOTE — TELEPHONE ENCOUNTER
Called patient's wife with monitor results. 17% PVC burden, patient is very fatigued and this is unlike him, not able to complete his usual activities. He has upcoming appt with Dr. Dawkins on 9/9 for further recommendations, which he will keep. No questions remaining at this time

## 2024-09-09 ENCOUNTER — OFFICE VISIT (OUTPATIENT)
Dept: CARDIOLOGY | Facility: CLINIC | Age: 74
End: 2024-09-09
Payer: MEDICARE

## 2024-09-09 VITALS
BODY MASS INDEX: 25.77 KG/M2 | WEIGHT: 174 LBS | HEART RATE: 58 BPM | SYSTOLIC BLOOD PRESSURE: 118 MMHG | OXYGEN SATURATION: 99 % | HEIGHT: 69 IN | DIASTOLIC BLOOD PRESSURE: 72 MMHG

## 2024-09-09 DIAGNOSIS — I49.3 PVC'S (PREMATURE VENTRICULAR CONTRACTIONS): Primary | ICD-10-CM

## 2024-09-09 DIAGNOSIS — I25.10 CORONARY ARTERY DISEASE INVOLVING NATIVE CORONARY ARTERY OF NATIVE HEART WITHOUT ANGINA PECTORIS: ICD-10-CM

## 2024-09-09 DIAGNOSIS — I10 ESSENTIAL HYPERTENSION: ICD-10-CM

## 2024-09-09 DIAGNOSIS — G47.33 MODERATE OBSTRUCTIVE SLEEP APNEA: ICD-10-CM

## 2024-09-09 PROCEDURE — 99214 OFFICE O/P EST MOD 30 MIN: CPT | Performed by: INTERNAL MEDICINE

## 2024-09-09 PROCEDURE — G2211 COMPLEX E/M VISIT ADD ON: HCPCS | Performed by: INTERNAL MEDICINE

## 2024-09-09 PROCEDURE — 3074F SYST BP LT 130 MM HG: CPT | Performed by: INTERNAL MEDICINE

## 2024-09-09 PROCEDURE — 3078F DIAST BP <80 MM HG: CPT | Performed by: INTERNAL MEDICINE

## 2024-09-09 NOTE — PROGRESS NOTES
Cardiac Electrophysiology Outpatient Consult Note            [facility]    Consult Note     Nic Brooks  [id number]  09/09/2024  [phone]     Primary Care Physician: Francisco Vincent MD    Referred By: Letha Arenas*    Subjective     Chief Complaint:   Diagnoses and all orders for this visit:    1. PVC's (premature ventricular contractions) (Primary)    2. Coronary artery disease involving native coronary artery of native heart without angina pectoris    3. Essential hypertension    4. Moderate obstructive sleep apnea      Chief Complaint   Patient presents with    Coronary artery disease involving native coronary artery of       History of Present Illness:   Nic Brooks is a 74 y.o. male who presents to my electrophysiology clinic for evaluation of above complaints.    Fatigue.  Fatigue when he is resting fatigue when he is exerting himself.  No palpitations no chest pain no skipped beat sensation..      Past Medical History:   Past Medical History:   Diagnosis Date    Arthritis     Coronary artery disease     s/p stent 2018    GERD (gastroesophageal reflux disease)     Hearing loss     kailee hearing aids    History of kidney stones 2008    just one     History of transfusion 1980    [facility] with lung surgery     Hyperlipidemia     Osteoarthritis     Sleep apnea with use of continuous positive airway pressure (CPAP)     complaint with machine     Wears glasses        Past Surgical History:   Past Surgical History:   Procedure Laterality Date    APPENDECTOMY      BACK SURGERY      x2    CARDIAC CATHETERIZATION N/A 1/19/2018    Procedure: Left Heart Cath;  Surgeon: Negrito Wilkes MD;  Location:  [facility];  Service:     CARDIAC CATHETERIZATION N/A 12/22/2023    Procedure: Left Heart Cath;  Surgeon: Darryl Álvarez III, MD;  Location:  [facility];  Service: Cardiovascular;  Laterality:  N/A;    COLONOSCOPY      every 5 years    CORONARY STENT PLACEMENT  2018    ENDOSCOPY      KIDNEY STONE SURGERY      KNEE ARTHROSCOPY W/ MENISCECTOMY      left    LUMBAR DISCECTOMY      LUNG REMOVAL, PARTIAL      right lower lobe     SPLENECTOMY  1974    TONSILLECTOMY      removed adnoids    TOTAL KNEE ARTHROPLASTY Right 11/15/2019    Procedure: TOTAL KNEE ARTHROPLASTY RIGHT;  Surgeon: Negrito Vidal MD;  Location:  [facility] OR;  Service: Orthopedics    TOTAL KNEE ARTHROPLASTY Left 10/20/2020    Procedure: TOTAL KNEE ARTHROPLASTY LEFT;  Surgeon: Negrito Vidal MD;  Location:  [facility];  Service: Orthopedics;  Laterality: Left;    WISDOM TOOTH EXTRACTION      only 2 removed        Family History:   Family History   Problem Relation Age of Onset    Hypertension Mother     Cancer Mother     Cancer Father     Emphysema Father     Diabetes Father     COPD Father     Osteoarthritis Father     No Known Problems Sister     Diabetes Brother     Heart disease Brother        Social History:   Social History     Socioeconomic History    Marital status:    Tobacco Use    Smoking status: Never     Passive exposure: Never    Smokeless tobacco: Never   Vaping Use    Vaping status: Never Used   Substance and Sexual Activity    Alcohol use: No    Drug use: No    Sexual activity: Not Currently     Partners: Female     Birth control/protection: None, Vasectomy       Medications:     Current Outpatient Medications:     aspirin 81 MG EC tablet, Take 1 tablet by mouth Daily., Disp: 90 tablet, Rfl: 3    chlorthalidone (HYGROTEN) 50 MG tablet, TAKE 1 TABLET BY MOUTH DAILY, Disp: 90 tablet, Rfl: 3    coenzyme Q10 100 MG capsule, Take 1 capsule by mouth Daily., Disp: , Rfl:     rosuvastatin (CRESTOR) 40 MG tablet, TAKE 1 TABLET BY MOUTH DAILY, Disp: 90 tablet, Rfl: 3    vitamin B-6 (PYRIDOXINE) 50 MG tablet, Take 1 tablet by mouth Daily., Disp: , Rfl:     Allergies:   No Known Allergies    Objective   Vital Signs:   Vitals:     "09/09/24 0834   BP: 118/72   BP Location: Left arm   Patient Position: Sitting   Cuff Size: Adult   Pulse: 58   SpO2: 99%   Weight: 78.9 kg (174 lb)   Height: 175.3 cm (69\")       PHYSICAL EXAM  General appearance: Awake, alert, cooperative  Head: Normocephalic, without obvious abnormality, atraumatic  Eyes: Conjunctivae/corneas clear, EOMs intact  Neck: no adenopathy, no carotid bruit, no JVD, and thyroid: not enlarged  Lungs: clear to auscultation bilaterally and no rhonchi or crackles\", ' symmetric  Heart: regular rate and rhythm, S1, S2 normal, no murmur, click, rub or gallop  Abdomen: Soft, non-tender, bowel sounds normal,  no organomegaly  Extremities: extremities normal, atraumatic, no cyanosis or edema  Skin: Skin color, turgor normal, no rashes or lesions  Neurologic: Grossly normal     Lab Results   Component Value Date    GLUCOSE 91 12/22/2023    CALCIUM 9.0 12/22/2023     12/22/2023    K 3.4 (L) 12/22/2023    CO2 29.0 12/22/2023     12/22/2023    BUN 17 12/22/2023    CREATININE 1.22 12/22/2023    EGFRIFNONA 68 10/08/2020    BCR 13.9 12/22/2023    ANIONGAP 9.0 12/22/2023     Lab Results   Component Value Date    WBC 6.43 12/22/2023    HGB 15.2 12/22/2023    HCT 45.1 12/22/2023    MCV 91.3 12/22/2023     12/22/2023     Lab Results   Component Value Date    INR 1.02 10/08/2020    INR 0.94 11/05/2019    INR 0.92 04/04/2016    PROTIME 13.1 10/08/2020    PROTIME 12.1 11/05/2019    PROTIME 10.0 04/04/2016     No results found for: \"TSH\", \"A3QVOJR\", \"Z6PTGCP\", \"THYROIDAB\"    Cardiac Testing:      I personally viewed and interpreted the patient's EKG/Telemetry/lab data    Procedures    Tobacco Cessation: N/A  Obstructive Sleep Apnea Screening: Completed    Advance Care Planning   ACP discussion was declined by the patient. Patient does not have an advance directive, declines further assistance.       Assessment & Plan    Diagnoses and all orders for this visit:    1. PVC's (premature " ventricular contractions) (Primary)    2. Coronary artery disease involving native coronary artery of native heart without angina pectoris    3. Essential hypertension    4. Moderate obstructive sleep apnea         Diagnosis Plan   1. PVC's (premature ventricular contractions)  Mid right ventricular outflow tract morphology PVCs.  Not especially common.  3% burden by recent assessment.    His symptoms really do not fit a PVC induced myopathy or PVC induced complaint.    He does have generalized fatigue most of the time.  This is the mainstay of his complaints.    He does take B12 supplementation and when he is due for his next shot he really feels fatigued.  Perhaps he just needs more B12 this is a question.    Exercise stress testing also shows deconditioning.  He would certainly stand to be in better cardiovascular condition.  We discussed exercising regularly 6 days a week starting at 10 minutes a day working his way up to 30 minutes a day.  Treadmill swimming bike.  No running.  I think this is probably the source of much of his complaints.  He is active and does 18,000 steps a day however this is walking and taking care of his Beatties as a  rather than prescribed cardiovascular exercise.      2. Coronary artery disease involving native coronary artery of native heart without angina pectoris  Stable.  Follows with cardiology.  Well treated.      3. Essential hypertension  Blood pressure well-controlled.      4. Moderate obstructive sleep apnea  Well treated.        Body mass index is 25.7 kg/m².    I spent 48 minutes in consultation with this patient which included more than 65% of this time in direct face-to-face counseling, physical examination and discussion of my assessment and findings and this shared decision making with the patient.  The remainder of the time not spent face-to-face was performing one, some or all of the following actions: preparing to see the patient (e.g. reviewing tests, prior  clinicians' notes, etc), ordering medications, tests or procedures, coordination of care, discussion of the plan with other healthcare providers, documenting clinical information in epic as well as independently interpreting results and communication of these results to the patient family and/or caregiver(s).  Please note that this explicitly excludes time spent on other separate billable services such as performing procedures or test interpretation, when applicable.    Follow Up:       Thank you for allowing me to participate in the care of your patient. Please to not hesitate to contact me with additional questions or concerns.      Jeffery Dawkins DO, FACC, RS  Cardiac Electrophysiologist  [facility] / [facility]

## 2024-10-21 ENCOUNTER — OFFICE VISIT (OUTPATIENT)
Dept: CARDIOLOGY | Facility: CLINIC | Age: 74
End: 2024-10-21
Payer: MEDICARE

## 2024-10-21 VITALS
HEART RATE: 65 BPM | HEIGHT: 69 IN | BODY MASS INDEX: 25.62 KG/M2 | DIASTOLIC BLOOD PRESSURE: 82 MMHG | SYSTOLIC BLOOD PRESSURE: 140 MMHG | WEIGHT: 173 LBS | OXYGEN SATURATION: 96 %

## 2024-10-21 DIAGNOSIS — I49.3 PVC'S (PREMATURE VENTRICULAR CONTRACTIONS): Primary | ICD-10-CM

## 2024-10-21 PROCEDURE — 3077F SYST BP >= 140 MM HG: CPT | Performed by: PHYSICIAN ASSISTANT

## 2024-10-21 PROCEDURE — 3079F DIAST BP 80-89 MM HG: CPT | Performed by: PHYSICIAN ASSISTANT

## 2024-10-21 PROCEDURE — 99214 OFFICE O/P EST MOD 30 MIN: CPT | Performed by: PHYSICIAN ASSISTANT

## 2024-10-21 PROCEDURE — 93000 ELECTROCARDIOGRAM COMPLETE: CPT | Performed by: PHYSICIAN ASSISTANT

## 2024-10-21 NOTE — PROGRESS NOTES
Cardiac Electrophysiology Outpatient Consult Note            [facility] at [facility]    Consult Note     Nic Brooks  [id number]    [phone]     Primary Care Physician: Francisco Vincent MD    Referred By: Dr. Hernandez.     Subjective     Chief Complaint:   There are no diagnoses linked to this encounter.    Chief Complaint   Patient presents with    PVC's (premature ventricular contractions)     Holter results       History of Present Illness:   Nic Brooks is a 74 y.o. male who presents to electrophysiology clinic for evaluation of recent abnormal Holter monitor revealing frequent PVCs.  Mr. Brooks's complaint is that he feels tired a lot.  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  Over the past year or so he is felt like he has had to take a nap he felt his right to sleep if he sits down.  He has gone through a cardiovascular workup which including a negative stress test normal echocardiogram.  Previous Holter monitor revealed 3% PVCs back in January but now has had a repeat monitor revealing 17% PVCs.  He tells me he does feel these occasionally feels skipping or fluttering sensation.  These are worse at night when he is trying to sleep.In view of this finding is recommended he consider further treatment regarding his PVCs see some peers upon his symptoms of fatigue.  He tells me is not having chest pain chest tightness.  He has had no dizziness near syncope or syncope.  Again as mentioned above the main complaint that he just feels tired a lot.    Past Medical History:   Past Medical History:   Diagnosis Date    Arthritis     Coronary artery disease     s/p stent 2018    GERD (gastroesophageal reflux disease)     Hearing loss     kailee hearing aids    History of kidney stones 2008    just one     History of transfusion 1980    [facility] with lung surgery     Hyperlipidemia      Osteoarthritis     Sleep apnea with use of continuous positive airway pressure (CPAP)     complaint with machine     Wears glasses        Past Surgical History:   Past Surgical History:   Procedure Laterality Date    APPENDECTOMY      BACK SURGERY      x2    CARDIAC CATHETERIZATION N/A 1/19/2018    Procedure: Left Heart Cath;  Surgeon: Negrito Wilkes MD;  Location:  [facility];  Service:     CARDIAC CATHETERIZATION N/A 12/22/2023    Procedure: Left Heart Cath;  Surgeon: Darryl Álvarez III, MD;  Location:  [facility];  Service: Cardiovascular;  Laterality: N/A;    COLONOSCOPY      every 5 years    CORONARY STENT PLACEMENT  2018    ENDOSCOPY      KIDNEY STONE SURGERY      KNEE ARTHROSCOPY W/ MENISCECTOMY      left    LUMBAR DISCECTOMY      LUNG REMOVAL, PARTIAL      right lower lobe     SPLENECTOMY  1974    TONSILLECTOMY      removed adnoids    TOTAL KNEE ARTHROPLASTY Right 11/15/2019    Procedure: TOTAL KNEE ARTHROPLASTY RIGHT;  Surgeon: Negrito Vidal MD;  Location:  [facility];  Service: Orthopedics    TOTAL KNEE ARTHROPLASTY Left 10/20/2020    Procedure: TOTAL KNEE ARTHROPLASTY LEFT;  Surgeon: Negrito Vidal MD;  Location:  [facility];  Service: Orthopedics;  Laterality: Left;    WISDOM TOOTH EXTRACTION      only 2 removed        Family History:   Family History   Problem Relation Age of Onset    Hypertension Mother     Cancer Mother     Cancer Father     Emphysema Father     Diabetes Father     COPD Father     Osteoarthritis Father     No Known Problems Sister     Diabetes Brother     Heart disease Brother        Social History:   Social History     Socioeconomic History    Marital status:    Tobacco Use    Smoking status: Never     Passive exposure: Never    Smokeless tobacco: Never   Vaping Use    Vaping status: Never Used   Substance and Sexual Activity    Alcohol use: No    Drug use: No    Sexual activity: Not Currently     Partners: Female     Birth  "control/protection: None, Vasectomy       Medications:     Current Outpatient Medications:     aspirin 81 MG EC tablet, Take 1 tablet by mouth Daily., Disp: 90 tablet, Rfl: 3    chlorthalidone (HYGROTEN) 50 MG tablet, TAKE 1 TABLET BY MOUTH DAILY, Disp: 90 tablet, Rfl: 3    coenzyme Q10 100 MG capsule, Take 1 capsule by mouth Daily., Disp: , Rfl:     rosuvastatin (CRESTOR) 40 MG tablet, TAKE 1 TABLET BY MOUTH DAILY, Disp: 90 tablet, Rfl: 3    vitamin B-6 (PYRIDOXINE) 50 MG tablet, Take 1 tablet by mouth Daily., Disp: , Rfl:     Allergies:   No Known Allergies    Objective   Vital Signs:   Vitals:    10/21/24 0903   BP: 140/82   BP Location: Right arm   Patient Position: Sitting   Cuff Size: Adult   Pulse: 65   SpO2: 96%   Weight: 78.5 kg (173 lb)   Height: 175.3 cm (69\")       PHYSICAL EXAM  General appearance: Awake, alert, cooperative  Head: Normocephalic, without obvious abnormality, atraumatic  Eyes: Conjunctivae/corneas clear, EOMs intact  Neck: no adenopathy, no carotid bruit, no JVD, and thyroid: not enlarged  Lungs: clear to auscultation bilaterally and no rhonchi or crackles\", ' symmetric  Heart: regular rate and rhythm, S1, S2 normal, no murmur, click, rub or gallop  Abdomen: Soft, non-tender, bowel sounds normal,  no organomegaly  Extremities: extremities normal, atraumatic, no cyanosis or edema  Skin: Skin color, turgor normal, no rashes or lesions  Neurologic: Grossly normal     Lab Results   Component Value Date    GLUCOSE 91 12/22/2023    CALCIUM 9.0 12/22/2023     12/22/2023    K 3.4 (L) 12/22/2023    CO2 29.0 12/22/2023     12/22/2023    BUN 17 12/22/2023    CREATININE 1.22 12/22/2023    EGFRIFNONA 68 10/08/2020    BCR 13.9 12/22/2023    ANIONGAP 9.0 12/22/2023     Lab Results   Component Value Date    WBC 6.43 12/22/2023    HGB 15.2 12/22/2023    HCT 45.1 12/22/2023    MCV 91.3 12/22/2023     12/22/2023     Lab Results   Component Value Date    INR 1.02 10/08/2020    INR 0.94 " "11/05/2019    INR 0.92 04/04/2016    PROTIME 13.1 10/08/2020    PROTIME 12.1 11/05/2019    PROTIME 10.0 04/04/2016     No results found for: \"TSH\", \"H6NFZPL\", \"M3TWAOV\", \"THYROIDAB\"    Cardiac Testing:      I personally viewed and interpreted the patient's EKG/Telemetry/lab data      ECG 12 Lead    Date/Time: 10/21/2024 11:25 AM  Performed by: Arturo Hutson PA    Authorized by: Arturo Hutson PA  Comparison: compared with previous ECG from 8/8/2024  Similar to previous ECG  Rhythm: sinus bradycardia  Ectopy: unifocal PVCs  Rate: normal  Conduction: conduction normal  T Waves: T waves normal  QRS axis: normal    Clinical impression: abnormal EKG          Tobacco Cessation: N/A  Obstructive Sleep Apnea Screening: Completed    Advance Care Planning   ACP discussion was declined by the patient. Patient does not have an advance directive, declines further assistance.       Assessment & Plan    There are no diagnoses linked to this encounter.       Diagnosis Plan   1. PVC's (premature ventricular contractions)  Mid right ventricular outflow tract morphology PVCs.  Increased PVCs on Holter monitor today at 17%.  Symptoms are fatigue and occasional palpitations.        2. Coronary artery disease involving native coronary artery of native heart without angina pectoris  Stable.  Follows with cardiology.  Well treated.  He denies having any angina.      3. Essential hypertension  Blood pressure well-controlled.      4. Moderate obstructive sleep apnea  Well treated.  He tracks this quite closely.      Reviewed Holter monitor and EKG today.  Symptoms of fatigue as mentioned previously Dr. Dawkins is not clearly fit as far as a reason for why he is so fatigued.  We did review options for treating PVCs such as beta-blocker or verapamil which I do not think he is a good candidate for due to bradycardia.  We mentioned ablation procedure but this may be \"a long climb for short slide\".  He will consider these.  Return " follow-up or office as scheduled.  Electronically signed by WEI Chester, 10/21/24, 11:27 AM EDT.

## 2024-10-28 ENCOUNTER — OFFICE VISIT (OUTPATIENT)
Dept: SLEEP MEDICINE | Facility: CLINIC | Age: 74
End: 2024-10-28
Payer: MEDICARE

## 2024-10-28 VITALS
DIASTOLIC BLOOD PRESSURE: 78 MMHG | BODY MASS INDEX: 25.77 KG/M2 | TEMPERATURE: 98.2 F | HEIGHT: 69 IN | WEIGHT: 174 LBS | HEART RATE: 74 BPM | SYSTOLIC BLOOD PRESSURE: 122 MMHG | OXYGEN SATURATION: 94 %

## 2024-10-28 DIAGNOSIS — G47.33 OSA (OBSTRUCTIVE SLEEP APNEA): Primary | ICD-10-CM

## 2024-10-28 PROCEDURE — 3074F SYST BP LT 130 MM HG: CPT | Performed by: NURSE PRACTITIONER

## 2024-10-28 PROCEDURE — 99213 OFFICE O/P EST LOW 20 MIN: CPT | Performed by: NURSE PRACTITIONER

## 2024-10-28 PROCEDURE — 3078F DIAST BP <80 MM HG: CPT | Performed by: NURSE PRACTITIONER

## 2024-10-28 NOTE — PROGRESS NOTES
Chief Complaint:   Chief Complaint   Patient presents with    Follow-up    Sleep Apnea       HPI:    Nic Brooks is a 74 y.o. male here for follow-up of sleep apnea.  Patient was last seen 10/26/2023.  Patient continues to do well with CPAP therapy.  Patient is sleeping 7 hours nightly and does feel rested upon awakening.  Patient goes to sleep easily and does not get up during the night.  Patient continues to do well with heated tubing and nasal mask.  We did speak today about 53 out the last 90 days of use.  Patient states he is very compliant and did notice his SIM card had popped out for a short while noticed this and did put it back in place.  He is doing well with heated tubing and nasal mask.        Current medications are:   Current Outpatient Medications:     aspirin 81 MG EC tablet, Take 1 tablet by mouth Daily., Disp: 90 tablet, Rfl: 3    chlorthalidone (HYGROTEN) 50 MG tablet, TAKE 1 TABLET BY MOUTH DAILY, Disp: 90 tablet, Rfl: 3    coenzyme Q10 100 MG capsule, Take 1 capsule by mouth Daily., Disp: , Rfl:     rosuvastatin (CRESTOR) 40 MG tablet, TAKE 1 TABLET BY MOUTH DAILY, Disp: 90 tablet, Rfl: 3    vitamin B-6 (PYRIDOXINE) 50 MG tablet, Take 1 tablet by mouth Daily., Disp: , Rfl: .      The patient's relevant past medical, surgical, family and social history were reviewed and updated in Epic as appropriate.       Review of Systems   HENT:  Positive for congestion and hearing loss.    Eyes:  Positive for visual disturbance.   Respiratory:  Positive for apnea.    Musculoskeletal:  Positive for arthralgias, joint swelling and myalgias.   Allergic/Immunologic: Positive for environmental allergies.   Psychiatric/Behavioral:  Positive for sleep disturbance.    All other systems reviewed and are negative.        Objective:    Physical Exam  Constitutional:       Appearance: Normal appearance.   HENT:      Head: Normocephalic and atraumatic.      Mouth/Throat:      Comments: Class 4  airway  Cardiovascular:      Rate and Rhythm: Normal rate and regular rhythm.   Pulmonary:      Effort: Pulmonary effort is normal.      Breath sounds: Normal breath sounds.   Skin:     General: Skin is warm and dry.   Neurological:      Mental Status: He is alert and oriented to person, place, and time.   Psychiatric:         Mood and Affect: Mood normal.         Behavior: Behavior normal.         Thought Content: Thought content normal.         Judgment: Judgment normal.         CPAP Report    53/90 days of use  Greater than 4-hour use 65.6  AHI of 2.7  Settings 6-18  90% pressure 9 cm H2O  The patient continues to use and benefit from CPAP therapy.    ASSESSMENT/PLAN    Diagnoses and all orders for this visit:    1. GAUDENCIO (obstructive sleep apnea) (Primary)  -     PAP Therapy        Refill supplies x 1 year.  Return to clinic 1 year or sooner as symptoms warrant.    Signed by  SHIVA Edwards    October 28, 2024      CC: Francisco Vincent MD         No ref. provider found

## 2024-11-22 ENCOUNTER — TELEPHONE (OUTPATIENT)
Dept: NEUROSURGERY | Facility: CLINIC | Age: 74
End: 2024-11-22

## 2024-11-22 ENCOUNTER — OFFICE VISIT (OUTPATIENT)
Dept: NEUROSURGERY | Facility: CLINIC | Age: 74
End: 2024-11-22
Payer: MEDICARE

## 2024-11-22 VITALS — BODY MASS INDEX: 26.36 KG/M2 | HEIGHT: 69 IN | WEIGHT: 178 LBS | TEMPERATURE: 98.7 F

## 2024-11-22 DIAGNOSIS — M51.16 LUMBAR DISC HERNIATION WITH RADICULOPATHY: Primary | ICD-10-CM

## 2024-11-22 PROCEDURE — 1160F RVW MEDS BY RX/DR IN RCRD: CPT | Performed by: NEUROLOGICAL SURGERY

## 2024-11-22 PROCEDURE — 99204 OFFICE O/P NEW MOD 45 MIN: CPT | Performed by: NEUROLOGICAL SURGERY

## 2024-11-22 PROCEDURE — 1159F MED LIST DOCD IN RCRD: CPT | Performed by: NEUROLOGICAL SURGERY

## 2024-11-22 NOTE — PROGRESS NOTES
Patient: Nic Brooks  : 1950    Primary Care Provider: Francisco Vincent MD    Requesting Provider: As above        History    Chief Complaint: Back and right leg pain with weakness and sensory alteration.    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  He is well-known to my service.  On 2011 he underwent right L4-5 discectomy.  On 2015 he underwent left L2-3 discectomy.  He has done well.  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  Occasionally he has symptoms extending into the right shin.  He has numbness and weakness as well.  He has done 10 weeks of physical therapy to no avail.  He has no left-sided symptoms.  He is worse with bending and standing.  Symptoms improve with sitting.    Review of Systems   Constitutional:  Negative for activity change, appetite change, chills, diaphoresis, fatigue, fever and unexpected weight change.   HENT:  Negative for congestion, dental problem, drooling, ear discharge, ear pain, facial swelling, hearing loss, mouth sores, nosebleeds, postnasal drip, rhinorrhea, sinus pressure, sinus pain, sneezing, sore throat, tinnitus, trouble swallowing and voice change.    Eyes:  Negative for photophobia, pain, discharge, redness, itching and visual disturbance.   Respiratory:  Negative for apnea, cough, choking, chest tightness, shortness of breath, wheezing and stridor.    Cardiovascular:  Negative for chest pain, palpitations and leg swelling.   Gastrointestinal:  Negative for abdominal distention, abdominal pain, anal bleeding, blood in stool, constipation, diarrhea, nausea, rectal pain and vomiting.   Endocrine: Negative for cold intolerance, heat intolerance, polydipsia, polyphagia and polyuria.   Genitourinary:  Negative for decreased urine volume, difficulty urinating, dysuria, enuresis, flank pain,  "frequency, genital sores, hematuria, penile discharge, penile pain, penile swelling, scrotal swelling, testicular pain and urgency.   Musculoskeletal:  Positive for arthralgias and back pain. Negative for gait problem, joint swelling, myalgias, neck pain and neck stiffness.   Skin:  Negative for color change, pallor, rash and wound.   Allergic/Immunologic: Negative for environmental allergies, food allergies and immunocompromised state.   Neurological:  Negative for dizziness, tremors, seizures, syncope, facial asymmetry, speech difficulty, weakness, light-headedness, numbness and headaches.   Hematological:  Negative for adenopathy. Does not bruise/bleed easily.   Psychiatric/Behavioral:  Negative for agitation, behavioral problems, confusion, decreased concentration, dysphoric mood, hallucinations, self-injury, sleep disturbance and suicidal ideas. The patient is not nervous/anxious and is not hyperactive.    All other systems reviewed and are negative.      The patient's past medical history, past surgical history, family history, and social history have been reviewed at length in the electronic medical record.      Physical Exam:   Temp 98.7 °F (37.1 °C) (Temporal)   Ht 175.3 cm (69\")   Wt 80.7 kg (178 lb)   BMI 26.29 kg/m²   CONSTITUTIONAL: Patient is well-nourished, pleasant and appears stated age.  MUSCULOSKELETAL:  Straight leg raising is negative.  Jon's Sign is negative.  ROM in the low back is normal.  Tenderness in the back to palpation is not observed.  NEUROLOGICAL:  Orientation, memory, attention span, language function, and cognition have been examined and are intact.  Strength is intact in the lower extremities to direct testing.  Muscle tone is normal throughout.  Station and gait are normal.  Sensation is intact to light touch testing throughout except in the left distal thigh and knee region where it is diminished.  Deep tendon reflexes are 2+ and symmetrical except the right knee reflex " which is 1+.  Coordination is intact.      Medical Decision Making    Data Review:   (All imaging studies were personally reviewed unless stated otherwise)  MRI of the lumbar spine dated 11/2/2024 is compared to the prior study from 6/10/2015.  Laminotomy defect is noted on the left at L2-3.  There is some broad-based disc protrusion with a greater component to the right at L3-4.  This compromises the recess.  There is also a foraminal to extraforaminal component that could compromise the L3 nerve root.  Laminotomy defect is noted on the right at L4-5.  There is a low-grade listhesis of L5 on S1 that is stable when compared to the prior images.  There has been some overall progression of his diffuse degenerative change.    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    Treatment Options:   I have referred him for an epidural injection selectively at L3-4 on the right.  He will follow-up thereafter.  He is not doing better then I would consider lumbar discectomy to free up the L3 and L4 nerve roots.      Scribed for Himanshu Sanders MD by Toya Ang CMA on 11/22/2024 10:52 EST       I, Dr. Sanders, personally performed the services described in the documentation, as scribed in my presence, and it is both accurate and complete.

## 2024-12-04 ENCOUNTER — OFFICE VISIT (OUTPATIENT)
Dept: PAIN MEDICINE | Facility: CLINIC | Age: 74
End: 2024-12-04
Payer: MEDICARE

## 2024-12-04 ENCOUNTER — PATIENT ROUNDING (BHMG ONLY) (OUTPATIENT)
Dept: PAIN MEDICINE | Facility: CLINIC | Age: 74
End: 2024-12-04
Payer: MEDICARE

## 2024-12-04 VITALS — HEIGHT: 69 IN | WEIGHT: 181 LBS | BODY MASS INDEX: 26.81 KG/M2

## 2024-12-04 DIAGNOSIS — M54.16 LUMBAR RADICULOPATHY: Primary | ICD-10-CM

## 2024-12-04 NOTE — PROGRESS NOTES
A MY-CHART MESSAGE HAS BEEN SENT TO THE PATIENT FOR PATIENT ROUNDING WITH [facility].

## 2024-12-04 NOTE — PROGRESS NOTES
Referring Physician: Himanshu Sanders MD  [address] [facility]  [address]  [place] [address]    Primary Physician: Francisco Vincent MD    CHIEF COMPLAINT or REASON FOR VISIT: Back Pain (New patient)      Initial history of present illness on 12/04/2024:  Mr. Nic Brooks is 74 y.o. male who presents as a new patient referral for evaluation and treatment of right-sided back pain with radiation of the right thigh.  Patient has a prior history of multiple lumbar spinal surgeries with Dr. Sanders, neurosurgery.  This particular episode of pain began in July 2024 without any inciting event or trauma.  He is retired from his career as a .  He is currently an avid .  He was recently evaluated by Dr. Sanders after trialing physical therapy without much success for his radicular pain.  Dr. Sanders discussed potential surgical intervention, referred for consideration of right L3/L4 selective nerve root block.  Patient has prior history of right L4-5 discectomy in 2011, left L2-3 discectomy in 2015.  Patient denies any bowel or bladder dysfunction, lower extremity weakness, new onset saddle anesthesia or unexplained weight loss.  He has tried conservative measures including physical therapy, NSAIDs.      Interval history:    Interventions:      Objective Pain Scoring:   BRIEF PAIN INVENTORY:  Total score:   Pain Score    12/04/24 0816   PainSc:   5   PainLoc: Back      PHQ-2: 0  PHQ-9:    Opioid Risk Tool:         Review of Systems:   ROS negative except as otherwise noted     Past Medical History:   Past Medical History:   Diagnosis Date    Abnormal ECG     Arthritis     Chronic pain disorder 07 2024    Coronary artery disease     s/p stent 2018    CTS (carpal tunnel syndrome)     GERD (gastroesophageal reflux disease)     Hearing loss     kailee hearing aids    History of kidney stones 2008    just one     History of transfusion 1980    [facility] with lung surgery      Hyperlipidemia     Joint pain     Osteoarthritis     Peripheral neuropathy     Sleep apnea with use of continuous positive airway pressure (CPAP)     complaint with machine     Wears glasses          Past Surgical History:   Past Surgical History:   Procedure Laterality Date    APPENDECTOMY      BACK SURGERY  07/02/2015    Left L2 laminotomy, medial facetectomy, and L2-L3 discectomy. --Dr. Himanshu Sanders    BACK SURGERY Right 08/16/2011    Right L4-5 discectomy- Dr. Himanshu Sanders    CARDIAC CATHETERIZATION N/A 01/19/2018    Procedure: Left Heart Cath;  Surgeon: Negrito Wilkes MD;  Location:  [facility] CATH INVASIVE LOCATION;  Service:     CARDIAC CATHETERIZATION N/A 12/22/2023    Procedure: Left Heart Cath;  Surgeon: Darryl Álvarez III, MD;  Location: [facility] CATH INVASIVE LOCATION;  Service: Cardiovascular;  Laterality: N/A;    COLONOSCOPY      every 5 years    CORONARY STENT PLACEMENT  2018    ENDOSCOPY      EPIDURAL BLOCK      JOINT REPLACEMENT      KIDNEY STONE SURGERY      KNEE ARTHROSCOPY W/ MENISCECTOMY      left    LUMBAR DISCECTOMY      LUNG REMOVAL, PARTIAL      right lower lobe     ORTHOPEDIC SURGERY      SPLENECTOMY  1974    TONSILLECTOMY      removed adnoids    TOTAL KNEE ARTHROPLASTY Right 11/15/2019    Procedure: TOTAL KNEE ARTHROPLASTY RIGHT;  Surgeon: Negrito Vidal MD;  Location:  [facility];  Service: Orthopedics    TOTAL KNEE ARTHROPLASTY Left 10/20/2020    Procedure: TOTAL KNEE ARTHROPLASTY LEFT;  Surgeon: Negrito Vidal MD;  Location:  [facility];  Service: Orthopedics;  Laterality: Left;    WISDOM TOOTH EXTRACTION      only 2 removed          Family History   Family History   Problem Relation Age of Onset    Hypertension Mother     Cancer Mother     Cancer Father     Emphysema Father     Diabetes Father     COPD Father     Osteoarthritis Father     Arthritis Father     No Known Problems Sister     Diabetes Brother     Heart disease Brother          Social History   Social History  "    Socioeconomic History    Marital status:    Tobacco Use    Smoking status: Never     Passive exposure: Never    Smokeless tobacco: Never   Vaping Use    Vaping status: Never Used   Substance and Sexual Activity    Alcohol use: No    Drug use: No    Sexual activity: Not Currently     Partners: Female     Birth control/protection: Abstinence, None, Vasectomy        Medications:     Current Outpatient Medications:     aspirin 81 MG EC tablet, Take 1 tablet by mouth Daily., Disp: 90 tablet, Rfl: 3    chlorthalidone (HYGROTEN) 50 MG tablet, TAKE 1 TABLET BY MOUTH DAILY, Disp: 90 tablet, Rfl: 3    coenzyme Q10 100 MG capsule, Take 1 capsule by mouth Daily., Disp: , Rfl:     Cyanocobalamin 1000 MCG/ML kit, Inject  as directed., Disp: , Rfl:     rosuvastatin (CRESTOR) 40 MG tablet, TAKE 1 TABLET BY MOUTH DAILY, Disp: 90 tablet, Rfl: 3    vitamin B-6 (PYRIDOXINE) 50 MG tablet, Take 1 tablet by mouth Daily., Disp: , Rfl:         Physical Exam:     Vitals:    12/04/24 0816   Weight: 82.1 kg (181 lb)   Height: 175.3 cm (69\")   PainSc:   5   PainLoc: Back        General: Alert and oriented, No acute distress.   HEENT: Normocephalic, atraumatic.   Cardiovascular: No gross edema  Respiratory: Respirations are non-labored       Lumbar Spine:   No masses or atrophy  Range of motion - Flexion normal. Extension normal.    Facet Loading: Negative bilaterally  Facet Palpation - Nontender   Lexa finger/Gaenslen's/Jon's/LANDON/Thigh thrust -   Straight leg raise/slump test: Positive right  Multifidus toe-touch test:    Motor Exam:       Strength: Rate on 1-5 scale Right Left    L1/2- hip flexion 5/5  5/5    L3- knee extension 5/5  5/5    L4- ankle dorsiflexion 5/5  5/5    L5- great toe extension 5/5  5/5    S1- ankle plantarflexion 5/5  5/5    Sensory Exam: Altered sensation right L3 dermatome       Neurologic: Cranial Nerves II-XII are grossly intact.      Psychiatric: Cooperative.   Gait: Normal   Assistive Devices: " None    Imaging Studies:   No results found for this or any previous visit.        Independent review of radiographic imaging: Available for my interpretation is lumbar MRI dated November 2, 2024 demonstrating L3/L4 broad disc bulge with severe canal and right-sided neuroforaminal stenosis; L4/L5 degenerative disc disease with Modic 2 endplate changes;    Impression & Plan:       12/04/2024: Nic Brooks is a 74 y.o. male with past medical history significant for moderate GAUDENCIO, GERD, CAD (ASA), HTN, PVCs, prior right L4/L5 and left L2/L3 discectomies with Dr. Sanders who presents to the pain clinic for evaluation and treatment of chronic right-sided back pain with radiation to right thigh.  MRI interpretation as above.  Evaluation consistent with lumbar radiculopathy.  We discussed epidural steroid injection to improve pain.  I had a discussion with the patient regarding the risks of the procedure including bleeding, infection, damage to surrounding structures.  We discussed the potential adverse effects of corticosteroid injection including flushing of the face, lipodystrophy, skin discoloration, elevated blood glucose, increased blood pressure.  Risks of frequent steroid administration include weight gain, hormonal changes, mood changes, osteoporosis.    1. Lumbar radiculopathy        PLAN:  1. Medication Recommendations: Recommend Voltaren topical, NSAIDs, Tylenol.  Can trial turmeric 500 mg twice daily if NSAID contraindicated.  -Can consider GBP    2. Physical Therapy: Continue HEP    3. Psychological: defer    4. Complementary and alternative (CAM) Therapies:     5. Labs/Diagnostic studies: None indicated     6. Imaging: MRI independently interpreted and reviewed with patient    7. Interventions: Schedule right L3/L4 selective nerve root block (26843).    8. Referrals: Patient will follow-up with neurosurgery after injection    9. Records: Neurosurgical notes reviewed    10. Lifestyle goals:    Follow-up 1  month      [facility]  Brady Roberts MD          Quality Metrics:

## 2024-12-05 ENCOUNTER — OUTSIDE FACILITY SERVICE (OUTPATIENT)
Dept: PAIN MEDICINE | Facility: CLINIC | Age: 74
End: 2024-12-05
Payer: MEDICARE

## 2024-12-05 ENCOUNTER — DOCUMENTATION (OUTPATIENT)
Dept: PAIN MEDICINE | Facility: CLINIC | Age: 74
End: 2024-12-05

## 2024-12-05 NOTE — PROGRESS NOTES
[facility]  [address] [place] [address]      PROCEDURE: Fluoroscopically-guided  Lumbar Selective Nerve Root Block- right L3/L4    PRE-OP DIAGNOSIS: Lumbar radiculopathy  POST-OP DIAGNOSIS: Lumbar radiculopathy    BLOOD THINNERS (ANTIPLATELETS/ANTICOAGULANTS): Were discussed with the patient and IRA Guidelines were followed.     CONSENT: Risks, benefits and options were explained to the patient, all questions were answered and written informed consent was obtained.     ANESTHESIA:  Local only    PROCEDURE NOTE: A pre-procedural time out was performed to confirm the correct patient, procedure, side, and site. Standard ASA monitors were applied and oxygen via nasal cannula was provided. All proceduralists donned sterile gloves with masks and surgical hats. The patient was placed prone with pillow under the abdomen and all pressure points padded. The patient's lumbar spine was prepped in standard fashion using Chlorhexidine and draped with sterile towels. The target neuroforamen was identified using oblique fluoroscopy and the superior vertebral body endplate squared. The overlying skin and subcutaneous tissue was anesthetized with 1% lidocaine. A 22 gauge 3.5 inch spinal needle with bent tip was incrementally advanced using intermittent fluoroscopy to the 6 o'clock position of the target pedicle in the mid-neuroforamen using oblique, AP and lateral intermittent fluoroscopy. After negative aspiration of blood and cerebrospinal fluid, needle placement was confirmed with 1 ml of omnipaque 180 mgI/ml contrast using AP fluoroscopic imaging. Imaging revealed a clear outline of the target spinal nerve with proximal spread of agent through the neuroforamen medially to the epidural space, without evidence of intravascular or intrathecal spread. After negative aspiration, a mixture containing dexamethasone 5 mg steroid and lidocaine 1% - 1 ml local anesthetic for a total  volume of 1.5 ml was injected under direct visualization with fluoroscopy. The needle was flushed, removed and a bandage applied.     EBL: None     COMPLICATIONS: None     The patient was monitored in recovery area until all discharge criteria were met. Vital signs remained stable throughout the procedure and in the recovery area. There were no immediate complications and the patient tolerated the procedure well. Sensory and motor exam was unchanged from baseline. The patient received written discharge instructions prior to discharge.     FOLLOW UP: As scheduled     ADDITIONAL NOTES: []        [facility]       Brady Roberts MD     CODES:  [id number]

## 2024-12-20 ENCOUNTER — PREP FOR SURGERY (OUTPATIENT)
Dept: OTHER | Facility: HOSPITAL | Age: 74
End: 2024-12-20
Payer: MEDICARE

## 2024-12-20 ENCOUNTER — OFFICE VISIT (OUTPATIENT)
Dept: NEUROSURGERY | Facility: CLINIC | Age: 74
End: 2024-12-20
Payer: MEDICARE

## 2024-12-20 VITALS — TEMPERATURE: 98.2 F | HEIGHT: 69 IN | BODY MASS INDEX: 26.63 KG/M2 | WEIGHT: 179.8 LBS

## 2024-12-20 DIAGNOSIS — M51.26 HNP (HERNIATED NUCLEUS PULPOSUS), LUMBAR: Primary | ICD-10-CM

## 2024-12-20 PROCEDURE — 1160F RVW MEDS BY RX/DR IN RCRD: CPT | Performed by: NEUROLOGICAL SURGERY

## 2024-12-20 PROCEDURE — 1159F MED LIST DOCD IN RCRD: CPT | Performed by: NEUROLOGICAL SURGERY

## 2024-12-20 PROCEDURE — 99214 OFFICE O/P EST MOD 30 MIN: CPT | Performed by: NEUROLOGICAL SURGERY

## 2024-12-20 RX ORDER — FAMOTIDINE 20 MG/1
20 TABLET, FILM COATED ORAL
OUTPATIENT
Start: 2024-12-20

## 2024-12-20 RX ORDER — CHLORHEXIDINE GLUCONATE 40 MG/ML
SOLUTION TOPICAL
Qty: 120 ML | Refills: 0 | Status: SHIPPED | OUTPATIENT
Start: 2024-12-20

## 2024-12-20 NOTE — PROGRESS NOTES
Patient: Nic Brooks  : 1950    Primary Care Provider: Francisco Vincent MD    Requesting Provider: As above        History    Chief Complaint: Back and right leg pain with weakness and sensory alteration.    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  He is well-known to my service.  On 2011 he underwent right L4-5 discectomy.  On 2015 he underwent left L2-3 discectomy.  He has done well.  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  Occasionally he has symptoms extending into the right shin.  He has numbness and weakness as well.  He has done 10 weeks of physical therapy to no avail.  He has no left-sided symptoms.  He is worse with bending and standing.  Symptoms improve with sitting.  He had injection which has helped minimally.  The pain continues into the right thigh and knee with a component into the upper shin on the right side.    Review of Systems   Constitutional:  Negative for activity change, appetite change, chills, diaphoresis, fatigue, fever and unexpected weight change.   HENT:  Negative for congestion, dental problem, drooling, ear discharge, ear pain, facial swelling, hearing loss, mouth sores, nosebleeds, postnasal drip, rhinorrhea, sinus pressure, sinus pain, sneezing, sore throat, tinnitus, trouble swallowing and voice change.    Eyes:  Negative for photophobia, pain, discharge, redness, itching and visual disturbance.   Respiratory:  Negative for apnea, cough, choking, chest tightness, shortness of breath, wheezing and stridor.    Cardiovascular:  Negative for chest pain, palpitations and leg swelling.   Gastrointestinal:  Negative for abdominal distention, abdominal pain, anal bleeding, blood in stool, constipation, diarrhea, nausea, rectal pain and vomiting.   Endocrine: Negative for cold intolerance, heat intolerance,  "polydipsia, polyphagia and polyuria.   Genitourinary:  Negative for decreased urine volume, difficulty urinating, dysuria, enuresis, flank pain, frequency, genital sores, hematuria, penile discharge, penile pain, penile swelling, scrotal swelling, testicular pain and urgency.   Musculoskeletal:  Positive for arthralgias and back pain. Negative for gait problem, joint swelling, myalgias, neck pain and neck stiffness.   Skin:  Negative for color change, pallor, rash and wound.   Allergic/Immunologic: Negative for environmental allergies, food allergies and immunocompromised state.   Neurological:  Negative for dizziness, tremors, seizures, syncope, facial asymmetry, speech difficulty, weakness, light-headedness, numbness and headaches.   Hematological:  Negative for adenopathy. Does not bruise/bleed easily.   Psychiatric/Behavioral:  Negative for agitation, behavioral problems, confusion, decreased concentration, dysphoric mood, hallucinations, self-injury, sleep disturbance and suicidal ideas. The patient is not nervous/anxious and is not hyperactive.    All other systems reviewed and are negative.    The patient's past medical history, past surgical history, family history, and social history have been reviewed at length in the electronic medical record.      Physical Exam:   Temp 98.2 °F (36.8 °C) (Temporal)   Ht 175.3 cm (69\")   Wt 81.6 kg (179 lb 12.8 oz)   BMI 26.55 kg/m²   Deferred    Medical Decision Making    Data Review:   (All imaging studies were personally reviewed unless stated otherwise)  MRI of the lumbar spine dated 11/2/2024 is compared to the prior study from 6/10/2015. Laminotomy defect is noted on the left at L2-3. There is some broad-based disc protrusion with a greater component to the right at L3-4. This compromises the recess. There is also a foraminal to extraforaminal component that could compromise the L3 nerve root. Laminotomy defect is noted on the right at L4-5. There is a low-grade " listhesis of L5 on S1 that is stable when compared to the prior images. There has been some overall progression of his diffuse degenerative change.     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    Treatment Options:   The patient is struggling and very limited.  I have recommended right L3-4 discectomy from a typical more medial approach to free up both the L3 and L4 nerve roots.  The nature of the procedure as well as the potential risks, complications, limitations, and alternatives to the procedure were discussed at length with the patient and the patient has agreed to proceed with surgery.  Given his history he will require formal cardiac clearance.          I, Dr. Sanders, personally performed the services described in the documentation, as scribed in my presence, and it is both accurate and complete.

## 2024-12-20 NOTE — H&P
Patient: Nic Brooks  : 1950     Primary Care Provider: Francisco Vincent MD     Requesting Provider: As above           History     Chief Complaint: Back and right leg pain with weakness and sensory alteration.     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  He is well-known to my service.  On 2011 he underwent right L4-5 discectomy.  On 2015 he underwent left L2-3 discectomy.  He has done well.  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  Occasionally he has symptoms extending into the right shin.  He has numbness and weakness as well.  He has done 10 weeks of physical therapy to no avail.  He has no left-sided symptoms.  He is worse with bending and standing.  Symptoms improve with sitting.  He had injection which has helped minimally.  The pain continues into the right thigh and knee with a component into the upper shin on the right side.     Review of Systems   Constitutional:  Negative for activity change, appetite change, chills, diaphoresis, fatigue, fever and unexpected weight change.   HENT:  Negative for congestion, dental problem, drooling, ear discharge, ear pain, facial swelling, hearing loss, mouth sores, nosebleeds, postnasal drip, rhinorrhea, sinus pressure, sinus pain, sneezing, sore throat, tinnitus, trouble swallowing and voice change.    Eyes:  Negative for photophobia, pain, discharge, redness, itching and visual disturbance.   Respiratory:  Negative for apnea, cough, choking, chest tightness, shortness of breath, wheezing and stridor.    Cardiovascular:  Negative for chest pain, palpitations and leg swelling.   Gastrointestinal:  Negative for abdominal distention, abdominal pain, anal bleeding, blood in stool, constipation, diarrhea, nausea, rectal pain and vomiting.   Endocrine: Negative for cold intolerance, heat  intolerance, polydipsia, polyphagia and polyuria.   Genitourinary:  Negative for decreased urine volume, difficulty urinating, dysuria, enuresis, flank pain, frequency, genital sores, hematuria, penile discharge, penile pain, penile swelling, scrotal swelling, testicular pain and urgency.   Musculoskeletal:  Positive for arthralgias and back pain. Negative for gait problem, joint swelling, myalgias, neck pain and neck stiffness.   Skin:  Negative for color change, pallor, rash and wound.   Allergic/Immunologic: Negative for environmental allergies, food allergies and immunocompromised state.   Neurological:  Negative for dizziness, tremors, seizures, syncope, facial asymmetry, speech difficulty, weakness, light-headedness, numbness and headaches.   Hematological:  Negative for adenopathy. Does not bruise/bleed easily.   Psychiatric/Behavioral:  Negative for agitation, behavioral problems, confusion, decreased concentration, dysphoric mood, hallucinations, self-injury, sleep disturbance and suicidal ideas. The patient is not nervous/anxious and is not hyperactive.    All other systems reviewed and are negative.     The patient's past medical history, past surgical history, family history, and social history have been reviewed at length in the electronic medical record.     Past Medical History:   Diagnosis Date    Abnormal ECG     Arthritis     Chronic pain disorder 07 2024    Coronary artery disease     s/p stent 2018    CTS (carpal tunnel syndrome)     GERD (gastroesophageal reflux disease)     Hearing loss     kailee hearing aids    History of kidney stones 2008    just one     History of transfusion 1980    [facility] with lung surgery     Hyperlipidemia     Joint pain     Osteoarthritis     Peripheral neuropathy     Sleep apnea with use of continuous positive airway pressure (CPAP)     complaint with machine     Wears glasses      Past Surgical History:   Procedure Laterality Date    APPENDECTOMY       BACK SURGERY  07/02/2015    Left L2 laminotomy, medial facetectomy, and L2-L3 discectomy. --Dr. Himanshu Sanders    BACK SURGERY Right 08/16/2011    Right L4-5 discectomy- Dr. Himanshu Sanders    CARDIAC CATHETERIZATION N/A 01/19/2018    Procedure: Left Heart Cath;  Surgeon: Negrito Wilkes MD;  Location:  [facility]ATH INVASIVE LOCATION;  Service:     CARDIAC CATHETERIZATION N/A 12/22/2023    Procedure: Left Heart Cath;  Surgeon: Darryl Álvarez III, MD;  Location: [facility]IVE [facility];  Service: Cardiovascular;  Laterality: N/A;    COLONOSCOPY      every 5 years    CORONARY STENT PLACEMENT  2018    ENDOSCOPY      EPIDURAL BLOCK      JOINT REPLACEMENT      KIDNEY STONE SURGERY      KNEE ARTHROSCOPY W/ MENISCECTOMY      left    LUMBAR DISCECTOMY      LUNG REMOVAL, PARTIAL      right lower lobe     ORTHOPEDIC SURGERY      SPLENECTOMY  1974    TONSILLECTOMY      removed adnoids    TOTAL KNEE ARTHROPLASTY Right 11/15/2019    Procedure: TOTAL KNEE ARTHROPLASTY RIGHT;  Surgeon: Negrito Vidal MD;  Location:  [facility];  Service: Orthopedics    TOTAL KNEE ARTHROPLASTY Left 10/20/2020    Procedure: TOTAL KNEE ARTHROPLASTY LEFT;  Surgeon: Negrito Vidal MD;  Location:  [facility];  Service: Orthopedics;  Laterality: Left;    WISDOM TOOTH EXTRACTION      only 2 removed      Family History   Problem Relation Age of Onset    Hypertension Mother     Cancer Mother     Cancer Father     Emphysema Father     Diabetes Father     COPD Father     Osteoarthritis Father     Arthritis Father     No Known Problems Sister     Diabetes Brother     Heart disease Brother      Social History     Socioeconomic History    Marital status:    Tobacco Use    Smoking status: Never     Passive exposure: Never    Smokeless tobacco: Never   Vaping Use    Vaping status: Never Used   Substance and Sexual Activity    Alcohol use: No    Drug use: No    Sexual activity: Not Currently     Partners: Female     Birth control/protection: Abstinence,  "None, Vasectomy           No Known Allergies    Current Outpatient Medications on File Prior to Visit   Medication Sig Dispense Refill    aspirin 81 MG EC tablet Take 1 tablet by mouth Daily. 90 tablet 3    chlorthalidone (HYGROTEN) 50 MG tablet TAKE 1 TABLET BY MOUTH DAILY 90 tablet 3    coenzyme Q10 100 MG capsule Take 1 capsule by mouth Daily.      Cyanocobalamin 1000 MCG/ML kit Inject  as directed.      rosuvastatin (CRESTOR) 40 MG tablet TAKE 1 TABLET BY MOUTH DAILY 90 tablet 3    vitamin B-6 (PYRIDOXINE) 50 MG tablet Take 1 tablet by mouth Daily.       No current facility-administered medications on file prior to visit.          Physical Exam:   Temp 98.2 °F (36.8 °C) (Temporal)   Ht 175.3 cm (69\")   Wt 81.6 kg (179 lb 12.8 oz)   BMI 26.55 kg/m²   Deferred     Medical Decision Making     Data Review:   (All imaging studies were personally reviewed unless stated otherwise)  MRI of the lumbar spine dated 11/2/2024 is compared to the prior study from 6/10/2015. Laminotomy defect is noted on the left at L2-3. There is some broad-based disc protrusion with a greater component to the right at L3-4. This compromises the recess. There is also a foraminal to extraforaminal component that could compromise the L3 nerve root. Laminotomy defect is noted on the right at L4-5. There is a low-grade listhesis of L5 on S1 that is stable when compared to the prior images. There has been some overall progression of his diffuse degenerative change.      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     Treatment Options:   The patient is struggling and very limited.  I have recommended right L3-4 discectomy from a typical more medial approach to free up both the L3 and L4 nerve roots.  The nature of the procedure as well as the potential risks, complications, limitations, and alternatives to the procedure were discussed at length with the patient and the patient has agreed to proceed with surgery.  Given his " history he will require formal cardiac clearance.

## 2025-01-03 ENCOUNTER — DOCUMENTATION (OUTPATIENT)
Dept: CARDIOLOGY | Facility: CLINIC | Age: 75
End: 2025-01-03
Payer: MEDICARE

## 2025-01-03 NOTE — PROGRESS NOTES
01/03/25      Re: Nic Brooks, 1950   Procedure/Surgery -             Nic Brooks, 1950 is a 74 year old with CAD, HTN, CKD Stage II and PVC;s with recent LHC revealing Patent stent Normal EF. He has no recent angina or CHF symptoms . He is considered acceptable   RISK  for scheduled procedure/surgery from a cardiac/EP standpoint.  Any questions please call [phone].  er:            Sincerely,          WEI Chester PA

## 2025-01-13 ENCOUNTER — PRE-ADMISSION TESTING (OUTPATIENT)
Dept: PREADMISSION TESTING | Facility: HOSPITAL | Age: 75
End: 2025-01-13
Payer: MEDICARE

## 2025-01-13 VITALS — WEIGHT: 179.23 LBS | HEIGHT: 68 IN | BODY MASS INDEX: 27.16 KG/M2

## 2025-01-13 DIAGNOSIS — M51.26 HNP (HERNIATED NUCLEUS PULPOSUS), LUMBAR: ICD-10-CM

## 2025-01-13 LAB
DEPRECATED RDW RBC AUTO: 51.8 FL (ref 37–54)
ERYTHROCYTE [DISTWIDTH] IN BLOOD BY AUTOMATED COUNT: 15.1 % (ref 12.3–15.4)
HBA1C MFR BLD: 5.8 % (ref 4.8–5.6)
HCT VFR BLD AUTO: 46.2 % (ref 37.5–51)
HGB BLD-MCNC: 15.6 G/DL (ref 13–17.7)
MCH RBC QN AUTO: 31.4 PG (ref 26.6–33)
MCHC RBC AUTO-ENTMCNC: 33.8 G/DL (ref 31.5–35.7)
MCV RBC AUTO: 93 FL (ref 79–97)
PLATELET # BLD AUTO: 202 10*3/MM3 (ref 140–450)
PMV BLD AUTO: 10.7 FL (ref 6–12)
POTASSIUM SERPL-SCNC: 3.8 MMOL/L (ref 3.5–5.2)
RBC # BLD AUTO: 4.97 10*6/MM3 (ref 4.14–5.8)
WBC NRBC COR # BLD AUTO: 10.26 10*3/MM3 (ref 3.4–10.8)

## 2025-01-13 PROCEDURE — 87081 CULTURE SCREEN ONLY: CPT

## 2025-01-13 PROCEDURE — 84132 ASSAY OF SERUM POTASSIUM: CPT

## 2025-01-13 PROCEDURE — 36415 COLL VENOUS BLD VENIPUNCTURE: CPT

## 2025-01-13 PROCEDURE — 83036 HEMOGLOBIN GLYCOSYLATED A1C: CPT

## 2025-01-13 PROCEDURE — 85027 COMPLETE CBC AUTOMATED: CPT

## 2025-01-13 NOTE — PAT
Patient viewed general PAT education video as instructed in their preoperative information received from their surgeon.  Patient stated the general PAT education video was viewed in its entirety and survey completed.  Copies of [facility] general education handouts (Incentive Spirometry, Meds to Beds Program, Patient Belongings, Pre-op skin preparation instructions, Blood Glucose testing, Visitor policy, Surgery FAQ, Code H) distributed to patient if not printed. Education related to the PAT pass and skin preparation for surgery (if applicable) completed in PAT as a reinforcement to PAT education video. Patient instructed to return PAT pass provided today as well as completed skin preparation sheet (if applicable) on the day of procedure.     Additionally if patient had not viewed video yet but intended to view it at home or in our waiting area, then referred them to the handout with QR code/link provided during PAT visit.  Encouraged patient/family to read [facility] general education handouts thoroughly and notify PAT staff with any questions or concerns. Patient verbalized understanding of all information and priority content.    An arrival time for procedure was not provided during PAT visit. If patient had any questions or concerns about their arrival time, they were instructed to contact their surgeon/physician.  Additionally, if the patient referred to an arrival time that was acquired from their my chart account, patient was encouraged to verify that time with their surgeon/physician. Arrival times are NOT provided in Pre Admission Testing Department.    Patient denies any current skin issues.     Patient instructed to bring CPAP mask and tubing to the hospital for overnight stay.  Explained that it is not necessary to bring their CPAP machine to the hospital instead a CPAP machine will be provided for use by the hospital. If patient knows their CPAP settings, those settings will be implemented.  If not, the CPAP machine  will be utilized on the auto setting using their mask and tubing.    Patient verbalized understanding.    Bactroban (if prescribed) and Chlorhexidine Prescription prescribed by physician before PAT visit.  Verified with patient that medication(s) were picked up from their pharmacy.  Written instructions given to patient during PAT visit.  Patient/family also instructed to complete skin prep checklist and return the checklist on the day of surgery to preoperative staff.  Patient/family verbalized understanding.    Patient to apply Chlorhexadine wipes  to surgical area (as instructed) the night before procedure and the AM of procedure. Wipes provided.    Patient instructed to drink 20 ounces of Gatorade or Gatorlyte (if diabetic) and it needs to be completed 1 hour (for Main OR patients) or 2 hours (scheduled  section & [facility] patients) before given arrival time for procedure (NO RED Gatorade and NO Gatorade Zero).    Patient verbalized understanding.    Verified patient previously completed cardiology visit for cardiac risk assessment in preparation for upcoming procedure, completion of 12-lead ECG within six months, and risk assessment letter reviewed. No further interventions required.   EKG IN EPIC FROM 10/21/24 AND CARDIAC RISK IN [facility] FROM [facility] ON 1/3/25. PATIENT DENIES CHEST PAIN AND SHORTNESS OF BREATH.

## 2025-01-15 LAB — MRSA SPEC QL CULT: NORMAL

## 2025-01-20 ENCOUNTER — HOSPITAL ENCOUNTER (OUTPATIENT)
Facility: HOSPITAL | Age: 75
Discharge: HOME | End: 2025-01-20
Attending: NEUROLOGICAL SURGERY | Admitting: NEUROLOGICAL SURGERY
Payer: MEDICARE

## 2025-01-20 ENCOUNTER — ANESTHESIA (OUTPATIENT)
Dept: PERIOP | Facility: HOSPITAL | Age: 75
End: 2025-01-20
Payer: MEDICARE

## 2025-01-20 ENCOUNTER — ANESTHESIA EVENT (OUTPATIENT)
Dept: PERIOP | Facility: HOSPITAL | Age: 75
End: 2025-01-20
Payer: MEDICARE

## 2025-01-20 ENCOUNTER — APPOINTMENT (OUTPATIENT)
Dept: GENERAL RADIOLOGY | Facility: HOSPITAL | Age: 75
End: 2025-01-20
Payer: MEDICARE

## 2025-01-20 VITALS
BODY MASS INDEX: 27.13 KG/M2 | WEIGHT: 179 LBS | RESPIRATION RATE: 16 BRPM | SYSTOLIC BLOOD PRESSURE: 151 MMHG | DIASTOLIC BLOOD PRESSURE: 101 MMHG | HEIGHT: 68 IN | OXYGEN SATURATION: 93 % | TEMPERATURE: 97.6 F | HEART RATE: 75 BPM

## 2025-01-20 DIAGNOSIS — M51.26 HNP (HERNIATED NUCLEUS PULPOSUS), LUMBAR: ICD-10-CM

## 2025-01-20 DIAGNOSIS — M51.26 LUMBAR DISC HERNIATION: Primary | ICD-10-CM

## 2025-01-20 PROCEDURE — A9270 NON-COVERED ITEM OR SERVICE: HCPCS | Performed by: NEUROLOGICAL SURGERY

## 2025-01-20 PROCEDURE — C1713 ANCHOR/SCREW BN/BN,TIS/BN: HCPCS | Performed by: NEUROLOGICAL SURGERY

## 2025-01-20 PROCEDURE — 25010000002 GLYCOPYRROLATE 1 MG/5ML SOLUTION: Performed by: NURSE ANESTHETIST, CERTIFIED REGISTERED

## 2025-01-20 PROCEDURE — 25010000002 METHYLPREDNISOLONE PER 40 MG: Performed by: NEUROLOGICAL SURGERY

## 2025-01-20 PROCEDURE — 76000 FLUOROSCOPY <1 HR PHYS/QHP: CPT

## 2025-01-20 PROCEDURE — 63710000001 FAMOTIDINE 20 MG TABLET: Performed by: NEUROLOGICAL SURGERY

## 2025-01-20 PROCEDURE — 25010000002 ONDANSETRON PER 1 MG: Performed by: NURSE ANESTHETIST, CERTIFIED REGISTERED

## 2025-01-20 PROCEDURE — 63710000001 MUPIROCIN 2 % OINTMENT: Performed by: NEUROLOGICAL SURGERY

## 2025-01-20 PROCEDURE — 25010000002 SUGAMMADEX 200 MG/2ML SOLUTION: Performed by: NURSE ANESTHETIST, CERTIFIED REGISTERED

## 2025-01-20 PROCEDURE — 25810000003 LACTATED RINGERS PER 1000 ML: Performed by: ANESTHESIOLOGY

## 2025-01-20 PROCEDURE — 25010000002 DEXAMETHASONE PER 1 MG: Performed by: NURSE ANESTHETIST, CERTIFIED REGISTERED

## 2025-01-20 PROCEDURE — 25010000002 LIDOCAINE PF 1% 1 % SOLUTION: Performed by: ANESTHESIOLOGY

## 2025-01-20 PROCEDURE — 25010000002 CEFAZOLIN PER 500 MG: Performed by: NEUROLOGICAL SURGERY

## 2025-01-20 PROCEDURE — 25010000002 LIDOCAINE PF 1% 1 % SOLUTION: Performed by: NURSE ANESTHETIST, CERTIFIED REGISTERED

## 2025-01-20 PROCEDURE — 25010000002 PROPOFOL 10 MG/ML EMULSION: Performed by: NURSE ANESTHETIST, CERTIFIED REGISTERED

## 2025-01-20 PROCEDURE — 25010000002 FENTANYL CITRATE (PF) 100 MCG/2ML SOLUTION: Performed by: NURSE ANESTHETIST, CERTIFIED REGISTERED

## 2025-01-20 DEVICE — FLOSEAL WITH RECOTHROM - 5ML
Type: IMPLANTABLE DEVICE | Site: SPINE LUMBAR | Status: FUNCTIONAL
Brand: FLOSEAL HEMOSTATIC MATRIX

## 2025-01-20 DEVICE — AVITENE ULTRAFOAM, 8 CM X 12.5 CM (3-1/8" X 5"), 100 SQ CM
Type: IMPLANTABLE DEVICE | Site: SPINE LUMBAR | Status: FUNCTIONAL
Brand: AVITENE

## 2025-01-20 RX ORDER — PROMETHAZINE HYDROCHLORIDE 25 MG/1
25 SUPPOSITORY RECTAL ONCE AS NEEDED
Status: DISCONTINUED | OUTPATIENT
Start: 2025-01-20 | End: 2025-01-20 | Stop reason: HOSPADM

## 2025-01-20 RX ORDER — SODIUM CHLORIDE 9 MG/ML
9 INJECTION, SOLUTION INTRAVENOUS AS NEEDED
Status: DISCONTINUED | OUTPATIENT
Start: 2025-01-20 | End: 2025-01-20 | Stop reason: HOSPADM

## 2025-01-20 RX ORDER — MAGNESIUM HYDROXIDE 1200 MG/15ML
LIQUID ORAL AS NEEDED
Status: DISCONTINUED | OUTPATIENT
Start: 2025-01-20 | End: 2025-01-20 | Stop reason: HOSPADM

## 2025-01-20 RX ORDER — LIDOCAINE HYDROCHLORIDE 10 MG/ML
0.5 INJECTION, SOLUTION EPIDURAL; INFILTRATION; INTRACAUDAL; PERINEURAL ONCE AS NEEDED
Status: COMPLETED | OUTPATIENT
Start: 2025-01-20 | End: 2025-01-20

## 2025-01-20 RX ORDER — FAMOTIDINE 20 MG/1
20 TABLET, FILM COATED ORAL
Status: COMPLETED | OUTPATIENT
Start: 2025-01-20 | End: 2025-01-20

## 2025-01-20 RX ORDER — HYDROMORPHONE HYDROCHLORIDE 1 MG/ML
0.5 INJECTION, SOLUTION INTRAMUSCULAR; INTRAVENOUS; SUBCUTANEOUS
Status: DISCONTINUED | OUTPATIENT
Start: 2025-01-20 | End: 2025-01-20 | Stop reason: HOSPADM

## 2025-01-20 RX ORDER — FAMOTIDINE 20 MG/1
20 TABLET, FILM COATED ORAL ONCE
Status: CANCELLED | OUTPATIENT
Start: 2025-01-20 | End: 2025-01-20

## 2025-01-20 RX ORDER — MIDAZOLAM HYDROCHLORIDE 1 MG/ML
0.5 INJECTION, SOLUTION INTRAMUSCULAR; INTRAVENOUS
Status: DISCONTINUED | OUTPATIENT
Start: 2025-01-20 | End: 2025-01-20 | Stop reason: HOSPADM

## 2025-01-20 RX ORDER — PROMETHAZINE HYDROCHLORIDE 25 MG/1
25 TABLET ORAL ONCE AS NEEDED
Status: DISCONTINUED | OUTPATIENT
Start: 2025-01-20 | End: 2025-01-20 | Stop reason: HOSPADM

## 2025-01-20 RX ORDER — BUPIVACAINE HYDROCHLORIDE AND EPINEPHRINE 2.5; 5 MG/ML; UG/ML
INJECTION, SOLUTION EPIDURAL; INFILTRATION; INTRACAUDAL; PERINEURAL AS NEEDED
Status: DISCONTINUED | OUTPATIENT
Start: 2025-01-20 | End: 2025-01-20 | Stop reason: HOSPADM

## 2025-01-20 RX ORDER — ONDANSETRON 2 MG/ML
4 INJECTION INTRAMUSCULAR; INTRAVENOUS ONCE AS NEEDED
Status: DISCONTINUED | OUTPATIENT
Start: 2025-01-20 | End: 2025-01-20 | Stop reason: HOSPADM

## 2025-01-20 RX ORDER — FENTANYL CITRATE 50 UG/ML
INJECTION, SOLUTION INTRAMUSCULAR; INTRAVENOUS AS NEEDED
Status: DISCONTINUED | OUTPATIENT
Start: 2025-01-20 | End: 2025-01-20 | Stop reason: SURG

## 2025-01-20 RX ORDER — SODIUM CHLORIDE, SODIUM LACTATE, POTASSIUM CHLORIDE, CALCIUM CHLORIDE 600; 310; 30; 20 MG/100ML; MG/100ML; MG/100ML; MG/100ML
9 INJECTION, SOLUTION INTRAVENOUS CONTINUOUS
Status: DISCONTINUED | OUTPATIENT
Start: 2025-01-21 | End: 2025-01-20 | Stop reason: HOSPADM

## 2025-01-20 RX ORDER — LIDOCAINE HYDROCHLORIDE 10 MG/ML
INJECTION, SOLUTION EPIDURAL; INFILTRATION; INTRACAUDAL; PERINEURAL AS NEEDED
Status: DISCONTINUED | OUTPATIENT
Start: 2025-01-20 | End: 2025-01-20 | Stop reason: SURG

## 2025-01-20 RX ORDER — FAMOTIDINE 10 MG/ML
20 INJECTION, SOLUTION INTRAVENOUS ONCE
Status: CANCELLED | OUTPATIENT
Start: 2025-01-20 | End: 2025-01-20

## 2025-01-20 RX ORDER — FENTANYL CITRATE 50 UG/ML
50 INJECTION, SOLUTION INTRAMUSCULAR; INTRAVENOUS
Status: DISCONTINUED | OUTPATIENT
Start: 2025-01-20 | End: 2025-01-20 | Stop reason: HOSPADM

## 2025-01-20 RX ORDER — SODIUM CHLORIDE 0.9 % (FLUSH) 0.9 %
10 SYRINGE (ML) INJECTION AS NEEDED
Status: DISCONTINUED | OUTPATIENT
Start: 2025-01-20 | End: 2025-01-20 | Stop reason: HOSPADM

## 2025-01-20 RX ORDER — LABETALOL HYDROCHLORIDE 5 MG/ML
5 INJECTION, SOLUTION INTRAVENOUS
Status: DISCONTINUED | OUTPATIENT
Start: 2025-01-20 | End: 2025-01-20 | Stop reason: HOSPADM

## 2025-01-20 RX ORDER — HYDROCODONE BITARTRATE AND ACETAMINOPHEN 5; 325 MG/1; MG/1
1 TABLET ORAL ONCE AS NEEDED
Status: DISCONTINUED | OUTPATIENT
Start: 2025-01-20 | End: 2025-01-20 | Stop reason: HOSPADM

## 2025-01-20 RX ORDER — SODIUM CHLORIDE, SODIUM LACTATE, POTASSIUM CHLORIDE, CALCIUM CHLORIDE 600; 310; 30; 20 MG/100ML; MG/100ML; MG/100ML; MG/100ML
9 INJECTION, SOLUTION INTRAVENOUS CONTINUOUS
Status: DISCONTINUED | OUTPATIENT
Start: 2025-01-20 | End: 2025-01-20 | Stop reason: HOSPADM

## 2025-01-20 RX ORDER — SODIUM CHLORIDE 0.9 % (FLUSH) 0.9 %
3 SYRINGE (ML) INJECTION EVERY 12 HOURS SCHEDULED
Status: DISCONTINUED | OUTPATIENT
Start: 2025-01-20 | End: 2025-01-20 | Stop reason: HOSPADM

## 2025-01-20 RX ORDER — SODIUM CHLORIDE 0.9 % (FLUSH) 0.9 %
10 SYRINGE (ML) INJECTION EVERY 12 HOURS SCHEDULED
Status: DISCONTINUED | OUTPATIENT
Start: 2025-01-20 | End: 2025-01-20 | Stop reason: HOSPADM

## 2025-01-20 RX ORDER — OXYCODONE AND ACETAMINOPHEN 5; 325 MG/1; MG/1
1 TABLET ORAL 3 TIMES DAILY PRN
Qty: 12 TABLET | Refills: 0 | Status: SHIPPED | OUTPATIENT
Start: 2025-01-20

## 2025-01-20 RX ORDER — IPRATROPIUM BROMIDE AND ALBUTEROL SULFATE 2.5; .5 MG/3ML; MG/3ML
3 SOLUTION RESPIRATORY (INHALATION) ONCE AS NEEDED
Status: DISCONTINUED | OUTPATIENT
Start: 2025-01-20 | End: 2025-01-20 | Stop reason: HOSPADM

## 2025-01-20 RX ORDER — HYDRALAZINE HYDROCHLORIDE 20 MG/ML
5 INJECTION INTRAMUSCULAR; INTRAVENOUS
Status: DISCONTINUED | OUTPATIENT
Start: 2025-01-20 | End: 2025-01-20 | Stop reason: HOSPADM

## 2025-01-20 RX ORDER — METHYLPREDNISOLONE ACETATE 40 MG/ML
INJECTION, SUSPENSION INTRA-ARTICULAR; INTRALESIONAL; INTRAMUSCULAR; SOFT TISSUE AS NEEDED
Status: DISCONTINUED | OUTPATIENT
Start: 2025-01-20 | End: 2025-01-20 | Stop reason: HOSPADM

## 2025-01-20 RX ORDER — NALOXONE HCL 0.4 MG/ML
0.4 VIAL (ML) INJECTION AS NEEDED
Status: DISCONTINUED | OUTPATIENT
Start: 2025-01-20 | End: 2025-01-20 | Stop reason: HOSPADM

## 2025-01-20 RX ORDER — PROPOFOL 10 MG/ML
VIAL (ML) INTRAVENOUS AS NEEDED
Status: DISCONTINUED | OUTPATIENT
Start: 2025-01-20 | End: 2025-01-20 | Stop reason: SURG

## 2025-01-20 RX ORDER — MEPERIDINE HYDROCHLORIDE 25 MG/ML
12.5 INJECTION INTRAMUSCULAR; INTRAVENOUS; SUBCUTANEOUS
Status: DISCONTINUED | OUTPATIENT
Start: 2025-01-20 | End: 2025-01-20 | Stop reason: HOSPADM

## 2025-01-20 RX ORDER — ONDANSETRON 2 MG/ML
INJECTION INTRAMUSCULAR; INTRAVENOUS AS NEEDED
Status: DISCONTINUED | OUTPATIENT
Start: 2025-01-20 | End: 2025-01-20 | Stop reason: SURG

## 2025-01-20 RX ORDER — DEXAMETHASONE SODIUM PHOSPHATE 4 MG/ML
INJECTION, SOLUTION INTRA-ARTICULAR; INTRALESIONAL; INTRAMUSCULAR; INTRAVENOUS; SOFT TISSUE AS NEEDED
Status: DISCONTINUED | OUTPATIENT
Start: 2025-01-20 | End: 2025-01-20 | Stop reason: SURG

## 2025-01-20 RX ORDER — SODIUM CHLORIDE 0.9 % (FLUSH) 0.9 %
3-10 SYRINGE (ML) INJECTION AS NEEDED
Status: DISCONTINUED | OUTPATIENT
Start: 2025-01-20 | End: 2025-01-20 | Stop reason: HOSPADM

## 2025-01-20 RX ORDER — ROCURONIUM BROMIDE 10 MG/ML
INJECTION, SOLUTION INTRAVENOUS AS NEEDED
Status: DISCONTINUED | OUTPATIENT
Start: 2025-01-20 | End: 2025-01-20 | Stop reason: SURG

## 2025-01-20 RX ORDER — GLYCOPYRROLATE 0.2 MG/ML
INJECTION INTRAMUSCULAR; INTRAVENOUS AS NEEDED
Status: DISCONTINUED | OUTPATIENT
Start: 2025-01-20 | End: 2025-01-20 | Stop reason: SURG

## 2025-01-20 RX ADMIN — MUPIROCIN 1 APPLICATION: 20 OINTMENT TOPICAL at 09:55

## 2025-01-20 RX ADMIN — LIDOCAINE HYDROCHLORIDE 50 MG: 10 INJECTION, SOLUTION EPIDURAL; INFILTRATION; INTRACAUDAL; PERINEURAL at 14:00

## 2025-01-20 RX ADMIN — ROCURONIUM BROMIDE 50 MG: 10 INJECTION INTRAVENOUS at 14:00

## 2025-01-20 RX ADMIN — PROPOFOL 250 MG: 10 INJECTION, EMULSION INTRAVENOUS at 14:00

## 2025-01-20 RX ADMIN — ONDANSETRON 4 MG: 2 INJECTION INTRAMUSCULAR; INTRAVENOUS at 15:08

## 2025-01-20 RX ADMIN — ROCURONIUM BROMIDE 10 MG: 10 INJECTION INTRAVENOUS at 14:55

## 2025-01-20 RX ADMIN — LIDOCAINE HYDROCHLORIDE 0.5 ML: 10 INJECTION, SOLUTION EPIDURAL; INFILTRATION; INTRACAUDAL; PERINEURAL at 09:49

## 2025-01-20 RX ADMIN — ROCURONIUM BROMIDE 20 MG: 10 INJECTION INTRAVENOUS at 14:26

## 2025-01-20 RX ADMIN — GLYCOPYRROLATE 0.2 MG: 1 INJECTION INTRAMUSCULAR; INTRAVENOUS at 14:14

## 2025-01-20 RX ADMIN — SODIUM CHLORIDE, POTASSIUM CHLORIDE, SODIUM LACTATE AND CALCIUM CHLORIDE: 600; 310; 30; 20 INJECTION, SOLUTION INTRAVENOUS at 15:08

## 2025-01-20 RX ADMIN — FENTANYL CITRATE 100 MCG: 50 INJECTION, SOLUTION INTRAMUSCULAR; INTRAVENOUS at 14:00

## 2025-01-20 RX ADMIN — FAMOTIDINE 20 MG: 20 TABLET, FILM COATED ORAL at 09:55

## 2025-01-20 RX ADMIN — SODIUM CHLORIDE 2 G: 900 INJECTION INTRAVENOUS at 14:06

## 2025-01-20 RX ADMIN — SODIUM CHLORIDE, POTASSIUM CHLORIDE, SODIUM LACTATE AND CALCIUM CHLORIDE 9 ML/HR: 600; 310; 30; 20 INJECTION, SOLUTION INTRAVENOUS at 09:49

## 2025-01-20 RX ADMIN — SUGAMMADEX 200 MG: 100 INJECTION, SOLUTION INTRAVENOUS at 15:08

## 2025-01-20 RX ADMIN — DEXAMETHASONE SODIUM PHOSPHATE 4 MG: 4 INJECTION INTRA-ARTICULAR; INTRALESIONAL; INTRAMUSCULAR; INTRAVENOUS; SOFT TISSUE at 14:00

## 2025-01-20 NOTE — ANESTHESIA POSTPROCEDURE EVALUATION
Patient: Nic Brooks    Procedure Summary       Date: 01/20/25 Room / Location:  [facility] [address] [place] [address] [facility]    Anesthesia Start: 1356 Anesthesia Stop: 1530    Procedure: LUMBAR DISCECTOMY L3-4 (Right: Spine Lumbar) Diagnosis:       HNP (herniated nucleus pulposus), lumbar      (HNP (herniated nucleus pulposus), lumbar [M51.26])    Surgeons: Himanshu Sanders MD Provider: Irene Olivera MD    Anesthesia Type: general ASA Status: 3            Anesthesia Type: general    Vitals  Vitals Value Taken Time   /91 01/20/25 1525   Temp     Pulse 79 01/20/25 1529   Resp     SpO2 96 % 01/20/25 1529   Vitals shown include unfiled device data.        Post Anesthesia Care and Evaluation    Patient location during evaluation: PACU  Patient participation: complete - patient participated  Level of consciousness: agitated  Pain management: adequate    Airway patency: patent  Anesthetic complications: No anesthetic complications  PONV Status: none  Cardiovascular status: hemodynamically stable and acceptable  Respiratory status: nonlabored ventilation, acceptable and nasal cannula  Hydration status: acceptable

## 2025-01-20 NOTE — H&P
Pre-Op H&P  Nic Brooks  [id number]  1950      Chief complaint: back pain      Subjective:  Patient is a 74 y.o.male presents for scheduled surgery by Dr. Sanders. He anticipates a LUMBAR DISCECTOMY L3-4  today.  He reports worsening low back pain since July.  He denies injury.  He has had previous back surgeries.  The pain radiates down the right lower extremity.  He denies saddle anesthesia.  He tried physical therapy without benefit.  He tried injections which provided minimal pain relief.      Review of Systems:  Constitutional-- No fever, chills or sweats. No fatigue.  CV-- No chest pain, palpitation or syncope. +HTN, HLD, CAD  +cardiac clearance   Resp-- No SOB, cough, hemoptysis. +GAUDENCIO on cpap   Skin--No rashes or lesions      Allergies: No Known Allergies      Home Meds:  Medications Prior to Admission   Medication Sig Dispense Refill Last Dose/Taking    aspirin 81 MG EC tablet Take 1 tablet by mouth Daily. 90 tablet 3     Chlorhexidine Gluconate 4 % solution Shower each day with solution for 5 days beginning 5 days before surgery. 120 mL 0     chlorthalidone (HYGROTEN) 50 MG tablet TAKE 1 TABLET BY MOUTH DAILY 90 tablet 3     coenzyme Q10 100 MG capsule Take 1 capsule by mouth Daily.       Cyanocobalamin 1000 MCG/ML kit Inject  as directed Every 30 (Thirty) Days.       mupirocin (BACTROBAN) 2 % nasal ointment Apply to the inside of each nostril with a cotton swab two times daily, morning and evening, for 5 days before surgery. 10 each 0     rosuvastatin (CRESTOR) 40 MG tablet TAKE 1 TABLET BY MOUTH DAILY 90 tablet 3     vitamin B-6 (PYRIDOXINE) 50 MG tablet Take 1 tablet by mouth Daily.            PMH:   Past Medical History:   Diagnosis Date    Abnormal ECG     PVC    Arthritis     OSTEO    Chronic pain disorder 07 2024    Coronary artery disease     s/p stent 2018    CTS (carpal tunnel syndrome)     GERD (gastroesophageal reflux disease)     Hearing loss     kailee hearing aids    History of  kidney stones 2008    just one     History of transfusion 1980    [facility] with lung surgery , NO REACTION    Hyperlipidemia     Hypertension     Joint pain     Osteoarthritis     Peripheral neuropathy     PONV (postoperative nausea and vomiting)     Sleep apnea with use of continuous positive airway pressure (CPAP)     complaint with machine     Wears glasses      PSH:    Past Surgical History:   Procedure Laterality Date    APPENDECTOMY      BACK SURGERY  07/02/2015    Left L2 laminotomy, medial facetectomy, and L2-L3 discectomy. --Dr. Himanshu Sanders    BACK SURGERY Right 08/16/2011    Right L4-5 discectomy- Dr. Himanshu Sanders    CARDIAC CATHETERIZATION N/A 01/19/2018    Procedure: Left Heart Cath;  Surgeon: Negrito Wilkes MD;  Location:  [facility];  Service:     CARDIAC CATHETERIZATION N/A 12/22/2023    Procedure: Left Heart Cath;  Surgeon: Darryl Álvarze III, MD;  Location:  [facility];  Service: Cardiovascular;  Laterality: N/A;    COLONOSCOPY      every 5 years    CORONARY STENT PLACEMENT  2018    ENDOSCOPY      EPIDURAL BLOCK      KIDNEY STONE SURGERY      KNEE ARTHROSCOPY W/ MENISCECTOMY      left    LUNG REMOVAL, PARTIAL      right lower lobe 1980    SPLENECTOMY  1974    TONSILLECTOMY      removed adnoids    TOTAL KNEE ARTHROPLASTY Right 11/15/2019    Procedure: TOTAL KNEE ARTHROPLASTY RIGHT;  Surgeon: Negrito Vidal MD;  Location:  [facility];  Service: Orthopedics    TOTAL KNEE ARTHROPLASTY Left 10/20/2020    Procedure: TOTAL KNEE ARTHROPLASTY LEFT;  Surgeon: Negrito Vidal MD;  Location:  [facility];  Service: Orthopedics;  Laterality: Left;    WISDOM TOOTH EXTRACTION      only 2 removed        Immunization History:  Influenza: UTD  Pneumococcal: UTD  Tetanus: Unknown    Social History:   Tobacco:   Social History     Tobacco Use   Smoking Status Never    Passive exposure: Never   Smokeless Tobacco Never      Alcohol:     Social History     Substance  and Sexual Activity   Alcohol Use No         Physical Exam: VS: /94  HR 60  RR 16  T 97.9  Sat 97%RA      General Appearance:    Alert, cooperative, no distress, appears stated age   Head:    Normocephalic, without obvious abnormality, atraumatic   Lungs:     Clear to auscultation bilaterally, respirations unlabored    Heart:   Regular rate and rhythm, S1 and S2 normal    Abdomen:    Soft without tenderness   Extremities:   Extremities normal, atraumatic, no cyanosis or edema   Skin:   Skin color, texture, turgor normal, no rashes or lesions   Neurologic:   Grossly intact     Results Review:     LABS:  Lab Results   Component Value Date    WBC 10.26 01/13/2025    HGB 15.6 01/13/2025    HCT 46.2 01/13/2025    MCV 93.0 01/13/2025     01/13/2025    NEUTROABS 3.14 12/22/2023    GLUCOSE 91 12/22/2023    BUN 17 12/22/2023    CREATININE 1.22 12/22/2023    EGFRIFNONA 68 10/08/2020     12/22/2023    K 3.8 01/13/2025     12/22/2023    CO2 29.0 12/22/2023    PHOS 2.8 12/22/2023    CALCIUM 9.0 12/22/2023    ALBUMIN 4.1 12/21/2023    AST 24 12/21/2023    ALT 22 12/21/2023    BILITOT 0.5 12/21/2023       RADIOLOGY:  Imaging Results (Last 72 Hours)       ** No results found for the last 72 hours. **            I reviewed the patient's new clinical results.    Cancer Staging (if applicable)  Cancer Patient: __ yes __no __unknown; If yes, clinical stage T:__ N:__M:__, stage group or __N/A      Impression: HNP (herniated nucleus pulposus) lumbar      Plan: LUMBAR DISCECTOMY L3-4       Ashely Gómez, SHIVA   1/20/2025   09:37 EST

## 2025-01-20 NOTE — ANESTHESIA PREPROCEDURE EVALUATION
Anesthesia Evaluation     Patient summary reviewed and Nursing notes reviewed   history of anesthetic complications:  PONV               Airway   Mallampati: II  TM distance: >3 FB  Neck ROM: full  No difficulty expected  Dental - normal exam     Pulmonary - normal exam   (+) ,sleep apnea on CPAP  Cardiovascular - normal exam    (+) hypertension, CAD, cardiac stents (LAD 1/18) , hyperlipidemia    ROS comment:   Echo 12/23: normal EF, no significant valve disease    LHC 12/23: Non-obstructive CAD, widely patent LAD stent    Acceptable risk per cardiology    Neuro/Psych- negative ROS  GI/Hepatic/Renal/Endo    (+) GERD    Musculoskeletal (-) negative ROS    Abdominal  - normal exam    Bowel sounds: normal.   Substance History - negative use     OB/GYN negative ob/gyn ROS         Other                      Anesthesia Plan    ASA 3     general     intravenous induction     Anesthetic plan, risks, benefits, and alternatives have been provided, discussed and informed consent has been obtained with: patient.    Plan discussed with CRNA.      CODE STATUS:

## 2025-01-20 NOTE — OP NOTE
NEUROSURGICAL OPERATIVE NOTE        PREOPERATIVE DIAGNOSIS:    Right L3-4 disc herniation      POSTOPERATIVE DIAGNOSIS:  Same      PROCEDURE:  Right L3-4 laminotomy, medial facetectomy, foraminotomy, and discectomy      SURGEON:  Himanshu Sanders M.D.      ASSISTANT: Marilee Chappell PA-C    PAC assisted with:   Suctioning   Retraction   Tying   Suturing   Closing   Application of dressing   Skilled neurosurgery PA assistance was necessary to perform this procedure.        ANESTHESIA:  General      ESTIMATED BLOOD LOSS: Minimal      SPECIMEN: None      DRAINS: None      COMPLICATIONS:  None        CLINICAL NOTE:  The patient is a 74-year-old gentleman who presents with ongoing and progressive back and right leg pain with sensory alteration and weakness.  Studies demonstrate disc protrusion at the L3-4 level but is felt to compromise the L4 and potentially the L3 nerve roots.  As such, he presents at this time for lumbar discectomy.  The nature of the procedure as well as the potential risks, complications, limitations, and alternatives to the procedure were discussed at length with the patient and the patient has agreed to proceed with surgery.      TECHNICAL NOTE:  The patient was brought to the operating room and while on his cart general endotracheal anesthesia was achieved. He was then turned prone onto the Cloward saddle frame and special care was ensured to protect pressure points. His low back was prepared and draped in the usual fashion. A localizing radiograph was obtained with a spinal needle in the lumbosacral midline. Based on this, a 3-4 cm vertical incision was fashioned overlying the L3-4 level. Underlying tissues were divided with cautery to provide exposure to the right L3 and L4 hemilamina. Laminotomy was fashioned. Another radiograph confirmed the operative level. Medial aspect of the facet was taken down with drill and Kerrison punches. The neural foramen was decompressed with Kerrison punches. I  extended the laminotomy upward. I made a point of preserving the pars interarticularis. Overgrown ligament was removed. Subligamentous disc herniation was incised and evacuated piecemeal with an array of pituitary rongeurs, curettes and punches. I worked laterally with upbiting pituitary rongeur to remove some more foraminal and potentially extraforaminal disc fragments. A blunt probe could readily be passed along the L3 and L4 nerve roots after the decompression. With a Valsalva maneuver, there was no significant bleeding or evidence of CSF leak. The wound was washed out with a saline solution. A small portion of Gelfoam soaked in Depo-Medrol was then placed over the exposed dura and nerve root. The paraspinous muscle and fascia were reapproximated in an interrupted fashion with 0 Vicryl suture; 0.25% Marcaine was instilled in the paraspinous musculature and subcutaneous tissues. Subcutaneous tissues were closed in layers with 3-0 Vicryl suture. The skin was closed in a running subcuticular fashion with 3-0 Vicryl suture. A dermal sealant and sterile dressing were applied. He was rolled onto his cart, extubated and taken to the recovery room in satisfactory condition.             Himanshu Sanders M.D.

## 2025-01-20 NOTE — ANESTHESIA PROCEDURE NOTES
Airway  Urgency: elective    Date/Time: 1/20/2025 2:02 PM  Airway not difficult    General Information and Staff    Patient location during procedure: OR  CRNA/CAA: Katherine Teixeira CRNA    Indications and Patient Condition  Indications for airway management: airway protection    Preoxygenated: yes  Mask difficulty assessment: 1 - vent by mask    Final Airway Details  Final airway type: endotracheal airway      Successful airway: ETT  Cuffed: yes   Successful intubation technique: video laryngoscopy  Facilitating devices/methods: intubating stylet  Endotracheal tube insertion site: oral  Blade: Baron  Blade size: 3  ETT size (mm): 7.5  Cormack-Lehane Classification: grade I - full view of glottis  Placement verified by: chest auscultation and capnometry   Measured from: teeth  ETT/EBT  to teeth (cm): 21  Number of attempts at approach: 1  Assessment: lips, teeth, and gum same as pre-op and atraumatic intubation

## 2025-01-20 NOTE — LETTER
[facility]    Patient: Nic Brooks  : 1950      Dear Dr. Vincent,    I just completed the lumbar discectomy on Mr. Brooks to address his back and right leg pain.  Thus far all has gone well.  He will likely go home shortly and then follow-up in our clinic in about 3 weeks.      With kindest regards,    Himanshu Sanders MD  25  15:19 EST

## 2025-02-06 ENCOUNTER — OFFICE VISIT (OUTPATIENT)
Dept: CARDIOLOGY | Facility: CLINIC | Age: 75
End: 2025-02-06
Payer: MEDICARE

## 2025-02-06 VITALS
WEIGHT: 169 LBS | SYSTOLIC BLOOD PRESSURE: 100 MMHG | HEART RATE: 76 BPM | OXYGEN SATURATION: 94 % | DIASTOLIC BLOOD PRESSURE: 68 MMHG | HEIGHT: 67 IN | BODY MASS INDEX: 26.53 KG/M2

## 2025-02-06 DIAGNOSIS — I49.3 PVC'S (PREMATURE VENTRICULAR CONTRACTIONS): ICD-10-CM

## 2025-02-06 DIAGNOSIS — E78.5 DYSLIPIDEMIA, GOAL LDL BELOW 70: ICD-10-CM

## 2025-02-06 DIAGNOSIS — I10 ESSENTIAL HYPERTENSION: ICD-10-CM

## 2025-02-06 DIAGNOSIS — I25.10 CORONARY ARTERY DISEASE INVOLVING NATIVE CORONARY ARTERY OF NATIVE HEART WITHOUT ANGINA PECTORIS: Primary | ICD-10-CM

## 2025-02-06 RX ORDER — CHLORTHALIDONE 25 MG/1
25 TABLET ORAL DAILY
Qty: 90 TABLET | Refills: 3 | Status: SHIPPED | OUTPATIENT
Start: 2025-02-06

## 2025-02-06 NOTE — PROGRESS NOTES
OFFICE VISIT  NOTE  [facility] CARDIOLOGY      Name: Nic Brooks    Date: 2025  MRN:  [id number]  :  1950      REFERRING/PRIMARY PROVIDER:  Francisco Vincent MD     Chief Complaint   Patient presents with    Coronary artery disease involving native coronary       HPI: Nic Brooks is a 74 y.o. male who presents for follow up of CAD, previously followed by Dr. Wilkes. 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  Hospitalized 2023 due to frequent PVCs found on EKG at time of hernia surgery and at PCPs office, stress echocardiogram showed moderate risk, therefore follow-up [facility] showed nonobstructive CAD widely patent LAD stent EDP of 7 mmHg.   Holter  showed 17% PVCs.  He has extreme bouts of fatigue when he is out working, he drinks a lot of water and Gatorade but has spells where he has to go lay down because of extreme fatigue he says his heart rate monitor shows heart rates in the 30s during these events.  He has a consultation with Dr. Méndez in 2025.  Reports home blood pressure run 110s to 120s systolic with diastolics in the 70s to 80s.    ROS:Pertinent positives as listed in the HPI.  All other systems reviewed and negative.    Past Medical History:   Diagnosis Date    Abnormal ECG     PVC    Arthritis     OSTEO    Chronic pain disorder 2024    Coronary artery disease     s/p stent     CTS (carpal tunnel syndrome)     GERD (gastroesophageal reflux disease)     Hearing loss     kailee hearing aids    History of kidney stones     just one     History of transfusion     [facility] with lung surgery , NO REACTION    Hyperlipidemia     Hypertension     Joint pain     Osteoarthritis     Peripheral neuropathy     PONV (postoperative nausea and vomiting)      Sleep apnea with use of continuous positive airway pressure (CPAP)     complaint with machine     Wears glasses        Past Surgical History:   Procedure Laterality Date    APPENDECTOMY      BACK SURGERY  07/02/2015    Left L2 laminotomy, medial facetectomy, and L2-L3 discectomy. --Dr. Himanshu Sanders    BACK SURGERY Right 08/16/2011    Right L4-5 discectomy- Dr. Himanshu Sanders    CARDIAC CATHETERIZATION N/A 01/19/2018    Procedure: Left Heart Cath;  Surgeon: Negrito Wilkes MD;  Location:  [facility];  Service:     CARDIAC CATHETERIZATION N/A 12/22/2023    Procedure: Left Heart Cath;  Surgeon: Darryl Álvarez III, MD;  Location: [place] [facility];  Service: Cardiovascular;  Laterality: N/A;    COLONOSCOPY      every 5 years    CORONARY STENT PLACEMENT  2018    ENDOSCOPY      EPIDURAL BLOCK      KIDNEY STONE SURGERY      KNEE ARTHROSCOPY W/ MENISCECTOMY      left    LUMBAR DISCECTOMY Right 01/20/2025    Procedure: LUMBAR DISCECTOMY L3-4;  Surgeon: Himanshu Sanders MD;  Location:  [facility] OR;  Service: Neurosurgery;  Laterality: Right;    LUMBAR DISCECTOMY      LUNG REMOVAL, PARTIAL      right lower lobe 1980    SPLENECTOMY  1974    TONSILLECTOMY      removed adnoids    TOTAL KNEE ARTHROPLASTY Right 11/15/2019    Procedure: TOTAL KNEE ARTHROPLASTY RIGHT;  Surgeon: Negrito Vidal MD;  Location:  [facility] OR;  Service: Orthopedics    TOTAL KNEE ARTHROPLASTY Left 10/20/2020    Procedure: TOTAL KNEE ARTHROPLASTY LEFT;  Surgeon: Negrito Vidal MD;  Location:  [facility] OR;  Service: Orthopedics;  Laterality: Left;    WISDOM TOOTH EXTRACTION      only 2 removed        Social History     Socioeconomic History    Marital status:    Tobacco Use    Smoking status: Never     Passive exposure: Never    Smokeless tobacco: Never   Vaping Use    Vaping status: Never Used   Substance and Sexual Activity    Alcohol use: No    Drug use: No    Sexual activity: Not Currently     Partners: Female     Birth  "control/protection: Abstinence, None, Vasectomy       Family History   Problem Relation Age of Onset    Hypertension Mother     Cancer Mother     Cancer Father     Emphysema Father     Diabetes Father     COPD Father     Osteoarthritis Father     Arthritis Father     No Known Problems Sister     Diabetes Brother     Heart disease Brother         No Known Allergies    Current Outpatient Medications   Medication Instructions    aspirin 81 mg, Oral, Daily    chlorthalidone (HYGROTON) 25 mg, Oral, Daily    coenzyme Q10 100 mg, Daily    Cyanocobalamin 1000 MCG/ML kit Every 30 Days    oxyCODONE-acetaminophen (PERCOCET) 5-325 MG per tablet 1 tablet, Oral, 3 Times Daily PRN    rosuvastatin (CRESTOR) 40 mg, Oral, Daily    vitamin B-6 (PYRIDOXINE) 50 mg, Daily       Vitals:    02/06/25 0841   BP: 100/68   BP Location: Left arm   Patient Position: Sitting   Pulse: 76   SpO2: 94%   Weight: 76.7 kg (169 lb)   Height: 170.2 cm (67\")       Body mass index is 26.47 kg/m².    PHYSICAL EXAM:  General Appearance:   · well developed  · well nourished  Neck:  · thyroid not enlarged  · supple  Respiratory:  · no respiratory distress  · normal breath sounds  · no rales  Cardiovascular:  · no jugular venous distention  · regular rhythm, with extrasystolic beats noted on auscultation  · apical impulse normal  · S1 normal, S2 normal  · no S3, no S4   · no murmur  · no rub, no thrill  · carotid pulses normal; no bruit  · pedal pulses normal  · lower extremity edema: none    Skin:   warm, dry      RESULTS:   Procedures    Results for orders placed during the hospital encounter of 12/21/23    Adult Stress Echo W/ Cont or Stress Agent if Necessary Per Protocol    Interpretation Summary    Patient denied chest pain but c/o SOA (SpO2 WNL on RA) during exercise that resolved in recovery.    Expected exercise duration = 6:40. Actual = 2:42. ALOK (+59). Patient requested to stop due to SOA.    THR of 124 achievede at 2:05.    No significant ST or T " "wave changes noted.    SR-ST with frequent PVCs, less frequent during exercise. Bigeminy noted in pretest and recovery, couplets in recovery.    LVEF preserved at baseline.  some augmentation with exertino but HR attenuated when excercising.    ANAND treadmill score -2 secondary to shortness of breath and poor exercise tolerance.  medium risk stress test      Previous labs, EKGs and cardiac testing reviewed      Lab Results   Component Value Date    CHOL 124 12/21/2023    TRIG 69 12/21/2023    HDL 52 12/21/2023    LDL 58 12/21/2023    AST 24 12/21/2023    ALT 22 12/21/2023     Lab Results   Component Value Date    HGBA1C 5.80 (H) 01/13/2025     Creatinine   Date Value Ref Range Status   12/22/2023 1.22 0.76 - 1.27 mg/dL Final   12/21/2023 1.07 0.76 - 1.27 mg/dL Final   12/21/2023 1.15 0.76 - 1.27 mg/dL Final     eGFR Non  Amer   Date Value Ref Range Status   10/08/2020 68 >60 mL/min/1.73 Final   11/16/2019 60 (L) >60 mL/min/1.73 Final   11/05/2019 68 >60 mL/min/1.73 Final         ASSESSMENT:  Problem List Items Addressed This Visit       Coronary artery disease involving native coronary artery of native heart without angina pectoris - Primary    Overview     A. Right and [facility] 1/21/2005: Normal LV function, EF 75%, normal right heart pressures, minor nonobstructive coronary artery disease  B.  1/2018, Recurrent chest pain with abnormal EKG showing anterior YESI, \"new\" since 01/2005  C.  [facility], 1/19/2018:  Normal LV function, iFR 0.89, KERRY to LAD.         Dyslipidemia, goal LDL below 70    Essential hypertension    Relevant Medications    chlorthalidone (HYGROTON) 25 MG tablet    PVC's (premature ventricular contractions)       PLAN:    Coronary artery disease  S/p LAD stent 2018  Repeat cath 12/22/2023 by Dr. Álvarez showed nonobstructive CAD widely patent LAD stent.  Continue ASA, statin   No evidence of ischemic symptoms currently.  I do not feel that his weakness spells are from CAD.    2.   Hypertension   Goal " <130/80mmHg  Decrease chlorthalidone to 25 mg daily, due to weakness and fatigue, if blood pressure remains under 130s systolic, may be able to discontinue it completely.  Diuretics may become a problem I can patients over 75.    3.  Hyperlipidemia   Goal LDL <70  Continue Crestor 40mg nightly   Recent lipids reviewed, excellent results.    4.  CKD stage III:  GFR 57 5/2022, continue avoidance of NSAIDs and maintain adequate hydration    5.  Frequent PVCs:  17% on Holter 9/2024 has consultation with Dr. Méndez for further evaluation  Also has bradycardic spells and weakness spells.  Intolerant to beta-blockers due to worsening fatigue and dizziness.      Advance Care Planning   ACP discussion was held with the patient during this visit. Patient has an advance directive in EMR which is still valid.           Follow-up   Return in about 1 year (around 2/6/2026).    Darryl Hernandez MD, [facility]  Interventional Cardiology

## 2025-02-11 ENCOUNTER — OFFICE VISIT (OUTPATIENT)
Dept: NEUROSURGERY | Facility: CLINIC | Age: 75
End: 2025-02-11
Payer: MEDICARE

## 2025-02-11 VITALS
WEIGHT: 172 LBS | HEIGHT: 67 IN | TEMPERATURE: 97.3 F | BODY MASS INDEX: 27 KG/M2 | DIASTOLIC BLOOD PRESSURE: 84 MMHG | SYSTOLIC BLOOD PRESSURE: 124 MMHG

## 2025-02-11 DIAGNOSIS — M51.26 HNP (HERNIATED NUCLEUS PULPOSUS), LUMBAR: Primary | ICD-10-CM

## 2025-02-11 DIAGNOSIS — Z98.890 S/P LUMBAR DISCECTOMY: ICD-10-CM

## 2025-02-11 PROCEDURE — 3079F DIAST BP 80-89 MM HG: CPT | Performed by: PHYSICIAN ASSISTANT

## 2025-02-11 PROCEDURE — 99024 POSTOP FOLLOW-UP VISIT: CPT | Performed by: PHYSICIAN ASSISTANT

## 2025-02-11 PROCEDURE — 3074F SYST BP LT 130 MM HG: CPT | Performed by: PHYSICIAN ASSISTANT

## 2025-02-11 NOTE — PROGRESS NOTES
Subjective     Chief Complaint:     Patient ID: Nic Brooks is a 74 y.o. male is here today for follow-up.    Post-op  Pain Control:  No pain  Fever:  No fever  Diet:  Adequate intake  Activity:  Normal  Operative Site Issues: No    Post-op Follow-up  Pain Control:  No pain  Fever:  No fever  Diet:  Adequate intake  Activity:  Normal  Operative Site Issues: No        History of Present Illness   Mr. 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 He is well-known to our service. On 8/16/2011 he underwent right L4-5 discectomy. On 7/2/2015 he underwent left L2-3 discectomy. He has done well. In July he was doing some weed eating and lifting hives when developed low back pain extending into the right thigh and knee.as well as into the upper shin with sensory alteration and weakness   . Studies demonstrated disc protrusion at the L3-4 level but is felt to compromise the L4 and potentially the L3 nerve roots. After several weeks of conservative treatment and PT, his pain had persisted. As such, he presented on 1/20/25 for lumbar discectomy. Surgery was without complication.   Today, he notes that he still as a bit of pain on the top of his right hip, but he denies any leg pain. 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 He denies fever, chills, or problems with his incision.     The following portions of the patient's history were reviewed and updated as appropriate: allergies, current medications, past family history, past medical history, past social history, past surgical history and problem list.    Family history:   Family History   Problem Relation Age of Onset   • Hypertension Mother    • Cancer Mother    • Cancer Father    • Emphysema Father    • Diabetes Father    • COPD Father    • Osteoarthritis Father    • Arthritis Father    • No Known Problems Sister    • Diabetes Brother    • Heart disease Brother         Social history:   Social History     Socioeconomic History   • Marital status:    Tobacco Use   • Smoking status: Never     Passive exposure: Never   • Smokeless tobacco: Never   Vaping Use   • Vaping status: Never Used   Substance and Sexual Activity   • Alcohol use: No   • Drug use: No   • Sexual activity: Not Currently     Partners: Female     Birth control/protection: Abstinence, None, Vasectomy       Review of Systems   Constitutional:  Negative for activity change, appetite change, chills, diaphoresis, fatigue, fever and unexpected weight change.   HENT:  Negative for congestion, dental problem, drooling, ear discharge, ear pain, facial swelling, hearing loss, mouth sores, nosebleeds, postnasal drip, rhinorrhea, sinus pressure, sinus pain, sneezing, sore throat, tinnitus, trouble swallowing and voice change.    Eyes:  Negative for photophobia, pain, discharge, redness, itching and visual disturbance.   Respiratory:  Negative for apnea, cough, choking, chest tightness, shortness of breath, wheezing and stridor.    Cardiovascular:  Negative for chest pain, palpitations and leg swelling.   Gastrointestinal:  Negative for abdominal distention, abdominal pain, anal bleeding, blood in stool, constipation, diarrhea, nausea, rectal pain and vomiting.   Endocrine: Negative for cold intolerance, heat intolerance, polydipsia, polyphagia and polyuria.   Genitourinary:  Negative for decreased urine volume, difficulty urinating, dysuria, enuresis, flank pain, frequency, genital sores, hematuria, penile discharge, penile pain, penile swelling, scrotal swelling, testicular pain and urgency.   Musculoskeletal:  Negative for arthralgias, back pain, gait problem, joint swelling, myalgias, neck pain and neck stiffness.   Skin:  Negative for color change, pallor, poor wound healing, rash and wound.   Allergic/Immunologic: Negative for environmental allergies, food allergies and immunocompromised state.  "  Neurological:  Negative for dizziness, tremors, seizures, syncope, facial asymmetry, speech difficulty, weakness, light-headedness, numbness and headaches.   Hematological:  Negative for adenopathy. Does not bruise/bleed easily.   Psychiatric/Behavioral:  Negative for agitation, behavioral problems, confusion, decreased concentration, dysphoric mood, hallucinations, self-injury, sleep disturbance and suicidal ideas. The patient is not nervous/anxious and is not hyperactive.        Objective   Blood pressure 124/84, temperature 97.3 °F (36.3 °C), temperature source Infrared, height 170.2 cm (67\"), weight 78 kg (172 lb).  Body mass index is 26.94 kg/m².    Physical Exam  Constitutional:       Appearance: Normal appearance.   Eyes:      Pupils: Pupils are equal, round, and reactive to light.   Cardiovascular:      Pulses: Normal pulses.   ____Pulmonary:      Effort: Pulmonary effort is normal.      Breath sounds: Normal breath sounds.   Musculoskeletal:      Comments: Gait slow and mildly antalgic, but steady and independent with no foot drop.    Skin:     Comments: Incision clean, dry, and intact with no swelling, tenderness, or erythema.    _Neurological:      General: No focal deficit present.      Mental Status:   alert and oriented to person, place, and time.   Psychiatric:         Mood and Affect: Mood normal.         Behavior: Behavior normal.    Assessment & Plan   Mr Brooks is doing well. He may drive. He has made changes to his routine so that he is not needing to lift full boxes of hives. He would like to do some postop PT and I have placed an order for that. We will have him follow up in about one month, though he may cancel that if he is doing very well with no difficulty     Diagnoses and all orders for this visit:    1. HNP (herniated nucleus pulposus), lumbar (Primary)  -     Ambulatory Referral to Physical Therapy for Evaluation & Treatment    2. S/P lumbar discectomy  -     Ambulatory Referral to " Physical Therapy for Evaluation & Treatment      Return in about 4 weeks (around 3/11/2025).       This document signed by Marilee Chappell PA-C  February 11, 2025 11:28 EST

## 2025-03-10 ENCOUNTER — OFFICE VISIT (OUTPATIENT)
Dept: CARDIOLOGY | Facility: CLINIC | Age: 75
End: 2025-03-10
Payer: MEDICARE

## 2025-03-10 VITALS
SYSTOLIC BLOOD PRESSURE: 102 MMHG | DIASTOLIC BLOOD PRESSURE: 70 MMHG | BODY MASS INDEX: 28 KG/M2 | HEIGHT: 67 IN | HEART RATE: 84 BPM | OXYGEN SATURATION: 94 % | WEIGHT: 178.4 LBS

## 2025-03-10 DIAGNOSIS — I25.10 CORONARY ARTERY DISEASE INVOLVING NATIVE CORONARY ARTERY OF NATIVE HEART WITHOUT ANGINA PECTORIS: ICD-10-CM

## 2025-03-10 DIAGNOSIS — I49.3 PVC'S (PREMATURE VENTRICULAR CONTRACTIONS): Primary | ICD-10-CM

## 2025-03-10 DIAGNOSIS — I10 ESSENTIAL HYPERTENSION: ICD-10-CM

## 2025-03-10 PROCEDURE — 3078F DIAST BP <80 MM HG: CPT | Performed by: PHYSICIAN ASSISTANT

## 2025-03-10 PROCEDURE — 3074F SYST BP LT 130 MM HG: CPT | Performed by: PHYSICIAN ASSISTANT

## 2025-03-10 PROCEDURE — 99214 OFFICE O/P EST MOD 30 MIN: CPT | Performed by: PHYSICIAN ASSISTANT

## 2025-03-10 RX ORDER — AMOXICILLIN 500 MG/1
500 CAPSULE ORAL AS NEEDED
COMMUNITY
Start: 2025-02-13 | End: 2025-03-12

## 2025-03-10 NOTE — PROGRESS NOTES
"        Cardiac Electrophysiology Outpatient Consult Note            [facility]    Consult Note     Nic Brooks  [id number]    [phone]     Primary Care Physician: Francisco Vincent MD    Referred By: Dr. Hernandez.     Subjective     Chief Complaint:   Diagnoses and all orders for this visit:    1. PVC's (premature ventricular contractions) (Primary)    2. Essential hypertension    3. Coronary artery disease involving native coronary artery of native heart without angina pectoris        Chief Complaint   Patient presents with    PVC's (premature ventricular contractions)     6-Mo F/U       History of Present Illness:   Nic Brooks is a 75 y.o. male who presents to electrophysiology clinic for follow-up of PVCs, hypertension, coronary disease.  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  He recently had back surgery to recover from this has been doing physical therapy.  He mostly has good days where he is able to do quite a bit of physical activities exercises and therapy without any issues.  Occasionally he will have some skipped beats or flutter sensation sometimes he feels \"fatigued\" after these events.  Otherwise has had no syncope near syncope chest pain suggesting angina pectoris.        Past Medical History:   Past Medical History:   Diagnosis Date    Abnormal ECG     PVC    Arthritis     OSTEO    Chronic pain disorder 07 2024    Coronary artery disease     s/p stent 2018    CTS (carpal tunnel syndrome)     GERD (gastroesophageal reflux disease)     Hearing loss     kailee hearing aids    History of kidney stones 2008    just one     History of transfusion 1980    [facility] with lung surgery , NO REACTION    Hyperlipidemia     Hypertension     Joint pain     Osteoarthritis     Peripheral neuropathy     PONV (postoperative nausea and vomiting)     Sleep apnea with use of continuous positive airway pressure (CPAP)     complaint with " machine     Wears glasses        Past Surgical History:   Past Surgical History:   Procedure Laterality Date    APPENDECTOMY      BACK SURGERY  07/02/2015    Left L2 laminotomy, medial facetectomy, and L2-L3 discectomy. --Dr. Himanshu Sanders    BACK SURGERY Right 08/16/2011    Right L4-5 discectomy- Dr. Himanshu Sanders    CARDIAC CATHETERIZATION N/A 01/19/2018    Procedure: Left Heart Cath;  Surgeon: Negrito Wilkes MD;  Location:  [facility];  Service:     CARDIAC CATHETERIZATION N/A 12/22/2023    Procedure: Left Heart Cath;  Surgeon: Darryl Álvarez III, MD;  [facility];  Service: Cardiovascular;  Laterality: N/A;    COLONOSCOPY      every 5 years    CORONARY STENT PLACEMENT  2018    ENDOSCOPY      EPIDURAL BLOCK      KIDNEY STONE SURGERY      KNEE ARTHROSCOPY W/ MENISCECTOMY      left    LUMBAR DISCECTOMY Right 01/20/2025    Procedure: LUMBAR DISCECTOMY L3-4;  Surgeon: Himanshu Sanders MD;  Location:  [facility] OR;  Service: Neurosurgery;  Laterality: Right;    LUMBAR DISCECTOMY      LUNG REMOVAL, PARTIAL      right lower lobe 1980    SPLENECTOMY  1974    TONSILLECTOMY      removed adnoids    TOTAL KNEE ARTHROPLASTY Right 11/15/2019    Procedure: TOTAL KNEE ARTHROPLASTY RIGHT;  Surgeon: Negrito Vidal MD;  Location:  [facility] OR;  Service: Orthopedics    TOTAL KNEE ARTHROPLASTY Left 10/20/2020    Procedure: TOTAL KNEE ARTHROPLASTY LEFT;  Surgeon: Negrito Vidal MD;  Location:  [facility] OR;  Service: Orthopedics;  Laterality: Left;    WISDOM TOOTH EXTRACTION      only 2 removed        Family History:   Family History   Problem Relation Age of Onset    Hypertension Mother     Cancer Mother     Diabetes Mother     Cancer Father     Emphysema Father     Diabetes Father     COPD Father     Osteoarthritis Father     Arthritis Father     No Known Problems Sister     Diabetes Brother     Heart disease Brother        Social History:   Social History     Socioeconomic History     "Marital status:    Tobacco Use    Smoking status: Never     Passive exposure: Never    Smokeless tobacco: Never   Vaping Use    Vaping status: Never Used   Substance and Sexual Activity    Alcohol use: No    Drug use: No    Sexual activity: Not Currently     Partners: Female     Birth control/protection: Abstinence, None, Vasectomy       Medications:     Current Outpatient Medications:     amoxicillin (AMOXIL) 500 MG capsule, Take 1 capsule by mouth As Needed (Take 4 pills 1 hour prior to dental procedure)., Disp: , Rfl:     aspirin 81 MG EC tablet, Take 1 tablet by mouth Daily., Disp: 90 tablet, Rfl: 3    chlorthalidone (HYGROTON) 25 MG tablet, Take 1 tablet by mouth Daily., Disp: 90 tablet, Rfl: 3    coenzyme Q10 100 MG capsule, Take 1 capsule by mouth Daily., Disp: , Rfl:     Cyanocobalamin 1000 MCG/ML kit, Inject  as directed Every 30 (Thirty) Days., Disp: , Rfl:     rosuvastatin (CRESTOR) 40 MG tablet, TAKE 1 TABLET BY MOUTH DAILY, Disp: 90 tablet, Rfl: 3    vitamin B-6 (PYRIDOXINE) 50 MG tablet, Take 1 tablet by mouth Daily., Disp: , Rfl:     Allergies:   No Known Allergies    Objective   Vital Signs:   Vitals:    03/10/25 1148   BP: 102/70   BP Location: Right arm   Patient Position: Sitting   Cuff Size: Adult   Pulse: 84   SpO2: 94%   Weight: 80.9 kg (178 lb 6.4 oz)   Height: 170.2 cm (67\")         PHYSICAL EXAM  General appearance: Awake, alert, cooperative  Head: Normocephalic, without obvious abnormality, atraumatic  Eyes: Conjunctivae/corneas clear, EOMs intact  Neck: no adenopathy, no carotid bruit, no JVD, and thyroid: not enlarged  Lungs: clear to auscultation bilaterally and no rhonchi or crackles\", ' symmetric  Heart: regular rate and rhythm, S1, S2 normal, no murmur, click, rub or gallop  Abdomen: Soft, non-tender, bowel sounds normal,  no organomegaly  Extremities: extremities normal, atraumatic, no cyanosis or edema  Skin: Skin color, turgor normal, no rashes or lesions  Neurologic: " "Grossly normal     Lab Results   Component Value Date    GLUCOSE 91 12/22/2023    CALCIUM 9.0 12/22/2023     12/22/2023    K 3.8 01/13/2025    CO2 29.0 12/22/2023     12/22/2023    BUN 17 12/22/2023    CREATININE 1.22 12/22/2023    EGFRIFNONA 68 10/08/2020    BCR 13.9 12/22/2023    ANIONGAP 9.0 12/22/2023     Lab Results   Component Value Date    WBC 10.26 01/13/2025    HGB 15.6 01/13/2025    HCT 46.2 01/13/2025    MCV 93.0 01/13/2025     01/13/2025     Lab Results   Component Value Date    INR 1.02 10/08/2020    INR 0.94 11/05/2019    INR 0.92 04/04/2016    PROTIME 13.1 10/08/2020    PROTIME 12.1 11/05/2019    PROTIME 10.0 04/04/2016     No results found for: \"TSH\", \"D5GBKKF\", \"I1WJOKT\", \"THYROIDAB\"    Cardiac Testing:      I personally viewed and interpreted the patient's EKG/Telemetry/lab data    Procedures    Tobacco Cessation: N/A  Obstructive Sleep Apnea Screening: Completed    Advance Care Planning   ACP discussion was declined by the patient. Patient does not have an advance directive, declines further assistance.       Assessment & Plan    Diagnoses and all orders for this visit:    1. PVC's (premature ventricular contractions) (Primary)    2. Essential hypertension    3. Coronary artery disease involving native coronary artery of native heart without angina pectoris           Diagnosis Plan   1. PVCs Minimally symptomatic.      2. Coronary artery disease involving native coronary artery of native heart without angina pectoris  Stable.  Follows with cardiology.  Well treated.  He denies having any angina.      3. Essential hypertension  Blood pressure well-controlled.      4. Moderate obstructive sleep apnea  Well treated.  He tracks this quite closely.        Follow-up in 1 year or sooner as needed.  Electronically signed by WEI Chester, 03/10/25, 3:04 PM EDT.   "

## 2025-03-12 ENCOUNTER — OFFICE VISIT (OUTPATIENT)
Dept: NEUROSURGERY | Facility: CLINIC | Age: 75
End: 2025-03-12
Payer: MEDICARE

## 2025-03-12 VITALS
SYSTOLIC BLOOD PRESSURE: 118 MMHG | TEMPERATURE: 97.3 F | WEIGHT: 174.8 LBS | HEIGHT: 67 IN | BODY MASS INDEX: 27.44 KG/M2 | DIASTOLIC BLOOD PRESSURE: 70 MMHG

## 2025-03-12 DIAGNOSIS — M51.26 HNP (HERNIATED NUCLEUS PULPOSUS), LUMBAR: Primary | ICD-10-CM

## 2025-03-12 NOTE — PROGRESS NOTES
Subjective     Chief Complaint: back and right leg pain     Patient ID: Nic Brooks is a 75 y.o. male is here today for follow-up.    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 He is well-known to our service. On 8/16/2011 he underwent right L4-5 discectomy. On 7/2/2015 he underwent left L2-3 discectomy. He has done well. In July he was doing some weed eating and lifting hives when developed low back pain extending into the right thigh and knee.as well as into the upper shin with sensory alteration and weakness   . MRI showed disc herniation at the L3-4 level. After failing conservative treatment, he ultimately   presented on 1/20/25 for lumbar discectomy. Surgery was without complication.   At his last follow-up visit he was still having some pain across into his right hip.  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  He is working in the [employer] but he has his son and grandson helping him with all the heavy lifting.  Today he states that the pain will sometimes go into his right buttock and into his right leg in the anterior thigh into the knee.  If the pain gets too severe he will lay down with a heating pad for about an hour and then he is able to resume his activities.  He can work for about 2 hours at a time before he has to rest.  He is not taking any medication.  This is mildly limiting but not severely so.  He feels that he is still quite a bit better than he had been prior to surgery  He is attending physical therapy 3 times a week working on his general strength, balance and doing core and upper body work  He denies any fevers chills or problems with his incision    The following portions  "of the patient's history were reviewed and updated as appropriate: allergies, current medications, past family history, past medical history, past social history, past surgical history and problem list.    Family history:   Family History   Problem Relation Age of Onset    Hypertension Mother     Cancer Mother     Diabetes Mother     Cancer Father     Emphysema Father     Diabetes Father     COPD Father     Osteoarthritis Father     Arthritis Father     No Known Problems Sister     Diabetes Brother     Heart disease Brother        Social history:   Social History     Socioeconomic History    Marital status:    Tobacco Use    Smoking status: Never     Passive exposure: Never    Smokeless tobacco: Never   Vaping Use    Vaping status: Never Used   Substance and Sexual Activity    Alcohol use: No    Drug use: No    Sexual activity: Not Currently     Partners: Female     Birth control/protection: Abstinence, None, Vasectomy       Review of Systems   Musculoskeletal:  Positive for arthralgias (right side front upper leg) and back pain (low right side back pain).   Skin:  Negative for poor wound healing.       Objective   Blood pressure 118/70, temperature 97.3 °F (36.3 °C), temperature source Infrared, height 170.2 cm (67\"), weight 79.3 kg (174 lb 12.8 oz).  Body mass index is 27.38 kg/m².    Physical Exam  Constitutional:       Appearance: Normal appearance.   Eyes:      Pupils: Pupils are equal, round, and reactive to light.   Cardiovascular:      Pulses: Normal pulses.   ____Pulmonary:      Effort: Pulmonary effort is normal.      Breath sounds: Normal breath sounds.   Musculoskeletal:      Comments: Gait slow and mildly antalgic, but steady and independent with no foot drop.    Skin:     Comments: Incision has healed well  _Neurological:      General: No focal deficit present.      Mental Status:   alert and oriented to person, place, and time.   Psychiatric:         Mood and Affect: Mood normal.         " Behavior: Behavior normal.    Assessment & Plan   Mr Brooks is not 100%, but has had definite improvement since surgery and he is able to do most of what he wants to do.  He does still have to stop and rest fairly frequently during the day but feels that his limitations are relatively modest at this point.  He is going into his busy season and will continue his physical therapy.  When he had seen Dr. Sanders, he was told that he may need a fusion at some point in the future.  He definitely does not feel that he needs further surgery workup at this point.  We will leave it up to him to call us should his symptoms persist and worsen and he is wanting to explore that option    Diagnoses and all orders for this visit:    1. HNP (herniated nucleus pulposus), lumbar (Primary)      Return if symptoms worsen or fail to improve.       This document signed by Marilee Chappell PA-C  March 12, 2025 11:38 EDT

## 2025-03-18 ENCOUNTER — TELEPHONE (OUTPATIENT)
Dept: CARDIOLOGY | Facility: CLINIC | Age: 75
End: 2025-03-18
Payer: MEDICARE

## 2025-03-18 NOTE — TELEPHONE ENCOUNTER
Pt's wife called stating Dr. Méndez requested to see Mr. Brooks tomorrow 3/19/25 at 9:00. Do either of you guys know anything about this? No documentation in patient or his wife's chart stating this. Please advise.

## 2025-03-20 NOTE — TELEPHONE ENCOUNTER
SW PT'S WIFE MAGY VIA PHONE SHE IS AWARE OF THE APPT DATE, TIME, LOCATION & WHO PT WILL BE SEEING. MAILED OUT THE APPT REMINDER PAPER. PT'S WIFE WAS VERY PLEASED WITH A 3 MO FU.

## 2025-06-09 ENCOUNTER — APPOINTMENT (OUTPATIENT)
Dept: CARDIOLOGY | Facility: HOSPITAL | Age: 75
End: 2025-06-09
Payer: MEDICARE

## 2025-06-09 ENCOUNTER — HOSPITAL ENCOUNTER (INPATIENT)
Facility: HOSPITAL | Age: 75
LOS: 3 days | Discharge: HOME | End: 2025-06-12
Attending: INTERNAL MEDICINE | Admitting: INTERNAL MEDICINE
Payer: MEDICARE

## 2025-06-09 ENCOUNTER — APPOINTMENT (OUTPATIENT)
Dept: MRI IMAGING | Facility: HOSPITAL | Age: 75
End: 2025-06-09
Payer: MEDICARE

## 2025-06-09 DIAGNOSIS — R13.10 DYSPHAGIA, UNSPECIFIED TYPE: ICD-10-CM

## 2025-06-09 DIAGNOSIS — I63.9 CEREBROVASCULAR ACCIDENT (CVA), UNSPECIFIED MECHANISM: Primary | ICD-10-CM

## 2025-06-09 LAB
CA-I SERPL ISE-MCNC: 1.14 MMOL/L (ref 1.15–1.3)
GLUCOSE BLDC GLUCOMTR-MCNC: 100 MG/DL (ref 70–130)
GLUCOSE BLDC GLUCOMTR-MCNC: 93 MG/DL (ref 70–130)

## 2025-06-09 PROCEDURE — 70551 MRI BRAIN STEM W/O DYE: CPT

## 2025-06-09 PROCEDURE — 93306 TTE W/DOPPLER COMPLETE: CPT | Performed by: INTERNAL MEDICINE

## 2025-06-09 PROCEDURE — 82948 REAGENT STRIP/BLOOD GLUCOSE: CPT

## 2025-06-09 PROCEDURE — 99223 1ST HOSP IP/OBS HIGH 75: CPT | Performed by: NURSE PRACTITIONER

## 2025-06-09 PROCEDURE — 84100 ASSAY OF PHOSPHORUS: CPT | Performed by: PHYSICIAN ASSISTANT

## 2025-06-09 PROCEDURE — 93005 ELECTROCARDIOGRAM TRACING: CPT | Performed by: INTERNAL MEDICINE

## 2025-06-09 PROCEDURE — 93306 TTE W/DOPPLER COMPLETE: CPT

## 2025-06-09 PROCEDURE — 92610 EVALUATE SWALLOWING FUNCTION: CPT

## 2025-06-09 PROCEDURE — 99223 1ST HOSP IP/OBS HIGH 75: CPT | Performed by: INTERNAL MEDICINE

## 2025-06-09 PROCEDURE — 80061 LIPID PANEL: CPT | Performed by: NURSE PRACTITIONER

## 2025-06-09 PROCEDURE — 82330 ASSAY OF CALCIUM: CPT | Performed by: PHYSICIAN ASSISTANT

## 2025-06-09 PROCEDURE — 83036 HEMOGLOBIN GLYCOSYLATED A1C: CPT | Performed by: NURSE PRACTITIONER

## 2025-06-09 PROCEDURE — 80048 BASIC METABOLIC PNL TOTAL CA: CPT | Performed by: PHYSICIAN ASSISTANT

## 2025-06-09 PROCEDURE — 93010 ELECTROCARDIOGRAM REPORT: CPT | Performed by: INTERNAL MEDICINE

## 2025-06-09 PROCEDURE — 83735 ASSAY OF MAGNESIUM: CPT | Performed by: PHYSICIAN ASSISTANT

## 2025-06-09 RX ORDER — ASPIRIN 300 MG/1
300 SUPPOSITORY RECTAL DAILY
Status: DISCONTINUED | OUTPATIENT
Start: 2025-06-10 | End: 2025-06-10

## 2025-06-09 RX ORDER — SODIUM CHLORIDE 9 MG/ML
40 INJECTION, SOLUTION INTRAVENOUS AS NEEDED
Status: DISCONTINUED | OUTPATIENT
Start: 2025-06-09 | End: 2025-06-12 | Stop reason: HOSPADM

## 2025-06-09 RX ORDER — SODIUM CHLORIDE 0.9 % (FLUSH) 0.9 %
10 SYRINGE (ML) INJECTION AS NEEDED
Status: DISCONTINUED | OUTPATIENT
Start: 2025-06-09 | End: 2025-06-12 | Stop reason: HOSPADM

## 2025-06-09 RX ORDER — ASPIRIN 325 MG
325 TABLET ORAL DAILY
Status: DISCONTINUED | OUTPATIENT
Start: 2025-06-10 | End: 2025-06-10

## 2025-06-09 RX ORDER — SODIUM CHLORIDE 0.9 % (FLUSH) 0.9 %
10 SYRINGE (ML) INJECTION EVERY 12 HOURS SCHEDULED
Status: DISCONTINUED | OUTPATIENT
Start: 2025-06-09 | End: 2025-06-12 | Stop reason: HOSPADM

## 2025-06-09 RX ORDER — ATORVASTATIN CALCIUM 40 MG/1
80 TABLET, FILM COATED ORAL NIGHTLY
Status: DISCONTINUED | OUTPATIENT
Start: 2025-06-09 | End: 2025-06-10

## 2025-06-09 RX ADMIN — ATORVASTATIN CALCIUM 80 MG: 40 TABLET, FILM COATED ORAL at 20:17

## 2025-06-09 RX ADMIN — Medication 10 ML: at 20:17

## 2025-06-09 NOTE — CONSULTS
Stroke Consult Note    Patient Name: Nic Brooks   MRN: [id number]  Age: 75 y.o.  Sex: male  : 1950    Primary Care Physician: Francisco Vincent MD  Referring Physician:  Dr. Meng    Handedness: Right  Race:      Chief Complaint/Reason for Consultation: Left sided weakness, numbness, limb facial droop    HPI: Nic Brooks is a 75-year-old, , right-handed male with known diagnosis of HTN, HLD, GAUDENCIO (on CPAP), CAD, and GERD who presents as a transfer from [facility] where he was evaluated for acute onset left-sided weakness, numbness and facial droop.  Patient had an acute onset of left arm weakness numbness and facial droop beginning at 1120 this morning and while unloading groceries from the car.  Patient reported that his left arm became flaccid and numb he had tingling in his left face, left facial drooping and dysarthria.  OSH initial NIHSS was 2.  OSH CT head was negative for acute abnormality.  He was a candidate for and did receive IV TNK at OSH at 1305.  CT perfusion showed elevated Tmax in > 6 seconds in the left lower temporal region.  CTA head and neck negative for LVO.  Patient was transferred to [facility] for further management and higher level of care.    Upon arrival to [facility] patient is alert and oriented x 4.  His speech is mildly dysarthric, no aphasia.  He has a slight left facial droop, tongue protrudes midline.  Gaze is normal, all visual fields are intact.  No drift in any extremity.  Sensation is equal to light touch bilaterally.  No dysmetria.  Patient states that his left hand weakness and numbness seems to have resolved.  Does note persistent facial droop and dysarthria.  Patient tells me he was on Plavix in the past for cardiac stents, he is no longer on this takes baby aspirin only.      Last Known Normal Date/Time: 11:20 EST     Review of Systems   Constitutional:  Negative for fever.   HENT:  Negative for trouble swallowing.    Eyes:  Negative for  visual disturbance.   Respiratory:  Negative for cough and shortness of breath.    Cardiovascular:  Negative for chest pain and palpitations.   Gastrointestinal:  Negative for nausea and vomiting.   Musculoskeletal: Negative.    Skin: Negative.    Neurological:  Positive for facial asymmetry and speech difficulty. Negative for weakness, numbness and headaches.   Psychiatric/Behavioral: Negative.        Past Medical History:   Diagnosis Date    Abnormal ECG     PVC    Arthritis     OSTEO    Chronic pain disorder 07 2024    Coronary artery disease     s/p stent 2018    CTS (carpal tunnel syndrome)     GERD (gastroesophageal reflux disease)     Hearing loss     kailee hearing aids    History of kidney stones 2008    just one     History of transfusion [facility] , NO REACTION    Hyperlipidemia     Hypertension     Joint pain     Osteoarthritis     Peripheral neuropathy     PONV (postoperative nausea and vomiting)     Sleep apnea with use of continuous positive airway pressure (CPAP)     complaint with machine     Wears glasses      Past Surgical History:   Procedure Laterality Date    APPENDECTOMY      BACK SURGERY  07/02/2015    Left L2 laminotomy, medial facetectomy, and L2-L3 discectomy. --Dr. Himanshu Sanders    BACK SURGERY Right 08/16/2011    Right L4-5 discectomy- Dr. Himanshu Sanders    CARDIAC CATHETERIZATION N/A 01/19/2018    Procedure: Left Heart Cath;  Surgeon: Negrito Wilkes MD;  Location:  [facility];  Service:     CARDIAC CATHETERIZATION N/A 12/22/2023    Procedure: Left Heart Cath;  Surgeon: Darryl Álvarez III, MD;  Location:  [facility];  Service: Cardiovascular;  Laterality: N/A;    COLONOSCOPY      every 5 years    CORONARY STENT PLACEMENT  2018    ENDOSCOPY      EPIDURAL BLOCK      KIDNEY STONE SURGERY      KNEE ARTHROSCOPY W/ MENISCECTOMY      left    LUMBAR DISCECTOMY Right 01/20/2025    Procedure: LUMBAR DISCECTOMY L3-4;   Surgeon: Himanshu Sanders MD;  Location:  [facility] OR;  Service: Neurosurgery;  Laterality: Right;    LUMBAR DISCECTOMY      LUNG REMOVAL, PARTIAL      right lower lobe 1980    SPLENECTOMY  1974    TONSILLECTOMY      removed adnoids    TOTAL KNEE ARTHROPLASTY Right 11/15/2019    Procedure: TOTAL KNEE ARTHROPLASTY RIGHT;  Surgeon: Negrito Vidal MD;  Location:  [facility];  Service: Orthopedics    TOTAL KNEE ARTHROPLASTY Left 10/20/2020    Procedure: TOTAL KNEE ARTHROPLASTY LEFT;  Surgeon: Negrito Vidal MD;  Location:  [facility];  Service: Orthopedics;  Laterality: Left;    WISDOM TOOTH EXTRACTION      only 2 removed      Family History   Problem Relation Age of Onset    Hypertension Mother     Cancer Mother     Diabetes Mother     Cancer Father     Emphysema Father     Diabetes Father     COPD Father     Osteoarthritis Father     Arthritis Father     No Known Problems Sister     Diabetes Brother     Heart disease Brother      Social History     Socioeconomic History    Marital status:    Tobacco Use    Smoking status: Never     Passive exposure: Never    Smokeless tobacco: Never   Vaping Use    Vaping status: Never Used   Substance and Sexual Activity    Alcohol use: No    Drug use: No    Sexual activity: Not Currently     Partners: Female     Birth control/protection: Abstinence, None, Vasectomy     No Known Allergies  Prior to Admission medications    Medication Sig Start Date End Date Taking? Authorizing Provider   aspirin 81 MG EC tablet Take 1 tablet by mouth Daily. 6/23/22   Darryl Hernandez MD   chlorthalidone (HYGROTON) 25 MG tablet Take 1 tablet by mouth Daily. 2/6/25   Darryl Hernandez MD   coenzyme Q10 100 MG capsule Take 1 capsule by mouth Daily.    ProviderHomero MD   Cyanocobalamin 1000 MCG/ML kit Inject  as directed Every 30 (Thirty) Days.    ProviderHoemro MD   rosuvastatin (CRESTOR) 40 MG tablet TAKE 1 TABLET BY MOUTH DAILY 7/11/24   Letha Arenas PA-C   vitamin B-6  (PYRIDOXINE) 50 MG tablet Take 1 tablet by mouth Daily.    Provider, MD Homero         BP: ()/()   Arterial Line BP: ()/()   Neurological Exam  Mental Status  Alert. Oriented to person, place, and time. Mild dysarthria present. Language is fluent with no aphasia. Attention and concentration are normal.    Cranial Nerves  CN II: Visual fields full to confrontation.  CN III, IV, VI: Extraocular movements intact bilaterally. Normal lids and orbits bilaterally. Pupils equal round and reactive to light bilaterally.  CN V: Facial sensation is normal.  CN VII:  Right: There is no facial weakness.  Left: There is central facial weakness.  CN XI: Shoulder shrug strength is normal.  CN XII: Tongue midline without atrophy or fasciculations.    Motor  Normal muscle bulk throughout. No fasciculations present. Normal muscle tone. No abnormal involuntary movements. Strength is 5/5 throughout all four extremities.    Sensory  Light touch is normal in upper and lower extremities.     Coordination    Finger-to-nose, rapid alternating movements and heel-to-shin normal bilaterally without dysmetria.    Gait    Not tested.      Physical Exam  Vitals reviewed.   Constitutional:       Appearance: Normal appearance.   HENT:      Head: Normocephalic and atraumatic.   Eyes:      General: Lids are normal.      Extraocular Movements: Extraocular movements intact.      Pupils: Pupils are equal, round, and reactive to light.   Cardiovascular:      Rate and Rhythm: Normal rate and regular rhythm.   Pulmonary:      Effort: Pulmonary effort is normal. No respiratory distress.   Musculoskeletal:         General: No swelling. Normal range of motion.      Cervical back: Normal range of motion.   Skin:     General: Skin is warm and dry.   Neurological:      Mental Status: He is alert and oriented to person, place, and time.      Cranial Nerves: Cranial nerve deficit and dysarthria present.      Sensory: No sensory deficit.      Motor: Motor  strength is normal.No weakness.      Coordination: Coordination is intact.   Psychiatric:         Mood and Affect: Mood normal.         Behavior: Behavior normal.         Acute Stroke Data    Thrombolytic Inclusion / Exclusion Criteria    Time: 14:19 EDT  Person Administering Scale: SHIVA Tejada      YES NO INCLUSION CRITERIA CLASS I   [x] []   Suspected diagnosis of acute ischemic stroke with measureable neurological deficit.  Low NIHSS with disabling stroke symptoms.   [x] []   Onset of stroke symptoms < 3 hours before beginning treatment >/ 18 years old  Stroke symptom onset = time patient was last seen well or without symptoms (LKW)   [] [x]   Onset of symptoms between 3-4.5 hours: >/= 80 years old (safe Class IIa) with history of   both diabetes and prior CVA  (reasonable Class IIb) AND NIHSS </= 25  *If not eligible for IV Thrombolytic consider neuro intervention for LKW within 24 hours     YES NO EXCLUSION CRITERIA (CONTRAINDICATIONS) CLASS III EVIDENCE HARM   [] []   Blood pressure >185/110 medically refractory to IV medications   [] []   Active bleeding at a non-compressible site   [] []   Active intracranial hemorrhage (ICH)   [] []   Symptoms suggestive of subarachnoid hemorrhage (SAH)   [] []   GI bleed within 21 days   [] []   Ischemic stroke within 3 months   [] []   Severe head trauma within 3 months    [] []   Intracranial or intraspinal surgery within 3 months   [] []   Current GI malignancy   [] []   Intracranial neoplasm   [] []   Infective endocarditis   [] []   Aortic arch dissection   [] []   Active coagulopathy with  INR >1.7, platelets <100,000, PTT > 40 sec, PT > 15 sec   *For warfarin, administration can begin before blood tests resulted. Discontinue for above values.    [] []   Treatment dose* of LMWH (Lovenox) in last 24 hours  *prophylactic dosages are not a contraindication   [] []   Concurrent use of antiplatelet agents' glycoprotein inhibitors IIb/IIIa (Integrilin, etc.)   []  []   Thrombin or factor Xa inhibitors (Eliquis, Xarelto, Arixtra) taken in last 48 hours     YES NO CLASS II: AIS WITH THE FOLLOWING CONDITIONS -   TREATMENT RISKS SHOULD BE WEIGHED AGAINST POSSIBLE BENEFITS.    [] []   Major trauma in last 14 days, recent major surgery in last 14 days, intracranial arterial dissection, giant unruptured and unsecured intracranial aneurysm, pericarditis     [] []   The risks and benefits have been discussed with the patient or family related to the administration of IV thrombolytic therapy for stroke symptoms.   [] []   I have discussed and reviewed the patient's case and imaging with the attending prior to IV thrombolytic therapy.   TIME 13:05 Time IV thrombolytic administered -received at [facility] Meds:  Scheduled-   Infusions- No current facility-administered medications for this encounter.     PRNs-     Functional Status Prior to Current Stroke/Stacia Score:       MODIFIED STACIA SCALE (to be assessed for each patient having history of stroke) []Stroke history but not assessed  [x]0: No symptoms at all  []1: No significant disability despite symptoms  []2: Slight disability  []3: Moderate disability  []4: Moderately severe disability  []5: Severe disability  []6: Death         NIH Stroke Scale  Time: 14:19 EDT  Person Administering Scale: SHIVA Tejada    1a  Level of consciousness: 0=alert; keenly responsive   1b. LOC questions:  0=Answers both questions correctly   1c. LOC commands: 0=Performs both tasks correctly   2.  Best Gaze: 0=normal   3.  Visual: 0=No visual loss   4. Facial Palsy: 1=Minor paralysis (flattened nasolabial fold, asymmetric on smiling)   5a.  Motor left arm: 0=No drift, limb holds 90 (or 45) degrees for full 10 seconds   5b.  Motor right arm: 0=No drift, limb holds 90 (or 45) degrees for full 10 seconds   6a. motor left le=No drift, limb holds 90 (or 45) degrees for full 10 seconds   6b  Motor right le=No drift, limb holds 90 (or  45) degrees for full 10 seconds   7. Limb Ataxia: 0=Absent   8.  Sensory: 0=Normal; no sensory loss   9. Best Language:  0=No aphasia, normal   10. Dysarthria: 1=Mild to moderate, patient slurs at least some words and at worst, can be understood with some difficulty   11. Extinction and Inattention: 0=No abnormality    Total:   2       Results Reviewed:  OSH radiology report findings    CT head was negative for acute abnormality.    CT perfusion showed elevated Tmax in > 6 seconds in the left lower temporal region.  CTA head and neck negative for LVO.     Assessment/Plan:  75-year-old, , right-handed male with known diagnosis of HTN, HLD, GAUDENCIO (on CPAP), CAD, and GERD who presents as a transfer from [facility] [place] where he was evaluated for acute onset left-sided weakness numbness and facial droop.  Patient had an acute onset of left arm weakness, numbness and facial droop beginning at 1120 this morning.  His initial NIHSS was 2.  OSH CT head was negative for acute abnormality.  He was a candidate for and did receive IV TNK at OSH at 1305.  CT perfusion showed elevated Tmax in > 6 seconds in the left lower temporal region.  CTA head and neck negative for LVO.  Transferred to [facility] for higher level of care    Antiplatelet PTA: Aspirin 81  Anticoagulant PTA: None        Right hemispheric stroke s/p TNK  -Admit to ICU  -TIA/CVA order set with thrombolytic therapy has been initiated  -NPO until bedside nursing dysphagia screen completed  -MRI brain without  -24 hour post-TNK scan @ 13:00  -TTE with bubble  -A1c and lipid panel in AM  -Meds: ASA tomorrow if 24 hour CTH is negative  -Activity as tolerated, fall risk precautions  -PT/OT/SLP evaluation    2.  Essential hypertension,   -Strict blood pressure monitoring, keep BP <180/105  -Nicardipine as needed for SBP >180/105    3.  Hyperlipidemia  -Lipid panel in AM  -Atorvastatin 80mg nightly        Plan of care was discussed with patient, nursing and  primary team.  Stroke neurology will continue to follow.  Please call with any questions or concerns.            Leyda Yin, SHIVA  June 9, 2025  14:19 EDT

## 2025-06-09 NOTE — THERAPY EVALUATION
Acute Care - Speech Language Pathology   Swallow Initial Evaluation [facility]  Clinical Swallow Evaluation     Patient Name: Nic Brooks  : 1950  MRN: [id number]  Today's Date: 2025               Admit Date: 2025    Visit Dx:     ICD-10-CM ICD-9-CM   1. Cerebrovascular accident (CVA), unspecified mechanism  I63.9 434.91   2. Dysphagia, unspecified type  R13.10 787.20     Patient Active Problem List   Diagnosis    Moderate obstructive sleep apnea    Fatigue    Snoring    GERD (gastroesophageal reflux disease)    Hypersomnia    Other chest pain    Abnormal EKG    Coronary artery disease involving native coronary artery of native heart without angina pectoris    Dyslipidemia, goal LDL below 70    Essential hypertension    Primary osteoarthritis of right knee    Status post total left knee replacement    Prediabetes    Primary osteoarthritis of left knee    Unstable angina    Chest pain    PVC's (premature ventricular contractions)    HNP (herniated nucleus pulposus), lumbar    Lumbar disc herniation    CVA (cerebral vascular accident)     Past Medical History:   Diagnosis Date    Abnormal ECG     PVC    Arthritis     OSTEO    Chronic pain disorder 2024    Coronary artery disease     s/p stent     CTS (carpal tunnel syndrome)     CVA (cerebral vascular accident) 2025    GERD (gastroesophageal reflux disease)     Hearing loss     kailee hearing aids    History of kidney stones     just one     History of transfusion     [facility] with lung surgery , NO REACTION    Hyperlipidemia     Hypertension     Joint pain     Osteoarthritis     Peripheral neuropathy     PONV (postoperative nausea and vomiting)     Sleep apnea with use of continuous positive airway pressure (CPAP)     complaint with machine     Wears glasses      Past Surgical History:   Procedure Laterality Date    APPENDECTOMY      BACK SURGERY  2015    Left L2 laminotomy, medial facetectomy, and L2-L3  discectomy. --Dr. Himanshu Sanders    BACK SURGERY Right 08/16/2011    Right L4-5 discectomy- Dr. Himanshu Sanders    CARDIAC CATHETERIZATION N/A 01/19/2018    Procedure: Left Heart Cath;  Surgeon: Negrito Wilkes MD;  Location:  [facility];  Service:     CARDIAC CATHETERIZATION N/A 12/22/2023    Procedure: Left Heart Cath;  Surgeon: Darryl Álvarez III, MD;  Location:  [facility];  Service: Cardiovascular;  Laterality: N/A;    COLONOSCOPY      every 5 years    CORONARY STENT PLACEMENT  2018    ENDOSCOPY      EPIDURAL BLOCK      KIDNEY STONE SURGERY      KNEE ARTHROSCOPY W/ MENISCECTOMY      left    LUMBAR DISCECTOMY Right 01/20/2025    Procedure: LUMBAR DISCECTOMY L3-4;  Surgeon: Himanshu Sanders MD;  Location:  [facility] OR;  Service: Neurosurgery;  Laterality: Right;    LUMBAR DISCECTOMY      LUNG REMOVAL, PARTIAL      right lower lobe 1980    SPLENECTOMY  1974    TONSILLECTOMY      removed adnoids    TOTAL KNEE ARTHROPLASTY Right 11/15/2019    Procedure: TOTAL KNEE ARTHROPLASTY RIGHT;  Surgeon: Negrito Vidal MD;  Location:  [facility];  Service: Orthopedics    TOTAL KNEE ARTHROPLASTY Left 10/20/2020    Procedure: TOTAL KNEE ARTHROPLASTY LEFT;  Surgeon: Negrito Vidal MD;  Location:  [facility];  Service: Orthopedics;  Laterality: Left;    WISDOM TOOTH EXTRACTION      only 2 removed        SLP Recommendation and Plan  SLP Swallowing Diagnosis: suspected pharyngeal dysphagia (06/09/25 1605)  SLP Diet Recommendation: mechanical ground textures, no mixed consistencies, nectar thick liquids (consider NPO if concerns arise prior to SLP re-eval) (06/09/25 1605)  Recommended Precautions and Strategies: no straw, upright posture during/after eating, general aspiration precautions (06/09/25 1605)  SLP Rec. for Method of Medication Administration: meds whole, with puree, as tolerated (06/09/25 1605)     Monitor for Signs of Aspiration: yes, notify SLP if any concerns (06/09/25  1605)  Recommended Diagnostics: reassess via clinical swallow evaluation, SLE/Cog/Motor Speech Evaluation, other (see comments) (consider FEES if further concerns) (06/09/25 1605)  Swallow Criteria for Skilled Therapeutic Interventions Met: demonstrates skilled criteria (06/09/25 1605)  Anticipated Discharge Disposition (SLP): home with OP services (pending further assessment) (06/09/25 1605)  Rehab Potential/Prognosis, Swallowing: good, to achieve stated therapy goals (06/09/25 1605)     Predicted Duration Therapy Intervention (Days): 1 week (06/09/25 1605)  Oral Care Recommendations: Oral Care BID/PRN, Toothbrush (06/09/25 1605)          SWALLOW EVALUATION (Last 72 Hours)       SLP Adult Swallow Evaluation       Row Name 06/09/25 1605                   Rehab Evaluation    Document Type evaluation  -AC        Subjective Information no complaints  -AC        Patient Observations alert;cooperative  -AC        Patient/Family/Caregiver Comments/Observations Spouse present.  -AC        Patient Effort excellent  -AC           General Information    Patient Profile Reviewed yes  -AC        Pertinent History Of Current Problem Presented to OSH w/ L facial droop, dysarthria, LUE weakness/numbness. S/p TNK 1305. Sxs improving, but still presented w/ slight L labial droop & pt detects mildly slurred speech, though was 100% intelligible during assessment. MRI pending. Stroke team suspects R CVA. Hx GERD, RLL resection for infection yrs ago per pt/spouse.  -AC        Current Method of Nutrition NPO  -AC        Precautions/Limitations, Vision WFL;for purposes of eval  -AC        Precautions/Limitations, Hearing WFL;for purposes of eval  -AC        Prior Level of Function-Communication unknown  -AC        Prior Level of Function-Swallowing no diet consistency restrictions  -AC        Plans/Goals Discussed with patient;spouse/S.O.;agreed upon  -AC        Barriers to Rehab none identified  -AC        Patient's Goals for Discharge  return to PO diet;eat/drink without coughing/choking  -AC        Family Goals for Discharge patient able to return to PO diet;patient able to eat/drink without coughing/choking  -AC           Pain    Pretreatment Pain Rating 0/10 - no pain  -AC        Posttreatment Pain Rating 0/10 - no pain  -AC           Oral Motor Structure and Function    Dentition Assessment natural, present and adequate  -AC        Secretion Management WNL/WFL  -AC        Mucosal Quality moist, healthy  -AC        Volitional Cough WFL  -AC           Oral Musculature and Cranial Nerve Assessment    Oral Motor General Assessment oral labial or buccal impairment;lingual impairment  -AC        Oral Labial or Buccal Impairment, Detail, Cranial Nerve VII (Facial): left labial droop  slight, upper lip  -AC        Lingual Impairment, Detail. Cranial Nerves IX, XII (Glossopharyngeal and Hypoglossal) other (see comments)  deviation upon protrusion; otherwise, ROM seemingly WFL  -AC           General Eating/Swallowing Observations    Respiratory Support Currently in Use room air  -AC        Eating/Swallowing Skills fed by SLP;self-fed;appropriate self-feeding skills observed  -AC        Positioning During Eating upright 90 degree;upright in bed  -AC        Utensils Used spoon;cup;straw  -AC        Consistencies Trialed ice chips;thin liquids;nectar/syrup-thick liquids;pureed;regular textures  -AC           Clinical Swallow Eval    Oral Prep Phase WFL  -AC        Oral Transit WFL  -AC        Oral Residue WFL  -AC        Pharyngeal Phase suspected pharyngeal impairment  -AC        Esophageal Phase unremarkable  -AC           Pharyngeal Phase Concerns    Pharyngeal Phase Concerns throat clear  -AC        Throat Clear thin;nectar  consistently w/ thin via cup/straw, x1 w/ nectar via straw  -AC        Pharyngeal Phase Concerns, Comment Pt/spouse reported baseline throat clearing/drainage issues. Felt to be at risk for aspiration given presentation. Discussed  risks, options for intervention. Pt stated understanding and agreed w/ modified diet.  -           Swallowing Quality of Life Assessment    Education and counseling provided Signs of aspiration;Risks of aspiration;Aspiration precautions;Compensatory strategy recommendations and rationale  -           SLP Evaluation Clinical Impression    SLP Swallowing Diagnosis suspected pharyngeal dysphagia  -        Functional Impact risk of aspiration/pneumonia  -        Rehab Potential/Prognosis, Swallowing good, to achieve stated therapy goals  -        Swallow Criteria for Skilled Therapeutic Interventions Met demonstrates skilled criteria  -           Recommendations    Predicted Duration Therapy Intervention (Days) 1 week  -        SLP Diet Recommendation mechanical ground textures;no mixed consistencies;nectar thick liquids  consider NPO if concerns arise prior to SLP re-eval  -        Recommended Diagnostics reassess via clinical swallow evaluation;SLE/Cog/Motor Speech Evaluation;other (see comments)  consider FEES if further concerns  -        Recommended Precautions and Strategies no straw;upright posture during/after eating;general aspiration precautions  -        Oral Care Recommendations Oral Care BID/PRN;Toothbrush  -        SLP Rec. for Method of Medication Administration meds whole;with puree;as tolerated  -        Monitor for Signs of Aspiration yes;notify SLP if any concerns  -        Anticipated Discharge Disposition (SLP) home with OP services  pending further assessment  -                  User Key  (r) = Recorded By, (t) = Taken By, (c) = Cosigned By      Initials Name Effective Dates     Leyda Harris, MS CCC-SLP 02/03/23 -                     EDUCATION  The patient has been educated in the following areas:   Dysphagia (Swallowing Impairment) Modified Diet Instruction.                Time Calculation:    Time Calculation- SLP       Row Name 06/09/25 2121             Time  Calculation- SLP    SLP Start Time 1605  -AC      SLP Received On 06/09/25  -         Untimed Charges    [id number] Eval Oral Pharyng Swallow Minutes 50  -AC         Total Minutes    Untimed Charges Total Minutes 50  -AC       Total Minutes 50  -AC                User Key  (r) = Recorded By, (t) = Taken By, (c) = Cosigned By      Initials Name Provider Type    Leyda Govea MS CCC-SLP Speech and Language Pathologist                    Therapy Charges for Today       Code Description Service Date Service Provider Modifiers Qty    [id number]  ST EVAL ORAL PHARYNG SWALLOW 3 6/9/2025 Leyda Harris MS CCC-SLP GN 1                 Leyda Harris MS CCC-SLP  6/9/2025

## 2025-06-09 NOTE — PLAN OF CARE
Goal Outcome Evaluation:  Plan of Care Reviewed With: patient, significant other                Anticipated Discharge Disposition (SLP): home with OP services (pending further assessment)          SLP Swallowing Diagnosis: suspected pharyngeal dysphagia (06/09/25 8473)

## 2025-06-09 NOTE — H&P
Intensive Care Admission Note     Stroke, s/p TNK     History of Present Illness     Nic Brooks is a 75 y.o. male with past medical history significant for hypertension, hyperlipidemia, GAUDENCIO uses CPAP, coronary artery disease, and GERD. Patient presented to [facility] with complaints of acute onset left-sided weakness, numbness, and facial droop that began around 1120 this morning. Per chart, he had an initial NIH of 2. CT head is negative for acute abnormality. He was a candidate for TNK which was administered at 1305. CTA head and neck is negative for LVO. CT perfusion showed elevated Tmax in > 6 seconds in the left lower temporal region. Patient is transferred to [facility] for higher level of care.      Time spent 5 minutes   Electronically signed by Sana Huitron PA-C, 06/09/25, 2:52 PM EDT.     The patient arrived from the outside facility via ground ambulance.  He states that he is feeling much better.  He states that earlier today he was going to get groceries from his car and his left arm felt like rubber and he could not use it properly.  In addition, he had some slurred speech and expressive aphasia with facial droop.  He was given tenecteplase as mentioned above.  He states that he has been under evaluation by Dr. Hernandez as well as Dr. Méndez at our facility for underlying coronary disease as well as recent palpitations and frequent PVCs.  He has been on a daily aspirin.  He has not noticed an increase in palpitations or flutters in his chest.  He denies any previous symptoms such as limb weakness, headaches, or visual change.  He denies neck pain.  He feels that his symptoms have completely resolved since tenecteplase.    Of note, last known well was approximately 11:20 AM today.    Problem List, Surgical History, Family, Social History, and ROS     CVA (cerebral vascular accident)     Past Surgical History:   Procedure Laterality Date    APPENDECTOMY      BACK SURGERY  07/02/2015     Left L2 laminotomy, medial facetectomy, and L2-L3 discectomy. --Dr. Himanshu Sanders    BACK SURGERY Right 08/16/2011    Right L4-5 discectomy- Dr. Himanshu Sanders    CARDIAC CATHETERIZATION N/A 01/19/2018    Procedure: Left Heart Cath;  Surgeon: Negrito Wilkes MD;  Location:  [facility];  Service:     CARDIAC CATHETERIZATION N/A 12/22/2023    Procedure: Left Heart Cath;  Surgeon: Darryl Álvarez III, MD;  Location:  [facility];  Service: Cardiovascular;  Laterality: N/A;    COLONOSCOPY      every 5 years    CORONARY STENT PLACEMENT  2018    ENDOSCOPY      EPIDURAL BLOCK      KIDNEY STONE SURGERY      KNEE ARTHROSCOPY W/ MENISCECTOMY      left    LUMBAR DISCECTOMY Right 01/20/2025    Procedure: LUMBAR DISCECTOMY L3-4;  Surgeon: Himanshu Sanders MD;  Location:  [facility];  Service: Neurosurgery;  Laterality: Right;    LUMBAR DISCECTOMY      LUNG REMOVAL, PARTIAL      right lower lobe 1980    SPLENECTOMY  1974    TONSILLECTOMY      removed adnoids    TOTAL KNEE ARTHROPLASTY Right 11/15/2019    Procedure: TOTAL KNEE ARTHROPLASTY RIGHT;  Surgeon: Negrito Vidal MD;  Location:  [facility];  Service: Orthopedics    TOTAL KNEE ARTHROPLASTY Left 10/20/2020    Procedure: TOTAL KNEE ARTHROPLASTY LEFT;  Surgeon: Negrito Vidal MD;  Location:  [facility];  Service: Orthopedics;  Laterality: Left;    WISDOM TOOTH EXTRACTION      only 2 removed        No Known Allergies  No current facility-administered medications on file prior to encounter.     Current Outpatient Medications on File Prior to Encounter   Medication Sig    aspirin 81 MG EC tablet Take 1 tablet by mouth Daily.    chlorthalidone (HYGROTON) 25 MG tablet Take 1 tablet by mouth Daily.    coenzyme Q10 100 MG capsule Take 1 capsule by mouth Daily.    Cyanocobalamin 1000 MCG/ML kit Inject  as directed Every 30 (Thirty) Days.    rosuvastatin (CRESTOR) 40 MG tablet TAKE 1 TABLET BY MOUTH DAILY    vitamin B-6 (PYRIDOXINE) 50 MG tablet Take 1  tablet by mouth Daily.     MEDICATION LIST AND ALLERGIES REVIEWED.    Family History   Problem Relation Age of Onset    Hypertension Mother     Cancer Mother     Diabetes Mother     Cancer Father     Emphysema Father     Diabetes Father     COPD Father     Osteoarthritis Father     Arthritis Father     No Known Problems Sister     Diabetes Brother     Heart disease Brother      Social History     Tobacco Use    Smoking status: Never     Passive exposure: Never    Smokeless tobacco: Never   Vaping Use    Vaping status: Never Used   Substance Use Topics    Alcohol use: No    Drug use: No     Social History     Social History Narrative    Lives at home. Is a .      FAMILY AND SOCIAL HISTORY REVIEWED.    Review of Systems  Review of systems has been completed and it is negative except as mentioned expressly HPI.    Physical Exam and Clinical Information   There were no vitals taken for this visit.  Physical Exam  Vitals reviewed.   Constitutional:       Appearance: Normal appearance.   Eyes:      Extraocular Movements: Extraocular movements intact.      Conjunctiva/sclera: Conjunctivae normal.      Pupils: Pupils are equal, round, and reactive to light.   Neck:      Vascular: No carotid bruit.   Cardiovascular:      Rate and Rhythm: Normal rate and regular rhythm.      Pulses: Normal pulses.      Heart sounds: Normal heart sounds. No murmur heard.     No friction rub.   Pulmonary:      Effort: Pulmonary effort is normal. No respiratory distress.      Breath sounds: Normal breath sounds. No stridor. No wheezing, rhonchi or rales.   Chest:      Chest wall: No tenderness.   Abdominal:      General: Abdomen is flat. Bowel sounds are normal. There is no distension.      Palpations: Abdomen is soft.      Tenderness: There is no abdominal tenderness.   Musculoskeletal:         General: Normal range of motion.      Cervical back: Normal range of motion and neck supple. No rigidity.      Right lower leg: No edema.       Left lower leg: No edema.   Skin:     General: Skin is warm.      Capillary Refill: Capillary refill takes less than 2 seconds.      Coloration: Skin is not jaundiced.   Neurological:      General: No focal deficit present.      Mental Status: He is alert and oriented to person, place, and time. Mental status is at baseline.      Motor: No weakness.      Coordination: Coordination normal.   Psychiatric:         Mood and Affect: Mood normal.                   Imaging and lab report as per HPI.    Impression     Acute CVA status post tenecteplase  History of coronary artery disease  History of frequent palpitations  Hypertension  Obstructive sleep apnea on nocturnal CPAP     Plan/Recommendations     Patient will be admitted to the intensive care unit for close post TNK monitoring.  Logical checks per protocol.  Blood pressure control.  Risk stratification with repeat echocardiography and possibly reimaging.  EKG now.  The patient did have a history of frequent PVCs and I suspect there is a possibility that he has intermittent paroxysmal atrial fibrillation though I currently do not have evidence of that.  Will arrange for CPAP to be applied this evening as needed for sleep due to his obstructive sleep apnea.      High level of risk due to:  drug(s) requiring intensive monitoring for toxicity, parenteral controlled substances, and decision regarding escalation of level of hospital care.      Paco Meng MD, [facility]  Pulmonary and Critical Care Medicine  06/09/25 15:35 EDT     CC: Francisco Vincent MD

## 2025-06-10 ENCOUNTER — APPOINTMENT (OUTPATIENT)
Dept: CT IMAGING | Facility: HOSPITAL | Age: 75
End: 2025-06-10
Payer: MEDICARE

## 2025-06-10 ENCOUNTER — APPOINTMENT (OUTPATIENT)
Dept: CARDIOLOGY | Facility: HOSPITAL | Age: 75
End: 2025-06-10
Payer: MEDICARE

## 2025-06-10 LAB
ANION GAP SERPL CALCULATED.3IONS-SCNC: 9 MMOL/L (ref 5–15)
AORTIC DIMENSIONLESS INDEX: 0.83 (DI)
AV MEAN PRESS GRAD SYS DOP V1V2: 3 MMHG
AV VMAX SYS DOP: 116 CM/SEC
BH CV ECHO MEAS - AO MAX PG: 5.4 MMHG
BH CV ECHO MEAS - AO ROOT DIAM: 3.3 CM
BH CV ECHO MEAS - AO V2 VTI: 24.3 CM
BH CV ECHO MEAS - AVA(I,D): 2.6 CM2
BH CV ECHO MEAS - EDV(CUBED): 91.1 ML
BH CV ECHO MEAS - EDV(MOD-SP2): 65.6 ML
BH CV ECHO MEAS - EDV(MOD-SP4): 66.6 ML
BH CV ECHO MEAS - EF(MOD-SP2): 59.6 %
BH CV ECHO MEAS - EF(MOD-SP4): 60.5 %
BH CV ECHO MEAS - ESV(CUBED): 29.8 ML
BH CV ECHO MEAS - ESV(MOD-SP2): 26.5 ML
BH CV ECHO MEAS - ESV(MOD-SP4): 26.3 ML
BH CV ECHO MEAS - FS: 31.1 %
BH CV ECHO MEAS - IVS/LVPW: 1 CM
BH CV ECHO MEAS - IVSD: 0.9 CM
BH CV ECHO MEAS - LA DIMENSION: 3.3 CM
BH CV ECHO MEAS - LAT PEAK E' VEL: 9 CM/SEC
BH CV ECHO MEAS - LV DIASTOLIC VOL/BSA (35-75): 34.8 CM2
BH CV ECHO MEAS - LV MASS(C)D: 132.8 GRAMS
BH CV ECHO MEAS - LV MAX PG: 3.6 MMHG
BH CV ECHO MEAS - LV MEAN PG: 2 MMHG
BH CV ECHO MEAS - LV SYSTOLIC VOL/BSA (12-30): 13.7 CM2
BH CV ECHO MEAS - LV V1 MAX: 95 CM/SEC
BH CV ECHO MEAS - LV V1 VTI: 20.2 CM
BH CV ECHO MEAS - LVIDD: 4.5 CM
BH CV ECHO MEAS - LVIDS: 3.1 CM
BH CV ECHO MEAS - LVOT AREA: 3.1 CM2
BH CV ECHO MEAS - LVOT DIAM: 2 CM
BH CV ECHO MEAS - LVPWD: 0.9 CM
BH CV ECHO MEAS - MED PEAK E' VEL: 6.2 CM/SEC
BH CV ECHO MEAS - MV A MAX VEL: 72.8 CM/SEC
BH CV ECHO MEAS - MV DEC SLOPE: 214 CM/SEC2
BH CV ECHO MEAS - MV DEC TIME: 0.21 SEC
BH CV ECHO MEAS - MV E MAX VEL: 36.2 CM/SEC
BH CV ECHO MEAS - MV E/A: 0.5
BH CV ECHO MEAS - MV MAX PG: 3.5 MMHG
BH CV ECHO MEAS - MV MEAN PG: 1 MMHG
BH CV ECHO MEAS - MV P1/2T: 91 MSEC
BH CV ECHO MEAS - MV V2 VTI: 29.3 CM
BH CV ECHO MEAS - MVA(P1/2T): 2.42 CM2
BH CV ECHO MEAS - MVA(VTI): 2.17 CM2
BH CV ECHO MEAS - PA ACC TIME: 0.14 SEC
BH CV ECHO MEAS - RAP SYSTOLE: 8 MMHG
BH CV ECHO MEAS - SV(LVOT): 63.5 ML
BH CV ECHO MEAS - SV(MOD-SP2): 39.1 ML
BH CV ECHO MEAS - SV(MOD-SP4): 40.3 ML
BH CV ECHO MEAS - SVI(LVOT): 33.1 ML/M2
BH CV ECHO MEAS - SVI(MOD-SP2): 20.4 ML/M2
BH CV ECHO MEAS - SVI(MOD-SP4): 21 ML/M2
BH CV ECHO MEAS - TAPSE (>1.6): 1.75 CM
BH CV ECHO MEASUREMENTS AVERAGE E/E' RATIO: 4.76
BH CV ECHO SHUNT ASSESSMENT PERFORMED (HIDDEN SCRIPTING): 1
BH CV LOWER VASCULAR LEFT COMMON FEMORAL AUGMENT: NORMAL
BH CV LOWER VASCULAR LEFT COMMON FEMORAL COMPRESS: NORMAL
BH CV LOWER VASCULAR LEFT COMMON FEMORAL PHASIC: NORMAL
BH CV LOWER VASCULAR LEFT COMMON FEMORAL SPONT: NORMAL
BH CV LOWER VASCULAR LEFT DISTAL FEMORAL COMPRESS: NORMAL
BH CV LOWER VASCULAR LEFT GASTRONEMIUS COMPRESS: NORMAL
BH CV LOWER VASCULAR LEFT GREATER SAPH AK COMPRESS: NORMAL
BH CV LOWER VASCULAR LEFT GREATER SAPH BK COMPRESS: NORMAL
BH CV LOWER VASCULAR LEFT LESSER SAPH COMPRESS: NORMAL
BH CV LOWER VASCULAR LEFT MID FEMORAL AUGMENT: NORMAL
BH CV LOWER VASCULAR LEFT MID FEMORAL COMPRESS: NORMAL
BH CV LOWER VASCULAR LEFT MID FEMORAL PHASIC: NORMAL
BH CV LOWER VASCULAR LEFT MID FEMORAL SPONT: NORMAL
BH CV LOWER VASCULAR LEFT PERONEAL COMPRESS: NORMAL
BH CV LOWER VASCULAR LEFT POPLITEAL AUGMENT: NORMAL
BH CV LOWER VASCULAR LEFT POPLITEAL COMPRESS: NORMAL
BH CV LOWER VASCULAR LEFT POPLITEAL PHASIC: NORMAL
BH CV LOWER VASCULAR LEFT POPLITEAL SPONT: NORMAL
BH CV LOWER VASCULAR LEFT POSTERIOR TIBIAL COMPRESS: NORMAL
BH CV LOWER VASCULAR LEFT PROFUNDA FEMORAL COMPRESS: NORMAL
BH CV LOWER VASCULAR LEFT PROXIMAL FEMORAL COMPRESS: NORMAL
BH CV LOWER VASCULAR LEFT SAPHENOFEMORAL JUNCTION COMPRESS: NORMAL
BH CV LOWER VASCULAR RIGHT COMMON FEMORAL AUGMENT: NORMAL
BH CV LOWER VASCULAR RIGHT COMMON FEMORAL COMPRESS: NORMAL
BH CV LOWER VASCULAR RIGHT COMMON FEMORAL PHASIC: NORMAL
BH CV LOWER VASCULAR RIGHT COMMON FEMORAL SPONT: NORMAL
BH CV LOWER VASCULAR RIGHT DISTAL FEMORAL COMPRESS: NORMAL
BH CV LOWER VASCULAR RIGHT GASTRONEMIUS COMPRESS: NORMAL
BH CV LOWER VASCULAR RIGHT GREATER SAPH AK COMPRESS: NORMAL
BH CV LOWER VASCULAR RIGHT GREATER SAPH BK COMPRESS: NORMAL
BH CV LOWER VASCULAR RIGHT MID FEMORAL AUGMENT: NORMAL
BH CV LOWER VASCULAR RIGHT MID FEMORAL COMPRESS: NORMAL
BH CV LOWER VASCULAR RIGHT MID FEMORAL PHASIC: NORMAL
BH CV LOWER VASCULAR RIGHT MID FEMORAL SPONT: NORMAL
BH CV LOWER VASCULAR RIGHT PERONEAL COMPRESS: NORMAL
BH CV LOWER VASCULAR RIGHT POPLITEAL AUGMENT: NORMAL
BH CV LOWER VASCULAR RIGHT POPLITEAL COMPRESS: NORMAL
BH CV LOWER VASCULAR RIGHT POPLITEAL PHASIC: NORMAL
BH CV LOWER VASCULAR RIGHT POPLITEAL SPONT: NORMAL
BH CV LOWER VASCULAR RIGHT POSTERIOR TIBIAL COMPRESS: NORMAL
BH CV LOWER VASCULAR RIGHT PROFUNDA FEMORAL COMPRESS: NORMAL
BH CV LOWER VASCULAR RIGHT PROXIMAL FEMORAL COMPRESS: NORMAL
BH CV LOWER VASCULAR RIGHT SAPHENOFEMORAL JUNCTION COMPRESS: NORMAL
BH CV VAS BP LEFT ARM: NORMAL MMHG
BH CV XLRA - RV BASE: 4 CM
BH CV XLRA - RV LENGTH: 7.1 CM
BH CV XLRA - RV MID: 2.8 CM
BH CV XLRA - TDI S': 10.1 CM/SEC
BUN SERPL-MCNC: 13.4 MG/DL (ref 8–23)
BUN/CREAT SERPL: 12.2 (ref 7–25)
CALCIUM SPEC-SCNC: 8.9 MG/DL (ref 8.6–10.5)
CHLORIDE SERPL-SCNC: 102 MMOL/L (ref 98–107)
CHOLEST SERPL-MCNC: 121 MG/DL (ref 0–200)
CO2 SERPL-SCNC: 26 MMOL/L (ref 22–29)
CREAT SERPL-MCNC: 1.1 MG/DL (ref 0.76–1.27)
EGFRCR SERPLBLD CKD-EPI 2021: 69.6 ML/MIN/1.73
GLUCOSE SERPL-MCNC: 92 MG/DL (ref 65–99)
HBA1C MFR BLD: 5.8 % (ref 4.8–5.6)
HDLC SERPL-MCNC: 41 MG/DL (ref 40–60)
IVRT: 95 MS
LDLC SERPL CALC-MCNC: 58 MG/DL (ref 0–100)
LDLC/HDLC SERPL: 1.35 {RATIO}
LEFT ATRIUM VOLUME INDEX: 16.3 ML/M2
LEFT ATRIUM VOLUME: 30 ML
LV EF BIPLANE MOD: 60.6 %
MAGNESIUM SERPL-MCNC: 1.9 MG/DL (ref 1.6–2.4)
PHOSPHATE SERPL-MCNC: 2.6 MG/DL (ref 2.5–4.5)
POTASSIUM SERPL-SCNC: 3.7 MMOL/L (ref 3.5–5.2)
SODIUM SERPL-SCNC: 137 MMOL/L (ref 136–145)
TRIGL SERPL-MCNC: 123 MG/DL (ref 0–150)
VLDLC SERPL-MCNC: 22 MG/DL (ref 5–40)

## 2025-06-10 PROCEDURE — 99232 SBSQ HOSP IP/OBS MODERATE 35: CPT | Performed by: INTERNAL MEDICINE

## 2025-06-10 PROCEDURE — 93970 EXTREMITY STUDY: CPT

## 2025-06-10 PROCEDURE — 94799 UNLISTED PULMONARY SVC/PX: CPT

## 2025-06-10 PROCEDURE — 97161 PT EVAL LOW COMPLEX 20 MIN: CPT

## 2025-06-10 PROCEDURE — 93970 EXTREMITY STUDY: CPT | Performed by: INTERNAL MEDICINE

## 2025-06-10 PROCEDURE — 70450 CT HEAD/BRAIN W/O DYE: CPT

## 2025-06-10 PROCEDURE — 99222 1ST HOSP IP/OBS MODERATE 55: CPT | Performed by: INTERNAL MEDICINE

## 2025-06-10 PROCEDURE — 97165 OT EVAL LOW COMPLEX 30 MIN: CPT

## 2025-06-10 PROCEDURE — 92523 SPEECH SOUND LANG COMPREHEN: CPT

## 2025-06-10 PROCEDURE — 99233 SBSQ HOSP IP/OBS HIGH 50: CPT | Performed by: STUDENT IN AN ORGANIZED HEALTH CARE EDUCATION/TRAINING PROGRAM

## 2025-06-10 PROCEDURE — 94660 CPAP INITIATION&MGMT: CPT

## 2025-06-10 PROCEDURE — 92610 EVALUATE SWALLOWING FUNCTION: CPT

## 2025-06-10 RX ORDER — CLOPIDOGREL BISULFATE 75 MG/1
75 TABLET ORAL DAILY
Status: DISCONTINUED | OUTPATIENT
Start: 2025-06-11 | End: 2025-06-12

## 2025-06-10 RX ORDER — ASPIRIN 81 MG/1
81 TABLET, CHEWABLE ORAL DAILY
Status: DISCONTINUED | OUTPATIENT
Start: 2025-06-11 | End: 2025-06-12 | Stop reason: HOSPADM

## 2025-06-10 RX ORDER — ROSUVASTATIN CALCIUM 20 MG/1
40 TABLET, COATED ORAL NIGHTLY
Status: DISCONTINUED | OUTPATIENT
Start: 2025-06-10 | End: 2025-06-12 | Stop reason: HOSPADM

## 2025-06-10 RX ADMIN — ROSUVASTATIN CALCIUM 40 MG: 20 TABLET, FILM COATED ORAL at 20:57

## 2025-06-10 RX ADMIN — MUPIROCIN 1 APPLICATION: 20 OINTMENT TOPICAL at 02:00

## 2025-06-10 RX ADMIN — Medication 10 ML: at 08:27

## 2025-06-10 RX ADMIN — MUPIROCIN 1 APPLICATION: 20 OINTMENT TOPICAL at 22:36

## 2025-06-10 RX ADMIN — ASPIRIN 325 MG: 325 TABLET ORAL at 16:17

## 2025-06-10 RX ADMIN — MUPIROCIN 1 APPLICATION: 20 OINTMENT TOPICAL at 08:27

## 2025-06-10 RX ADMIN — Medication 10 ML: at 20:57

## 2025-06-10 NOTE — PROGRESS NOTES
Intensive Care Follow-up     Hospital:  LOS: 1 day   Mr. Nic Brooks, 75 y.o. male is followed for:   CVA (cerebral vascular accident)        Subjective     Nic Brooks is a 75 y.o. male with past medical history significant for hypertension, hyperlipidemia, GAUDENCIO uses CPAP, coronary artery disease, and GERD. Patient presented to [facility] with complaints of acute onset left-sided weakness, numbness, and facial droop that began around 1120 this morning. Per chart, he had an initial NIH of 2. CT head is negative for acute abnormality. He was a candidate for TNK which was administered at 1305. CTA head and neck is negative for LVO. CT perfusion showed elevated Tmax in > 6 seconds in the left lower temporal region. Patient is transferred to [facility] for higher level of care.       Time spent 5 minutes   Electronically signed by Sana Huitron PA-C, 06/09/25, 2:52 PM EDT.      The patient arrived from the outside facility via ground ambulance.  He states that he is feeling much better.  He states that earlier today he was going to get groceries from his car and his left arm felt like rubber and he could not use it properly.  In addition, he had some slurred speech and expressive aphasia with facial droop.  He was given tenecteplase as mentioned above.  He states that he has been under evaluation by Dr. Hernandez as well as Dr. Méndez at our facility for underlying coronary disease as well as recent palpitations and frequent PVCs.  He has been on a daily aspirin.  He has not noticed an increase in palpitations or flutters in his chest.  He denies any previous symptoms such as limb weakness, headaches, or visual change.  He denies neck pain.  He feels that his symptoms have completely resolved since tenecteplase.     Of note, last known well was approximately 11:20 AM today.  Interval History:  Chart is been reviewed.  The patient has done well overnight.  He denies any problems with headaches or visual  "changes.  No focal deficits currently.  Was scored as an NIH of 1 this morning due to very faint facial droop.  Continues to have frequent episodes of PVCs.  No sign of atrial fibrillation on telemetry.    The patient's past medical, surgical and social history were reviewed and updated in Epic as appropriate.        Objective     Infusions:     Medications:  aspirin, 325 mg, Oral, Daily   Or  aspirin, 300 mg, Rectal, Daily  atorvastatin, 80 mg, Oral, Nightly  mupirocin, 1 Application, Each Nare, BID  sodium chloride, 10 mL, Intravenous, Q12H        Vital Sign Min/Max for last 24 hours  Temp  Min: 95.8 °F (35.4 °C)  Max: 98 °F (36.7 °C)   BP  Min: 84/72  Max: 136/90   Pulse  Min: 51  Max: 80   Resp  Min: 14  Max: 16   SpO2  Min: 88 %  Max: 97 %   Flow (L/min) (Oxygen Therapy)  Min: 5  Max: 5       Input/Output for last 24 hour shift  06/09 0701 - 06/10 0700  In: -   Out: 1075 [Urine:1075]      Objective:  General Appearance:  Comfortable, well-appearing and in no acute distress.    Vital signs: (most recent): Blood pressure 112/77, pulse 64, temperature 97.7 °F (36.5 °C), temperature source Oral, resp. rate 16, height 170.2 cm (67.01\"), weight 79.9 kg (176 lb 2.4 oz), SpO2 93%.    HEENT: Normal HEENT exam.    Lungs:  Normal effort and normal respiratory rate.  Breath sounds clear to auscultation.  He is not in respiratory distress.    Heart: Normal rate.  Irregular rhythm.  S1 normal and S2 normal.  No murmur.   Chest: Symmetric chest wall expansion.   Abdomen: Abdomen is soft and non-distended.  Bowel sounds are normal.   There is no abdominal tenderness.     Extremities: Normal range of motion.    Neurological: Patient is alert and oriented to person, place and time.  Normal strength.    Pupils:  Pupils are equal, round, and reactive to light.  Pupils are equal.   Skin:  Warm.                    Results from last 7 days   Lab Units 06/09/25  2308   SODIUM mmol/L 137   POTASSIUM mmol/L 3.7   CO2 mmol/L 26.0   BUN " mg/dL 13.4   CREATININE mg/dL 1.10   MAGNESIUM mg/dL 1.9   PHOSPHORUS mg/dL 2.6   GLUCOSE mg/dL 92     Estimated Creatinine Clearance: 57.9 mL/min (by C-G formula based on SCr of 1.1 mg/dL).            I reviewed the patient's results and images.     Assessment & Plan   Impression      Acute CVA status post tenecteplase therapy  Patent foramen ovale  Frequent PAC ectopy       Plan        Will continue to monitor the patient closely today.  Overall he has had a very good result from his thrombolytic therapy.  Given the finding of patent foramen ovale, we will go ahead and perform duplex ultrasound of the lower extremities though I doubt DVT at this time.  Follow-up CT scan of the head later today.  Further plans and risk stratification per stroke neurology.  I have placed a consult for cardiology at the request of family to further advise on the patient's frequent ectopy.  I have not seen evidence of atrial fibrillation as yet though I think that this would certainly be a possibility as an etiology for his stroke.  Follow-up labs and orders are placed.    Plan of care and goals reviewed with mulitdisciplinary/antibiotic stewardship team during rounds.   I discussed the patient's findings and my recommendations with patient, family, and nursing staff         Paco Meng MD, [facility]  Pulmonology and Critical Care Medicine

## 2025-06-10 NOTE — PLAN OF CARE
Goal Outcome Evaluation:  Plan of Care Reviewed With: patient, spouse           Outcome Evaluation: PT initial evaluation completed. Pt demonstrating all functional mobility WFL requiring no more than SBA for safety only. SBA to EOB, stand and ambulate prolonged distance in hallway. No further skilled inpatient PT interventions required at this time; PT to sign off. Recommending home with assist as needed upon discharge.    Anticipated Discharge Disposition (PT): home with assist

## 2025-06-10 NOTE — PROGRESS NOTES
"Stroke Progress Note       Chief Complaint:  left sided weakness    Subjective    Subjective     Subjective:  The patient is lying down in the bed in NAD.  The wife was at the bedside. The patient stated that he is doing better today compared to yesterday.  Denies having any more weakness in the left upper and lower extremity.  I discussed with the patient and his wife the imaging findings and what could possibly explain his stroke.  We also discussed management plan moving forward.  All patient's questions and concerns were answered.    No other acute complains at this time    Review of Systems   Constitutional: No fatigue      Objective      Temp:  [95.8 °F (35.4 °C)-98 °F (36.7 °C)] 97.7 °F (36.5 °C)  Heart Rate:  [51-80] 64  Resp:  [14-16] 16  BP: ()/() 112/77    Objective    GEN: lying in bed; in NAD  HENT: normocephalic, non-erythematous oropharynx  CV: no LE edema    NEURO:  Mental Status: A&O x 3, interactive, able to follow commands  Speech: Intact Articulation  CN 2-12:  II - PERRLA  III, IV, VI - EOMI  V - Facial sensation intact  VII -no gross facial asymmetry  VIII -the patient is hard hearing  XII - Tongue protrudes midline    Motor: Patient can move all 4 extremities against gravity with no drift appreciated  Sensory: intact light touch throughout  Reflexes: negative Aldridge's sign BL  Coordination: no ataxia with finger-to-nose testing  Gait/Station: deferred     Results Review:    I reviewed the patient's new clinical results.    No results found for: \"WBC\", \"RBC\", \"HGB\", \"HCT\", \"MCV\", \"MCH\", \"MCHC\", \"RDW\", \"RDWSD\", \"MPV\", \"PLT\", \"NEUTRORELPCT\", \"LYMPHORELPCT\", \"MONORELPCT\", \"EOSRELPCT\", \"BASORELPCT\", \"AUTOIGPER\", \"NEUTROABS\", \"LYMPHSABS\", \"MONOSABS\", \"EOSABS\", \"BASOSABS\", \"AUTOIGNUM\", \"NRBC\"    Lab Results   Component Value Date    GLUCOSE 92 06/09/2025    BUN 13.4 06/09/2025    CREATININE 1.10 06/09/2025     06/09/2025    K 3.7 06/09/2025     06/09/2025    CALCIUM 8.9 " 06/09/2025    PROTEINTOT 6.7 12/21/2023    ALBUMIN 4.1 12/21/2023    ALT 22 12/21/2023    AST 24 12/21/2023    ALKPHOS 63 12/21/2023    BILITOT 0.5 12/21/2023    GLOB 2.6 12/21/2023    AGRATIO 1.6 12/21/2023    BCR 12.2 06/09/2025    ANIONGAP 9.0 06/09/2025    EGFR 69.6 06/09/2025     MRI Brain Without Contrast  Result Date: 6/9/2025  Impression: 1.Small area of restricted diffusion/acute infarct noted within the right frontal lobe. There is associated minimal edema without significant mass effect. 2.Susceptibility artifact noted within the right occipital lobe , most likely represents hemosiderin staining from old hemorrhage. Please consider further evaluation with dedicated head CT to exclude an acute hemorrhage. 3.Additional chronic findings as characterized above Electronically Signed: Alonso De Leon DO  6/9/2025 10:38 PM EDT  Workstation ID: [id number]    XR Chest 1 View  Result Date: 6/9/2025  Blunting of the right costophrenic angle, may be secondary to infiltrate/atelectasis or effusion. Continued follow-up recommended. Images reviewed, interpreted, and dictated by Dr. Darryl Verdugo. Transcribed by Maddie Villegas PA-C.    CTA NECK / BRAIN STROKE PROTOCOL  Result Date: 6/9/2025  1. No carotid or vertebral stenosis/dissection. 2. There is an 8 mm noncalcified nodule in the left upper lobe on image #56. This is indeterminate. Recommend follow-up with pulmonary and a short-term 3 month follow-up diagnostic chest CT based on Fleischner Society guidelines. 3. Degenerative changes of the cervical spine. CT ANGIOGRAPHY HEAD HISTORY: Left facial droop, slurred speech COMPARISON: None. TECHNIQUE: Thin section axial images through the brain were performed following the administration of intravenous contrast. Coronal and sagittal 3-D MIP images were performed and reviewed. This study was performed with techniques to keep radiation doses as low as reasonably achievable, (ALARA). Individualized dose reduction techniques  "using automated exposure control or adjustment of mA and/or kV according to the patient size were employed. FINDINGS: The distal internal carotid arteries are unremarkable. There are minimal calcifications in the cavernous carotid arteries with no stenosis. The left A1 segment is hypoplastic. The anterior cerebral arteries are otherwise unremarkable. The anterior communicating artery is normal. There is no stenosis or aneurysm. The middle cerebral arteries have normal enhancement with no stenosis or aneurysm. There is symmetrical branching enhancement. The basilar artery is normal in caliber. The posterior cerebral artery gives rise to the right posterior cerebral artery. The left P1 segment is hypoplastic. There is a posterior communicating artery supplying the left posterior cerebral artery. There is no posterior fossa circulation stenosis or aneurysm. The dural venous sinuses have normal enhancement. IMPRESSION: No intracranial stenosis or aneurysm. Images reviewed, interpreted, dictated and electronically signed by Jaiden Egan MD Voice transcription technology (Power Scribe) is used for the dictation of this note and \"sound-alike\" words might be erroneously placed despite reviewing this note for accuracy. Errors in dictation may reflect use of voice recognition software and not all errors in transcription may have been detected prior to signing.    CT Head Without Contrast  Result Date: 6/9/2025  There is no large vessel occlusion. There is questionable 5 mL of ischemia in the left parietal region which may be artifactual. There is hypoperfusion in the cerebellum Tmax of less than 6 seconds. This could be artifactual. This does not correspond to any abnormality CTA Head examination. Images reviewed, interpreted, dictated and electronically signed by Jaiden Egan MD Voice transcription technology (Power Scribe) is used for the dictation of this note and \"sound-alike\" words might be erroneously placed despite " reviewing this note for accuracy. Errors in dictation may reflect use of voice recognition software and not all errors in transcription may have been detected prior to signing.    Results for orders placed during the hospital encounter of 06/09/25    Adult Transthoracic Echo Complete W/ Cont if Necessary Per Protocol (With Agitated Saline)    Interpretation Summary    Left ventricular systolic function is normal. Calculated left ventricular EF = 60.6% Left ventricular ejection fraction appears to be 56 - 60%.    Left ventricular diastolic function is consistent with (grade I) impaired relaxation.    Small patent foramen ovale present.    Saline test results are positive with valsalva manuever.    -CTH wo on 6/9/2025 images were personally reviewed and showed an area of hypodensity on the left parietal lobe otherwise no acute ischemic or hemorrhagic stroke.  -CTA of the head and neck images from 6/9/2025 were personally reviewed and showed no flow limiting stenosis or LVO  -MRI brain images from 6/9/2025 were personally reviewed and showed an acute ischemic stroke affecting the right frontal lobe.  On the FLAIR imaging this seems to be evidence of chronic ischemic infarcts affecting the bilateral parieto-occipital lobes  -Transthoracic echocardiogram from 6/9/2025 report was personally reviewed and showed left ventricular ejection fraction of 56 to 60%, no left atrial dilation and small PFO  -Bilateral lower extremity duplex ultrasound from 6/10/2025 was unremarkable  -A1c from 6/9/2025 was 5.8%  -LDL from 69 2025 was 58    Assessment/Plan     75-year-old, , right-handed male with known diagnosis of HTN, HLD, GAUDENCIO (on CPAP), CAD, and GERD who presents as a transfer from [facility] where he was evaluated for acute onset left-sided weakness numbness and facial droop.  Patient had an acute onset of left arm weakness, numbness and facial droop beginning at 1120 this morning.  His initial NIHSS was  2.  OSH CT head was negative for acute abnormality.  He was a candidate for and did receive IV TNK at OSH at 1305.  CT perfusion showed elevated Tmax in > 6 seconds in the left lower temporal region.  CTA head and neck negative for LVO.  Transferred to [facility] for higher level of care     Antiplatelet PTA: Aspirin 81  Anticoagulant PTA: None             #Right frontal lobe acute ischemic stroke s/p TNK  -Etiology of patient's stroke is cryptogenic.  This could possibly be due to cardioembolic versus ESUS  -CTH wo on 6/9/2025 images were personally reviewed and showed an area of hypodensity on the left parietal lobe otherwise no acute ischemic or hemorrhagic stroke.  -CTA of the head and neck images from 6/9/2025 were personally reviewed and showed no flow limiting stenosis or LVO  -MRI brain images from 6/9/2025 were personally reviewed and showed an acute ischemic stroke affecting the right frontal lobe.  On the FLAIR imaging this seems to be evidence of chronic ischemic infarcts affecting the bilateral parieto-occipital lobes  -Transthoracic echocardiogram from 6/9/2025 report was personally reviewed and showed left ventricular ejection fraction of 56 to 60%, no left atrial dilation and small PFO  -Bilateral lower extremity duplex ultrasound from 6/10/2025 was unremarkable  -A1c from 6/9/2025 was 5.8%  -LDL from 69 2025 was 58    Recommendation  -24 hour post-TNK scan @ 13:00  -Meds: DAPT with aspirin 81 mg and Plavix 75 mg daily for 21 days if 24 hour CTH is negative  -Continue home rosuvastatin 40 mg nightly for secondary stroke prevention.  Target LDL level of less than 70  -Target blood pressure goals of normotension  -The patient will need cardiac monitoring upon discharge  -Activity as tolerated, fall risk precautions  -PT/OT/SLP evaluation     #Essential hypertension  - Target blood pressure goals of normotension     #Hyperlipidemia  - As detailed above    Stroke will continue to follow. Please call for any  further questions or concerns  =================================  Gilbert Venegas MD, Msc, PhD  Vascular Neurologist  [facility]

## 2025-06-10 NOTE — THERAPY RE-EVALUATION
Acute Care - Speech Language Pathology   Swallow Re-Evaluation [facility]  Clinical Swallow Re-Evaluation  Cognitive-Communication Evaluation       Patient Name: Nic Brooks  : 1950  MRN: [id number]  Today's Date: 6/10/2025               Admit Date: 2025    Visit Dx:     ICD-10-CM ICD-9-CM   1. Cerebrovascular accident (CVA), unspecified mechanism  I63.9 434.91   2. Dysphagia, unspecified type  R13.10 787.20     Patient Active Problem List   Diagnosis    Moderate obstructive sleep apnea    Fatigue    Snoring    GERD (gastroesophageal reflux disease)    Hypersomnia    Other chest pain    Abnormal EKG    Coronary artery disease involving native coronary artery of native heart without angina pectoris    Dyslipidemia, goal LDL below 70    Essential hypertension    Primary osteoarthritis of right knee    Status post total left knee replacement    Prediabetes    Primary osteoarthritis of left knee    Unstable angina    Chest pain    PVC's (premature ventricular contractions)    HNP (herniated nucleus pulposus), lumbar    Lumbar disc herniation    CVA (cerebral vascular accident)     Past Medical History:   Diagnosis Date    Abnormal ECG     PVC    Arthritis     OSTEO    Chronic pain disorder 2024    Coronary artery disease     s/p stent     CTS (carpal tunnel syndrome)     CVA (cerebral vascular accident) 2025    GERD (gastroesophageal reflux disease)     Hearing loss     kailee hearing aids    History of kidney stones     just one     History of transfusion     [facility] with lung surgery , NO REACTION    Hyperlipidemia     Hypertension     Joint pain     Osteoarthritis     Peripheral neuropathy     PONV (postoperative nausea and vomiting)     Sleep apnea with use of continuous positive airway pressure (CPAP)     complaint with machine     Wears glasses      Past Surgical History:   Procedure Laterality Date    APPENDECTOMY      BACK SURGERY  2015    Left L2  laminotomy, medial facetectomy, and L2-L3 discectomy. --Dr. Himanshu Sanders    BACK SURGERY Right 08/16/2011    Right L4-5 discectomy- Dr. Himanshu Sanders    CARDIAC CATHETERIZATION N/A 01/19/2018    Procedure: Left Heart Cath;  Surgeon: Negrito Wilkes MD;  Location:  [facility];  Service:     CARDIAC CATHETERIZATION N/A 12/22/2023    Procedure: Left Heart Cath;  Surgeon: Darryl Álvarez III, MD;  Location:  [facility];  Service: Cardiovascular;  Laterality: N/A;    COLONOSCOPY      every 5 years    CORONARY STENT PLACEMENT  2018    ENDOSCOPY      EPIDURAL BLOCK      KIDNEY STONE SURGERY      KNEE ARTHROSCOPY W/ MENISCECTOMY      left    LUMBAR DISCECTOMY Right 01/20/2025    Procedure: LUMBAR DISCECTOMY L3-4;  Surgeon: Himanshu Sanders MD;  Location:  [facility] OR;  Service: Neurosurgery;  Laterality: Right;    LUMBAR DISCECTOMY      LUNG REMOVAL, PARTIAL      right lower lobe 1980    SPLENECTOMY  1974    TONSILLECTOMY      removed adnoids    TOTAL KNEE ARTHROPLASTY Right 11/15/2019    Procedure: TOTAL KNEE ARTHROPLASTY RIGHT;  Surgeon: Negrito Vidal MD;  Location:  [facility];  Service: Orthopedics    TOTAL KNEE ARTHROPLASTY Left 10/20/2020    Procedure: TOTAL KNEE ARTHROPLASTY LEFT;  Surgeon: Negrito Vidal MD;  Location:  [facility];  Service: Orthopedics;  Laterality: Left;    WISDOM TOOTH EXTRACTION      only 2 removed        SLP Recommendation and Plan  SLP Swallowing Diagnosis: swallow WFL/no suspected pharyngeal impairment (06/10/25 0852)  SLP Diet Recommendation: regular textures, thin liquids (06/10/25 0852)  Recommended Precautions and Strategies: upright posture during/after eating, small bites of food and sips of liquid, general aspiration precautions (06/10/25 0852)  SLP Rec. for Method of Medication Administration: meds whole, with thin liquids, with puree, as tolerated (06/10/25 0852)     Monitor for Signs of Aspiration: no, notify SLP if any concerns (06/10/25 0852)      Swallow Criteria for Skilled Therapeutic Interventions Met: no problems identified which require skilled intervention (06/10/25 0852)  Anticipated Discharge Disposition (SLP): home (06/10/25 0852)  Rehab Potential/Prognosis, Swallowing: good, to achieve stated therapy goals (06/10/25 0852)  Therapy Frequency (Swallow): evaluation only (06/10/25 0852)     Oral Care Recommendations: Oral Care BID/PRN, Toothbrush (06/10/25 0852)                                        Progress: improving (eval; see note for more information)      SWALLOW EVALUATION (Last 72 Hours)       SLP Adult Swallow Evaluation       Row Name 06/10/25 0852 06/09/25 1602                Rehab Evaluation    Document Type discharge evaluation/summary  -MM evaluation  -AC       Subjective Information no complaints  -MM no complaints  -AC       Patient Observations alert;cooperative  -MM alert;cooperative  -AC       Patient/Family/Caregiver Comments/Observations Spouse present  -MM Spouse present.  -AC       Patient Effort good  -MM excellent  -AC          General Information    Patient Profile Reviewed yes  -MM yes  -AC       Pertinent History Of Current Problem See initial eval.  -MM Presented to OSH w/ L facial droop, dysarthria, LUE weakness/numbness. S/p TNK 1305. Sxs improving, but still presented w/ slight L labial droop & pt detects mildly slurred speech, though was 100% intelligible during assessment. MRI pending. Stroke team suspects R CVA. Hx GERD, RLL resection for infection yrs ago per pt/spouse.  -AC       Current Method of Nutrition mechanical ground textures;nectar/syrup-thick liquids  -MM NPO  -AC       Precautions/Limitations, Vision WFL;for purposes of eval  -MM WFL;for purposes of eval  -AC       Precautions/Limitations, Hearing WFL;for purposes of eval  -MM WFL;for purposes of eval  -AC       Prior Level of Function-Communication unknown  -MM unknown  -AC       Prior Level of Function-Swallowing no diet consistency restrictions  -MM  no diet consistency restrictions  -AC       Plans/Goals Discussed with patient;spouse/S.O.;agreed upon  -MM patient;spouse/S.O.;agreed upon  -AC       Barriers to Rehab none identified  -MM none identified  -AC       Patient's Goals for Discharge return to regular diet  -MM return to PO diet;eat/drink without coughing/choking  -AC       Family Goals for Discharge -- patient able to return to PO diet;patient able to eat/drink without coughing/choking  -AC          Pain    Pretreatment Pain Rating 0/10 - no pain  -MM 0/10 - no pain  -AC       Posttreatment Pain Rating 0/10 - no pain  -MM 0/10 - no pain  -AC          Oral Motor Structure and Function    Dentition Assessment natural, present and adequate  -MM natural, present and adequate  -AC       Secretion Management WNL/WFL  -MM WNL/WFL  -AC       Mucosal Quality moist, healthy  -MM moist, healthy  -AC       Volitional Cough WFL  -MM WFL  -AC          Oral Musculature and Cranial Nerve Assessment    Oral Motor General Assessment -- oral labial or buccal impairment;lingual impairment  -AC       Oral Labial or Buccal Impairment, Detail, Cranial Nerve VII (Facial): -- left labial droop  slight, upper lip  -AC       Lingual Impairment, Detail. Cranial Nerves IX, XII (Glossopharyngeal and Hypoglossal) -- other (see comments)  deviation upon protrusion; otherwise, ROM seemingly WFL  -AC          General Eating/Swallowing Observations    Respiratory Support Currently in Use room air  -MM room air  -AC       Eating/Swallowing Skills self-fed  -MM fed by SLP;self-fed;appropriate self-feeding skills observed  -AC       Positioning During Eating upright 90 degree;upright in bed  -MM upright 90 degree;upright in bed  -AC       Utensils Used spoon;cup;straw  -MM spoon;cup;straw  -AC       Consistencies Trialed ice chips;thin liquids;pureed;regular textures  -MM ice chips;thin liquids;nectar/syrup-thick liquids;pureed;regular textures  -AC          Clinical Swallow Eval    Oral  Prep Phase WFL  -MM WFL  -AC       Oral Transit WFL  -MM WFL  -AC       Oral Residue WFL  -MM WFL  -AC       Pharyngeal Phase no overt signs/symptoms of pharyngeal impairment  -MM suspected pharyngeal impairment  -AC       Esophageal Phase unremarkable  -MM unremarkable  -       Clinical Swallow Evaluation Summary Pt presents with grossly functional oral deglutition and no overt s/sx aspiration. SLP recs upgrade to regular and thin liquid diet at this time.  -MM --          Pharyngeal Phase Concerns    Pharyngeal Phase Concerns -- throat clear  -       Throat Clear -- thin;nectar  consistently w/ thin via cup/straw, x1 w/ nectar via straw  -AC       Pharyngeal Phase Concerns, Comment -- Pt/spouse reported baseline throat clearing/drainage issues. Felt to be at risk for aspiration given presentation. Discussed risks, options for intervention. Pt stated understanding and agreed w/ modified diet.  -          Swallowing Quality of Life Assessment    Education and counseling provided -- Signs of aspiration;Risks of aspiration;Aspiration precautions;Compensatory strategy recommendations and rationale  -          SLP Evaluation Clinical Impression    SLP Swallowing Diagnosis swallow WFL/no suspected pharyngeal impairment  -MM suspected pharyngeal dysphagia  -       Functional Impact -- risk of aspiration/pneumonia  -       Rehab Potential/Prognosis, Swallowing good, to achieve stated therapy goals  -MM good, to achieve stated therapy goals  -       Swallow Criteria for Skilled Therapeutic Interventions Met no problems identified which require skilled intervention  -MM demonstrates skilled criteria  -          Recommendations    Therapy Frequency (Swallow) evaluation only  -MM --       Predicted Duration Therapy Intervention (Days) -- 1 week  -       SLP Diet Recommendation regular textures;thin liquids  -MM mechanical ground textures;no mixed consistencies;nectar thick liquids  consider NPO if concerns  arise prior to SLP re-eval  -       Recommended Diagnostics -- reassess via clinical swallow evaluation;SLE/Cog/Motor Speech Evaluation;other (see comments)  consider FEES if further concerns  -       Recommended Precautions and Strategies upright posture during/after eating;small bites of food and sips of liquid;general aspiration precautions  - no straw;upright posture during/after eating;general aspiration precautions  -       Oral Care Recommendations Oral Care BID/PRN;Toothbrush  -MM Oral Care BID/PRN;Toothbrush  -       SLP Rec. for Method of Medication Administration meds whole;with thin liquids;with puree;as tolerated  -MM meds whole;with puree;as tolerated  -       Monitor for Signs of Aspiration no;notify SLP if any concerns  -MM yes;notify SLP if any concerns  -       Anticipated Discharge Disposition (SLP) home  - home with OP services  pending further assessment  -                 User Key  (r) = Recorded By, (t) = Taken By, (c) = Cosigned By      Initials Name Effective Dates     Leyda Harris MS CONNER-SLP 02/03/23 -     MM Carmina Bernal MS CCC-SLP 08/30/24 -                     EDUCATION  The patient has been educated in the following areas:   Evaluation results and recommendations.                Time Calculation:    Time Calculation- SLP       Row Name 06/10/25 0950             Time Calculation- SLP    SLP Start Time 0852  -MM      SLP Received On 06/10/25  -MM         Untimed Charges    82261-AK Eval Speech and Production w/ Language Minutes 27  -MM      74959-YA Eval Oral Pharyng Swallow Minutes 26  -MM         Total Minutes    Untimed Charges Total Minutes 53  -MM       Total Minutes 53  -MM                User Key  (r) = Recorded By, (t) = Taken By, (c) = Cosigned By      Initials Name Provider Type    MM Carmina Bernal, MS CCC-SLP Speech and Language Pathologist                    Therapy Charges for Today       Code Description Service Date Service Provider Modifiers Qty     91018883139 HC ST EVAL ORAL PHARYNG SWALLOW 2 6/10/2025 Carmina Bernal, MS CCC-SLP GN 1    52360533839 HC ST EVAL SPEECH AND PROD W LANG  2 6/10/2025 Carmina Bernal, MS CCC-SLP GN 1                 Carmina Bernal, MS CCC-SLP  6/10/2025   and Acute Care - Speech Language Pathology Initial Evaluation  [facility]     Patient Name: Nic Brooks  : 1950  MRN: [id number]  Today's Date: 6/10/2025               Admit Date: 2025     Visit Dx:    ICD-10-CM ICD-9-CM   1. Cerebrovascular accident (CVA), unspecified mechanism  I63.9 434.91   2. Dysphagia, unspecified type  R13.10 787.20     Patient Active Problem List   Diagnosis    Moderate obstructive sleep apnea    Fatigue    Snoring    GERD (gastroesophageal reflux disease)    Hypersomnia    Other chest pain    Abnormal EKG    Coronary artery disease involving native coronary artery of native heart without angina pectoris    Dyslipidemia, goal LDL below 70    Essential hypertension    Primary osteoarthritis of right knee    Status post total left knee replacement    Prediabetes    Primary osteoarthritis of left knee    Unstable angina    Chest pain    PVC's (premature ventricular contractions)    HNP (herniated nucleus pulposus), lumbar    Lumbar disc herniation    CVA (cerebral vascular accident)     Past Medical History:   Diagnosis Date    Abnormal ECG     PVC    Arthritis     OSTEO    Chronic pain disorder 2024    Coronary artery disease     s/p stent     CTS (carpal tunnel syndrome)     CVA (cerebral vascular accident) 2025    GERD (gastroesophageal reflux disease)     Hearing loss     kailee hearing aids    History of kidney stones     just one     History of transfusion     [facility] with lung surgery , NO REACTION    Hyperlipidemia     Hypertension     Joint pain     Osteoarthritis     Peripheral neuropathy     PONV (postoperative nausea and vomiting)     Sleep apnea with use of continuous positive airway pressure (CPAP)      complaint with machine     Wears glasses      Past Surgical History:   Procedure Laterality Date    APPENDECTOMY      BACK SURGERY  07/02/2015    Left L2 laminotomy, medial facetectomy, and L2-L3 discectomy. --Dr. Himanshu Sanders    BACK SURGERY Right 08/16/2011    Right L4-5 discectomy- Dr. Himanshu Sanders    CARDIAC CATHETERIZATION N/A 01/19/2018    Procedure: Left Heart Cath;  Surgeon: Negrito Wilkes MD;  Location:  [facility];  Service:     CARDIAC CATHETERIZATION N/A 12/22/2023    Procedure: Left Heart Cath;  Surgeon: Darryl Álvarez III, MD;  Location: [facility];  Service: Cardiovascular;  Laterality: N/A;    COLONOSCOPY      every 5 years    CORONARY STENT PLACEMENT  2018    ENDOSCOPY      EPIDURAL BLOCK      KIDNEY STONE SURGERY      KNEE ARTHROSCOPY W/ MENISCECTOMY      left    LUMBAR DISCECTOMY Right 01/20/2025    Procedure: LUMBAR DISCECTOMY L3-4;  Surgeon: Himanshu Sanders MD;  Location:  [facility];  Service: Neurosurgery;  Laterality: Right;    LUMBAR DISCECTOMY      LUNG REMOVAL, PARTIAL      right lower lobe 1980    SPLENECTOMY  1974    TONSILLECTOMY      removed adnoids    TOTAL KNEE ARTHROPLASTY Right 11/15/2019    Procedure: TOTAL KNEE ARTHROPLASTY RIGHT;  Surgeon: Negrito Vidal MD;  Location:  [facility];  Service: Orthopedics    TOTAL KNEE ARTHROPLASTY Left 10/20/2020    Procedure: TOTAL KNEE ARTHROPLASTY LEFT;  Surgeon: Negrito Vidal MD;  Location:  [facility];  Service: Orthopedics;  Laterality: Left;    WISDOM TOOTH EXTRACTION      only 2 removed        SLP Recommendation and Plan  SLP Diagnosis: functional speech/language skills, functional cognitive-linguistic skills (06/10/25 0852)  SLP Diagnosis Comments: Pt presents with functional speech and language skills at this time. Pt and pt's spouse endorse the pt is functioning at baseline at this time. (06/10/25 0852)     Monitor for Signs of Aspiration: no, notify SLP if any concerns (06/10/25  0852)  Swallow Criteria for Skilled Therapeutic Interventions Met: no problems identified which require skilled intervention (06/10/25 0852)  SLC Criteria for Skilled Therapy Interventions Met: no problems identified which require skilled intervention (06/10/25 0852)  Anticipated Discharge Disposition (SLP): home (06/10/25 0852)     Therapy Frequency (Swallow): evaluation only (06/10/25 0852)  Therapy Frequency (SLP SLC): evaluation only (06/10/25 0852)     Oral Care Recommendations: Oral Care BID/PRN, Toothbrush (06/10/25 0852)                          Progress: improving (eval; see note for more information) (06/10/25 0949)      SLP EVALUATION (Last 72 Hours)       SLP SLC Evaluation       Row Name 06/10/25 0852                   General Information    Prior Level of Function-Communication WFL  -MM        Patient's Goals for Discharge return to home  -MM           Comprehension Assessment/Intervention    Comprehension Assessment/Intervention Auditory Comprehension;Reading Comprehension  -MM           Auditory Comprehension Assessment/Intervention    Auditory Comprehension (Communication) WFL  -MM        Narrative Discourse WFL  -MM           Reading Comprehension Assessment/Intervention    Reading Comprehension (Communication) WFL  -MM        Paragraph Level WFL  -MM           Expression Assessment/Intervention    Expression Assessment/Intervention verbal expression;graphic expression  -MM           Verbal Expression Assessment/Intervention    Verbal Expression WFL  -MM        Conversational Discourse/Fluency WFL  -MM           Graphic Expression Assessment/Intervention    Graphic Expression WFL  -MM        Sentence Formulation WFL  -MM           Oral Musculature and Cranial Nerve Assessment    Oral Motor General Assessment WFL  -MM           Motor Speech Assessment/Intervention    Motor Speech Function WFL  -MM        Conversational Speech (Communication) WFL  -MM        Speech intelligibility 100%;in quiet  environment;in connected speech;with unfamiliar listener  -           Cursory Voice Assessment/Intervention    Quality and Resonance (Voice) WFL  -           SLP Evaluation Clinical Impressions    SLP Diagnosis functional speech/language skills;functional cognitive-linguistic skills  -        SLP Diagnosis Comments Pt presents with functional speech and language skills at this time. Pt and pt's spouse endorse the pt is functioning at baseline at this time.  -        SLC Criteria for Skilled Therapy Interventions Met no problems identified which require skilled intervention  -           Recommendations    Therapy Frequency (SLP SLC) evaluation only  -                  User Key  (r) = Recorded By, (t) = Taken By, (c) = Cosigned By      Initials Name Effective Dates    Carmina Garsia MS CCC-SLP 08/30/24 -                        EDUCATION  The patient has been educated in the following areas:     Evaluation results and recommendations.                        Time Calculation:      Time Calculation- SLP       Row Name 06/10/25 0950             Time Calculation- SLP    SLP Start Time 0852  -MM      SLP Received On 06/10/25  -MM         Untimed Charges    [id number] Eval Speech and Production w/ Language Minutes 27  -MM      [id number] Eval Oral Pharyng Swallow Minutes 26  -MM         Total Minutes    Untimed Charges Total Minutes 53  -MM       Total Minutes 53  -MM                User Key  (r) = Recorded By, (t) = Taken By, (c) = Cosigned By      Initials Name Provider Type    Carmina Garsia MS CCC-SLP Speech and Language Pathologist                    Therapy Charges for Today       Code Description Service Date Service Provider Modifiers Qty    35415309447 HC ST EVAL ORAL PHARYNG SWALLOW 2 6/10/2025 Carmina Bernal MS CCC-SLP GN 1    59099667761 HC ST EVAL SPEECH AND PROD W LANG  2 6/10/2025 Carmina Bernal MS CCC-SLP GN 1                       Carmina Bernal MS CCC-DEE DEE  6/10/2025

## 2025-06-10 NOTE — PLAN OF CARE
Goal Outcome Evaluation:      MRI completed overnight.      Neuro: NIH 0. Pt has slight nystagmus toward left side at 0600.   Cardiac: Pt has frequent PVC's. Bradycardia while sleeping at night.   Resp: pt wears CPAP while sleeping.   : pt utilizes bedside urinal with adequate output throughout shift.    Spouse at bedside overnight

## 2025-06-10 NOTE — CONSULTS
[facility] at [facility]  CARDIOLOGY CONSULTATION NOTE    Nic Brooks  : 1950  MRN:[id number]  Home Phone:[phone]    Date of Admission:2025  Date of Consultation: 06/10/25    PCP: Francisco Vincent MD    IDENTIFICATION: A 75 y.o. male resident of [place], [place]     CC: CVA    PROBLEM LIST:     CVA (cerebral vascular accident)    ALLERGIES: No Known Allergies    HOME MEDICINES:   Current Outpatient Medications   Medication Instructions    aspirin 81 mg, Oral, Daily    chlorthalidone (HYGROTON) 25 mg, Oral, Daily    coenzyme Q10 100 mg, Daily    Cyanocobalamin 1000 MCG/ML kit Every 30 Days    rosuvastatin (CRESTOR) 40 mg, Oral, Daily    vitamin B-6 (PYRIDOXINE) 50 mg, Daily     HPI: Mr. Brooks is a 76 y/o male with CAD, frequent PVCs, HTN, and HLD who is seen in consultation for acute CVA. Presented to OSH yesterday with acute onset LUE weakness and numbness, left facial drooping and dysarthria. NIHSS of 2, he was given TNKase with improvement of symptoms. MRI Brain last night showed small acute infarct in right frontal lobe. CTA head and neck with no carotid or vertebral stenosis, and no intracranial stenosis. Echo shows normal EF, small PFO with Valsalva maneuver. EKG with NSR. Cardiology is asked to see for further recommendations. His family does have a device to track his rhythm, and he does note an atrial arrhythmia a few weeks ago, therefore occult atrial fibrillation is a high probability.       ROS: All systems have been reviewed and are negative with the exception of those mentioned in the HPI and problem list above.    Surgical History:   Past Surgical History:   Procedure Laterality Date    APPENDECTOMY      BACK SURGERY  2015    Left L2 laminotomy, medial facetectomy, and L2-L3 discectomy. --Dr. Himanshu Sanders    BACK SURGERY Right 2011    Right L4-5 discectomy- Dr. Himanshu Sanders    CARDIAC CATHETERIZATION N/A 2018    Procedure: Left Heart Cath;   Surgeon: Negrito Wilkes MD;  Location:  [facility];  Service:     CARDIAC CATHETERIZATION N/A 12/22/2023    Procedure: Left Heart Cath;  Surgeon: Darryl Álvarez III, MD;  Location:  [facility];  Service: Cardiovascular;  Laterality: N/A;    COLONOSCOPY      every 5 years    CORONARY STENT PLACEMENT  2018    ENDOSCOPY      EPIDURAL BLOCK      KIDNEY STONE SURGERY      KNEE ARTHROSCOPY W/ MENISCECTOMY      left    LUMBAR DISCECTOMY Right 01/20/2025    Procedure: LUMBAR DISCECTOMY L3-4;  Surgeon: Himanshu Sanders MD;  Location:  [facility];  Service: Neurosurgery;  Laterality: Right;    LUMBAR DISCECTOMY      LUNG REMOVAL, PARTIAL      right lower lobe 1980    SPLENECTOMY  1974    TONSILLECTOMY      removed adnoids    TOTAL KNEE ARTHROPLASTY Right 11/15/2019    Procedure: TOTAL KNEE ARTHROPLASTY RIGHT;  Surgeon: Negrito Vidal MD;  Location:  [facility];  Service: Orthopedics    TOTAL KNEE ARTHROPLASTY Left 10/20/2020    Procedure: TOTAL KNEE ARTHROPLASTY LEFT;  Surgeon: Negrito Vidal MD;  Location:  [facility];  Service: Orthopedics;  Laterality: Left;    WISDOM TOOTH EXTRACTION      only 2 removed        Social History:   Social History     Socioeconomic History    Marital status:    Tobacco Use    Smoking status: Never     Passive exposure: Never    Smokeless tobacco: Never   Vaping Use    Vaping status: Never Used   Substance and Sexual Activity    Alcohol use: No    Drug use: Never    Sexual activity: Not Currently     Partners: Female     Birth control/protection: Abstinence, None, Vasectomy       Family History:   Family History   Problem Relation Age of Onset    Hypertension Mother     Cancer Mother     Diabetes Mother     Cancer Father     Emphysema Father     Diabetes Father     COPD Father     Osteoarthritis Father     Arthritis Father     No Known Problems Sister     Diabetes Brother     Heart disease Brother        Objective     /86   Pulse 77   Temp  "97.7 °F (36.5 °C) (Oral)   Resp 16   Ht 170.2 cm (67.01\")   Wt 79.9 kg (176 lb 2.4 oz)   SpO2 92%   BMI 27.58 kg/m²     Intake/Output Summary (Last 24 hours) at 6/10/2025 1036  Last data filed at 6/10/2025 0900  Gross per 24 hour   Intake 443 ml   Output 1075 ml   Net -632 ml       PHYSICAL EXAM:  General Appearance:   · well developed  · well nourished  HENT:   · oropharynx moist  · lips not cyanotic  Neck:  · thyroid not enlarged  · supple  Respiratory:  · no respiratory distress  · normal breath sounds  · no rales  Cardiovascular:  · no jugular venous distention  · regular rhythm  · apical impulse normal  · S1 normal, S2 normal  · no S3, no S4   · no murmur  · no rub, no thrill  · carotid pulses normal; no bruit  · pedal pulses normal  · lower extremity edema: none    Gastrointestinal:   · bowel sounds normal  · non-tender  · no hepatomegaly, no splenomegaly  Musculoskeletal:  · no clubbing of fingers.   · normocephalic, head atraumatic  Skin:   · warm  · dry  Psychiatric:  · judgement and insight appropriate  · normal mood and affect      Labs/Diagnostic Data  Results from last 7 days   Lab Units 06/09/25  2308   SODIUM mmol/L 137   POTASSIUM mmol/L 3.7   CHLORIDE mmol/L 102   CO2 mmol/L 26.0   BUN mg/dL 13.4   CREATININE mg/dL 1.10   GLUCOSE mg/dL 92   CALCIUM mg/dL 8.9         Results from last 7 days   Lab Units 06/09/25  2308   MAGNESIUM mg/dL 1.9     Results from last 7 days   Lab Units 06/09/25  2308   CHOLESTEROL mg/dL 121   TRIGLYCERIDES mg/dL 123   HDL CHOL mg/dL 41   LDL CHOL mg/dL 58         Results from last 7 days   Lab Units 06/09/25  2308   HEMOGLOBIN A1C % 5.80*         Results from last 7 days   Lab Units 06/09/25  1256   PROTIME seconds 10.7   INR  1.06   APTT seconds 26.8     I personally reviewed the patient's EKG/Telemetry data    Radiology Data:   MRI Brain Without Contrast  Result Date: 6/9/2025  Impression: 1.Small area of restricted diffusion/acute infarct noted within the right " frontal lobe. There is associated minimal edema without significant mass effect. 2.Susceptibility artifact noted within the right occipital lobe , most likely represents hemosiderin staining from old hemorrhage. Please consider further evaluation with dedicated head CT to exclude an acute hemorrhage. 3.Additional chronic findings as characterized above Electronically Signed: Alonso De Leon DO  6/9/2025 10:38 PM EDT  Workstation ID: [id number]    XR Chest 1 View  Result Date: 6/9/2025  Blunting of the right costophrenic angle, may be secondary to infiltrate/atelectasis or effusion. Continued follow-up recommended. Images reviewed, interpreted, and dictated by Dr. Darryl Verdugo. Transcribed by Maddie Villegas PA-C.    CTA NECK / BRAIN STROKE PROTOCOL  Result Date: 6/9/2025  1. No carotid or vertebral stenosis/dissection. 2. There is an 8 mm noncalcified nodule in the left upper lobe on image #56. This is indeterminate. Recommend follow-up with pulmonary and a short-term 3 month follow-up diagnostic chest CT based on Fleischner Society guidelines. 3. Degenerative changes of the cervical spine. CT ANGIOGRAPHY HEAD HISTORY: Left facial droop, slurred speech COMPARISON: None. TECHNIQUE: Thin section axial images through the brain were performed following the administration of intravenous contrast. Coronal and sagittal 3-D MIP images were performed and reviewed. This study was performed with techniques to keep radiation doses as low as reasonably achievable, (ALARA). Individualized dose reduction techniques using automated exposure control or adjustment of mA and/or kV according to the patient size were employed. FINDINGS: The distal internal carotid arteries are unremarkable. There are minimal calcifications in the cavernous carotid arteries with no stenosis. The left A1 segment is hypoplastic. The anterior cerebral arteries are otherwise unremarkable. The anterior communicating artery is normal. There is no stenosis or  "aneurysm. The middle cerebral arteries have normal enhancement with no stenosis or aneurysm. There is symmetrical branching enhancement. The basilar artery is normal in caliber. The posterior cerebral artery gives rise to the right posterior cerebral artery. The left P1 segment is hypoplastic. There is a posterior communicating artery supplying the left posterior cerebral artery. There is no posterior fossa circulation stenosis or aneurysm. The dural venous sinuses have normal enhancement. IMPRESSION: No intracranial stenosis or aneurysm. Images reviewed, interpreted, dictated and electronically signed by Jaiden Egan MD Voice transcription technology ([employer]) is used for the dictation of this note and \"sound-alike\" words might be erroneously placed despite reviewing this note for accuracy. Errors in dictation may reflect use of voice recognition software and not all errors in transcription may have been detected prior to signing.    CT Head Without Contrast  Result Date: 6/9/2025  There is no large vessel occlusion. There is questionable 5 mL of ischemia in the left parietal region which may be artifactual. There is hypoperfusion in the cerebellum Tmax of less than 6 seconds. This could be artifactual. This does not correspond to any abnormality CTA Head examination. Images reviewed, interpreted, dictated and electronically signed by Jaiden Egan MD Voice transcription technology ([employer]) is used for the dictation of this note and \"sound-alike\" words might be erroneously placed despite reviewing this note for accuracy. Errors in dictation may reflect use of voice recognition software and not all errors in transcription may have been detected prior to signing.    Results for orders placed during the hospital encounter of 06/09/25    Adult Transthoracic Echo Complete W/ Cont if Necessary Per Protocol (With Agitated Saline)    Interpretation Summary    Left ventricular systolic function is normal. " Calculated left ventricular EF = 60.6% Left ventricular ejection fraction appears to be 56 - 60%.    Left ventricular diastolic function is consistent with (grade I) impaired relaxation.    Small patent foramen ovale present.    Saline test results are positive with valsalva manuever.      Current Medications:    aspirin, 325 mg, Oral, Daily   Or  aspirin, 300 mg, Rectal, Daily  atorvastatin, 80 mg, Oral, Nightly  mupirocin, 1 Application, Each Nare, BID  sodium chloride, 10 mL, Intravenous, Q12H           Assessment and Plan:     1. Acute right frontal CVA  - CTA head and neck with no intracranial or carotid/vertebral stenosis   - Echo with normal EF, small PFO with Valsalva maneuver, unlikely source of CVA. RoPE score of 4   - high probability for occult Afib, atrial arrhythmia noted on home monitoring device. Recommend loop monitor implant prior to discharge and +/- NOAC at discharge   - Neurology following   Possible loop recorder Thursday    2. CAD  - no angina  - continue ASA, statin     3. Frequent PVCs  - Patient has upcoming appt with Dr. Méndez next week on 6/18, we will keep that appointment for him.  Overall he is asymptomatic therefore defer beta-blocker at this time average heart rate usually runs 60-70 which is why he does not been started in the past.    4. Hypertension   - controlled    5. Hyperlipidemia   - LDL 58 on recent labs, at target  - on Rosuvastatin 40mg at home       Scribed by Letha Arenas for Darryl Hernandez MD    I,Darryl Hernandez M.D., personally performed the services described in this documentation as scribed by the above named individual in my presence, and it is both accurate and complete.    Darryl Hernandez MD, [facility], [facility]  06/10/25  15:20 EDT

## 2025-06-10 NOTE — PLAN OF CARE
Goal Outcome Evaluation:  Plan of Care Reviewed With: (P) patient, spouse        Progress: (P) no change  Outcome Evaluation: (P) Pt presents at functional baseline. OT signing off, rec DC home w/ assist.    Anticipated Discharge Disposition (OT): (P) home with assist

## 2025-06-10 NOTE — THERAPY DISCHARGE NOTE
Acute Care - Occupational Therapy Discharge  [facility]    Patient Name: Nic Brooks  : 1950    MRN: [id number]                              Today's Date: 6/10/2025       Admit Date: 2025    Visit Dx:     ICD-10-CM ICD-9-CM   1. Cerebrovascular accident (CVA), unspecified mechanism  I63.9 434.91   2. Dysphagia, unspecified type  R13.10 787.20     Patient Active Problem List   Diagnosis    Moderate obstructive sleep apnea    Fatigue    Snoring    GERD (gastroesophageal reflux disease)    Hypersomnia    Other chest pain    Abnormal EKG    Coronary artery disease involving native coronary artery of native heart without angina pectoris    Dyslipidemia, goal LDL below 70    Essential hypertension    Primary osteoarthritis of right knee    Status post total left knee replacement    Prediabetes    Primary osteoarthritis of left knee    Unstable angina    Chest pain    PVC's (premature ventricular contractions)    HNP (herniated nucleus pulposus), lumbar    Lumbar disc herniation    CVA (cerebral vascular accident)     Past Medical History:   Diagnosis Date    Abnormal ECG     PVC    Arthritis     OSTEO    Chronic pain disorder 2024    Coronary artery disease     s/p stent     CTS (carpal tunnel syndrome)     CVA (cerebral vascular accident) 2025    GERD (gastroesophageal reflux disease)     Hearing loss     kailee hearing aids    History of kidney stones     just one     History of transfusion     [facility] with lung surgery , NO REACTION    Hyperlipidemia     Hypertension     Joint pain     Osteoarthritis     Peripheral neuropathy     PONV (postoperative nausea and vomiting)     Sleep apnea with use of continuous positive airway pressure (CPAP)     complaint with machine     Wears glasses      Past Surgical History:   Procedure Laterality Date    APPENDECTOMY      BACK SURGERY  2015    Left L2 laminotomy, medial facetectomy, and L2-L3 discectomy. --Dr. Himanshu Sanders     BACK SURGERY Right 08/16/2011    Right L4-5 discectomy- Dr. Himanshu Sanders    CARDIAC CATHETERIZATION N/A 01/19/2018    Procedure: Left Heart Cath;  Surgeon: Negrito Wilkes MD;  Location:  [facility];  Service:     CARDIAC CATHETERIZATION N/A 12/22/2023    Procedure: Left Heart Cath;  Surgeon: Darryl Álvarez III, MD;  Location:  [facility];  Service: Cardiovascular;  Laterality: N/A;    COLONOSCOPY      every 5 years    CORONARY STENT PLACEMENT  2018    ENDOSCOPY      EPIDURAL BLOCK      KIDNEY STONE SURGERY      KNEE ARTHROSCOPY W/ MENISCECTOMY      left    LUMBAR DISCECTOMY Right 01/20/2025    Procedure: LUMBAR DISCECTOMY L3-4;  Surgeon: Himanshu Sanders MD;  Location:  [facility];  Service: Neurosurgery;  Laterality: Right;    LUMBAR DISCECTOMY      LUNG REMOVAL, PARTIAL      right lower lobe 1980    SPLENECTOMY  1974    TONSILLECTOMY      removed adnoids    TOTAL KNEE ARTHROPLASTY Right 11/15/2019    Procedure: TOTAL KNEE ARTHROPLASTY RIGHT;  Surgeon: Negrito Vidal MD;  Location:  [facility];  Service: Orthopedics    TOTAL KNEE ARTHROPLASTY Left 10/20/2020    Procedure: TOTAL KNEE ARTHROPLASTY LEFT;  Surgeon: Negrito Vidal MD;  Location:  [facility];  Service: Orthopedics;  Laterality: Left;    WISDOM TOOTH EXTRACTION      only 2 removed       General Information       Row Name 06/10/25 0933          OT Time and Intention    Subjective Information no complaints (P)   -KW     Document Type discharge evaluation/summary (P)   -KW     Mode of Treatment occupational therapy (P)   -KW     Patient Effort excellent (P)   -KW     Symptoms Noted During/After Treatment none (P)   -KW       Row Name 06/10/25 0933          General Information    Patient Profile Reviewed yes (P)   -KW     Prior Level of Function independent:;all household mobility;community mobility;gait;grooming;dressing;bathing;home management (P)   -KW     Existing Precautions/Restrictions fall (P)   -KW      Barriers to Rehab none identified (P)   -KW       Row Name 06/10/25 0933          Living Environment    Current Living Arrangements home (P)   -KW     People in Home spouse (P)   -KW       Row Name 06/10/25 0933          Home Main Entrance    Number of Stairs, Main Entrance none;other (see comments) (P)   Pt states ramp into the home  -KW     Stair Railings, Main Entrance none (P)   -KW       Row Name 06/10/25 0933          Cognition    Orientation Status (Cognition) oriented x 4 (P)   -KW       Row Name 06/10/25 0933          Safety Issues/Impairments Affecting Functional Mobility    Safety Issues Affecting Function (Mobility) awareness of need for assistance;insight into deficits/self-awareness;safety precaution awareness (P)   -KW     Impairments Affecting Function (Mobility) strength (P)   -KW               User Key  (r) = Recorded By, (t) = Taken By, (c) = Cosigned By      Initials Name Provider Type    Guerline Renner, OT Student OT Student                   Mobility/ADL's       Row Name 06/10/25 0935          Bed Mobility    Bed Mobility scooting/bridging;supine-sit (P)   -KW     Scooting/Bridging [place] (Bed Mobility) standby assist;verbal cues (P)   -KW     Supine-Sit [place] (Bed Mobility) standby assist;verbal cues (P)   -       Row Name 06/10/25 0935          Transfers    Transfers sit-stand transfer;stand-sit transfer (P)   -[facility] Name 06/10/25 0935          Sit-Stand Transfer    Sit-Stand [place] (Transfers) standby assist;verbal cues (P)   -       Row Name 06/10/25 0935          Stand-Sit Transfer    Stand-Sit [place] (Transfers) standby assist (P)   -       Row Name 06/10/25 0935          Functional Mobility    Functional Mobility- Ind. Level contact guard assist;1 person + 1 person to manage equipment (P)   -KW     Functional Mobility- Comment Pt initially CG then progressed to SB (P)   -[facility] Name 06/10/25 0935          Activities of Daily Living     BADL Assessment/Intervention upper body dressing;grooming (P)   -KW       Row Name 06/10/25 0935          Upper Body Dressing Assessment/Training    [place] Level (Upper Body Dressing) don;pajama/robe;minimum assist (75% patient effort) (P)   -KW     Position (Upper Body Dressing) edge of bed sitting (P)   -KW       Row Name 06/10/25 0935          Grooming Assessment/Training    [place] Level (Grooming) wash face, hands;set up (P)   -KW     Position (Grooming) unsupported sitting (P)   -KW               User Key  (r) = Recorded By, (t) = Taken By, (c) = Cosigned By      Initials Name Provider Type    Guerline Renner OT Student OT Student                   Obj/Interventions       Row Name 06/10/25 0938          Sensory Assessment (Somatosensory)    Sensory Assessment (Somatosensory) sensation intact (P)   -KW       Row Name 06/10/25 0938          Vision Assessment/Intervention    Visual Impairment/Limitations WNL (P)   -KW       Row Name 06/10/25 0938          Range of Motion Comprehensive    General Range of Motion no range of motion deficits identified (P)   -KW       Row Name 06/10/25 0938          Strength Comprehensive (MMT)    General Manual Muscle Testing (MMT) Assessment no strength deficits identified (P)   -KW     Comment, General Manual Muscle Testing (MMT) Assessment BUE grossly 4+/5 (P)   -KW       Row Name 06/10/25 0938          Balance    Balance Assessment sitting static balance;sitting dynamic balance;sit to stand dynamic balance;standing static balance;standing dynamic balance (P)   -KW     Static Sitting Balance standby assist (P)   -KW     Dynamic Sitting Balance standby assist (P)   -KW     Position, Sitting Balance unsupported;sitting in chair;sitting edge of bed (P)   -KW     Sit to Stand Dynamic Balance standby assist (P)   -KW     Static Standing Balance standby assist (P)   -KW     Dynamic Standing Balance contact guard (P)   -KW     Position/Device Used, Standing  Balance unsupported (P)   -KW     Balance Interventions sitting;standing;sit to stand;static;dynamic;minimal challenge;occupation based/functional task (P)   -KW               User Key  (r) = Recorded By, (t) = Taken By, (c) = Cosigned By      Initials Name Provider Type    Guerline Renner, OT Student OT Student                   Goals/Plan    No documentation.                  Clinical Impression       Row Name 06/10/25 0941          Pain Assessment    Pretreatment Pain Rating 0/10 - no pain (P)   -KW     Posttreatment Pain Rating 0/10 - no pain (P)   -KW       Row Name 06/10/25 0941          Plan of Care Review    Plan of Care Reviewed With patient;spouse (P)   -KW     Progress no change (P)   -KW     Outcome Evaluation Pt presents at functional baseline. OT signing off, rec DC home w/ assist. (P)   -KW       Row Name 06/10/25 0941          Therapy Assessment/Plan (OT)    Patient/Family Therapy Goal Statement (OT) Return to PLOF (P)   -KW       Row Name 06/10/25 0941          Therapy Plan Review/Discharge Plan (OT)    Anticipated Discharge Disposition (OT) home with assist (P)   -KW       Row Name 06/10/25 0941          Vital Signs    Pre Systolic BP Rehab 105 (P)   -KW     Pre Treatment Diastolic BP 68 (P)   -KW     Post Systolic BP Rehab 131 (P)   -KW     Post Treatment Diastolic BP 96 (P)   -KW     Pretreatment Heart Rate (beats/min) 77 (P)   -KW     Posttreatment Heart Rate (beats/min) 73 (P)   -KW     Pre SpO2 (%) 95 (P)   -KW     O2 Delivery Pre Treatment room air (P)   -KW     Post SpO2 (%) 95 (P)   -KW     O2 Delivery Post Treatment room air (P)   -KW     Pre Patient Position Supine (P)   -KW     Intra Patient Position Standing (P)   -KW     Post Patient Position Sitting (P)   -KW       Row Name 06/10/25 0941          Positioning and Restraints    Pre-Treatment Position in bed (P)   -KW     Post Treatment Position chair (P)   -KW     In Chair notified nsg;sitting;call light within  reach;encouraged to call for assist;exit alarm on;with family/caregiver;waffle cushion (P)   -KW               User Key  (r) = Recorded By, (t) = Taken By, (c) = Cosigned By      Initials Name Provider Type    Guerline Renner, OT Student OT Student                   Outcome Measures       Row Name 06/10/25 0947          How much help from another is currently needed...    Putting on and taking off regular lower body clothing? 4 (P)   -KW     Bathing (including washing, rinsing, and drying) 4 (P)   -KW     Toileting (which includes using toilet bed pan or urinal) 4 (P)   -KW     Putting on and taking off regular upper body clothing 3 (P)   -KW     Taking care of personal grooming (such as brushing teeth) 4 (P)   -KW     Eating meals 4 (P)   -KW     AM-PAC 6 Clicks Score (OT) 23 (P)   -KW       Row Name 06/10/25 0950 06/10/25 0800       How much help from another person do you currently need...    Turning from your back to your side while in flat bed without using bedrails? 4  -TT 4  -MM    Moving from lying on back to sitting on the side of a flat bed without bedrails? 4  -TT 4  -MM    Moving to and from a bed to a chair (including a wheelchair)? 4  -TT 4  -MM    Standing up from a chair using your arms (e.g., wheelchair, bedside chair)? 4  -TT 4  -MM    Climbing 3-5 steps with a railing? 4  -TT 4  -MM    To walk in hospital room? 4  -TT 4  -MM    AM-PAC 6 Clicks Score (PT) 24  -TT 24  -MM    Highest Level of Mobility Goal Walk 250 Feet or More - 8  -TT Walk 250 Feet or More - 8  -MM      Row Name 06/10/25 0950 06/10/25 0947       Modified [place] Scale    Pre-Stroke Modified [place] Scale 0 - No Symptoms at all.  -TT 6 - Unable to determine (UTD) from the medical record documentation (P)   -KW    Modified [place] Scale 1 - No significant disability despite symptoms.  Able to carry out all usual duties and activities.  -TT 1 - No significant disability despite symptoms.  Able to carry out all usual duties and  activities. (P)   -KW      Row Name 06/10/25 0950 06/10/25 0947       Functional Assessment    Outcome Measure Options AM-PAC 6 Clicks Basic Mobility (PT)  -TT AM-PAC 6 Clicks Daily Activity (OT);Modified [place] (P)   -KW              User Key  (r) = Recorded By, (t) = Taken By, (c) = Cosigned By      Initials Name Provider Type    MM Carmina Byrne, RN Registered Nurse    Guerline Renner, OT Student OT Student    TT Latisha Bernal, PT Physical Therapist                    OT Recommendation and Plan     Plan of Care Review  Plan of Care Reviewed With: (P) patient, spouse  Progress: (P) no change  Outcome Evaluation: (P) Pt presents at functional baseline. OT signing off, rec DC home w/ assist.  Plan of Care Reviewed With: (P) patient, spouse  Outcome Evaluation: (P) Pt presents at functional baseline. OT signing off, rec DC home w/ assist.     Time Calculation:   Evaluation Complexity (OT)  Review Occupational Profile/Medical/Therapy History Complexity: (P) brief/low complexity  Assessment, Occupational Performance/Identification of Deficit Complexity: (P) 1-3 performance deficits  Clinical Decision Making Complexity (OT): (P) problem focused assessment/low complexity  Overall Complexity of Evaluation (OT): (P) low complexity     Time Calculation- OT       Row Name 06/10/25 0949             Time Calculation- OT    OT Start Time 0845 (P)   -KW      OT Received On 06/10/25 (P)   -KW         Untimed Charges    OT Eval/Re-eval Minutes 65 (P)   -KW         Total Minutes    Untimed Charges Total Minutes 65 (P)   -KW       Total Minutes 65 (P)   -KW                User Key  (r) = Recorded By, (t) = Taken By, (c) = Cosigned By      Initials Name Provider Type    Guerline Renner, OT Student OT Student                  Therapy Charges for Today       Code Description Service Date Service Provider Modifiers Qty    [id number] HC-OT EVAL LOW COMPLEXITY 5 6/10/2025 Guerline Singleton, OT Student  1                OT Discharge Summary  Anticipated Discharge Disposition (OT): (P) home with assist  Reason for Discharge: (P) At baseline function  Outcomes Achieved: (P) Refer to plan of care for updates on goals achieved  Discharge Destination: (P) Home with assist    Guerline Singleton, OT Student  6/10/2025

## 2025-06-10 NOTE — THERAPY EVALUATION
Patient Name: Nic Brooks  : 1950    MRN: [id number]                              Today's Date: 6/10/2025       Admit Date: 2025    Visit Dx:     ICD-10-CM ICD-9-CM   1. Cerebrovascular accident (CVA), unspecified mechanism  I63.9 434.91   2. Dysphagia, unspecified type  R13.10 787.20     Patient Active Problem List   Diagnosis    Moderate obstructive sleep apnea    Fatigue    Snoring    GERD (gastroesophageal reflux disease)    Hypersomnia    Other chest pain    Abnormal EKG    Coronary artery disease involving native coronary artery of native heart without angina pectoris    Dyslipidemia, goal LDL below 70    Essential hypertension    Primary osteoarthritis of right knee    Status post total left knee replacement    Prediabetes    Primary osteoarthritis of left knee    Unstable angina    Chest pain    PVC's (premature ventricular contractions)    HNP (herniated nucleus pulposus), lumbar    Lumbar disc herniation    CVA (cerebral vascular accident)     Past Medical History:   Diagnosis Date    Abnormal ECG     PVC    Arthritis     OSTEO    Chronic pain disorder 2024    Coronary artery disease     s/p stent     CTS (carpal tunnel syndrome)     CVA (cerebral vascular accident) 2025    GERD (gastroesophageal reflux disease)     Hearing loss     kailee hearing aids    History of kidney stones     just one     History of transfusion     [facility] with lung surgery , NO REACTION    Hyperlipidemia     Hypertension     Joint pain     Osteoarthritis     Peripheral neuropathy     PONV (postoperative nausea and vomiting)     Sleep apnea with use of continuous positive airway pressure (CPAP)     complaint with machine     Wears glasses      Past Surgical History:   Procedure Laterality Date    APPENDECTOMY      BACK SURGERY  2015    Left L2 laminotomy, medial facetectomy, and L2-L3 discectomy. --Dr. Himanshu Sanders    BACK SURGERY Right 2011    Right L4-5 discectomy-  Dr. Himanshu Sanders    CARDIAC CATHETERIZATION N/A 01/19/2018    Procedure: Left Heart Cath;  Surgeon: Negrito Wilkes MD;  Location:  [facility];  Service:     CARDIAC CATHETERIZATION N/A 12/22/2023    Procedure: Left Heart Cath;  Surgeon: Darryl Álvarez III, MD;  Location:  [facility];  Service: Cardiovascular;  Laterality: N/A;    COLONOSCOPY      every 5 years    CORONARY STENT PLACEMENT  2018    ENDOSCOPY      EPIDURAL BLOCK      KIDNEY STONE SURGERY      KNEE ARTHROSCOPY W/ MENISCECTOMY      left    LUMBAR DISCECTOMY Right 01/20/2025    Procedure: LUMBAR DISCECTOMY L3-4;  Surgeon: Himanshu Sanders MD;  Location:  [facility];  Service: Neurosurgery;  Laterality: Right;    LUMBAR DISCECTOMY      LUNG REMOVAL, PARTIAL      right lower lobe 1980    SPLENECTOMY  1974    TONSILLECTOMY      removed adnoids    TOTAL KNEE ARTHROPLASTY Right 11/15/2019    Procedure: TOTAL KNEE ARTHROPLASTY RIGHT;  Surgeon: Negrito Vidal MD;  Location:  [facility];  Service: Orthopedics    TOTAL KNEE ARTHROPLASTY Left 10/20/2020    Procedure: TOTAL KNEE ARTHROPLASTY LEFT;  Surgeon: Negrito Vidal MD;  Location:  [facility];  Service: Orthopedics;  Laterality: Left;    WISDOM TOOTH EXTRACTION      only 2 removed       General Information       Row Name 06/10/25 0940          Physical Therapy Time and Intention    Document Type evaluation  -TT     Mode of Treatment co-treatment;physical therapy  -TT       Row Name 06/10/25 0940          General Information    Patient Profile Reviewed yes  -TT     Prior Level of Function independent:;all household mobility;community mobility;home management;driving;work;yard work  -TT     Existing Precautions/Restrictions no known precautions/restrictions  -TT     Barriers to Rehab none identified  -TT       Row Name 06/10/25 0940          Living Environment    Current Living Arrangements home  -TT     People in Home spouse  -TT       Row Name 06/10/25 0940          Home  Main Entrance    Number of Stairs, Main Entrance other (see comments)  ramp entrance  -TT       Row Name 06/10/25 0940          Stairs Within Home, Primary    Number of Stairs, Within Home, Primary none  -TT       Row Name 06/10/25 0940          Cognition    Orientation Status (Cognition) oriented x 4  -TT       Row Name 06/10/25 0940          Safety Issues/Impairments Affecting Functional Mobility    Safety Issues Affecting Function (Mobility) safety precautions follow-through/compliance;safety precaution awareness  -TT     Impairments Affecting Function (Mobility) endurance/activity tolerance  -TT               User Key  (r) = Recorded By, (t) = Taken By, (c) = Cosigned By      Initials Name Provider Type    TT Latisha Bernal, PT Physical Therapist                   Mobility       Row Name 06/10/25 0942          Bed Mobility    Bed Mobility scooting/bridging;supine-sit  -TT     Scooting/Bridging [place] (Bed Mobility) standby assist  seated scooting towards EOB  -TT     Supine-Sit [place] (Bed Mobility) standby assist;verbal cues  -TT     Assistive Device (Bed Mobility) head of bed elevated  -TT     Comment, (Bed Mobility) Brief cues for appropriate sequencing and avoidance of valsalva maneuver for improved safety. Denied dizziness with positional change.  -TT       Row Name 06/10/25 0942          Transfers    Comment, (Transfers) Performed in preparation for progression of OOB activities.  -TT       Row Name 06/10/25 0942          Sit-Stand Transfer    Sit-Stand [place] (Transfers) standby assist;verbal cues  -TT     Assistive Device (Sit-Stand Transfers) other (see comments)  no UE support  -TT     Comment, (Sit-Stand Transfer) Brief cues to maintain BLE under VIRAL and increased anterior weight shift. Denied onset of dizziness with positional change.  -TT       Row Name 06/10/25 0942          Gait/Stairs (Locomotion)    [place] Level (Gait) other (see comments);contact guard;standby  "assist;verbal cues  CGA progressing to SBA  -TT     Assistive Device (Gait) other (see comments)  no UE support  -TT     Patient was able to Ambulate yes  -TT     Distance in Feet (Gait) 400  -TT     Deviations/Abnormal Patterns (Gait) stride length decreased;gait speed decreased  -TT     Bilateral Gait Deviations decreased arm swing  -TT     Comment, (Gait/Stairs) Cues provided to increase bilateral step length and progress overall ambulation distance as able. Denied onset of fatigue; noting \"stiffness\" in b/l knees due to recent immobility. Reporting feeling \"90%\" back to baseline.  -TT               User Key  (r) = Recorded By, (t) = Taken By, (c) = Cosigned By      Initials Name Provider Type    TT Latisha Bernal, PT Physical Therapist                   Obj/Interventions       Row Name 06/10/25 0946          Range of Motion Comprehensive    General Range of Motion no range of motion deficits identified  -TT       Row Name 06/10/25 0946          Strength Comprehensive (MMT)    General Manual Muscle Testing (MMT) Assessment no strength deficits identified  -TT       Row Name 06/10/25 0946          Balance    Balance Assessment sitting static balance;sitting dynamic balance;standing static balance;standing dynamic balance  -TT     Static Sitting Balance supervision  -TT     Dynamic Sitting Balance standby assist  -TT     Position, Sitting Balance unsupported;sitting edge of bed  -TT     Static Standing Balance standby assist  -TT     Dynamic Standing Balance contact guard;standby assist  CGA progressing to SBA  -TT     Position/Device Used, Standing Balance unsupported  -TT     Balance Interventions sitting;standing;sit to stand;static;dynamic;occupation based/functional task  -TT     Comment, Balance CGA initially provided for improved safety, however, pt rapidly progressing to SBA. No LOB observed.  -TT       Row Name 06/10/25 0946          Sensory Assessment (Somatosensory)    Sensory Assessment " (Somatosensory) sensation intact  -TT               User Key  (r) = Recorded By, (t) = Taken By, (c) = Cosigned By      Initials Name Provider Type    TT Latisha Bernal, PT Physical Therapist                   Goals/Plan    No documentation.                  Clinical Impression       [facility] Name 06/10/25 0947          Pain    Pretreatment Pain Rating 0/10 - no pain  -TT     Posttreatment Pain Rating 0/10 - no pain  -TT     Pain Management Interventions activity modification encouraged;exercise or physical activity utilized;positioning techniques utilized  -TT     Response to Pain Interventions no change per patient report  -TT       Row Name 06/10/25 0947          Plan of Care Review    Plan of Care Reviewed With patient;spouse  -TT     Outcome Evaluation PT initial evaluation completed. Pt demonstrating all functional mobility WFL requiring no more than SBA for safety only. SBA to EOB, stand and ambulate prolonged distance in hallway. No further skilled inpatient PT interventions required at this time; PT to sign off. Recommending home with assist as needed upon discharge.  -TT       [facility] Name 06/10/25 0947          Therapy Assessment/Plan (PT)    Therapy Frequency (PT) evaluation only  -TT       Row Name 06/10/25 0947          Vital Signs    Pre Systolic BP Rehab 105  -TT     Pre Treatment Diastolic BP 68  -TT     Post Systolic BP Rehab 131  -TT     Post Treatment Diastolic BP 96  -TT     Pretreatment Heart Rate (beats/min) 80  -TT     Posttreatment Heart Rate (beats/min) 79  -TT     Pre SpO2 (%) 95  -TT     O2 Delivery Pre Treatment room air  -TT     Post SpO2 (%) 95  -TT     O2 Delivery Post Treatment room air  -TT     Pre Patient Position Supine  -TT     Post Patient Position Sitting  -TT       Row Name 06/10/25 0947          Positioning and Restraints    Pre-Treatment Position in bed  -TT     Post Treatment Position chair  -TT     In Chair notified nsg;call light within reach;encouraged to call for assist;exit  alarm on;waffle cushion  -TT               User Key  (r) = Recorded By, (t) = Taken By, (c) = Cosigned By      Initials Name Provider Type    TT Latisha Bernal PT Physical Therapist                   Outcome Measures       Row Name 06/10/25 0950 06/10/25 0800       How much help from another person do you currently need...    Turning from your back to your side while in flat bed without using bedrails? 4  -TT 4  -MM    Moving from lying on back to sitting on the side of a flat bed without bedrails? 4  -TT 4  -MM    Moving to and from a bed to a chair (including a wheelchair)? 4  -TT 4  -MM    Standing up from a chair using your arms (e.g., wheelchair, bedside chair)? 4  -TT 4  -MM    Climbing 3-5 steps with a railing? 4  -TT 4  -MM    To walk in hospital room? 4  -TT 4  -MM    AM-PAC 6 Clicks Score (PT) 24  -TT 24  -MM    Highest Level of Mobility Goal Walk 250 Feet or More - 8  -TT Walk 250 Feet or More - 8  -MM      Row Name 06/10/25 0950 06/10/25 0947       Modified Door Scale    Pre-Stroke Modified Stacia Scale 0 - No Symptoms at all.  -TT 6 - Unable to determine (UTD) from the medical record documentation (P)   -KW    Modified Door Scale 1 - No significant disability despite symptoms.  Able to carry out all usual duties and activities.  -TT 1 - No significant disability despite symptoms.  Able to carry out all usual duties and activities. (P)   -KW      Row Name 06/10/25 0950 06/10/25 0947       Functional Assessment    Outcome Measure Options AM-PAC 6 Clicks Basic Mobility (PT)  -TT AM-PAC 6 Clicks Daily Activity (OT);Modified Stacia (P)   -KW              User Key  (r) = Recorded By, (t) = Taken By, (c) = Cosigned By      Initials Name Provider Type    Carmina Tang, RN Registered Nurse    Guerline Renner, OT Student OT Student    TT Latisha Bernal, PT Physical Therapist                                 Physical Therapy Education       Title: PT OT SLP Therapies (In Progress)        Topic: Physical Therapy (Done)       Point: Mobility training (Done)       Learning Progress Summary            Patient Acceptance, E, VU by TT at 6/10/2025 0952   Significant Other Acceptance, E, VU by TT at 6/10/2025 0952                      Point: Home exercise program (Done)       Learning Progress Summary            Patient Acceptance, E, VU by TT at 6/10/2025 0952   Significant Other Acceptance, E, VU by TT at 6/10/2025 0952                      Point: Body mechanics (Done)       Learning Progress Summary            Patient Acceptance, E, VU by TT at 6/10/2025 0952   Significant Other Acceptance, E, VU by TT at 6/10/2025 0952                      Point: Precautions (Done)       Learning Progress Summary            Patient Acceptance, E, VU by TT at 6/10/2025 0952   Significant Other Acceptance, E, VU by TT at 6/10/2025 0952                                      User Key       Initials Effective Dates Name Provider Type Discipline    TT 05/30/25 -  Latisha Bernal, PT Physical Therapist PT                  PT Recommendation and Plan     Outcome Evaluation: PT initial evaluation completed. Pt demonstrating all functional mobility WFL requiring no more than SBA for safety only. SBA to EOB, stand and ambulate prolonged distance in hallway. No further skilled inpatient PT interventions required at this time; PT to sign off. Recommending home with assist as needed upon discharge.     Time Calculation:   PT Evaluation Complexity  History, PT Evaluation Complexity: 1-2 personal factors and/or comorbidities  Examination of Body Systems (PT Eval Complexity): 1-2 elements  Clinical Presentation (PT Evaluation Complexity): stable  Clinical Decision Making (PT Evaluation Complexity): low complexity  Overall Complexity (PT Evaluation Complexity): low complexity     PT Charges       Row Name 06/10/25 0953             Time Calculation    Start Time 0846  -TT      PT Received On 06/10/25  -TT         Untimed Charges    PT  Eval/Re-eval Minutes 65  -TT         Total Minutes    Untimed Charges Total Minutes 65  -TT       Total Minutes 65  -TT                User Key  (r) = Recorded By, (t) = Taken By, (c) = Cosigned By      Initials Name Provider Type    TT Latisha Bernal, PT Physical Therapist                  Therapy Charges for Today       Code Description Service Date Service Provider Modifiers Qty    [id number] HC PT EVAL LOW COMPLEXITY 5 6/10/2025 Latisha Bernal, PT  1            PT G-Codes  Outcome Measure Options: AM-PAC 6 Clicks Basic Mobility (PT)  AM-PAC 6 Clicks Score (PT): 24  AM-PAC 6 Clicks Score (OT): (P) 23  Modified Stacia Scale: 1 - No significant disability despite symptoms.  Able to carry out all usual duties and activities.  PT Discharge Summary  Anticipated Discharge Disposition (PT): home with assist  Reason for Discharge: At baseline function    Latisha Bernal PT  6/10/2025

## 2025-06-10 NOTE — PLAN OF CARE
Goal Outcome Evaluation:  Plan of Care Reviewed With: patient, spouse        Progress: improving (eval; see note for more information)       Anticipated Discharge Disposition (SLP): home    SLP Diagnosis: functional speech/language skills, functional cognitive-linguistic skills (06/10/25 0852)  SLP Diagnosis Comments: Pt presents with functional speech and language skills at this time. Pt and pt's spouse endorse the pt is functioning at baseline at this time. (06/10/25 0852)  SLP Swallowing Diagnosis: swallow WFL/no suspected pharyngeal impairment (06/10/25 0852)

## 2025-06-10 NOTE — CASE MANAGEMENT/SOCIAL WORK
Discharge Planning Assessment  [facility]     Patient Name: Nic Brooks  MRN: [id number]  Today's Date: 6/10/2025    Admit Date: 6/9/2025    Plan: Home with Family   Discharge Needs Assessment       Row Name 06/10/25 1203       Living Environment    People in Home spouse    Name(s) of People in Home Katie Brooks- wife    Current Living Arrangements home    Primary Care Provided by self    Provides Primary Care For no one    Family Caregiver if Needed spouse    Family Caregiver Names Katie Brooks- wife    Able to Return to Prior Arrangements yes       Transition Planning    Patient/Family Anticipates Transition to home with family    Patient/Family Anticipated Services at Transition none       Discharge Needs Assessment    Readmission Within the Last 30 Days no previous admission in last 30 days    Equipment Currently Used at Home cpap    Concerns to be Addressed discharge planning    Current Discharge Risk chronically ill                   Discharge Plan       Row Name 06/10/25 1202       Plan    Plan Home with Family    Patient/Family in Agreement with Plan yes    Plan Comments I have metwtih Mr. Brooks and his spouse at the bedside today to initiate a dischargeplan.  He states that helives in a [facility] home that he shares with is wife, Katie.  he reports independence with activities of daily living and mobility.  He does use a CPAP at night.  He denies use of any additional DME and current receipt of home health/OP services.  I have confirmed that his PCP is Francisco Vincent MD and his insurance is Medicare and [employer].  He denies difficulty affording /obtaining prescription medications.  He anticipates no discharge needs and states that his wife will provide assistance asnd transportation as needed.    Final Discharge Disposition Code 01 - home or self-care                       Demographic Summary       Row Name 06/10/25 1204       General Information    Admission Type inpatient    Arrived From home     Referral Source admission list    Reason for Consult discharge planning    Preferred Language English       Contact Information    Permission Granted to Share Info With                    Functional Status       Row Name 06/10/25 1204       Functional Status, IADL    Medications independent    Meal Preparation independent    Housekeeping independent    Laundry independent    Shopping independent       Employment/    Employment Status retired               Jazmin Jordan, RN

## 2025-06-10 NOTE — PLAN OF CARE
Goal Outcome Evaluation:  Plan of Care Reviewed With: (P) patient, spouse        Progress: (P) no change  Outcome Evaluation: (P) Pt presents below baseline with overall fatigue and decreased activity tolerance. Pt states currently he is 90% at his normal baseline. Pt was eager to ambulate 400ft in the hallway and to return to his active lifestyle. OT signing off, rec DC home w/ assist.    Anticipated Discharge Disposition (OT): (P) home with assist

## 2025-06-11 LAB
ANION GAP SERPL CALCULATED.3IONS-SCNC: 9 MMOL/L (ref 5–15)
BASOPHILS # BLD AUTO: 0.05 10*3/MM3 (ref 0–0.2)
BASOPHILS NFR BLD AUTO: 0.6 % (ref 0–1.5)
BUN SERPL-MCNC: 14.9 MG/DL (ref 8–23)
BUN/CREAT SERPL: 13.8 (ref 7–25)
CALCIUM SPEC-SCNC: 9.1 MG/DL (ref 8.6–10.5)
CHLORIDE SERPL-SCNC: 105 MMOL/L (ref 98–107)
CO2 SERPL-SCNC: 24 MMOL/L (ref 22–29)
CREAT SERPL-MCNC: 1.08 MG/DL (ref 0.76–1.27)
DEPRECATED RDW RBC AUTO: 50.5 FL (ref 37–54)
EGFRCR SERPLBLD CKD-EPI 2021: 71.1 ML/MIN/1.73
EOSINOPHIL # BLD AUTO: 0.34 10*3/MM3 (ref 0–0.4)
EOSINOPHIL NFR BLD AUTO: 4.1 % (ref 0.3–6.2)
ERYTHROCYTE [DISTWIDTH] IN BLOOD BY AUTOMATED COUNT: 15.1 % (ref 12.3–15.4)
GLUCOSE SERPL-MCNC: 97 MG/DL (ref 65–99)
HCT VFR BLD AUTO: 45.2 % (ref 37.5–51)
HGB BLD-MCNC: 15.5 G/DL (ref 13–17.7)
IMM GRANULOCYTES # BLD AUTO: 0.07 10*3/MM3 (ref 0–0.05)
IMM GRANULOCYTES NFR BLD AUTO: 0.9 % (ref 0–0.5)
LYMPHOCYTES # BLD AUTO: 2.32 10*3/MM3 (ref 0.7–3.1)
LYMPHOCYTES NFR BLD AUTO: 28.3 % (ref 19.6–45.3)
MAGNESIUM SERPL-MCNC: 1.8 MG/DL (ref 1.6–2.4)
MCH RBC QN AUTO: 31.4 PG (ref 26.6–33)
MCHC RBC AUTO-ENTMCNC: 34.3 G/DL (ref 31.5–35.7)
MCV RBC AUTO: 91.5 FL (ref 79–97)
MONOCYTES # BLD AUTO: 1.11 10*3/MM3 (ref 0.1–0.9)
MONOCYTES NFR BLD AUTO: 13.5 % (ref 5–12)
NEUTROPHILS NFR BLD AUTO: 4.31 10*3/MM3 (ref 1.7–7)
NEUTROPHILS NFR BLD AUTO: 52.6 % (ref 42.7–76)
NRBC BLD AUTO-RTO: 0 /100 WBC (ref 0–0.2)
PLATELET # BLD AUTO: 228 10*3/MM3 (ref 140–450)
PMV BLD AUTO: 10.3 FL (ref 6–12)
POTASSIUM SERPL-SCNC: 3.6 MMOL/L (ref 3.5–5.2)
QT INTERVAL: 426 MS
QTC INTERVAL: 432 MS
RBC # BLD AUTO: 4.94 10*6/MM3 (ref 4.14–5.8)
SODIUM SERPL-SCNC: 138 MMOL/L (ref 136–145)
WBC NRBC COR # BLD AUTO: 8.2 10*3/MM3 (ref 3.4–10.8)

## 2025-06-11 PROCEDURE — 99233 SBSQ HOSP IP/OBS HIGH 50: CPT | Performed by: STUDENT IN AN ORGANIZED HEALTH CARE EDUCATION/TRAINING PROGRAM

## 2025-06-11 PROCEDURE — 80048 BASIC METABOLIC PNL TOTAL CA: CPT | Performed by: INTERNAL MEDICINE

## 2025-06-11 PROCEDURE — 83735 ASSAY OF MAGNESIUM: CPT | Performed by: INTERNAL MEDICINE

## 2025-06-11 PROCEDURE — 85025 COMPLETE CBC W/AUTO DIFF WBC: CPT | Performed by: INTERNAL MEDICINE

## 2025-06-11 PROCEDURE — 99232 SBSQ HOSP IP/OBS MODERATE 35: CPT | Performed by: INTERNAL MEDICINE

## 2025-06-11 PROCEDURE — 99232 SBSQ HOSP IP/OBS MODERATE 35: CPT | Performed by: PHYSICIAN ASSISTANT

## 2025-06-11 RX ADMIN — ASPIRIN 81 MG 81 MG: 81 TABLET ORAL at 08:47

## 2025-06-11 RX ADMIN — MUPIROCIN 1 APPLICATION: 20 OINTMENT TOPICAL at 20:54

## 2025-06-11 RX ADMIN — ROSUVASTATIN CALCIUM 40 MG: 20 TABLET, FILM COATED ORAL at 20:54

## 2025-06-11 RX ADMIN — Medication 10 ML: at 08:47

## 2025-06-11 RX ADMIN — Medication 10 ML: at 20:54

## 2025-06-11 RX ADMIN — CLOPIDOGREL BISULFATE 75 MG: 75 TABLET, FILM COATED ORAL at 08:47

## 2025-06-11 RX ADMIN — MUPIROCIN 1 APPLICATION: 20 OINTMENT TOPICAL at 08:47

## 2025-06-11 NOTE — PROGRESS NOTES
Stroke Progress Note       Chief Complaint:  left sided weakness    Subjective    Subjective     Subjective:  The patient is lying down in the bed in NAD.  The son was at the bedside. The patient denies having any more weakness in the left upper and lower extremity.  I discussed with the patient and his son the imaging findings and what could possibly explain his stroke.  We also discussed management plan moving forward.  All patient's questions and concerns were answered.    No other acute complains at this time    Review of Systems   Constitutional: No fatigue      Objective      Temp:  [97.2 °F (36.2 °C)-97.9 °F (36.6 °C)] 97.9 °F (36.6 °C)  Heart Rate:  [55-93] 86  Resp:  [14-18] 18  BP: (102-143)/(77-93) 143/82    Objective    GEN: lying in bed; in NAD  HENT: normocephalic, non-erythematous oropharynx  CV: no LE edema    NEURO:  Mental Status: A&O x 3, interactive, able to follow commands  Speech: Intact Articulation  CN 2-12:  II - PERRLA  III, IV, VI - EOMI  V - Facial sensation intact  VII -no gross facial asymmetry  VIII -the patient is hard hearing  XII - Tongue protrudes midline    Motor: Patient can move all 4 extremities against gravity with no drift appreciated  Sensory: intact light touch throughout  Reflexes: negative Aldridge's sign BL  Coordination: no ataxia with finger-to-nose testing  Gait/Station: deferred     Results Review:    I reviewed the patient's new clinical results.    WBC   Date Value Ref Range Status   06/11/2025 8.20 3.40 - 10.80 10*3/mm3 Final     RBC   Date Value Ref Range Status   06/11/2025 4.94 4.14 - 5.80 10*6/mm3 Final     Hemoglobin   Date Value Ref Range Status   06/11/2025 15.5 13.0 - 17.7 g/dL Final     Hematocrit   Date Value Ref Range Status   06/11/2025 45.2 37.5 - 51.0 % Final     MCV   Date Value Ref Range Status   06/11/2025 91.5 79.0 - 97.0 fL Final     MCH   Date Value Ref Range Status   06/11/2025 31.4 26.6 - 33.0 pg Final     MCHC   Date Value Ref Range Status    06/11/2025 34.3 31.5 - 35.7 g/dL Final     RDW   Date Value Ref Range Status   06/11/2025 15.1 12.3 - 15.4 % Final     RDW-SD   Date Value Ref Range Status   06/11/2025 50.5 37.0 - 54.0 fl Final     MPV   Date Value Ref Range Status   06/11/2025 10.3 6.0 - 12.0 fL Final     Platelets   Date Value Ref Range Status   06/11/2025 228 140 - 450 10*3/mm3 Final     Neutrophil %   Date Value Ref Range Status   06/11/2025 52.6 42.7 - 76.0 % Final     Lymphocyte %   Date Value Ref Range Status   06/11/2025 28.3 19.6 - 45.3 % Final     Monocyte %   Date Value Ref Range Status   06/11/2025 13.5 (H) 5.0 - 12.0 % Final     Eosinophil %   Date Value Ref Range Status   06/11/2025 4.1 0.3 - 6.2 % Final     Basophil %   Date Value Ref Range Status   06/11/2025 0.6 0.0 - 1.5 % Final     Immature Grans %   Date Value Ref Range Status   06/11/2025 0.9 (H) 0.0 - 0.5 % Final     Neutrophils, Absolute   Date Value Ref Range Status   06/11/2025 4.31 1.70 - 7.00 10*3/mm3 Final     Lymphocytes, Absolute   Date Value Ref Range Status   06/11/2025 2.32 0.70 - 3.10 10*3/mm3 Final     Monocytes, Absolute   Date Value Ref Range Status   06/11/2025 1.11 (H) 0.10 - 0.90 10*3/mm3 Final     Eosinophils, Absolute   Date Value Ref Range Status   06/11/2025 0.34 0.00 - 0.40 10*3/mm3 Final     Basophils, Absolute   Date Value Ref Range Status   06/11/2025 0.05 0.00 - 0.20 10*3/mm3 Final     Immature Grans, Absolute   Date Value Ref Range Status   06/11/2025 0.07 (H) 0.00 - 0.05 10*3/mm3 Final     nRBC   Date Value Ref Range Status   06/11/2025 0.0 0.0 - 0.2 /100 WBC Final       Lab Results   Component Value Date    GLUCOSE 97 06/11/2025    BUN 14.9 06/11/2025    CREATININE 1.08 06/11/2025     06/11/2025    K 3.6 06/11/2025     06/11/2025    CALCIUM 9.1 06/11/2025    PROTEINTOT 6.7 12/21/2023    ALBUMIN 4.1 12/21/2023    ALT 22 12/21/2023    AST 24 12/21/2023    ALKPHOS 63 12/21/2023    BILITOT 0.5 12/21/2023    GLOB 2.6 12/21/2023    AGRATIO  1.6 12/21/2023    BCR 13.8 06/11/2025    ANIONGAP 9.0 06/11/2025    EGFR 71.1 06/11/2025     CT Head Without Contrast  Result Date: 6/10/2025  Impression: 1. Stable focal encephalomalacia from old right and left parietal infarcts and right occipital infarct, compared to 6/9/2025 MRI 2. Previously noted small acute right frontal lobe infarct only faintly visible on CT scan. 3. No evidence of intracranial hemorrhage or other clearly new intracranial disease. Electronically Signed: Reinier Beebe MD  6/10/2025 1:34 PM EDT  Workstation ID: [id number]    MRI Brain Without Contrast  Result Date: 6/9/2025  Impression: 1.Small area of restricted diffusion/acute infarct noted within the right frontal lobe. There is associated minimal edema without significant mass effect. 2.Susceptibility artifact noted within the right occipital lobe , most likely represents hemosiderin staining from old hemorrhage. Please consider further evaluation with dedicated head CT to exclude an acute hemorrhage. 3.Additional chronic findings as characterized above Electronically Signed: Alonso De Leon DO  6/9/2025 10:38 PM EDT  Workstation ID: [id number]    XR Chest 1 View  Result Date: 6/9/2025  Blunting of the right costophrenic angle, may be secondary to infiltrate/atelectasis or effusion. Continued follow-up recommended. Images reviewed, interpreted, and dictated by Dr. Darryl Verdugo. Transcribed by Maddie Villegas PA-C.    Results for orders placed during the hospital encounter of 06/09/25    Adult Transthoracic Echo Complete W/ Cont if Necessary Per Protocol (With Agitated Saline)    Interpretation Summary    Left ventricular systolic function is normal. Calculated left ventricular EF = 60.6% Left ventricular ejection fraction appears to be 56 - 60%.    Left ventricular diastolic function is consistent with (grade I) impaired relaxation.    Small patent foramen ovale present.    Saline test results are positive with valsalva manuever.    -[facility] wo  on 6/9/2025 images were personally reviewed and showed an area of hypodensity on the left parietal lobe otherwise no acute ischemic or hemorrhagic stroke.  -CTA of the head and neck images from 6/9/2025 were personally reviewed and showed no flow limiting stenosis or LVO  -MRI brain images from 6/9/2025 were personally reviewed and showed an acute ischemic stroke affecting the right frontal lobe.  On the FLAIR imaging this seems to be evidence of chronic ischemic infarcts affecting the bilateral parieto-occipital lobes  -Transthoracic echocardiogram from 6/9/2025 report was personally reviewed and showed left ventricular ejection fraction of 56 to 60%, no left atrial dilation and small PFO  -Bilateral lower extremity duplex ultrasound from 6/10/2025 was unremarkable  -A1c from 6/9/2025 was 5.8%  -LDL from 69 2025 was 58    Assessment/Plan     75-year-old, , right-handed male with known diagnosis of HTN, HLD, GAUDENCIO (on CPAP), CAD, and GERD who presents as a transfer from [facility] where he was evaluated for acute onset left-sided weakness numbness and facial droop.  Patient had an acute onset of left arm weakness, numbness and facial droop beginning at 1120 this morning.  His initial NIHSS was 2.  OSH CT head was negative for acute abnormality.  He was a candidate for and did receive IV TNK at OSH at 1305.  CT perfusion showed elevated Tmax in > 6 seconds in the left lower temporal region.  CTA head and neck negative for LVO.  Transferred to [facility] for higher level of care     Antiplatelet PTA: Aspirin 81  Anticoagulant PTA: None             #Right frontal lobe acute ischemic stroke s/p TNK  #Concern for cardiac arrhythmias   -Etiology of patient's stroke is cryptogenic.  This could possibly be due to cardioembolic versus ESUS  -[facility] wo on 6/9/2025 images were personally reviewed and showed an area of hypodensity on the left parietal lobe otherwise no acute ischemic or hemorrhagic stroke.  -CTA of  the head and neck images from 6/9/2025 were personally reviewed and showed no flow limiting stenosis or LVO  -MRI brain images from 6/9/2025 were personally reviewed and showed an acute ischemic stroke affecting the right frontal lobe.  On the FLAIR imaging this seems to be evidence of chronic ischemic infarcts affecting the bilateral parieto-occipital lobes  -Transthoracic echocardiogram from 6/9/2025 report was personally reviewed and showed left ventricular ejection fraction of 56 to 60%, no left atrial dilation and small PFO  -Bilateral lower extremity duplex ultrasound from 6/10/2025 was unremarkable  -A1c from 6/9/2025 was 5.8%  -LDL from 69 2025 was 58    Recommendation  -Continue DAPT with aspirin 81 mg and Plavix 75 mg daily for 21 days if 24 hour CTH is negative  -Continue home rosuvastatin 40 mg nightly for secondary stroke prevention.  Target LDL level of less than 70  -Discussed with cardiology ROSALIND to evaluate for any possible cardioembolic source of the stroke  -If cardiac work up remain inconclusive, okay to start Eliquis 5 mg BID and discontinue plavix for secondary stroke prevention  -Target blood pressure goals of normotension  -The patient will need cardiac monitoring upon discharge  -Activity as tolerated, fall risk precautions  -PT/OT/SLP evaluation     #Essential hypertension  - Target blood pressure goals of normotension     #Hyperlipidemia  - As detailed above    Stroke will continue to follow. Please call for any further questions or concerns  =================================  Gilbert Venegas MD, Msc, PhD  Vascular Neurologist  [facility]

## 2025-06-11 NOTE — PROGRESS NOTES
"  [facility]  PROGRESS NOTE    Date of Admission: 6/9/2025  Date of Service: 06/11/25    Primary Care Physician: Francisco Vincent MD    Chief Complaint: f/u CVA  Problem List:   CVA (cerebral vascular accident)      Subjective      Patient is stable neurologically, no recurrent focal deficits. He remains in NSR with frequent atrial and ventricular ectopy.       Objective   Vitals: /78   Pulse 65   Temp 97.2 °F (36.2 °C) (Axillary)   Resp 16   Ht 170.2 cm (67.01\")   Wt 79.9 kg (176 lb 2.4 oz)   SpO2 94%   BMI 27.58 kg/m²     Physical Exam:  GENERAL: Alert, cooperative, in no acute distress.   HEENT: Normocephalic, no jugular venous distention  HEART: Regular rhythm, normal rate, and no murmurs, gallops, or rubs.   LUNGS: No wheezing, rales or rhonchi.  NEUROLOGIC: Per Neuro   EXTREMITIES: No clubbing, cyanosis, or edema noted.     Results:  Results from last 7 days   Lab Units 06/11/25  0544   WBC 10*3/mm3 8.20   HEMOGLOBIN g/dL 15.5   HEMATOCRIT % 45.2   PLATELETS 10*3/mm3 228     Results from last 7 days   Lab Units 06/11/25  0544 06/09/25  2308   SODIUM mmol/L 138 137   POTASSIUM mmol/L 3.6 3.7   CHLORIDE mmol/L 105 102   CO2 mmol/L 24.0 26.0   BUN mg/dL 14.9 13.4   CREATININE mg/dL 1.08 1.10   GLUCOSE mg/dL 97 92      Lab Results   Component Value Date    CHOL 121 06/09/2025    TRIG 123 06/09/2025    HDL 41 06/09/2025    LDL 58 06/09/2025    AST 24 12/21/2023    ALT 22 12/21/2023     Results from last 7 days   Lab Units 06/09/25  2308   HEMOGLOBIN A1C % 5.80*     Results from last 7 days   Lab Units 06/09/25  2308   CHOLESTEROL mg/dL 121   TRIGLYCERIDES mg/dL 123   HDL CHOL mg/dL 41   LDL CHOL mg/dL 58       Results from last 7 days   Lab Units 06/09/25  1256   PROTIME seconds 10.7   INR  1.06   APTT seconds 26.8       Intake/Output Summary (Last 24 hours) at 6/11/2025 0840  Last data filed at 6/11/2025 0500  Gross per 24 hour   Intake 443 ml   Output 1300 ml "   Net -857 ml     I personally reviewed the patient's EKG/Telemetry data    Radiology Data:   CT Head Without Contrast  Result Date: 6/10/2025  Impression: 1. Stable focal encephalomalacia from old right and left parietal infarcts and right occipital infarct, compared to 6/9/2025 MRI 2. Previously noted small acute right frontal lobe infarct only faintly visible on CT scan. 3. No evidence of intracranial hemorrhage or other clearly new intracranial disease. Electronically Signed: Reinier Beebe MD  6/10/2025 1:34 PM EDT  Workstation ID: [id number]    MRI Brain Without Contrast  Result Date: 6/9/2025  Impression: 1.Small area of restricted diffusion/acute infarct noted within the right frontal lobe. There is associated minimal edema without significant mass effect. 2.Susceptibility artifact noted within the right occipital lobe , most likely represents hemosiderin staining from old hemorrhage. Please consider further evaluation with dedicated head CT to exclude an acute hemorrhage. 3.Additional chronic findings as characterized above Electronically Signed: Alonso De Leon DO  6/9/2025 10:38 PM EDT  Workstation ID: [id number]    XR Chest 1 View  Result Date: 6/9/2025  Blunting of the right costophrenic angle, may be secondary to infiltrate/atelectasis or effusion. Continued follow-up recommended. Images reviewed, interpreted, and dictated by Dr. Darryl Verdugo. Transcribed by Maddie Villegas PA-C.    CTA NECK / BRAIN STROKE PROTOCOL  Result Date: 6/9/2025  1. No carotid or vertebral stenosis/dissection. 2. There is an 8 mm noncalcified nodule in the left upper lobe on image #56. This is indeterminate. Recommend follow-up with pulmonary and a short-term 3 month follow-up diagnostic chest CT based on Fleischner Society guidelines. 3. Degenerative changes of the cervical spine. CT ANGIOGRAPHY HEAD HISTORY: Left facial droop, slurred speech COMPARISON: None. TECHNIQUE: Thin section axial images through the brain were performed  "following the administration of intravenous contrast. Coronal and sagittal 3-D MIP images were performed and reviewed. This study was performed with techniques to keep radiation doses as low as reasonably achievable, (ALARA). Individualized dose reduction techniques using automated exposure control or adjustment of mA and/or kV according to the patient size were employed. FINDINGS: The distal internal carotid arteries are unremarkable. There are minimal calcifications in the cavernous carotid arteries with no stenosis. The left A1 segment is hypoplastic. The anterior cerebral arteries are otherwise unremarkable. The anterior communicating artery is normal. There is no stenosis or aneurysm. The middle cerebral arteries have normal enhancement with no stenosis or aneurysm. There is symmetrical branching enhancement. The basilar artery is normal in caliber. The posterior cerebral artery gives rise to the right posterior cerebral artery. The left P1 segment is hypoplastic. There is a posterior communicating artery supplying the left posterior cerebral artery. There is no posterior fossa circulation stenosis or aneurysm. The dural venous sinuses have normal enhancement. IMPRESSION: No intracranial stenosis or aneurysm. Images reviewed, interpreted, dictated and electronically signed by Jaiden Egan MD Voice transcription technology (Power Scribe) is used for the dictation of this note and \"sound-alike\" words might be erroneously placed despite reviewing this note for accuracy. Errors in dictation may reflect use of voice recognition software and not all errors in transcription may have been detected prior to signing.    CT Head Without Contrast  Result Date: 6/9/2025  There is no large vessel occlusion. There is questionable 5 mL of ischemia in the left parietal region which may be artifactual. There is hypoperfusion in the cerebellum Tmax of less than 6 seconds. This could be artifactual. This does not correspond to any " "abnormality CTA Head examination. Images reviewed, interpreted, dictated and electronically signed by Jaiden Egan MD Voice transcription technology (Power Swiftypee) is used for the dictation of this note and \"sound-alike\" words might be erroneously placed despite reviewing this note for accuracy. Errors in dictation may reflect use of voice recognition software and not all errors in transcription may have been detected prior to signing.    Results for orders placed during the hospital encounter of 06/09/25    Adult Transthoracic Echo Complete W/ Cont if Necessary Per Protocol (With Agitated Saline)    Interpretation Summary    Left ventricular systolic function is normal. Calculated left ventricular EF = 60.6% Left ventricular ejection fraction appears to be 56 - 60%.    Left ventricular diastolic function is consistent with (grade I) impaired relaxation.    Small patent foramen ovale present.    Saline test results are positive with valsalva manuever.    BLE venous duplex 6/10/25:  Interpretation Summary         Normal bilateral lower extremity venous duplex scan.    Current Medications:  aspirin, 81 mg, Oral, Daily  clopidogrel, 75 mg, Oral, Daily  mupirocin, 1 Application, Each Nare, BID  rosuvastatin, 40 mg, Oral, Nightly  sodium chloride, 10 mL, Intravenous, Q12H         Assessment and Plan:     1. Acute right frontal CVA  - CTA head and neck with no intracranial or carotid/vertebral stenosis   - CT head with old bilateral parietal and occipital infarcts  - Echo with normal EF, small PFO with Valsalva maneuver, unlikely source of CVA. RoPE score of 4   - high probability for occult Afib, atrial arrhythmia noted on home monitoring device.  - Neurology following, has been started on DAPT with ASA, Plavix   - plan for ROSALIND and Loop monitor implant tomorrow with Dr. Rodriguez      2. CAD  - no angina  - continue ASA, statin      3. Frequent PVCs  - Patient has upcoming appt with Dr. Méndez next week on 6/18, we will " keep that appointment for him.  Overall he is asymptomatic therefore defer beta-blocker at this time average heart rate usually runs 60-70 which is why he does not been started in the past.     4. Hypertension   - controlled     5. Hyperlipidemia   - LDL 58 on recent labs, at target  - continue home Rosuvastatin 40mg        Electronically signed by Letha Arenas PA-C, 06/11/25, 8:45 AM EDT.

## 2025-06-11 NOTE — PROGRESS NOTES
Intensive Care Follow-up     Hospital:  LOS: 2 days   Mr. iNc Brooks, 75 y.o. male is followed for:   CVA (cerebral vascular accident)        Subjective     Nic Brooks is a 75 y.o. male with past medical history significant for hypertension, hyperlipidemia, GAUDENCIO uses CPAP, coronary artery disease, and GERD. Patient presented to [facility] with complaints of acute onset left-sided weakness, numbness, and facial droop that began around 1120 this morning. Per chart, he had an initial NIH of 2. CT head is negative for acute abnormality. He was a candidate for TNK which was administered at 1305. CTA head and neck is negative for LVO. CT perfusion showed elevated Tmax in > 6 seconds in the left lower temporal region. Patient is transferred to [facility] for higher level of care.       Time spent 5 minutes   Electronically signed by Sana Huitron PA-C, 06/09/25, 2:52 PM EDT.      The patient arrived from the outside facility via ground ambulance.  He states that he is feeling much better.  He states that earlier today he was going to get groceries from his car and his left arm felt like rubber and he could not use it properly.  In addition, he had some slurred speech and expressive aphasia with facial droop.  He was given tenecteplase as mentioned above.  He states that he has been under evaluation by Dr. Hernandez as well as Dr. Méndez at our facility for underlying coronary disease as well as recent palpitations and frequent PVCs.  He has been on a daily aspirin.  He has not noticed an increase in palpitations or flutters in his chest.  He denies any previous symptoms such as limb weakness, headaches, or visual change.  He denies neck pain.  He feels that his symptoms have completely resolved since tenecteplase.     Of note, last known well was approximately 11:20 AM today.  Interval History:  The chart has been reviewed.  The patient states that he feels that he is doing very well today no new  "reports of focal weakness, headaches, or visual change.  Eating and drinking well.    The patient's past medical, surgical and social history were reviewed and updated in Epic as appropriate.        Objective     Infusions:     Medications:  aspirin, 81 mg, Oral, Daily  clopidogrel, 75 mg, Oral, Daily  mupirocin, 1 Application, Each Nare, BID  rosuvastatin, 40 mg, Oral, Nightly  sodium chloride, 10 mL, Intravenous, Q12H        Vital Sign Min/Max for last 24 hours  Temp  Min: 97 °F (36.1 °C)  Max: 97.6 °F (36.4 °C)   BP  Min: 96/77  Max: 132/88   Pulse  Min: 55  Max: 93   Resp  Min: 14  Max: 18   SpO2  Min: 91 %  Max: 96 %   No data recorded       Input/Output for last 24 hour shift  06/10 0701 - 06/11 0700  In: 443 [P.O.:443]  Out: 1300 [Urine:1300]      Objective:  General Appearance:  Comfortable, well-appearing and in no acute distress.    Vital signs: (most recent): Blood pressure 132/88, pulse 76, temperature 97.6 °F (36.4 °C), temperature source Oral, resp. rate 18, height 170.2 cm (67.01\"), weight 79.9 kg (176 lb 2.4 oz), SpO2 93%.    HEENT: Normal HEENT exam.    Lungs:  Normal effort and normal respiratory rate.  Breath sounds clear to auscultation.  He is not in respiratory distress.    Heart: Normal rate.  Irregular rhythm.  S1 normal and S2 normal.  No murmur.   Chest: Symmetric chest wall expansion.   Abdomen: Abdomen is soft and non-distended.  Bowel sounds are normal.   There is no abdominal tenderness.     Extremities: Normal range of motion.    Neurological: Patient is alert and oriented to person, place and time.  Normal strength.    Pupils:  Pupils are equal, round, and reactive to light.  Pupils are equal.   Skin:  Warm.                Results from last 7 days   Lab Units 06/11/25  0544   WBC 10*3/mm3 8.20   HEMOGLOBIN g/dL 15.5   PLATELETS 10*3/mm3 228     Results from last 7 days   Lab Units 06/11/25  0544 06/09/25  2308   SODIUM mmol/L 138 137   POTASSIUM mmol/L 3.6 3.7   CO2 mmol/L 24.0 26.0 "   BUN mg/dL 14.9 13.4   CREATININE mg/dL 1.08 1.10   MAGNESIUM mg/dL 1.8 1.9   PHOSPHORUS mg/dL  --  2.6   GLUCOSE mg/dL 97 92     Estimated Creatinine Clearance: 58.9 mL/min (by C-G formula based on SCr of 1.08 mg/dL).            I reviewed the patient's results and images.     Assessment & Plan   Impression      Acute CVA status post tenecteplase therapy  Patent foramen ovale  Frequent ectopy       Plan        Continue with current dual antiplatelet therapy.  Consideration for full anticoagulation to be determined.  Plan is for transesophageal echocardiography tomorrow as well as a replacement.  Potentially discharge after that.  We will monitor in the ICU today to help facilitate this.  Continue to mobilize as tolerated.      Plan of care and goals reviewed with mulitdisciplinary/antibiotic stewardship team during rounds.   I discussed the patient's findings and my recommendations with patient, family, nursing staff, and consulting provider       Paco Meng MD, [facility]  Pulmonology and Critical Care Medicine

## 2025-06-12 ENCOUNTER — APPOINTMENT (OUTPATIENT)
Dept: CARDIOLOGY | Facility: HOSPITAL | Age: 75
End: 2025-06-12
Payer: MEDICARE

## 2025-06-12 ENCOUNTER — READMISSION MANAGEMENT (OUTPATIENT)
Dept: CALL CENTER | Facility: HOSPITAL | Age: 75
End: 2025-06-12
Payer: MEDICARE

## 2025-06-12 VITALS
HEIGHT: 67 IN | SYSTOLIC BLOOD PRESSURE: 126 MMHG | TEMPERATURE: 97.6 F | RESPIRATION RATE: 14 BRPM | OXYGEN SATURATION: 92 % | WEIGHT: 176.15 LBS | DIASTOLIC BLOOD PRESSURE: 88 MMHG | BODY MASS INDEX: 27.65 KG/M2 | HEART RATE: 82 BPM

## 2025-06-12 LAB
ANION GAP SERPL CALCULATED.3IONS-SCNC: 8 MMOL/L (ref 5–15)
ASCENDING AORTA: 3.9 CM
BASOPHILS # BLD AUTO: 0.04 10*3/MM3 (ref 0–0.2)
BASOPHILS NFR BLD AUTO: 0.4 % (ref 0–1.5)
BUN SERPL-MCNC: 15.1 MG/DL (ref 8–23)
BUN/CREAT SERPL: 13.9 (ref 7–25)
CALCIUM SPEC-SCNC: 8.8 MG/DL (ref 8.6–10.5)
CHLORIDE SERPL-SCNC: 103 MMOL/L (ref 98–107)
CO2 SERPL-SCNC: 24 MMOL/L (ref 22–29)
CREAT SERPL-MCNC: 1.09 MG/DL (ref 0.76–1.27)
DEPRECATED RDW RBC AUTO: 49.1 FL (ref 37–54)
EGFRCR SERPLBLD CKD-EPI 2021: 70.3 ML/MIN/1.73
EOSINOPHIL # BLD AUTO: 0.24 10*3/MM3 (ref 0–0.4)
EOSINOPHIL NFR BLD AUTO: 2.6 % (ref 0.3–6.2)
ERYTHROCYTE [DISTWIDTH] IN BLOOD BY AUTOMATED COUNT: 14.8 % (ref 12.3–15.4)
GLUCOSE SERPL-MCNC: 104 MG/DL (ref 65–99)
HCT VFR BLD AUTO: 44.8 % (ref 37.5–51)
HGB BLD-MCNC: 15.3 G/DL (ref 13–17.7)
IMM GRANULOCYTES # BLD AUTO: 0.02 10*3/MM3 (ref 0–0.05)
IMM GRANULOCYTES NFR BLD AUTO: 0.2 % (ref 0–0.5)
LYMPHOCYTES # BLD AUTO: 2.17 10*3/MM3 (ref 0.7–3.1)
LYMPHOCYTES NFR BLD AUTO: 23.4 % (ref 19.6–45.3)
MAGNESIUM SERPL-MCNC: 1.8 MG/DL (ref 1.6–2.4)
MCH RBC QN AUTO: 31.2 PG (ref 26.6–33)
MCHC RBC AUTO-ENTMCNC: 34.2 G/DL (ref 31.5–35.7)
MCV RBC AUTO: 91.4 FL (ref 79–97)
MONOCYTES # BLD AUTO: 1.29 10*3/MM3 (ref 0.1–0.9)
MONOCYTES NFR BLD AUTO: 13.9 % (ref 5–12)
NEUTROPHILS NFR BLD AUTO: 5.5 10*3/MM3 (ref 1.7–7)
NEUTROPHILS NFR BLD AUTO: 59.5 % (ref 42.7–76)
NRBC BLD AUTO-RTO: 0 /100 WBC (ref 0–0.2)
PLATELET # BLD AUTO: 237 10*3/MM3 (ref 140–450)
PMV BLD AUTO: 10.5 FL (ref 6–12)
POTASSIUM SERPL-SCNC: 3.6 MMOL/L (ref 3.5–5.2)
RBC # BLD AUTO: 4.9 10*6/MM3 (ref 4.14–5.8)
SODIUM SERPL-SCNC: 135 MMOL/L (ref 136–145)
WBC NRBC COR # BLD AUTO: 9.26 10*3/MM3 (ref 3.4–10.8)

## 2025-06-12 PROCEDURE — 99233 SBSQ HOSP IP/OBS HIGH 50: CPT | Performed by: NURSE PRACTITIONER

## 2025-06-12 PROCEDURE — 25010000002 MIDAZOLAM PER 1 MG

## 2025-06-12 PROCEDURE — 93312 ECHO TRANSESOPHAGEAL: CPT | Performed by: INTERNAL MEDICINE

## 2025-06-12 PROCEDURE — 93320 DOPPLER ECHO COMPLETE: CPT | Performed by: INTERNAL MEDICINE

## 2025-06-12 PROCEDURE — 80048 BASIC METABOLIC PNL TOTAL CA: CPT | Performed by: INTERNAL MEDICINE

## 2025-06-12 PROCEDURE — 25010000002 FENTANYL CITRATE (PF) 50 MCG/ML SOLUTION

## 2025-06-12 PROCEDURE — 0JH632Z INSERTION OF MONITORING DEVICE INTO CHEST SUBCUTANEOUS TISSUE AND FASCIA, PERCUTANEOUS APPROACH: ICD-10-PCS | Performed by: INTERNAL MEDICINE

## 2025-06-12 PROCEDURE — 99152 MOD SED SAME PHYS/QHP 5/>YRS: CPT | Performed by: INTERNAL MEDICINE

## 2025-06-12 PROCEDURE — 99232 SBSQ HOSP IP/OBS MODERATE 35: CPT | Performed by: INTERNAL MEDICINE

## 2025-06-12 PROCEDURE — 93319 3D ECHO IMG CGEN CAR ANOMAL: CPT | Performed by: INTERNAL MEDICINE

## 2025-06-12 PROCEDURE — 25010000002 FENTANYL CITRATE (PF) 50 MCG/ML SOLUTION: Performed by: INTERNAL MEDICINE

## 2025-06-12 PROCEDURE — 93312 ECHO TRANSESOPHAGEAL: CPT

## 2025-06-12 PROCEDURE — 93320 DOPPLER ECHO COMPLETE: CPT

## 2025-06-12 PROCEDURE — 93319 3D ECHO IMG CGEN CAR ANOMAL: CPT

## 2025-06-12 PROCEDURE — 99152 MOD SED SAME PHYS/QHP 5/>YRS: CPT

## 2025-06-12 PROCEDURE — 83735 ASSAY OF MAGNESIUM: CPT | Performed by: INTERNAL MEDICINE

## 2025-06-12 PROCEDURE — 33285 INSJ SUBQ CAR RHYTHM MNTR: CPT | Performed by: INTERNAL MEDICINE

## 2025-06-12 PROCEDURE — 85025 COMPLETE CBC W/AUTO DIFF WBC: CPT | Performed by: INTERNAL MEDICINE

## 2025-06-12 PROCEDURE — 25010000002 MIDAZOLAM PER 1 MG: Performed by: INTERNAL MEDICINE

## 2025-06-12 RX ORDER — ETOMIDATE 2 MG/ML
0.1 INJECTION INTRAVENOUS ONCE AS NEEDED
Status: DISCONTINUED | OUTPATIENT
Start: 2025-06-12 | End: 2025-06-12

## 2025-06-12 RX ORDER — MIDAZOLAM HYDROCHLORIDE 1 MG/ML
2 INJECTION, SOLUTION INTRAMUSCULAR; INTRAVENOUS ONCE
Status: COMPLETED | OUTPATIENT
Start: 2025-06-12 | End: 2025-06-12

## 2025-06-12 RX ORDER — FENTANYL CITRATE 50 UG/ML
50 INJECTION, SOLUTION INTRAMUSCULAR; INTRAVENOUS ONCE
Refills: 0 | Status: COMPLETED | OUTPATIENT
Start: 2025-06-12 | End: 2025-06-12

## 2025-06-12 RX ORDER — MIDAZOLAM HYDROCHLORIDE 1 MG/ML
INJECTION, SOLUTION INTRAMUSCULAR; INTRAVENOUS
Status: DISCONTINUED | OUTPATIENT
Start: 2025-06-12 | End: 2025-06-12 | Stop reason: HOSPADM

## 2025-06-12 RX ORDER — FENTANYL CITRATE 50 UG/ML
INJECTION, SOLUTION INTRAMUSCULAR; INTRAVENOUS
Status: COMPLETED
Start: 2025-06-12 | End: 2025-06-12

## 2025-06-12 RX ORDER — MIDAZOLAM HYDROCHLORIDE 1 MG/ML
INJECTION, SOLUTION INTRAMUSCULAR; INTRAVENOUS
Status: COMPLETED
Start: 2025-06-12 | End: 2025-06-12

## 2025-06-12 RX ORDER — FENTANYL CITRATE 50 UG/ML
INJECTION, SOLUTION INTRAMUSCULAR; INTRAVENOUS
Status: DISCONTINUED | OUTPATIENT
Start: 2025-06-12 | End: 2025-06-12 | Stop reason: HOSPADM

## 2025-06-12 RX ORDER — POTASSIUM CHLORIDE 1500 MG/1
40 TABLET, EXTENDED RELEASE ORAL EVERY 4 HOURS
Status: COMPLETED | OUTPATIENT
Start: 2025-06-12 | End: 2025-06-12

## 2025-06-12 RX ADMIN — FENTANYL CITRATE 50 MCG: 50 INJECTION, SOLUTION INTRAMUSCULAR; INTRAVENOUS at 13:29

## 2025-06-12 RX ADMIN — POTASSIUM CHLORIDE 40 MEQ: 1500 TABLET, EXTENDED RELEASE ORAL at 11:12

## 2025-06-12 RX ADMIN — FENTANYL CITRATE 25 MCG: 50 INJECTION, SOLUTION INTRAMUSCULAR; INTRAVENOUS at 13:33

## 2025-06-12 RX ADMIN — Medication 10 ML: at 08:19

## 2025-06-12 RX ADMIN — CLOPIDOGREL BISULFATE 75 MG: 75 TABLET, FILM COATED ORAL at 08:19

## 2025-06-12 RX ADMIN — MIDAZOLAM HYDROCHLORIDE 2 MG: 1 INJECTION, SOLUTION INTRAMUSCULAR; INTRAVENOUS at 13:30

## 2025-06-12 RX ADMIN — ASPIRIN 81 MG 81 MG: 81 TABLET ORAL at 08:19

## 2025-06-12 RX ADMIN — MIDAZOLAM HYDROCHLORIDE 1 MG: 1 INJECTION, SOLUTION INTRAMUSCULAR; INTRAVENOUS at 13:32

## 2025-06-12 RX ADMIN — POTASSIUM CHLORIDE 40 MEQ: 1500 TABLET, EXTENDED RELEASE ORAL at 06:08

## 2025-06-12 RX ADMIN — MUPIROCIN 1 APPLICATION: 20 OINTMENT TOPICAL at 08:19

## 2025-06-12 NOTE — DISCHARGE SUMMARY
DISCHARGE SUMMARY       Patient name: Nic Brooks  CSN: [id number]  MRN: [id number]  : 1950    Date of Admission: 2025  Date of Discharge:  2025    Admitting Physician:  Paco Meng MD   Primary Care Provider: Francisco Vincent MD  Consultations:   Darryl Hernandez MD  Cardiology  Gilbert Venegas MD  Stroke Neurology    Admission Diagnosis:   CVA    Discharge Diagnoses:     CVA (cerebral vascular accident)    Procedures:   1638 LOOP INSERTION    Imaging:  CT Head Without Contrast  Result Date: 6/10/2025  Impression: 1. Stable focal encephalomalacia from old right and left parietal infarcts and right occipital infarct, compared to 2025 MRI 2. Previously noted small acute right frontal lobe infarct only faintly visible on CT scan. 3. No evidence of intracranial hemorrhage or other clearly new intracranial disease. Electronically Signed: Reinier Beebe MD  6/10/2025 1:34 PM EDT  Workstation ID: [id number]    MRI Brain Without Contrast  Result Date: 2025  Impression: 1.Small area of restricted diffusion/acute infarct noted within the right frontal lobe. There is associated minimal edema without significant mass effect. 2.Susceptibility artifact noted within the right occipital lobe , most likely represents hemosiderin staining from old hemorrhage. Please consider further evaluation with dedicated head CT to exclude an acute hemorrhage. 3.Additional chronic findings as characterized above Electronically Signed: Alonso De Leon DO  2025 10:38 PM EDT  Workstation ID: [id number]    XR Chest 1 View  Result Date: 2025  Blunting of the right costophrenic angle, may be secondary to infiltrate/atelectasis or effusion. Continued follow-up recommended. Images reviewed, interpreted, and dictated by Dr. Darryl Verdugo. Transcribed by Maddie Villegas PA-C.    CTA NECK / BRAIN STROKE PROTOCOL  Result Date: 2025  1. No carotid or vertebral stenosis/dissection. 2. There is an 8 mm  "noncalcified nodule in the left upper lobe on image #56. This is indeterminate. Recommend follow-up with pulmonary and a short-term 3 month follow-up diagnostic chest CT based on Fleischner Society guidelines. 3. Degenerative changes of the cervical spine. CT ANGIOGRAPHY HEAD HISTORY: Left facial droop, slurred speech COMPARISON: None. TECHNIQUE: Thin section axial images through the brain were performed following the administration of intravenous contrast. Coronal and sagittal 3-D MIP images were performed and reviewed. This study was performed with techniques to keep radiation doses as low as reasonably achievable, (ALARA). Individualized dose reduction techniques using automated exposure control or adjustment of mA and/or kV according to the patient size were employed. FINDINGS: The distal internal carotid arteries are unremarkable. There are minimal calcifications in the cavernous carotid arteries with no stenosis. The left A1 segment is hypoplastic. The anterior cerebral arteries are otherwise unremarkable. The anterior communicating artery is normal. There is no stenosis or aneurysm. The middle cerebral arteries have normal enhancement with no stenosis or aneurysm. There is symmetrical branching enhancement. The basilar artery is normal in caliber. The posterior cerebral artery gives rise to the right posterior cerebral artery. The left P1 segment is hypoplastic. There is a posterior communicating artery supplying the left posterior cerebral artery. There is no posterior fossa circulation stenosis or aneurysm. The dural venous sinuses have normal enhancement. IMPRESSION: No intracranial stenosis or aneurysm. Images reviewed, interpreted, dictated and electronically signed by Jaiden Egna MD Voice transcription technology (Power Scribe) is used for the dictation of this note and \"sound-alike\" words might be erroneously placed despite reviewing this note for accuracy. Errors in dictation may reflect use of voice " "recognition software and not all errors in transcription may have been detected prior to signing.    CT Head Without Contrast  Result Date: 6/9/2025  There is no large vessel occlusion. There is questionable 5 mL of ischemia in the left parietal region which may be artifactual. There is hypoperfusion in the cerebellum Tmax of less than 6 seconds. This could be artifactual. This does not correspond to any abnormality CTA Head examination. Images reviewed, interpreted, dictated and electronically signed by Jaiden Egan MD Voice transcription technology (Power Scribe) is used for the dictation of this note and \"sound-alike\" words might be erroneously placed despite reviewing this note for accuracy. Errors in dictation may reflect use of voice recognition software and not all errors in transcription may have been detected prior to signing.      History of Present Illness:  Nic Brooks is a 75 y.o. male with past medical history significant for hypertension, hyperlipidemia, GAUDENCIO uses CPAP, coronary artery disease, and GERD. Patient presented to [facility] with complaints of acute onset left-sided weakness, numbness, and facial droop that began around 1120 this morning. Per chart, he had an initial NIH of 2. CT head is negative for acute abnormality. He was a candidate for TNK which was administered at 1305. CTA head and neck is negative for LVO. CT perfusion showed elevated Tmax in > 6 seconds in the left lower temporal region. Patient is transferred to [facility] for higher level of care.    (End of copied text).     Hospital Course:  Patient admitted to the ICU 2* the above listed problems in the HPI. Neuro deficits improved. Cardiology consulted for further work-up in light of home monitoring device noting atrial arrhythmia and new stroke ECHO showed normal EF with small PFO but felt unlikely to be source of CVA.     ROSALIND showed LVEF 56-60% without evidence of left atrial thrombus present. Small PFO noted " "with right to left shunting present.  Loop recorder inserted on 6/12. Plan is for f/u with [facility] as scheduled next week.    It has been determined by all primary and consulting parties that the patient has reached the maximum benefit of his inpatient stay and is ready for d/c home today. He is hemodynamically stable and tolerating PO intake. Stroke Team and Cardiology's orders are reflected below. They wished to initiate Xarelto at d/c with ASA. PT/OT evaluated and recommended home with assist.     Vitals:  /88   Pulse 82   Temp 97.6 °F (36.4 °C) (Oral)   Resp 14   Ht 170.2 cm (67.01\")   Wt 79.9 kg (176 lb 2.4 oz)   SpO2 92%   BMI 27.58 kg/m²     Objective:  General Appearance:  Comfortable, well-appearing and in no acute distress.     HEENT: Normal HEENT exam.    Lungs:  Normal effort and normal respiratory rate.  Breath sounds clear to auscultation.  He is not in respiratory distress.    Heart: Normal rate.  Irregular rhythm.  S1 normal and S2 normal.  No murmur. (Frequent ectopy by monitor)  Chest: Symmetric chest wall expansion.   Abdomen: Abdomen is soft and non-distended.  Bowel sounds are normal.   There is no abdominal tenderness.     Extremities: Normal range of motion.    Neurological: Patient is alert and oriented to person, place and time.  Normal strength.    Pupils:  Pupils are equal, round, and reactive to light.  Pupils are equal.   Skin:  Warm.      Labs:  Results from last 7 days   Lab Units 06/12/25  0411   WBC 10*3/mm3 9.26   HEMOGLOBIN g/dL 15.3   HEMATOCRIT % 44.8   PLATELETS 10*3/mm3 237     Results from last 7 days   Lab Units 06/12/25  0411   SODIUM mmol/L 135*   POTASSIUM mmol/L 3.6   CHLORIDE mmol/L 103   CO2 mmol/L 24.0   BUN mg/dL 15.1   CREATININE mg/dL 1.09   CALCIUM mg/dL 8.8   GLUCOSE mg/dL 104*         Magnesium   Date Value Ref Range Status   06/12/2025 1.8 1.6 - 2.4 mg/dL Final   06/11/2025 1.8 1.6 - 2.4 mg/dL Final   06/09/2025 1.9 1.6 - 2.4 mg/dL Final "     Phosphorus   Date Value Ref Range Status   06/09/2025 2.6 2.5 - 4.5 mg/dL Final           Discharge Medications:     Discharge Medications        New Medications        Instructions Start Date   rivaroxaban 20 MG tablet  Commonly known as: XARELTO   20 mg, Oral, Daily With Dinner             Continue These Medications        Instructions Start Date   aspirin 81 MG EC tablet   81 mg, Oral, Daily      coenzyme Q10 100 MG capsule   100 mg, Daily      Cyanocobalamin 1000 MCG/ML kit   Every 30 Days      rosuvastatin 40 MG tablet  Commonly known as: CRESTOR   40 mg, Oral, Daily      vitamin B-6 50 MG tablet  Commonly known as: PYRIDOXINE   50 mg, Daily             Stop These Medications      chlorthalidone 25 MG tablet  Commonly known as: HYGROTON                Activity at Discharge:    Activity Instructions       Activity as Tolerated              Follow-up Appointments  Future Appointments   Date Time Provider Department Center   6/18/2025  9:45 AM Duarte Méndez MD [facility]   7/24/2025  1:00 PM [facility] STRO[facility]   10/28/2025 10:15 AM Maureen Steele APRN [facility]   2/12/2026 10:20 AM Letha Arenas PA-C [facility]   3/11/2026  9:30 AM Arturo Hutson PA [facility]     Additional Instructions for the Follow-ups that You Need to Schedule       Ambulatory Referral to Neurology   As directed      4-6 weeks    Discharge Follow-up with PCP   As directed       Currently Documented PCP:    Francisco Vincent MD    PCP Phone Number:    [phone]     Follow Up Details: 1 week                Discharge Instructions:  D/c home today  Follow-ups as above  Medication changes as above  F/u PCP / Cardiology next week for BP check  Check BP twice daily  Further d/c instructions as above  Please call 911 or proceed to nearest ED if symptoms worsen or reoccur    Current Code Status       Date Active Code Status Order ID Comments User Context       Prior              Pamela Angel, MSN, APRN, [facility]  Pulmonary and Critical Care Medicine  06/12/25     Time: Discharge 36 min    CC: Francisco Vincent MD    Please note that portions of this note were completed with a voice recognition program.

## 2025-06-12 NOTE — PROGRESS NOTES
[facility] at [facility]  INPATIENT PROGRESS NOTE         [facility] 2B ICU    6/12/2025      PATIENT IDENTIFICATION:   Name:  Nic Brooks      MRN:  [id number]     75 y.o.  male             Reason for visit: Possible cardioembolic stroke, possible A-fib, PVCs, CAD      SUBJECTIVE:   Overall doing better, patient wife and daughter all have multiple questions regarding PFO will not be closed I had a detailed discussion with them regarding pathophysiology of PFO's and strokes and that his rope score is 2 portending a less than 0.1% risk of stroke attributable to PFO, and that A-fib is a more likely source.  They also have multiple questions regarding PVCs, feeling that they may be causing his fatigue.     OBJECTIVE:  Vitals:    06/12/25 0800 06/12/25 0900 06/12/25 1000 06/12/25 1100   BP: 130/96 116/90 (!) 102/36 121/83   BP Location:       Patient Position:       Pulse: 71 72 69 74   Resp: 16  16    Temp: 97.6 °F (36.4 °C)      TempSrc: Oral      SpO2: 94% 93% 93% 94%   Weight:       Height:               Body mass index is 27.58 kg/m².    Intake/Output Summary (Last 24 hours) at 6/12/2025 1243  Last data filed at 6/12/2025 0500  Gross per 24 hour   Intake 325 ml   Output 900 ml   Net -575 ml       Telemetry: Personally reviewed, normal sinus rhythm, with occasional PVCs    Exam:  General Appearance:   well developed  well nourished  Neck:  thyroid not enlarged  supple  Respiratory:  no respiratory distress  normal breath sounds  no rales  Cardiovascular:  no jugular venous distention  regular rhythm  apical impulse normal  S1 normal, S2 normal  no S3, no S4   no murmur  no rub, no thrill  carotid pulses normal; no bruit  pedal pulses normal  lower extremity edema: none    Skin:   warm, dry      No Known Allergies  Scheduled meds:       aspirin, 81 mg, Oral, Daily  clopidogrel, 75 mg, Oral, Daily  mupirocin, 1 Application, Each Nare, BID  rosuvastatin, 40 mg, Oral,  "Nightly  sodium chloride, 10 mL, Intravenous, Q12H      IV meds:                         Data Review:  Results from last 7 days   Lab Units 06/12/25  0411 06/11/25  0544 06/09/25  2308   SODIUM mmol/L 135* 138 137   BUN mg/dL 15.1 14.9 13.4   CREATININE mg/dL 1.09 1.08 1.10   GLUCOSE mg/dL 104* 97 92     Results from last 7 days   Lab Units 06/12/25  0411 06/11/25  0544   WBC 10*3/mm3 9.26 8.20   HEMOGLOBIN g/dL 15.3 15.5     Results from last 7 days   Lab Units 06/09/25  1256   INR  1.06         No results found for: \"DIGOXIN\"   No results found for: \"TSH\"  Results from last 7 days   Lab Units 06/09/25  2308   CHOLESTEROL mg/dL 121   HDL CHOL mg/dL 41       Estimated Creatinine Clearance: 58.4 mL/min (by C-G formula based on SCr of 1.09 mg/dL).        Imaging (last 24 hr):   I personally reviewed the most recent chest x-ray and other pertinent imaging studies.  Results for orders placed in visit on 11/02/24    IMAGING SCANNED        Last ECHO:  Results for orders placed during the hospital encounter of 06/09/25    Adult Transthoracic Echo Complete W/ Cont if Necessary Per Protocol (With Agitated Saline)    Interpretation Summary    Left ventricular systolic function is normal. Calculated left ventricular EF = 60.6% Left ventricular ejection fraction appears to be 56 - 60%.    Left ventricular diastolic function is consistent with (grade I) impaired relaxation.    Small patent foramen ovale present.    Saline test results are positive with valsalva manuever.        PROBLEM LIST:     CVA (cerebral vascular accident)  PVCs  PFO      ASSESSMENT/PLAN:  1. Acute right frontal CVA  Neurology reviewed imaging and it shows multiple bilateral previous strokes  - CTA head and neck with no intracranial or carotid/vertebral stenosis   - CT head with old bilateral parietal and occipital infarcts  - Echo with normal EF, small PFO with Valsalva maneuver, unlikely source of CVA. RoPE score of 2, portending less than 0.1% risk of " stroke being attributable to PFO  - high probability for occult Afib, atrial arrhythmia noted on home monitoring device.  - Neurology following, discussed with neurology, I recommend oral anticoagulation with aspirin at discharge they are agreeable  Patient prefers Xarelto as his wife was is on it and does well with it.  They have concerns regarding easy bruising, I discussed risk and benefits of antiplatelet with oral anticoagulation to prevent strokes in his clinical scenario.  - plan for ROSALIND and Loop monitor implant 6/12/2025 with Dr. Rodriguez     Home with aspirin and Xarelto when okay with neurology     2. CAD  - no angina  - continue ASA, statin      3. Frequent PVCs  - Patient has upcoming appt with Dr. Méndez next week on 6/18, we will keep that appointment for him.  This was requested a second opinion by patient's daughter    No beta-blocker as it caused bradycardia and worsening fatigue in the past  Patient and family very concerned that PVCs are causing his weakness     4. Hypertension   - controlled  BP goals per neurology given recent stroke     5. Hyperlipidemia   - LDL 58 on recent labs, at target  - continue home Rosuvastatin 40mg     Discussed with patient's nurse, and patient's family      Darryl Hernandez MD  6/12/2025    12:43 EDT

## 2025-06-12 NOTE — PROGRESS NOTES
Intensive Care Follow-up     Hospital:  LOS: 3 days   Mr. Nic Brooks, 75 y.o. male is followed for:   CVA (cerebral vascular accident)        Subjective     Nic Brooks is a 75 y.o. male with past medical history significant for hypertension, hyperlipidemia, GAUDENCIO uses CPAP, coronary artery disease, and GERD. Patient presented to [facility] with complaints of acute onset left-sided weakness, numbness, and facial droop that began around 1120 this morning. Per chart, he had an initial NIH of 2. CT head is negative for acute abnormality. He was a candidate for TNK which was administered at 1305. CTA head and neck is negative for LVO. CT perfusion showed elevated Tmax in > 6 seconds in the left lower temporal region. Patient is transferred to [facility] for higher level of care.       Time spent 5 minutes   Electronically signed by Sana Huitron PA-C, 06/09/25, 2:52 PM EDT.      The patient arrived from the outside facility via ground ambulance.  He states that he is feeling much better.  He states that earlier today he was going to get groceries from his car and his left arm felt like rubber and he could not use it properly.  In addition, he had some slurred speech and expressive aphasia with facial droop.  He was given tenecteplase as mentioned above.  He states that he has been under evaluation by Dr. Hernandez as well as Dr. Méndez at our facility for underlying coronary disease as well as recent palpitations and frequent PVCs.  He has been on a daily aspirin.  He has not noticed an increase in palpitations or flutters in his chest.  He denies any previous symptoms such as limb weakness, headaches, or visual change.  He denies neck pain.  He feels that his symptoms have completely resolved since tenecteplase.     Of note, last known well was approximately 11:20 AM on the day of admission  Interval History:  The chart has been reviewed.  The patient is currently n.p.o. in anticipation of  "transesophageal echocardiography.  He states that he is feeling well.  No complaints of new focal weakness, visual change, or headache.      The patient's past medical, surgical and social history were reviewed and updated in Epic as appropriate.        Objective     Infusions:     Medications:  aspirin, 81 mg, Oral, Daily  mupirocin, 1 Application, Each Nare, BID  rosuvastatin, 40 mg, Oral, Nightly  sodium chloride, 10 mL, Intravenous, Q12H        Vital Sign Min/Max for last 24 hours  Temp  Min: 97.6 °F (36.4 °C)  Max: 98.7 °F (37.1 °C)   BP  Min: 93/73  Max: 158/111   Pulse  Min: 59  Max: 99   Resp  Min: 12  Max: 18   SpO2  Min: 91 %  Max: 99 %   No data recorded       Input/Output for last 24 hour shift  06/11 0701 - 06/12 0700  In: 1010 [P.O.:1010]  Out: 900 [Urine:900]      Objective:  General Appearance:  Comfortable, well-appearing and in no acute distress.    Vital signs: (most recent): Blood pressure 108/88, pulse 71, temperature 97.6 °F (36.4 °C), temperature source Oral, resp. rate 14, height 170.2 cm (67.01\"), weight 79.9 kg (176 lb 2.4 oz), SpO2 99%.    HEENT: Normal HEENT exam.    Lungs:  Normal effort and normal respiratory rate.  Breath sounds clear to auscultation.  He is not in respiratory distress.    Heart: Normal rate.  Irregular rhythm.  S1 normal and S2 normal.  No murmur. (Frequent ectopy by monitor)  Chest: Symmetric chest wall expansion.   Abdomen: Abdomen is soft and non-distended.  Bowel sounds are normal.   There is no abdominal tenderness.     Extremities: Normal range of motion.    Neurological: Patient is alert and oriented to person, place and time.  Normal strength.    Pupils:  Pupils are equal, round, and reactive to light.  Pupils are equal.   Skin:  Warm.                Results from last 7 days   Lab Units 06/12/25  0411 06/11/25  0544   WBC 10*3/mm3 9.26 8.20   HEMOGLOBIN g/dL 15.3 15.5   PLATELETS 10*3/mm3 237 228     Results from last 7 days   Lab Units 06/12/25  0411 " 06/11/25  0544 06/09/25  2308   SODIUM mmol/L 135* 138 137   POTASSIUM mmol/L 3.6 3.6 3.7   CO2 mmol/L 24.0 24.0 26.0   BUN mg/dL 15.1 14.9 13.4   CREATININE mg/dL 1.09 1.08 1.10   MAGNESIUM mg/dL 1.8 1.8 1.9   PHOSPHORUS mg/dL  --   --  2.6   GLUCOSE mg/dL 104* 97 92     Estimated Creatinine Clearance: 58.4 mL/min (by C-G formula based on SCr of 1.09 mg/dL).            I reviewed the patient's results and images.     Assessment & Plan   Impression      Acute CVA status post tenecteplase therapy  Patent foramen ovale  Frequent ectopy  Rule out occult atrial fibrillation       Plan        Patient will undergo loop recorder implantation today as well as transesophageal echocardiography.  Will make a decision regarding DOAC anticoagulation prior to discharge.  Continue to mobilize as tolerated.  Potentially home today if everything checks out and we are able to arrange follow-up appropriately.    Plan of care and goals reviewed with mulitdisciplinary/antibiotic stewardship team during rounds.   I discussed the patient's findings and my recommendations with patient, family, and nursing staff       Paco Meng MD, [facility]  Pulmonology and Critical Care Medicine

## 2025-06-12 NOTE — PROGRESS NOTES
Stroke Progress Note       Chief Complaint:  left sided weakness    Subjective    Subjective     Subjective:  Resting in bed. Wife and daughter are present at the bedside.   Family is requesting to speak with Dr. Hernandez prior to ROSALIND and loop placement, I have personally texted him.  He has no complaints at this time.     Review of Systems   Constitutional:  Negative for fatigue.   Gastrointestinal:  Negative for nausea and vomiting.   Musculoskeletal:  Negative for gait problem.   Neurological:  Negative for speech difficulty, weakness and numbness.      Objective      Temp:  [97.6 °F (36.4 °C)-98.7 °F (37.1 °C)] 97.6 °F (36.4 °C)  Heart Rate:  [59-99] 71  Resp:  [12-18] 16  BP: ()/(64-96) 130/96    Objective    GEN: lying in bed; in NAD  HENT: normocephalic, non-erythematous oropharynx  CV: no LE edema    NEURO:  Mental Status: A&O x 3, interactive, able to follow commands  Speech: Intact Articulation  CN 2-12:  II - PERRLA  III, IV, VI - EOMI  V - Facial sensation intact  VII -no gross facial asymmetry  VIII -the patient is hard hearing  XII - Tongue protrudes midline    Motor: Patient can move all 4 extremities against gravity with no drift appreciated  Sensory: intact light touch throughout  Reflexes: negative Aldridge's sign BL  Coordination: no ataxia with finger-to-nose testing  Gait/Station: deferred     Results Review:    I reviewed the patient's new clinical results.    WBC   Date Value Ref Range Status   06/12/2025 9.26 3.40 - 10.80 10*3/mm3 Final     RBC   Date Value Ref Range Status   06/12/2025 4.90 4.14 - 5.80 10*6/mm3 Final     Hemoglobin   Date Value Ref Range Status   06/12/2025 15.3 13.0 - 17.7 g/dL Final     Hematocrit   Date Value Ref Range Status   06/12/2025 44.8 37.5 - 51.0 % Final     MCV   Date Value Ref Range Status   06/12/2025 91.4 79.0 - 97.0 fL Final     MCH   Date Value Ref Range Status   06/12/2025 31.2 26.6 - 33.0 pg Final     MCHC   Date Value Ref Range Status   06/12/2025  34.2 31.5 - 35.7 g/dL Final     RDW   Date Value Ref Range Status   06/12/2025 14.8 12.3 - 15.4 % Final     RDW-SD   Date Value Ref Range Status   06/12/2025 49.1 37.0 - 54.0 fl Final     MPV   Date Value Ref Range Status   06/12/2025 10.5 6.0 - 12.0 fL Final     Platelets   Date Value Ref Range Status   06/12/2025 237 140 - 450 10*3/mm3 Final     Neutrophil %   Date Value Ref Range Status   06/12/2025 59.5 42.7 - 76.0 % Final     Lymphocyte %   Date Value Ref Range Status   06/12/2025 23.4 19.6 - 45.3 % Final     Monocyte %   Date Value Ref Range Status   06/12/2025 13.9 (H) 5.0 - 12.0 % Final     Eosinophil %   Date Value Ref Range Status   06/12/2025 2.6 0.3 - 6.2 % Final     Basophil %   Date Value Ref Range Status   06/12/2025 0.4 0.0 - 1.5 % Final     Immature Grans %   Date Value Ref Range Status   06/12/2025 0.2 0.0 - 0.5 % Final     Neutrophils, Absolute   Date Value Ref Range Status   06/12/2025 5.50 1.70 - 7.00 10*3/mm3 Final     Lymphocytes, Absolute   Date Value Ref Range Status   06/12/2025 2.17 0.70 - 3.10 10*3/mm3 Final     Monocytes, Absolute   Date Value Ref Range Status   06/12/2025 1.29 (H) 0.10 - 0.90 10*3/mm3 Final     Eosinophils, Absolute   Date Value Ref Range Status   06/12/2025 0.24 0.00 - 0.40 10*3/mm3 Final     Basophils, Absolute   Date Value Ref Range Status   06/12/2025 0.04 0.00 - 0.20 10*3/mm3 Final     Immature Grans, Absolute   Date Value Ref Range Status   06/12/2025 0.02 0.00 - 0.05 10*3/mm3 Final     nRBC   Date Value Ref Range Status   06/12/2025 0.0 0.0 - 0.2 /100 WBC Final       Lab Results   Component Value Date    GLUCOSE 104 (H) 06/12/2025    BUN 15.1 06/12/2025    CREATININE 1.09 06/12/2025     (L) 06/12/2025    K 3.6 06/12/2025     06/12/2025    CALCIUM 8.8 06/12/2025    PROTEINTOT 6.7 12/21/2023    ALBUMIN 4.1 12/21/2023    ALT 22 12/21/2023    AST 24 12/21/2023    ALKPHOS 63 12/21/2023    BILITOT 0.5 12/21/2023    GLOB 2.6 12/21/2023    AGRATIO 1.6  12/21/2023    BCR 13.9 06/12/2025    ANIONGAP 8.0 06/12/2025    EGFR 70.3 06/12/2025     CT Head Without Contrast  Result Date: 6/10/2025  Impression: 1. Stable focal encephalomalacia from old right and left parietal infarcts and right occipital infarct, compared to 6/9/2025 MRI 2. Previously noted small acute right frontal lobe infarct only faintly visible on CT scan. 3. No evidence of intracranial hemorrhage or other clearly new intracranial disease. Electronically Signed: Reinier Beebe MD  6/10/2025 1:34 PM EDT  Workstation ID: [id number]    Results for orders placed during the hospital encounter of 06/09/25    Adult Transthoracic Echo Complete W/ Cont if Necessary Per Protocol (With Agitated Saline)    Interpretation Summary    Left ventricular systolic function is normal. Calculated left ventricular EF = 60.6% Left ventricular ejection fraction appears to be 56 - 60%.    Left ventricular diastolic function is consistent with (grade I) impaired relaxation.    Small patent foramen ovale present.    Saline test results are positive with valsalva manuever.    -CTH wo on 6/9/2025 images reviewed and showed an area of hypodensity on the left parietal lobe otherwise no acute ischemic or hemorrhagic stroke.  -CTA of the head and neck images from 6/9/2025 reviewed and showed no flow limiting stenosis or LVO  -MRI brain images from 6/9/2025 reviewed and showed an acute ischemic stroke affecting the right frontal lobe.  On the FLAIR imaging this seems to be evidence of chronic ischemic infarcts affecting the bilateral parieto-occipital lobes  -Transthoracic echocardiogram from 6/9/2025 report reviewed and showed left ventricular ejection fraction of 56 to 60%, no left atrial dilation and small PFO.  -Bilateral lower extremity duplex ultrasound from 6/10/2025 was unremarkable  -A1c from 6/9/2025 5.8%  -LDL from 6/9/2025 58    Assessment/Plan     75-year-old, , right-handed male with known diagnosis of HTN, HLD, GAUDENCIO (on  CPAP), CAD, and GERD who presents as a transfer from [facility] where he was evaluated for acute onset left-sided weakness numbness and facial droop.  Patient had an acute onset of left arm weakness, numbness and facial droop beginning at 1120 this morning.  His initial NIHSS was 2.  OSH CT head was negative for acute abnormality.  He was a candidate for and did receive IV TNK at OSH at 1305.  CT perfusion showed elevated Tmax in > 6 seconds in the left lower temporal region.  CTA head and neck negative for LVO.  Transferred to [facility] for higher level of care.     Antiplatelet PTA: Aspirin 81  Anticoagulant PTA: None    #Right frontal lobe acute ischemic stroke s/p TNK  #Concern for cardiac arrhythmias   -Etiology of patient's stroke is cryptogenic.  This could possibly be due to cardioembolic versus ESUS  Recommendations:  -Continue DAPT with aspirin 81 mg and Plavix 75 mg daily for 21 days   -Continue home rosuvastatin 40 mg nightly for secondary stroke prevention  -LDL this admission 58, Goal <70 for secondary stroke prevention   -TTE showed EF 55% & small PFO, RoPE score 4  -BLE duplex negative   -Cardiology following and plan for ROSALIND today and loop recorder implant today with Dr. Rodriguez   -Potentially transitioning to Eliquis 5mg BID pending cardiac work-up and DC plavix  -Normal blood pressure goals, overall management per Primary Medicine Team   -Activity as tolerated, fall risk precautions  -PT/OT/SLP recommend home with assistance     Stroke Neurology will continue to follow along. Plan discussed with the patient as well as the ICU medicine team as well as the patients family who is present at the bedside. Please call with any questions or concerns.   =================================  SHIVA Villagran  Stroke Neurology  06/09/2025        ADDENDUM:  15:26 EDT    PT had ROSALIND completed and loop recorder placed this afternoon. Plan discussed with Dr. Mendieta. He is appropriate for DC home with  Xarelto and ASA therapies as per Dr. Hernandez recommendations. He will follow up in the stroke clinic in one month & this is ordered. Stroke education completed. Normal BP goals. Please call with any other questions or concerns.

## 2025-06-12 NOTE — PROCEDURES
Indications: Implantable loop recorder placement for detection of occult atrial fibrillation in the setting of a cryptogenic stroke deemed to possibly be cardioembolic by the neurology team.     Procedure description: The left chest wall was prepped and draped in sterile fashion. The fourth intercostal space was identified. The subcutaneous tissue approximately 2 cm from the left sternal border was anesthetized. A small incision was made and then a subcutaneous pocket was created using the insertion tool and an Abbot insertable cardiac monitor was implanted. The device was interrogated and sensing threshold was recorded at approximately 0.69mV. The incision was secured with glue and Steri-strips and covered with Tegaderm.    Abbot Assert-IQ 3+, serial number: [id number]    MAT Rodriguez MD  06/12/25 16:39 EDT

## 2025-06-12 NOTE — DISCHARGE INSTRUCTIONS
Discussed the importance of medication compliance Aspirin 81mg daily, Crestor 40mg nightly, and Xarelto per orders and lifestyle modifications adequate control of blood pressure, adequate control of cholesterol (goal LDL <70), adequate control of glucose (<140, A1c goal <7), increased physical activity, and implementation of healthy diet to help reduce the risk of future cerebrovascular events.  Also discussed the signs symptoms that would warrant the patient return back to the emergency department including unilateral weakness, unilateral numbness, visual disturbances, loss of balance, speech difficulties, and/or a sudden severe headache.  Stroke clinic follow up in one month, 7/24/2025 1pm.

## 2025-06-12 NOTE — CASE MANAGEMENT/SOCIAL WORK
Continued Stay Note  [facility]     Patient Name: Nic Brooks  MRN: [id number]  Today's Date: 6/12/2025    Admit Date: 6/9/2025    Plan: Home with Family   Discharge Plan       Row Name 06/12/25 0910       Plan    Plan Comments Plan remains for pt to discharge home with his wife when ready. PT and OT evaluated pt and both recommended home with assist and signed off as pt didn't require any further therapy services at this time. Pt's wife to provide transportation home to [facility] as well when ready. Case management remains available and following for any discharge needs that arise.                   Discharge Codes    No documentation.                       GREYSON Winn

## 2025-06-13 ENCOUNTER — READMISSION MANAGEMENT (OUTPATIENT)
Dept: CALL CENTER | Facility: HOSPITAL | Age: 75
End: 2025-06-13
Payer: MEDICARE

## 2025-06-13 NOTE — OUTREACH NOTE
Stroke Week 1 Survey      Flowsheet Row Responses   [facility]   Does the patient have one of the following disease processes/diagnoses(primary or secondary)? Stroke   Week 1 attempt successful? Yes   Call start time 1201   Call end time 1213   Discharge diagnosis CVA (cerebral vascular accident)   Person spoke with today (if not patient) and relationship Patient   Medication alerts for this patient Xarelto   Meds reviewed with patient/caregiver? Yes   Is the patient having any side effects they believe may be caused by any medication additions or changes? No   Does the patient have all medications ordered at discharge? Yes   Prescription comments Patient states he has read all of the information regarding Xarelto. He understands the side effects. He states his wife has been on the medication for years. He is continuing Aspirin as directed. No questions or concerns.   Is the patient taking all medications as directed (includes completed medication regime)? Yes   Comments regarding appointments Cardiology f/u appt on 6/18/25 at 9:45 AM with Dr. Duarte Méndez. Patient states he had this appt prior to hospitalization. [facility] appt on 7/24/25 at 1:00 PM. Verified all appts with patient.   Does the patient have a primary care provider?  Yes   Comments regarding PCP PCP--Dr. Francisco Vincent. Wife is scheduing an appt now per patient. Patient states PCP texted his wife every day to check on him while he was in the hospital.   Has the patient kept scheduled appointments due by today? N/A   The Stroke Clinic at [facility] requests you follow up with them within 30 days for important follow up care. Please call [phone] to schedule this appointment. Thank you. Yes  [Patient has an appt with [facility] on 7/24/25 at 1:00 PM.]   Has home health visited the patient within 72 hours of discharge? N/A   Has all DME been delivered? No   Psychosocial  issues? No   Does the patient require any assistance with activities of daily living such as eating, bathing, dressing, walking, etc.? No   ADL comments Patient states he feels very fortunate to have no residual effects from the stroke. He is able to walk, eat and function normally. 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   Does the patient have any residual symptoms from stroke/TIA? No   Does the patient understand the diet ordered at discharge? Yes  [Discussed low cholesterol healthy heart diet. Patient is following diet.]   Did the patient receive a copy of their discharge instructions? Yes   Nursing interventions Reviewed instructions with patient   What is the patient's perception of their health status since discharge? Returned to baseline/stable   Nursing interventions Nurse provided patient education   Is the patient/caregiver able to teach back the risk factors for a stroke? High blood pressure-goal below 120/80, Diabetes, High Cholesterol, Physical inactivity and obesity   Is the patient/caregiver able to teach back signs and symptoms related to disease process for when to call PCP? Yes   Is the patient/caregiver able to teach back signs and symptoms related to disease process for when to call 911? Yes   If the patient is a current smoker, are they able to teach back resources for cessation? Not a smoker   Is the patient/caregiver able to teach back the hierarchy of who to call/visit for symptoms/problems? PCP, Specialist, Home health nurse, Urgent Care, ED, 911 Yes   Additional teach back comments Patient states he has the STROKE book and information he was given at the hospital. He states everything was explained to him very well at discharge.   Is the patient able to teach back FAST for Stroke? B alance: Watch for sudden loss of balance, E yes: Check for vision loss, F ace: Look for an uneven smile, A rm: Check if one arm is weak, S peech: Listen for slurred speech, T alejandro:  Call 9-1-1 right away   Week 1 call completed? Yes   Is the patient interested in additional calls from an ambulatory ? No   Would this patient benefit from a Referral to [facility] Social Work? No   Wrap up additional comments Patient denies any needs or concerns today. Patient very appreciative of the wonderful care he received at [facility] and of the phone call to check on him.   Call end time 1213            Debra ARZATE - Registered Nurse      Debra ARZATE - Registered Nurse

## 2025-06-13 NOTE — OUTREACH NOTE
Prep Survey      Flowsheet Row Responses   [facility] discharged from? [place]   Is LACE score < 7 ? No   Eligibility Readm Mgmt   Discharge diagnosis CVA (cerebral vascular accident)   Does the patient have one of the following disease processes/diagnoses(primary or secondary)? Stroke   Does the patient have Home health ordered? No   Is there a DME ordered? No   Prep survey completed? Yes            JEFFREY SINCLAIR - Registered Nurse

## 2025-06-18 ENCOUNTER — OFFICE VISIT (OUTPATIENT)
Dept: CARDIOLOGY | Facility: CLINIC | Age: 75
End: 2025-06-18
Payer: MEDICARE

## 2025-06-18 VITALS
HEIGHT: 69 IN | HEART RATE: 75 BPM | WEIGHT: 168 LBS | BODY MASS INDEX: 24.88 KG/M2 | OXYGEN SATURATION: 95 % | SYSTOLIC BLOOD PRESSURE: 112 MMHG | DIASTOLIC BLOOD PRESSURE: 78 MMHG

## 2025-06-18 DIAGNOSIS — G47.33 OSA (OBSTRUCTIVE SLEEP APNEA): ICD-10-CM

## 2025-06-18 DIAGNOSIS — I10 PRIMARY HYPERTENSION: ICD-10-CM

## 2025-06-18 DIAGNOSIS — I63.9 CRYPTOGENIC STROKE: Primary | ICD-10-CM

## 2025-06-18 DIAGNOSIS — I49.3 PVC'S (PREMATURE VENTRICULAR CONTRACTIONS): ICD-10-CM

## 2025-06-18 DIAGNOSIS — I25.810 CORONARY ARTERY DISEASE INVOLVING CORONARY BYPASS GRAFT OF NATIVE HEART WITHOUT ANGINA PECTORIS: ICD-10-CM

## 2025-06-18 NOTE — PROGRESS NOTES
Nic Brooks  1950  [phone]    06/18/2025    [facility] CARDIOLOGY     Referring Provider: No ref. provider found     Francisco Vincent MD  [address] [facility] [address]    Chief Complaint   Patient presents with    PVC's (premature ventricular contractions)       Problem List:     PVCs  Holter monitor 9/3/2024 14-day 17% PVCs.  Echocardiogram 6/12/2025 ROSALIND LVEF 55 to 60% no left atrial thrombus noted.  Chicken wing morphology of the left atrial appendage.  No significant valvular heart disease.  Mild dilatation of the proximal aorta.  Aortic root calcification noted.  Hypertension  CAD  Left heart catheterization 1/19/2018 status post PTCA and stenting of a mid/proximal LAD lesion.  Left heart catheterization 12/22/2023 nonobstructive CAD and a right dominant system.  Widely patent LAD stent.  Obstructive sleep apnea  CVA  Admitted on 6/20/2025 secondary to facial droop and left-sided weakness with numbness.  Head CT scan negative for acute abnormality.  CT angio of the head and neck negative.  Abnormal CT perfusion scan.  Subsequent treatment with TNKase  ROSALIND with small PFO no left atrial thrombus.  Implantable loop recorder inserted [facility] by Dr. Hernandez.  Started on aspirin and Xarelto.    Allergies  No Known Allergies    Current Medications    Current Outpatient Medications:     aspirin 81 MG EC tablet, Take 1 tablet by mouth Daily., Disp: 90 tablet, Rfl: 3    coenzyme Q10 100 MG capsule, Take 1 capsule by mouth Daily., Disp: , Rfl:     Cyanocobalamin 1000 MCG/ML kit, Inject  as directed Every 30 (Thirty) Days., Disp: , Rfl:     rivaroxaban (XARELTO) 20 MG tablet, Take 1 tablet by mouth Daily With Dinner., Disp: 90 tablet, Rfl: 0    rosuvastatin (CRESTOR) 40 MG tablet, TAKE 1 TABLET BY MOUTH DAILY, Disp: 90 tablet, Rfl: 3    vitamin B-6 (PYRIDOXINE) 50 MG tablet, Take 1 tablet by mouth Daily., Disp: , Rfl:     History of Present Illness     Pt presents for follow  "up of CVA/PVC/CAD/HTN .  The patient presents today for second opinion of his PVCs.  Since he was last seen in our clinic, pt was admitted on 6/9/2025 secondary to CVA.  During that time, he received TN K's and was recently discharged.  He is back 100% to his baseline with no neurological deficits.  Workup was extensive and found no etiology.  He was found to have a small PFO.  Implantable loop recorder was inserted.  From a PVC standpoint, he states that he feels them frequently which is mild to moderately symptomatic in nature no near-syncope or syncope.  No active chest pain.  His blood pressures for the most part are been well-controlled at home.  He has been started on aspirin and Xarelto with no current bleeding issues.  Patient has been intolerant to beta-blocker therapy for the treatment of his PVCs in the past.        Vitals:    06/18/25 0925   BP: 112/78   BP Location: Left arm   Patient Position: Sitting   Cuff Size: Adult   Pulse: 75   SpO2: 95%   Weight: 76.2 kg (168 lb)   Height: 175.3 cm (69\")     Body mass index is 24.81 kg/m².  PE:  General: NAD  Neck: no JVD, no carotid bruits, no TM  Heart RRR, NL S1, S2, S4 present, no rubs, murmurs  Lungs: CTA, no wheezes, rhonchi, or rales  Abd: soft, non-tender, NL BS  Ext: No musculoskeletal deformities, no edema, cyanosis, or clubbing  Psych: normal mood and affect    Diagnostic Data:      Procedures    Interrogation of implantable loop recorder today demonstrates no episodes of atrial fibrillation.  PVCs are noted.      1. Cryptogenic stroke    2. PVC's (premature ventricular contractions)    3. Coronary artery disease involving coronary bypass graft of native heart without angina pectoris    4. Primary hypertension    5. GAUDENCIO (obstructive sleep apnea)          Plan:    Cryptogenic stroke status post implantable loop recorder with no episodes of A-fib documented today.  Continue aspirin and Xarelto for now.  PVCs symptomatic in nature.  Not a good candidate " for medical therapy in the past as he has had significant side effects to medications.  Is interested in pursuing catheter ablation.  On review of the patient's twelve-lead EKGs, his PVCs appear to be arising from the left ventricular septal area.  Due to his recent stroke, I would like to wait 6 months before pursuing catheter ablation.  Will see him back at that time for further discussion.  Hypertension: Presently controlled on current medical regimen.  CAD.  Follow-up with Dr. Hernandez.    Return to clinic in 6 months.    F/up in 6 months

## 2025-06-23 ENCOUNTER — READMISSION MANAGEMENT (OUTPATIENT)
Dept: CALL CENTER | Facility: HOSPITAL | Age: 75
End: 2025-06-23
Payer: MEDICARE

## 2025-06-23 NOTE — OUTREACH NOTE
Stroke Week 2 Survey      Flowsheet Row Responses   [facility] facility patient discharged from? [place]   Does the patient have one of the following disease processes/diagnoses(primary or secondary)? Stroke   Week 2 attempt successful? No   Unsuccessful attempts Attempt 1   Revoke Bridgette Tatum H - Registered Nurse

## 2025-06-26 ENCOUNTER — TELEPHONE (OUTPATIENT)
Dept: CARDIOLOGY | Facility: CLINIC | Age: 75
End: 2025-06-26
Payer: MEDICARE

## 2025-06-26 NOTE — TELEPHONE ENCOUNTER
"    Caller: Katie Brooks    Relationship: Emergency Contact    Best call back number: [phone]    Who is your current provider: DR. DAVE    Is your current provider offboarding? NO    Who would you like your new provider to be: DR. VIDAL    What are your reasons for transferring care: PT WIFE STATES THAT DR. DAVE SAID \"WE DON'T DO MUCH FOR PTS OVER 70/80\" NOT HAPPY WITH CARE. CONFLICT OF INTEREST.    Additional notes: PLEASE CALL BACK TO ADVISE, THANK YOU.           "

## 2025-07-09 NOTE — TELEPHONE ENCOUNTER
PTS WIFE CALLED BACK TO CHECK ON STATUS ON TRANSFER OF CARE REQUEST, PLEASE CONTACT  AT YOUR EARLIEST CONVENIENCE.

## 2025-07-17 LAB
MDC_IDC_PG_IMPLANT_DTM: NORMAL
MDC_IDC_PG_MFG: NORMAL
MDC_IDC_PG_MODEL: NORMAL
MDC_IDC_PG_SERIAL: NORMAL
MDC_IDC_PG_TYPE: NORMAL
MDC_IDC_SESS_DTM: NORMAL
MDC_IDC_SESS_TYPE: NORMAL

## 2025-07-22 NOTE — TELEPHONE ENCOUNTER
Spoke with pt to transfer care to Dr Walsh. Pt req'd an appt soon to go over results of his loop recorder

## 2025-07-22 NOTE — TELEPHONE ENCOUNTER
PT WIFE CALLED BACK TO GET A STATUS UPDATE ON THE TRANSFER OF CARE. SHE IS UPSET AND ASKING TO SPEAK TO A MANAGER.

## 2025-07-24 ENCOUNTER — OFFICE VISIT (OUTPATIENT)
Dept: NEUROLOGY | Facility: CLINIC | Age: 75
End: 2025-07-24
Payer: MEDICARE

## 2025-07-24 VITALS
DIASTOLIC BLOOD PRESSURE: 74 MMHG | OXYGEN SATURATION: 94 % | RESPIRATION RATE: 16 BRPM | HEIGHT: 69 IN | BODY MASS INDEX: 25.31 KG/M2 | HEART RATE: 63 BPM | WEIGHT: 170.9 LBS | TEMPERATURE: 96.9 F | SYSTOLIC BLOOD PRESSURE: 126 MMHG

## 2025-07-24 DIAGNOSIS — I10 ESSENTIAL HYPERTENSION: ICD-10-CM

## 2025-07-24 DIAGNOSIS — E78.5 DYSLIPIDEMIA, GOAL LDL BELOW 70: ICD-10-CM

## 2025-07-24 DIAGNOSIS — Z86.73 HISTORY OF STROKE: Primary | ICD-10-CM

## 2025-07-24 RX ORDER — CHLORTHALIDONE 25 MG/1
TABLET ORAL
COMMUNITY
Start: 2025-02-06

## 2025-07-25 ENCOUNTER — OFFICE VISIT (OUTPATIENT)
Dept: CARDIOLOGY | Facility: CLINIC | Age: 75
End: 2025-07-25
Payer: MEDICARE

## 2025-07-25 VITALS
HEIGHT: 69 IN | WEIGHT: 170 LBS | OXYGEN SATURATION: 95 % | HEART RATE: 70 BPM | BODY MASS INDEX: 25.18 KG/M2 | SYSTOLIC BLOOD PRESSURE: 128 MMHG | DIASTOLIC BLOOD PRESSURE: 68 MMHG

## 2025-07-25 DIAGNOSIS — I10 ESSENTIAL HYPERTENSION: ICD-10-CM

## 2025-07-25 DIAGNOSIS — E78.5 DYSLIPIDEMIA, GOAL LDL BELOW 70: ICD-10-CM

## 2025-07-25 DIAGNOSIS — I49.3 PVC'S (PREMATURE VENTRICULAR CONTRACTIONS): ICD-10-CM

## 2025-07-25 DIAGNOSIS — Z86.73 HISTORY OF CVA (CEREBROVASCULAR ACCIDENT): ICD-10-CM

## 2025-07-25 DIAGNOSIS — I25.10 CORONARY ARTERY DISEASE INVOLVING NATIVE CORONARY ARTERY OF NATIVE HEART WITHOUT ANGINA PECTORIS: Primary | ICD-10-CM

## 2025-07-25 RX ORDER — BENZALKONIUM CHLORIDE 1.3 MG/ML
CLOTH TOPICAL
COMMUNITY

## 2025-07-25 RX ORDER — BISOPROLOL FUMARATE 5 MG/1
2.5 TABLET, FILM COATED ORAL DAILY
Qty: 30 TABLET | Refills: 3 | Status: SHIPPED | OUTPATIENT
Start: 2025-07-25

## 2025-07-25 NOTE — PROGRESS NOTES
[facility] Cardiology    Encounter Date: 2025    Patient ID: Nic Brooks is a 75 y.o. male.  : 1950     PCP: Francisco Vincent MD       Chief Complaint: Cryptogenic stroke      PROBLEM LIST:  CAD  [facility], 2018: status post PTCA and stenting of a mid/proximal LAD lesion.   [facility], 2023: nonobstructive CAD and a right dominant system. Widely patent LAD stent.   Echo, 2025: EF 56-60%. Grade I impaired relaxation. Small patent foramen ovale present. Positive saline with valsalva maneuver.   CVA  Admitted on 2025 secondary to facial droop and left-sided weakness with numbness.  Head CT scan negative for acute abnormality.  CT angio of the head and neck negative.  Abnormal CT perfusion scan.  Subsequent treatment with TNKase  ROSALIND with small PFO no left atrial thrombus.  Implantable loop recorder inserted [facility] [employer] by Dr. Hernandez.  Venous duplex, 06/10/2025: Normal bilateral LE.   PVCs  Holter, 2024: 17% PVCs.   Hypertension  Hyperlipidemia  Obstructive sleep apnea, on CPAP.   Surgeries:  Total left knee replacement    History of Present Illness  Patient presents today for a hospital follow-up with a history of CAD and cardiac risk factors. Patient was previously seen by Dr. Hernandez and wishes to transfer to our care. In , he was admitted due to a CVA and had extensive workup and finally loop recorder placed. Since then, he has recovered generally well but reports experiencing skipped beats that can sometimes cause lightheadedness and has even awakened him from sleep.  He was noted to have frequent PVCs of 17% on cardiac monitor and has already seen Dr. Méndez for further management.    He was previously tried on metoprolol which caused significant bradycardia and it was discontinued.  He is currently not on any beta-blockers.    Patient has noted somewhat less energetic and after initial activities of the day he rests and sleeps.  He has  "been on chlorthalidone 25 mg recently and previously used to take 50.  He has noted mild worsening of left ankle edema.  Denies orthopnea PND or syncope no chest pain.      No Known Allergies      Current Outpatient Medications:     aspirin 81 MG EC tablet, Take 1 tablet by mouth Daily., Disp: 90 tablet, Rfl: 3    chlorthalidone (HYGROTON) 25 MG tablet, , Disp: , Rfl:     coenzyme Q10 100 MG capsule, Take 1 capsule by mouth Daily., Disp: , Rfl:     Cyanocobalamin 1000 MCG/ML kit, Inject  as directed Every 30 (Thirty) Days., Disp: , Rfl:     Respiratory Therapy Supplies (CareTouch 2 CPAP Hose ) misc, Use., Disp: , Rfl:     rivaroxaban (XARELTO) 20 MG tablet, Take 1 tablet by mouth Daily With Dinner., Disp: 90 tablet, Rfl: 0    rosuvastatin (CRESTOR) 40 MG tablet, TAKE 1 TABLET BY MOUTH DAILY, Disp: 90 tablet, Rfl: 3    vitamin B-6 (PYRIDOXINE) 50 MG tablet, Take 1 tablet by mouth Daily., Disp: , Rfl:     bisoprolol (ZEBeta) 5 MG tablet, Take 0.5 tablets by mouth Daily., Disp: 30 tablet, Rfl: 3    The following portions of the patient's history were reviewed and updated as appropriate: allergies, current medications, past family history, past medical history, past social history, past surgical history and problem list.    ROS  Review of Systems   14 point ROS negative except for that listed in the HPI.         Objective:     /68 (BP Location: Left arm, Patient Position: Sitting, Cuff Size: Adult)   Pulse 70   Ht 175.3 cm (69\")   Wt 77.1 kg (170 lb)   SpO2 95%   BMI 25.10 kg/m²      Physical Exam  Constitutional: Patient appears well-developed and well-nourished.   HENT: HEENT exam unremarkable.   Neck: Neck supple. No JVD present. No carotid bruits.   Cardiovascular: Normal rate, regular rhythm and normal heart sounds. No murmur heard.   2+ symmetric pulses.   Pulmonary/Chest: Breath sounds normal. Does not exhibit tenderness.   Abdominal: Abdomen benign.   Musculoskeletal: Does not exhibit edema. "   Neurological: Neurological exam unremarkable.   Vitals reviewed.    Data Review:   Lab Results   Component Value Date    GLUCOSE 104 (H) 06/12/2025    BUN 15.1 06/12/2025    CREATININE 1.09 06/12/2025    EGFR 70.3 06/12/2025    BCR 13.9 06/12/2025     (L) 06/12/2025    K 3.6 06/12/2025    CO2 24.0 06/12/2025    CALCIUM 8.8 06/12/2025     Lab Results   Component Value Date    CHOL 121 06/09/2025    TRIG 123 06/09/2025    HDL 41 06/09/2025    LDL 58 06/09/2025      Lab Results   Component Value Date    WBC 9.26 06/12/2025    RBC 4.90 06/12/2025    HGB 15.3 06/12/2025    HCT 44.8 06/12/2025    MCV 91.4 06/12/2025     06/12/2025     Lab Results   Component Value Date    HGBA1C 5.80 (H) 06/09/2025        Procedures   Manual device interrogation, 07/25/25:   Normal, well-functioning [employer] with 3 years of battery life remaining.   Events: Frequent PVCs.         Advance Care Planning   ACP discussion was held with the patient during this visit. Patient does not have an advance directive, declines further assistance.           Assessment:      Diagnosis Plan   1. Coronary artery disease involving native coronary artery of native heart without angina pectoris most recent cardiac cath had shown patent stent. Stable without angina on current activity. Continue on aspirin 81 mg for antiplatelet therapy.       2. PVC's (premature ventricular contractions)  17% PVC burden on last Holter. Start on bisoprolol 2.5 mg daily.      3. History of CVA.  No recent stroke like symptoms. Continue on Xarelto 20 mg daily and aspirin 81 mg daily forstroke prophylaxis.  Loop recorder shows PVCs but no evidence of atrial fibrillation      4. Essential hypertension.  Well controlled. Continue chlorthalidone 25 mg daily.       5.      Dyslipidemia, goal LDL below 70                                    Controlled. Continue statin therapy.      Plan:   Stable cardiac status. No current angina or CHF symptoms.   Start on bisoprolol  2.5 mg daily for management of frequent symptomatic PVCs  Continue rest of the current medications.   Explained that further increase in chlorthalidone may lead to dehydration and for now due to minimal left ankle edema I have recommended use of compression stockings and leg elevation.  In the absence of right ankle edema or any other signs of volume overload aggressive diuretic therapy is not indicated.  Patient to follow-up with Dr. Méndez for further management of his frequent PVCs  FU in 3 MO, sooner as needed.  Thank you for allowing us to participate in the care of your patient.     Scribed for Susannah Walsh MD by Kindra Alcala. 7/25/2025  11:32 EDT    I, Susannah Walsh MD, personally performed the services described in this documentation as scribed by the above named individual in my presence, and it is both accurate and complete.  7/25/2025  12:36 EDT      Please note that portions of this note may have been completed with a voice recognition program. Efforts were made to edit the dictations, but occasionally words are mistranscribed.

## 2025-08-28 ENCOUNTER — TELEPHONE (OUTPATIENT)
Dept: CARDIOLOGY | Facility: CLINIC | Age: 75
End: 2025-08-28
Payer: MEDICARE

## (undated) DEVICE — SYR LUERLOK 20CC BX/50

## (undated) DEVICE — CATH IMG DRAGONFLY OPTIS 2.7F 135CM

## (undated) DEVICE — TB SXN FRAZIER 12F STRL

## (undated) DEVICE — MODEL BT2000 P/N 700287-012KIT CONTENTS: MANIFOLD WITH SALINE AND CONTRAST PORTS, SALINE TUBING WITH SPIKE AND HAND SYRINGE, TRANSDUCER: Brand: BT2000 AUTOMATED MANIFOLD KIT

## (undated) DEVICE — ST INF PRI SMRTSTE 20DRP 2VLV 24ML 117

## (undated) DEVICE — GLV SURG SENSICARE W/ALOE PF LF 9 STRL

## (undated) DEVICE — PATIENT RETURN ELECTRODE, SINGLE-USE, CONTACT QUALITY MONITORING, ADULT, WITH 9FT CORD, FOR PATIENTS WEIGING OVER 33LBS. (15KG): Brand: MEGADYNE

## (undated) DEVICE — GLV SURG PREMIERPRO MIC LTX PF SZ7 BRN

## (undated) DEVICE — BLANKT WARM UPPR/BDY ARM/OUT 57X196CM

## (undated) DEVICE — BNDG ELAS W/CLIP 6IN 10YD LF STRL

## (undated) DEVICE — HDRST INTUB GENTLETOUCH SLOT 7IN RT

## (undated) DEVICE — ANTIBACTERIAL UNDYED BRAIDED (POLYGLACTIN 910), SYNTHETIC ABSORBABLE SUTURE: Brand: COATED VICRYL

## (undated) DEVICE — SPNG GZ STRL 2S 4X4 12PLY

## (undated) DEVICE — PK NEURO DISC 10

## (undated) DEVICE — PK CATH CARD 10

## (undated) DEVICE — CABL BIPOL MEGADYNE 12FT DISP

## (undated) DEVICE — SYS SKIN CLS DERMABOND PRINEO W/22CM MESH TP

## (undated) DEVICE — GW PRESS VERRATA STR 185CM

## (undated) DEVICE — STRAP POSTN KN/BDY FM 5X72IN DISP

## (undated) DEVICE — GLV SURG PREMIERPRO MIC LTX PF SZ8.5 BRN

## (undated) DEVICE — INTRO SHEATH ART/FEM ENGAGE .038 6F12CM

## (undated) DEVICE — DRSNG PAD ABD 8X10IN STRL

## (undated) DEVICE — GW PERIPH GUIDERIGHT STD/EXCHNG/J/TIP SS 0.035IN 5X260CM

## (undated) DEVICE — STRYKER PERFORMANCE SERIES SAGITTAL BLADE: Brand: STRYKER PERFORMANCE SERIES

## (undated) DEVICE — DRSNG SURESITE123 4X4.8IN

## (undated) DEVICE — PUMP PAIN AUTOFUSER AUTO SELCT NOBOLUS 1TO14ML/HR 550ML DISP

## (undated) DEVICE — NDL HYPO ECLPS SFTY 18G 1 1/2IN

## (undated) DEVICE — CATH DIAG EXPO M/ PK 6FR FL4/FR4 PIG 3PK

## (undated) DEVICE — SSC BONE WAX
Type: IMPLANTABLE DEVICE | Site: SPINE LUMBAR | Status: NON-FUNCTIONAL
Brand: SSC BONE WAX
Removed: 2025-01-20

## (undated) DEVICE — CEMENT MIXING SYSTEM WITH MIS FEMORAL BREAKAWAY NOZZLE: Brand: REVOLUTION

## (undated) DEVICE — DRSNG WND BORDR/ADHS NONADHR/GZ LF 4X4IN STRL

## (undated) DEVICE — C-ARM DRAPE: Brand: DEROYAL

## (undated) DEVICE — GLV SURG PREMIERPRO MIC LTX PF SZ6.5 BRN

## (undated) DEVICE — TOOL MR8-14MH30D MR8 14CM MATCH DMD 3MM: Brand: MIDAS REX MR8

## (undated) DEVICE — UNDERCAST PADDING: Brand: DEROYAL

## (undated) DEVICE — BALN NC/EUPHORA RX 3.50X20MM

## (undated) DEVICE — GLIDESHEATH SLENDER STAINLESS STEEL KIT: Brand: GLIDESHEATH SLENDER

## (undated) DEVICE — DEV COMPR RADL PRELUDESYNCEZ 30ML 26CM

## (undated) DEVICE — SUT VIC PLS CTD ANTIB BR 3/0 8/18IN 45CM

## (undated) DEVICE — ADULT, W/LG. BACK PAD, RADIOTRANSPARENT ELEMENT AND LEAD WIRE COMPATIBLE W/: Brand: DEFIBRILLATION ELECTRODES

## (undated) DEVICE — GW J TP FIX CORE .035 150

## (undated) DEVICE — CVR HNDL LT SURG ACCSSRY BLU STRL

## (undated) DEVICE — GLV SURG PREMIERPRO MIC LTX PF SZ7.5 BRN

## (undated) DEVICE — Device

## (undated) DEVICE — PK KN TOTL 10

## (undated) DEVICE — ANGIO-SEAL VIP VASCULAR CLOSURE DEVICE: Brand: ANGIO-SEAL

## (undated) DEVICE — MODEL AT P65, P/N 701554-001KIT CONTENTS: HAND CONTROLLER, 3-WAY HIGH-PRESSURE STOPCOCK WITH ROTATING END AND PREMIUM HIGH-PRESSURE TUBING: Brand: ANGIOTOUCH® KIT

## (undated) DEVICE — CONN TBG Y 5 IN 1 LF STRL

## (undated) DEVICE — GUIDE CATHETER: Brand: MACH1™

## (undated) DEVICE — NON RIMMED SPEED PIN 65MM STERILE

## (undated) DEVICE — CATH DIAG EXPO M/ PK 5F FL4/FR4 PIG

## (undated) DEVICE — KT MANIFOLD CATHLAB CUST

## (undated) DEVICE — CVR HNDL LIGHT RIGID

## (undated) DEVICE — MICRO HVTSA, 0.5G AND HVTSA SOURCEMARK PRODUCT CODE M1206 AND M1206-01: Brand: EXOFIN MICRO HVTSA, 0.5G